# Patient Record
Sex: FEMALE | Race: OTHER | NOT HISPANIC OR LATINO
[De-identification: names, ages, dates, MRNs, and addresses within clinical notes are randomized per-mention and may not be internally consistent; named-entity substitution may affect disease eponyms.]

---

## 2017-05-09 PROBLEM — Z00.00 ENCOUNTER FOR PREVENTIVE HEALTH EXAMINATION: Status: ACTIVE | Noted: 2017-05-09

## 2017-05-16 ENCOUNTER — APPOINTMENT (OUTPATIENT)
Dept: PULMONOLOGY | Facility: CLINIC | Age: 82
End: 2017-05-16

## 2017-05-23 ENCOUNTER — OUTPATIENT (OUTPATIENT)
Dept: OUTPATIENT SERVICES | Facility: HOSPITAL | Age: 82
LOS: 1 days | End: 2017-05-23
Payer: MEDICARE

## 2017-05-23 PROCEDURE — 78815 PET IMAGE W/CT SKULL-THIGH: CPT | Mod: 26,PI

## 2017-05-23 PROCEDURE — A9552: CPT

## 2017-05-23 PROCEDURE — 78815 PET IMAGE W/CT SKULL-THIGH: CPT

## 2017-05-26 ENCOUNTER — APPOINTMENT (OUTPATIENT)
Dept: PULMONOLOGY | Facility: CLINIC | Age: 82
End: 2017-05-26

## 2017-05-26 VITALS
HEART RATE: 67 BPM | DIASTOLIC BLOOD PRESSURE: 72 MMHG | WEIGHT: 149 LBS | SYSTOLIC BLOOD PRESSURE: 128 MMHG | OXYGEN SATURATION: 98 % | BODY MASS INDEX: 25.13 KG/M2 | TEMPERATURE: 96.9 F | HEIGHT: 64.5 IN

## 2017-05-26 DIAGNOSIS — Z86.79 PERSONAL HISTORY OF OTHER DISEASES OF THE CIRCULATORY SYSTEM: ICD-10-CM

## 2017-05-26 DIAGNOSIS — Z86.39 PERSONAL HISTORY OF OTHER ENDOCRINE, NUTRITIONAL AND METABOLIC DISEASE: ICD-10-CM

## 2017-05-26 DIAGNOSIS — Z80.3 FAMILY HISTORY OF MALIGNANT NEOPLASM OF BREAST: ICD-10-CM

## 2017-05-26 DIAGNOSIS — F17.200 NICOTINE DEPENDENCE, UNSPECIFIED, UNCOMPLICATED: ICD-10-CM

## 2017-05-26 DIAGNOSIS — Z87.442 PERSONAL HISTORY OF URINARY CALCULI: ICD-10-CM

## 2017-05-26 RX ORDER — ASPIRIN 81 MG
81 TABLET, DELAYED RELEASE (ENTERIC COATED) ORAL
Refills: 0 | Status: ACTIVE | COMMUNITY

## 2017-05-26 RX ORDER — FOLIC ACID 20 MG
CAPSULE ORAL
Refills: 0 | Status: ACTIVE | COMMUNITY

## 2017-05-26 RX ORDER — LISINOPRIL 30 MG/1
TABLET ORAL
Refills: 0 | Status: ACTIVE | COMMUNITY

## 2017-05-30 LAB
ALBUMIN SERPL ELPH-MCNC: 4.5 G/DL
ALP BLD-CCNC: 47 U/L
ALT SERPL-CCNC: 9 U/L
ANION GAP SERPL CALC-SCNC: 16 MMOL/L
APTT BLD: 28.7 SEC
AST SERPL-CCNC: 16 U/L
BASOPHILS # BLD AUTO: 0.03 K/UL
BASOPHILS NFR BLD AUTO: 0.3 %
BILIRUB SERPL-MCNC: 0.3 MG/DL
BUN SERPL-MCNC: 30 MG/DL
CALCIUM SERPL-MCNC: 10.8 MG/DL
CHLORIDE SERPL-SCNC: 106 MMOL/L
CO2 SERPL-SCNC: 20 MMOL/L
CREAT SERPL-MCNC: 1.79 MG/DL
EOSINOPHIL # BLD AUTO: 0.24 K/UL
EOSINOPHIL NFR BLD AUTO: 2.1 %
GLUCOSE SERPL-MCNC: 104 MG/DL
HCT VFR BLD CALC: 38.5 %
HGB BLD-MCNC: 12.9 G/DL
IMM GRANULOCYTES NFR BLD AUTO: 0.2 %
INR PPP: 1.04 RATIO
LYMPHOCYTES # BLD AUTO: 3.54 K/UL
LYMPHOCYTES NFR BLD AUTO: 31.4 %
MAN DIFF?: NORMAL
MCHC RBC-ENTMCNC: 27.3 PG
MCHC RBC-ENTMCNC: 33.5 GM/DL
MCV RBC AUTO: 81.6 FL
MONOCYTES # BLD AUTO: 0.7 K/UL
MONOCYTES NFR BLD AUTO: 6.2 %
NEUTROPHILS # BLD AUTO: 6.73 K/UL
NEUTROPHILS NFR BLD AUTO: 59.8 %
PLATELET # BLD AUTO: 333 K/UL
POTASSIUM SERPL-SCNC: 5.3 MMOL/L
PROT SERPL-MCNC: 7.6 G/DL
PT BLD: 11.8 SEC
RBC # BLD: 4.72 M/UL
RBC # FLD: 14.9 %
SODIUM SERPL-SCNC: 142 MMOL/L
WBC # FLD AUTO: 11.26 K/UL

## 2017-06-05 ENCOUNTER — APPOINTMENT (OUTPATIENT)
Dept: PULMONOLOGY | Facility: CLINIC | Age: 82
End: 2017-06-05

## 2017-06-05 VITALS
HEART RATE: 68 BPM | OXYGEN SATURATION: 98 % | HEIGHT: 64.5 IN | WEIGHT: 146 LBS | DIASTOLIC BLOOD PRESSURE: 70 MMHG | SYSTOLIC BLOOD PRESSURE: 130 MMHG | BODY MASS INDEX: 24.62 KG/M2 | TEMPERATURE: 97.9 F

## 2017-06-07 LAB
ANION GAP SERPL CALC-SCNC: 21 MMOL/L
BUN SERPL-MCNC: 29 MG/DL
CALCIUM SERPL-MCNC: 11 MG/DL
CHLORIDE SERPL-SCNC: 107 MMOL/L
CO2 SERPL-SCNC: 19 MMOL/L
CREAT SERPL-MCNC: 1.76 MG/DL
GLUCOSE SERPL-MCNC: 102 MG/DL
POTASSIUM SERPL-SCNC: 5.6 MMOL/L
SODIUM SERPL-SCNC: 147 MMOL/L

## 2017-06-14 ENCOUNTER — RESULT REVIEW (OUTPATIENT)
Age: 82
End: 2017-06-14

## 2017-06-14 ENCOUNTER — APPOINTMENT (OUTPATIENT)
Dept: PULMONOLOGY | Facility: HOSPITAL | Age: 82
End: 2017-06-14

## 2017-06-14 ENCOUNTER — OUTPATIENT (OUTPATIENT)
Dept: OUTPATIENT SERVICES | Facility: HOSPITAL | Age: 82
LOS: 1 days | Discharge: HOME | End: 2017-06-14
Payer: MEDICARE

## 2017-06-14 LAB
BASE EXCESS BLDV CALC-SCNC: -2.1 MMOL/L — SIGNIFICANT CHANGE UP
CA-I SERPL-SCNC: 1.34 MMOL/L — HIGH (ref 1.12–1.3)
GAS PNL BLDV: 144 MMOL/L — SIGNIFICANT CHANGE UP (ref 138–146)
GAS PNL BLDV: SIGNIFICANT CHANGE UP
GAS PNL BLDV: SIGNIFICANT CHANGE UP
HCO3 BLDV-SCNC: 24 MMOL/L — SIGNIFICANT CHANGE UP (ref 20–27)
PCO2 BLDV: 44 MMHG — SIGNIFICANT CHANGE UP (ref 41–51)
PH BLDV: 7.35 — SIGNIFICANT CHANGE UP (ref 7.32–7.43)
PO2 BLDV: 16 MMHG — SIGNIFICANT CHANGE UP
POTASSIUM BLDV-SCNC: 4.7 MMOL/L — SIGNIFICANT CHANGE UP (ref 3.5–4.9)
SAO2 % BLDV: 21 % — SIGNIFICANT CHANGE UP

## 2017-06-14 PROCEDURE — 88173 CYTOPATH EVAL FNA REPORT: CPT

## 2017-06-14 PROCEDURE — 31652 BRONCH EBUS SAMPLNG 1/2 NODE: CPT

## 2017-06-14 PROCEDURE — 31624 DX BRONCHOSCOPE/LAVAGE: CPT

## 2017-06-14 PROCEDURE — 84132 ASSAY OF SERUM POTASSIUM: CPT

## 2017-06-14 PROCEDURE — 31625 BRONCHOSCOPY W/BIOPSY(S): CPT

## 2017-06-14 PROCEDURE — 84295 ASSAY OF SERUM SODIUM: CPT

## 2017-06-14 PROCEDURE — 88112 CYTOPATH CELL ENHANCE TECH: CPT

## 2017-06-14 PROCEDURE — 88305 TISSUE EXAM BY PATHOLOGIST: CPT

## 2017-06-14 PROCEDURE — 82330 ASSAY OF CALCIUM: CPT

## 2017-06-14 PROCEDURE — 87102 FUNGUS ISOLATION CULTURE: CPT

## 2017-06-14 PROCEDURE — 87070 CULTURE OTHR SPECIMN AEROBIC: CPT

## 2017-06-14 PROCEDURE — 88341 IMHCHEM/IMCYTCHM EA ADD ANTB: CPT

## 2017-06-14 PROCEDURE — 87116 MYCOBACTERIA CULTURE: CPT

## 2017-06-14 PROCEDURE — 87206 SMEAR FLUORESCENT/ACID STAI: CPT

## 2017-06-14 PROCEDURE — 87015 SPECIMEN INFECT AGNT CONCNTJ: CPT

## 2017-06-14 PROCEDURE — 82803 BLOOD GASES ANY COMBINATION: CPT

## 2017-06-15 LAB
GRAM STN FLD: SIGNIFICANT CHANGE UP
NIGHT BLUE STAIN TISS: SIGNIFICANT CHANGE UP
NON-GYNECOLOGICAL CYTOLOGY STUDY: SIGNIFICANT CHANGE UP
SPECIMEN SOURCE: SIGNIFICANT CHANGE UP
SPECIMEN SOURCE: SIGNIFICANT CHANGE UP

## 2017-06-16 LAB
CULTURE RESULTS: NO GROWTH — SIGNIFICANT CHANGE UP
SPECIMEN SOURCE: SIGNIFICANT CHANGE UP
SURGICAL PATHOLOGY STUDY: SIGNIFICANT CHANGE UP

## 2017-06-20 DIAGNOSIS — C77.1 SECONDARY AND UNSPECIFIED MALIGNANT NEOPLASM OF INTRATHORACIC LYMPH NODES: ICD-10-CM

## 2017-06-20 DIAGNOSIS — E11.9 TYPE 2 DIABETES MELLITUS WITHOUT COMPLICATIONS: ICD-10-CM

## 2017-06-20 DIAGNOSIS — F17.200 NICOTINE DEPENDENCE, UNSPECIFIED, UNCOMPLICATED: ICD-10-CM

## 2017-06-20 DIAGNOSIS — C34.11 MALIGNANT NEOPLASM OF UPPER LOBE, RIGHT BRONCHUS OR LUNG: ICD-10-CM

## 2017-06-20 DIAGNOSIS — I10 ESSENTIAL (PRIMARY) HYPERTENSION: ICD-10-CM

## 2017-06-22 ENCOUNTER — APPOINTMENT (OUTPATIENT)
Dept: THORACIC SURGERY | Facility: CLINIC | Age: 82
End: 2017-06-22

## 2017-06-22 VITALS
OXYGEN SATURATION: 97 % | TEMPERATURE: 98.2 F | WEIGHT: 150 LBS | RESPIRATION RATE: 17 BRPM | HEART RATE: 58 BPM | HEIGHT: 59 IN | DIASTOLIC BLOOD PRESSURE: 58 MMHG | BODY MASS INDEX: 30.24 KG/M2 | SYSTOLIC BLOOD PRESSURE: 126 MMHG

## 2017-06-23 RX ORDER — AZITHROMYCIN 250 MG/1
250 TABLET, FILM COATED ORAL
Qty: 6 | Refills: 0 | Status: COMPLETED | COMMUNITY
Start: 2017-03-27

## 2017-06-23 RX ORDER — OXYCODONE AND ACETAMINOPHEN 5; 325 MG/1; MG/1
5-325 TABLET ORAL
Qty: 12 | Refills: 0 | Status: COMPLETED | COMMUNITY
Start: 2017-05-02

## 2017-06-23 RX ORDER — OMEPRAZOLE 20 MG/1
20 CAPSULE, DELAYED RELEASE ORAL
Qty: 30 | Refills: 0 | Status: COMPLETED | COMMUNITY
Start: 2017-04-08

## 2017-06-23 RX ORDER — METFORMIN ER 500 MG 500 MG/1
500 TABLET ORAL
Qty: 90 | Refills: 0 | Status: COMPLETED | COMMUNITY
Start: 2016-03-24

## 2017-06-23 RX ORDER — FUROSEMIDE 20 MG/1
20 TABLET ORAL
Qty: 30 | Refills: 0 | Status: ACTIVE | COMMUNITY
Start: 2017-04-08

## 2017-06-23 RX ORDER — QUETIAPINE FUMARATE 25 MG/1
25 TABLET ORAL
Qty: 30 | Refills: 0 | Status: COMPLETED | COMMUNITY
Start: 2017-04-08

## 2017-06-23 RX ORDER — FLUTICASONE PROPIONATE 50 UG/1
50 SPRAY, METERED NASAL
Qty: 16 | Refills: 0 | Status: COMPLETED | COMMUNITY
Start: 2017-03-27

## 2017-06-23 RX ORDER — IBUPROFEN 600 MG/1
600 TABLET, FILM COATED ORAL
Qty: 28 | Refills: 0 | Status: COMPLETED | COMMUNITY
Start: 2017-05-02

## 2017-06-23 RX ORDER — CEPHALEXIN 500 MG/1
500 CAPSULE ORAL
Qty: 28 | Refills: 0 | Status: COMPLETED | COMMUNITY
Start: 2017-05-02

## 2017-07-06 ENCOUNTER — OUTPATIENT (OUTPATIENT)
Dept: OUTPATIENT SERVICES | Facility: HOSPITAL | Age: 82
LOS: 1 days | End: 2017-07-06
Payer: MEDICARE

## 2017-07-06 ENCOUNTER — APPOINTMENT (OUTPATIENT)
Dept: THORACIC SURGERY | Facility: CLINIC | Age: 82
End: 2017-07-06

## 2017-07-06 VITALS
RESPIRATION RATE: 20 BRPM | WEIGHT: 151 LBS | DIASTOLIC BLOOD PRESSURE: 62 MMHG | BODY MASS INDEX: 30.5 KG/M2 | HEART RATE: 98 BPM | TEMPERATURE: 97.9 F | OXYGEN SATURATION: 97 % | SYSTOLIC BLOOD PRESSURE: 143 MMHG

## 2017-07-06 DIAGNOSIS — R91.8 OTHER NONSPECIFIC ABNORMAL FINDING OF LUNG FIELD: ICD-10-CM

## 2017-07-06 DIAGNOSIS — R91.1 SOLITARY PULMONARY NODULE: ICD-10-CM

## 2017-07-06 LAB
ALBUMIN SERPL ELPH-MCNC: 4.2 G/DL — SIGNIFICANT CHANGE UP (ref 3.3–5)
ALP SERPL-CCNC: 59 U/L — SIGNIFICANT CHANGE UP (ref 40–120)
ALT FLD-CCNC: 8 U/L — LOW (ref 10–45)
ANION GAP SERPL CALC-SCNC: 16 MMOL/L — SIGNIFICANT CHANGE UP (ref 5–17)
APTT BLD: 28.1 SEC — SIGNIFICANT CHANGE UP (ref 27.5–37.4)
AST SERPL-CCNC: 12 U/L — SIGNIFICANT CHANGE UP (ref 10–40)
BASOPHILS NFR BLD AUTO: 0.5 % — SIGNIFICANT CHANGE UP (ref 0–2)
BILIRUB SERPL-MCNC: 0.2 MG/DL — SIGNIFICANT CHANGE UP (ref 0.2–1.2)
BUN SERPL-MCNC: 31 MG/DL — HIGH (ref 7–23)
CALCIUM SERPL-MCNC: 9.9 MG/DL — SIGNIFICANT CHANGE UP (ref 8.4–10.5)
CHLORIDE SERPL-SCNC: 101 MMOL/L — SIGNIFICANT CHANGE UP (ref 96–108)
CO2 SERPL-SCNC: 25 MMOL/L — SIGNIFICANT CHANGE UP (ref 22–31)
CREAT SERPL-MCNC: 2.3 MG/DL — HIGH (ref 0.5–1.3)
EOSINOPHIL NFR BLD AUTO: 2.5 % — SIGNIFICANT CHANGE UP (ref 0–6)
GLUCOSE SERPL-MCNC: 130 MG/DL — HIGH (ref 70–99)
HCT VFR BLD CALC: 36.3 % — SIGNIFICANT CHANGE UP (ref 34.5–45)
HGB BLD-MCNC: 12.9 G/DL — SIGNIFICANT CHANGE UP (ref 11.5–15.5)
INR BLD: 1.07 — SIGNIFICANT CHANGE UP (ref 0.88–1.16)
LYMPHOCYTES # BLD AUTO: 30.4 % — SIGNIFICANT CHANGE UP (ref 13–44)
MCHC RBC-ENTMCNC: 28.2 PG — SIGNIFICANT CHANGE UP (ref 27–34)
MCHC RBC-ENTMCNC: 35.5 G/DL — SIGNIFICANT CHANGE UP (ref 32–36)
MCV RBC AUTO: 79.3 FL — LOW (ref 80–100)
MONOCYTES NFR BLD AUTO: 8.4 % — SIGNIFICANT CHANGE UP (ref 2–14)
NEUTROPHILS NFR BLD AUTO: 58.2 % — SIGNIFICANT CHANGE UP (ref 43–77)
PLATELET # BLD AUTO: 346 K/UL — SIGNIFICANT CHANGE UP (ref 150–400)
POTASSIUM SERPL-MCNC: 4.8 MMOL/L — SIGNIFICANT CHANGE UP (ref 3.5–5.3)
POTASSIUM SERPL-SCNC: 4.8 MMOL/L — SIGNIFICANT CHANGE UP (ref 3.5–5.3)
PROT SERPL-MCNC: 7 G/DL — SIGNIFICANT CHANGE UP (ref 6–8.3)
PROTHROM AB SERPL-ACNC: 11.9 SEC — SIGNIFICANT CHANGE UP (ref 9.8–12.7)
RBC # BLD: 4.58 M/UL — SIGNIFICANT CHANGE UP (ref 3.8–5.2)
RBC # FLD: 14.3 % — SIGNIFICANT CHANGE UP (ref 10.3–16.9)
SODIUM SERPL-SCNC: 142 MMOL/L — SIGNIFICANT CHANGE UP (ref 135–145)
WBC # BLD: 10.8 K/UL — HIGH (ref 3.8–10.5)
WBC # FLD AUTO: 10.8 K/UL — HIGH (ref 3.8–10.5)

## 2017-07-06 PROCEDURE — 85025 COMPLETE CBC W/AUTO DIFF WBC: CPT

## 2017-07-06 PROCEDURE — 85610 PROTHROMBIN TIME: CPT

## 2017-07-06 PROCEDURE — 80053 COMPREHEN METABOLIC PANEL: CPT

## 2017-07-06 PROCEDURE — 36415 COLL VENOUS BLD VENIPUNCTURE: CPT

## 2017-07-06 PROCEDURE — 85730 THROMBOPLASTIN TIME PARTIAL: CPT

## 2017-07-19 LAB
CULTURE RESULTS: SIGNIFICANT CHANGE UP
SPECIMEN SOURCE: SIGNIFICANT CHANGE UP

## 2017-07-28 ENCOUNTER — RESULT REVIEW (OUTPATIENT)
Age: 82
End: 2017-07-28

## 2017-07-28 ENCOUNTER — OUTPATIENT (OUTPATIENT)
Dept: OUTPATIENT SERVICES | Facility: HOSPITAL | Age: 82
LOS: 1 days | End: 2017-07-28
Payer: MEDICARE

## 2017-07-28 PROCEDURE — 32405: CPT

## 2017-07-28 PROCEDURE — 88360 TUMOR IMMUNOHISTOCHEM/MANUAL: CPT

## 2017-07-28 PROCEDURE — 88342 IMHCHEM/IMCYTCHM 1ST ANTB: CPT

## 2017-07-28 PROCEDURE — 88173 CYTOPATH EVAL FNA REPORT: CPT

## 2017-07-28 PROCEDURE — 71035: CPT | Mod: 26

## 2017-07-28 PROCEDURE — 71035: CPT

## 2017-07-28 PROCEDURE — 77012 CT SCAN FOR NEEDLE BIOPSY: CPT | Mod: 26

## 2017-07-28 PROCEDURE — 88305 TISSUE EXAM BY PATHOLOGIST: CPT

## 2017-07-28 PROCEDURE — 77012 CT SCAN FOR NEEDLE BIOPSY: CPT

## 2017-07-28 PROCEDURE — 88341 IMHCHEM/IMCYTCHM EA ADD ANTB: CPT

## 2017-07-29 LAB
CULTURE RESULTS: SIGNIFICANT CHANGE UP
SPECIMEN SOURCE: SIGNIFICANT CHANGE UP

## 2017-08-01 LAB — NON-GYNECOLOGICAL CYTOLOGY STUDY: SIGNIFICANT CHANGE UP

## 2017-08-02 LAB — SURGICAL PATHOLOGY STUDY: SIGNIFICANT CHANGE UP

## 2017-08-04 ENCOUNTER — APPOINTMENT (OUTPATIENT)
Dept: THORACIC SURGERY | Facility: CLINIC | Age: 82
End: 2017-08-04

## 2017-08-07 ENCOUNTER — APPOINTMENT (OUTPATIENT)
Dept: THORACIC SURGERY | Facility: CLINIC | Age: 82
End: 2017-08-07

## 2017-08-07 ENCOUNTER — APPOINTMENT (OUTPATIENT)
Dept: THORACIC SURGERY | Facility: CLINIC | Age: 82
End: 2017-08-07
Payer: MEDICARE

## 2017-08-07 VITALS
RESPIRATION RATE: 17 BRPM | HEART RATE: 66 BPM | DIASTOLIC BLOOD PRESSURE: 65 MMHG | TEMPERATURE: 97.9 F | OXYGEN SATURATION: 95 % | BODY MASS INDEX: 31.1 KG/M2 | SYSTOLIC BLOOD PRESSURE: 150 MMHG | WEIGHT: 154 LBS

## 2017-08-07 DIAGNOSIS — R59.0 LOCALIZED ENLARGED LYMPH NODES: ICD-10-CM

## 2017-08-07 PROCEDURE — 99215 OFFICE O/P EST HI 40 MIN: CPT

## 2017-08-12 PROBLEM — R59.0 LYMPHADENOPATHY, MEDIASTINAL: Status: ACTIVE | Noted: 2017-05-26

## 2017-08-15 RX ORDER — DEXAMETHASONE 0.5 MG/5ML
20 ELIXIR ORAL ONCE
Qty: 0 | Refills: 0 | Status: DISCONTINUED | OUTPATIENT
Start: 2017-09-12 | End: 2017-09-27

## 2017-08-15 RX ORDER — CARBOPLATIN 50 MG
106 VIAL (EA) INTRAVENOUS ONCE
Qty: 0 | Refills: 0 | Status: DISCONTINUED | OUTPATIENT
Start: 2017-09-12 | End: 2017-09-27

## 2017-08-15 RX ORDER — ONDANSETRON 8 MG/1
16 TABLET, FILM COATED ORAL ONCE
Qty: 0 | Refills: 0 | Status: DISCONTINUED | OUTPATIENT
Start: 2017-09-12 | End: 2017-09-27

## 2017-08-15 RX ORDER — PACLITAXEL 6 MG/ML
80 INJECTION, SOLUTION, CONCENTRATE INTRAVENOUS ONCE
Qty: 0 | Refills: 0 | Status: DISCONTINUED | OUTPATIENT
Start: 2017-09-12 | End: 2017-09-27

## 2017-09-06 ENCOUNTER — OUTPATIENT (OUTPATIENT)
Dept: OUTPATIENT SERVICES | Facility: HOSPITAL | Age: 82
LOS: 1 days | End: 2017-09-06
Payer: MEDICARE

## 2017-09-06 PROCEDURE — 71250 CT THORAX DX C-: CPT

## 2017-09-06 PROCEDURE — 70551 MRI BRAIN STEM W/O DYE: CPT

## 2017-09-06 PROCEDURE — 70551 MRI BRAIN STEM W/O DYE: CPT | Mod: 26

## 2017-09-06 PROCEDURE — 71250 CT THORAX DX C-: CPT | Mod: 26

## 2017-09-12 ENCOUNTER — OUTPATIENT (OUTPATIENT)
Dept: OUTPATIENT SERVICES | Facility: HOSPITAL | Age: 82
LOS: 1 days | End: 2017-09-12
Payer: MEDICARE

## 2017-09-12 DIAGNOSIS — C34.90 MALIGNANT NEOPLASM OF UNSPECIFIED PART OF UNSPECIFIED BRONCHUS OR LUNG: ICD-10-CM

## 2017-09-12 DIAGNOSIS — R11.2 NAUSEA WITH VOMITING, UNSPECIFIED: ICD-10-CM

## 2017-09-12 PROCEDURE — 96417 CHEMO IV INFUS EACH ADDL SEQ: CPT

## 2017-09-12 PROCEDURE — 96367 TX/PROPH/DG ADDL SEQ IV INF: CPT

## 2017-09-12 PROCEDURE — 96413 CHEMO IV INFUSION 1 HR: CPT

## 2017-09-18 RX ORDER — CARBOPLATIN 50 MG
106 VIAL (EA) INTRAVENOUS ONCE
Qty: 0 | Refills: 0 | Status: DISCONTINUED | OUTPATIENT
Start: 2017-09-19 | End: 2017-10-04

## 2017-09-18 RX ORDER — ONDANSETRON 8 MG/1
16 TABLET, FILM COATED ORAL ONCE
Qty: 0 | Refills: 0 | Status: DISCONTINUED | OUTPATIENT
Start: 2017-09-19 | End: 2017-10-04

## 2017-09-18 RX ORDER — DEXAMETHASONE 0.5 MG/5ML
20 ELIXIR ORAL ONCE
Qty: 0 | Refills: 0 | Status: DISCONTINUED | OUTPATIENT
Start: 2017-09-19 | End: 2017-10-04

## 2017-09-18 RX ORDER — PACLITAXEL 6 MG/ML
80 INJECTION, SOLUTION, CONCENTRATE INTRAVENOUS ONCE
Qty: 0 | Refills: 0 | Status: DISCONTINUED | OUTPATIENT
Start: 2017-09-19 | End: 2017-10-04

## 2017-09-19 ENCOUNTER — OUTPATIENT (OUTPATIENT)
Dept: OUTPATIENT SERVICES | Facility: HOSPITAL | Age: 82
LOS: 1 days | End: 2017-09-19
Payer: MEDICARE

## 2017-09-19 DIAGNOSIS — C34.90 MALIGNANT NEOPLASM OF UNSPECIFIED PART OF UNSPECIFIED BRONCHUS OR LUNG: ICD-10-CM

## 2017-09-19 DIAGNOSIS — R11.2 NAUSEA WITH VOMITING, UNSPECIFIED: ICD-10-CM

## 2017-09-19 PROCEDURE — 96375 TX/PRO/DX INJ NEW DRUG ADDON: CPT

## 2017-09-19 PROCEDURE — 96417 CHEMO IV INFUS EACH ADDL SEQ: CPT

## 2017-09-19 PROCEDURE — 96413 CHEMO IV INFUSION 1 HR: CPT

## 2017-09-25 RX ORDER — PACLITAXEL 6 MG/ML
80 INJECTION, SOLUTION, CONCENTRATE INTRAVENOUS ONCE
Qty: 0 | Refills: 0 | Status: DISCONTINUED | OUTPATIENT
Start: 2017-09-26 | End: 2017-10-11

## 2017-09-25 RX ORDER — DEXAMETHASONE 0.5 MG/5ML
20 ELIXIR ORAL ONCE
Qty: 0 | Refills: 0 | Status: DISCONTINUED | OUTPATIENT
Start: 2017-09-26 | End: 2017-10-11

## 2017-09-25 RX ORDER — ONDANSETRON 8 MG/1
16 TABLET, FILM COATED ORAL ONCE
Qty: 0 | Refills: 0 | Status: DISCONTINUED | OUTPATIENT
Start: 2017-09-26 | End: 2017-10-11

## 2017-09-25 RX ORDER — CARBOPLATIN 50 MG
106 VIAL (EA) INTRAVENOUS ONCE
Qty: 0 | Refills: 0 | Status: DISCONTINUED | OUTPATIENT
Start: 2017-09-26 | End: 2017-10-11

## 2017-09-26 ENCOUNTER — OUTPATIENT (OUTPATIENT)
Dept: OUTPATIENT SERVICES | Facility: HOSPITAL | Age: 82
LOS: 1 days | End: 2017-09-26
Payer: COMMERCIAL

## 2017-09-26 DIAGNOSIS — R11.2 NAUSEA WITH VOMITING, UNSPECIFIED: ICD-10-CM

## 2017-09-26 DIAGNOSIS — C34.90 MALIGNANT NEOPLASM OF UNSPECIFIED PART OF UNSPECIFIED BRONCHUS OR LUNG: ICD-10-CM

## 2017-09-26 PROCEDURE — 96417 CHEMO IV INFUS EACH ADDL SEQ: CPT

## 2017-09-26 PROCEDURE — 96367 TX/PROPH/DG ADDL SEQ IV INF: CPT

## 2017-09-26 PROCEDURE — 96375 TX/PRO/DX INJ NEW DRUG ADDON: CPT

## 2017-09-26 PROCEDURE — 96413 CHEMO IV INFUSION 1 HR: CPT

## 2017-10-02 RX ORDER — DEXAMETHASONE 0.5 MG/5ML
20 ELIXIR ORAL ONCE
Qty: 0 | Refills: 0 | Status: DISCONTINUED | OUTPATIENT
Start: 2017-10-03 | End: 2017-10-18

## 2017-10-02 RX ORDER — CARBOPLATIN 50 MG
106 VIAL (EA) INTRAVENOUS ONCE
Qty: 0 | Refills: 0 | Status: DISCONTINUED | OUTPATIENT
Start: 2017-10-03 | End: 2017-10-18

## 2017-10-02 RX ORDER — PACLITAXEL 6 MG/ML
80 INJECTION, SOLUTION, CONCENTRATE INTRAVENOUS ONCE
Qty: 0 | Refills: 0 | Status: DISCONTINUED | OUTPATIENT
Start: 2017-10-03 | End: 2017-10-18

## 2017-10-02 RX ORDER — ONDANSETRON 8 MG/1
16 TABLET, FILM COATED ORAL ONCE
Qty: 0 | Refills: 0 | Status: DISCONTINUED | OUTPATIENT
Start: 2017-10-03 | End: 2017-10-18

## 2017-10-03 ENCOUNTER — OUTPATIENT (OUTPATIENT)
Dept: OUTPATIENT SERVICES | Facility: HOSPITAL | Age: 82
LOS: 1 days | End: 2017-10-03
Payer: MEDICARE

## 2017-10-03 DIAGNOSIS — C34.90 MALIGNANT NEOPLASM OF UNSPECIFIED PART OF UNSPECIFIED BRONCHUS OR LUNG: ICD-10-CM

## 2017-10-03 DIAGNOSIS — R11.2 NAUSEA WITH VOMITING, UNSPECIFIED: ICD-10-CM

## 2017-10-03 PROCEDURE — 96367 TX/PROPH/DG ADDL SEQ IV INF: CPT

## 2017-10-03 PROCEDURE — 96375 TX/PRO/DX INJ NEW DRUG ADDON: CPT

## 2017-10-03 PROCEDURE — 96417 CHEMO IV INFUS EACH ADDL SEQ: CPT

## 2017-10-03 PROCEDURE — 96413 CHEMO IV INFUSION 1 HR: CPT

## 2017-10-09 RX ORDER — CARBOPLATIN 50 MG
106 VIAL (EA) INTRAVENOUS ONCE
Qty: 0 | Refills: 0 | Status: DISCONTINUED | OUTPATIENT
Start: 2017-10-10 | End: 2017-10-25

## 2017-10-09 RX ORDER — DEXAMETHASONE 0.5 MG/5ML
20 ELIXIR ORAL ONCE
Qty: 0 | Refills: 0 | Status: DISCONTINUED | OUTPATIENT
Start: 2017-10-10 | End: 2017-10-25

## 2017-10-09 RX ORDER — PACLITAXEL 6 MG/ML
80 INJECTION, SOLUTION, CONCENTRATE INTRAVENOUS ONCE
Qty: 0 | Refills: 0 | Status: DISCONTINUED | OUTPATIENT
Start: 2017-10-10 | End: 2017-10-25

## 2017-10-09 RX ORDER — ONDANSETRON 8 MG/1
16 TABLET, FILM COATED ORAL ONCE
Qty: 0 | Refills: 0 | Status: DISCONTINUED | OUTPATIENT
Start: 2017-10-10 | End: 2017-10-25

## 2017-10-10 ENCOUNTER — OUTPATIENT (OUTPATIENT)
Dept: OUTPATIENT SERVICES | Facility: HOSPITAL | Age: 82
LOS: 1 days | End: 2017-10-10
Payer: MEDICARE

## 2017-10-10 DIAGNOSIS — C34.90 MALIGNANT NEOPLASM OF UNSPECIFIED PART OF UNSPECIFIED BRONCHUS OR LUNG: ICD-10-CM

## 2017-10-10 DIAGNOSIS — R11.2 NAUSEA WITH VOMITING, UNSPECIFIED: ICD-10-CM

## 2017-10-10 LAB
ALBUMIN SERPL ELPH-MCNC: 3.4 G/DL — SIGNIFICANT CHANGE UP (ref 3.3–5)
ALP SERPL-CCNC: 57 U/L — SIGNIFICANT CHANGE UP (ref 40–120)
ALT FLD-CCNC: 7 U/L — LOW (ref 10–45)
ANION GAP SERPL CALC-SCNC: 11 MMOL/L — SIGNIFICANT CHANGE UP (ref 5–17)
AST SERPL-CCNC: 9 U/L — LOW (ref 10–40)
BASOPHILS NFR BLD AUTO: 0.6 % — SIGNIFICANT CHANGE UP (ref 0–2)
BILIRUB SERPL-MCNC: 0.3 MG/DL — SIGNIFICANT CHANGE UP (ref 0.2–1.2)
BUN SERPL-MCNC: 41 MG/DL — HIGH (ref 7–23)
CALCIUM SERPL-MCNC: 9.5 MG/DL — SIGNIFICANT CHANGE UP (ref 8.4–10.5)
CHLORIDE SERPL-SCNC: 106 MMOL/L — SIGNIFICANT CHANGE UP (ref 96–108)
CO2 SERPL-SCNC: 23 MMOL/L — SIGNIFICANT CHANGE UP (ref 22–31)
CREAT SERPL-MCNC: 1.92 MG/DL — HIGH (ref 0.5–1.3)
EOSINOPHIL NFR BLD AUTO: 4.8 % — SIGNIFICANT CHANGE UP (ref 0–6)
GLUCOSE SERPL-MCNC: 137 MG/DL — HIGH (ref 70–99)
HCT VFR BLD CALC: 31.3 % — LOW (ref 34.5–45)
HGB BLD-MCNC: 11.3 G/DL — LOW (ref 11.5–15.5)
LYMPHOCYTES # BLD AUTO: 22.4 % — SIGNIFICANT CHANGE UP (ref 13–44)
MCHC RBC-ENTMCNC: 28 PG — SIGNIFICANT CHANGE UP (ref 27–34)
MCHC RBC-ENTMCNC: 36.1 G/DL — HIGH (ref 32–36)
MCV RBC AUTO: 77.7 FL — LOW (ref 80–100)
MONOCYTES NFR BLD AUTO: 10.6 % — SIGNIFICANT CHANGE UP (ref 2–14)
NEUTROPHILS NFR BLD AUTO: 61.6 % — SIGNIFICANT CHANGE UP (ref 43–77)
PLATELET # BLD AUTO: 136 K/UL — LOW (ref 150–400)
POTASSIUM SERPL-MCNC: 4.9 MMOL/L — SIGNIFICANT CHANGE UP (ref 3.5–5.3)
POTASSIUM SERPL-SCNC: 4.9 MMOL/L — SIGNIFICANT CHANGE UP (ref 3.5–5.3)
PROT SERPL-MCNC: 6.7 G/DL — SIGNIFICANT CHANGE UP (ref 6–8.3)
RBC # BLD: 4.03 M/UL — SIGNIFICANT CHANGE UP (ref 3.8–5.2)
RBC # FLD: 14.7 % — SIGNIFICANT CHANGE UP (ref 10.3–16.9)
SODIUM SERPL-SCNC: 140 MMOL/L — SIGNIFICANT CHANGE UP (ref 135–145)
WBC # BLD: 4.8 K/UL — SIGNIFICANT CHANGE UP (ref 3.8–10.5)
WBC # FLD AUTO: 4.8 K/UL — SIGNIFICANT CHANGE UP (ref 3.8–10.5)

## 2017-10-10 PROCEDURE — 96417 CHEMO IV INFUS EACH ADDL SEQ: CPT

## 2017-10-10 PROCEDURE — 85025 COMPLETE CBC W/AUTO DIFF WBC: CPT

## 2017-10-10 PROCEDURE — 96413 CHEMO IV INFUSION 1 HR: CPT

## 2017-10-10 PROCEDURE — 36415 COLL VENOUS BLD VENIPUNCTURE: CPT

## 2017-10-10 PROCEDURE — 80053 COMPREHEN METABOLIC PANEL: CPT

## 2017-10-10 PROCEDURE — 96375 TX/PRO/DX INJ NEW DRUG ADDON: CPT

## 2017-10-16 RX ORDER — PACLITAXEL 6 MG/ML
80 INJECTION, SOLUTION, CONCENTRATE INTRAVENOUS ONCE
Qty: 0 | Refills: 0 | Status: DISCONTINUED | OUTPATIENT
Start: 2017-10-17 | End: 2017-11-01

## 2017-10-16 RX ORDER — CARBOPLATIN 50 MG
106 VIAL (EA) INTRAVENOUS ONCE
Qty: 0 | Refills: 0 | Status: DISCONTINUED | OUTPATIENT
Start: 2017-10-17 | End: 2017-11-01

## 2017-10-16 RX ORDER — ONDANSETRON 8 MG/1
16 TABLET, FILM COATED ORAL ONCE
Qty: 0 | Refills: 0 | Status: DISCONTINUED | OUTPATIENT
Start: 2017-10-17 | End: 2017-11-01

## 2017-10-16 RX ORDER — DEXAMETHASONE 0.5 MG/5ML
20 ELIXIR ORAL ONCE
Qty: 0 | Refills: 0 | Status: DISCONTINUED | OUTPATIENT
Start: 2017-10-17 | End: 2017-11-01

## 2017-10-17 ENCOUNTER — APPOINTMENT (OUTPATIENT)
Dept: INFUSION THERAPY | Facility: HOSPITAL | Age: 82
End: 2017-10-17

## 2017-10-17 ENCOUNTER — OUTPATIENT (OUTPATIENT)
Dept: OUTPATIENT SERVICES | Facility: HOSPITAL | Age: 82
LOS: 1 days | End: 2017-10-17
Payer: MEDICARE

## 2017-10-17 DIAGNOSIS — C34.90 MALIGNANT NEOPLASM OF UNSPECIFIED PART OF UNSPECIFIED BRONCHUS OR LUNG: ICD-10-CM

## 2017-10-17 PROCEDURE — 96413 CHEMO IV INFUSION 1 HR: CPT

## 2017-10-17 PROCEDURE — 96375 TX/PRO/DX INJ NEW DRUG ADDON: CPT

## 2017-10-17 PROCEDURE — 96417 CHEMO IV INFUS EACH ADDL SEQ: CPT

## 2017-11-01 DIAGNOSIS — R11.2 NAUSEA WITH VOMITING, UNSPECIFIED: ICD-10-CM

## 2017-11-24 ENCOUNTER — OUTPATIENT (OUTPATIENT)
Dept: OUTPATIENT SERVICES | Facility: HOSPITAL | Age: 82
LOS: 1 days | End: 2017-11-24
Payer: MEDICARE

## 2017-11-24 PROCEDURE — 71250 CT THORAX DX C-: CPT

## 2017-11-24 PROCEDURE — 71250 CT THORAX DX C-: CPT | Mod: 26

## 2018-01-25 ENCOUNTER — APPOINTMENT (OUTPATIENT)
Dept: THORACIC SURGERY | Facility: CLINIC | Age: 83
End: 2018-01-25
Payer: COMMERCIAL

## 2018-01-25 VITALS
TEMPERATURE: 97.6 F | RESPIRATION RATE: 19 BRPM | WEIGHT: 151 LBS | DIASTOLIC BLOOD PRESSURE: 63 MMHG | HEART RATE: 84 BPM | BODY MASS INDEX: 30.5 KG/M2 | SYSTOLIC BLOOD PRESSURE: 130 MMHG | OXYGEN SATURATION: 98 %

## 2018-01-25 DIAGNOSIS — R91.8 OTHER NONSPECIFIC ABNORMAL FINDING OF LUNG FIELD: ICD-10-CM

## 2018-01-25 DIAGNOSIS — C34.90 MALIGNANT NEOPLASM OF UNSPECIFIED PART OF UNSPECIFIED BRONCHUS OR LUNG: ICD-10-CM

## 2018-01-25 DIAGNOSIS — C7A.1 MALIGNANT POORLY DIFFERENTIATED NEUROENDOCRINE TUMORS: ICD-10-CM

## 2018-01-25 PROCEDURE — 99214 OFFICE O/P EST MOD 30 MIN: CPT

## 2018-01-25 RX ORDER — OMEPRAZOLE 40 MG/1
40 CAPSULE, DELAYED RELEASE ORAL
Qty: 30 | Refills: 6 | Status: ACTIVE | COMMUNITY

## 2018-02-02 ENCOUNTER — OUTPATIENT (OUTPATIENT)
Dept: OUTPATIENT SERVICES | Facility: HOSPITAL | Age: 83
LOS: 1 days | End: 2018-02-02
Payer: MEDICARE

## 2018-02-02 LAB — GLUCOSE BLDC GLUCOMTR-MCNC: 122 MG/DL — HIGH (ref 70–99)

## 2018-02-02 PROCEDURE — A9552: CPT

## 2018-02-02 PROCEDURE — 78815 PET IMAGE W/CT SKULL-THIGH: CPT | Mod: 26

## 2018-02-02 PROCEDURE — 82962 GLUCOSE BLOOD TEST: CPT

## 2018-02-02 PROCEDURE — 78815 PET IMAGE W/CT SKULL-THIGH: CPT

## 2018-05-02 ENCOUNTER — APPOINTMENT (OUTPATIENT)
Dept: INFUSION THERAPY | Facility: HOSPITAL | Age: 83
End: 2018-05-02

## 2018-06-27 ENCOUNTER — APPOINTMENT (OUTPATIENT)
Dept: INFUSION THERAPY | Facility: HOSPITAL | Age: 83
End: 2018-06-27

## 2018-10-04 ENCOUNTER — APPOINTMENT (OUTPATIENT)
Dept: CT IMAGING | Facility: HOSPITAL | Age: 83
End: 2018-10-04
Payer: MEDICARE

## 2018-10-04 ENCOUNTER — OUTPATIENT (OUTPATIENT)
Dept: OUTPATIENT SERVICES | Facility: HOSPITAL | Age: 83
LOS: 1 days | End: 2018-10-04
Payer: MEDICARE

## 2018-10-04 PROCEDURE — 71250 CT THORAX DX C-: CPT | Mod: 26

## 2018-10-04 PROCEDURE — 71250 CT THORAX DX C-: CPT

## 2019-02-04 PROBLEM — R91.1 PULMONARY LESION: Status: ACTIVE | Noted: 2017-06-05

## 2019-03-28 ENCOUNTER — APPOINTMENT (OUTPATIENT)
Age: 84
End: 2019-03-28
Payer: COMMERCIAL

## 2019-03-28 ENCOUNTER — OUTPATIENT (OUTPATIENT)
Dept: OUTPATIENT SERVICES | Facility: HOSPITAL | Age: 84
LOS: 1 days | End: 2019-03-28
Payer: MEDICARE

## 2019-03-28 PROCEDURE — 71250 CT THORAX DX C-: CPT

## 2019-03-28 PROCEDURE — 71250 CT THORAX DX C-: CPT | Mod: 26

## 2019-05-08 ENCOUNTER — APPOINTMENT (OUTPATIENT)
Dept: OTOLARYNGOLOGY | Facility: CLINIC | Age: 84
End: 2019-05-08
Payer: MEDICARE

## 2019-05-08 VITALS
BODY MASS INDEX: 24.99 KG/M2 | HEART RATE: 75 BPM | WEIGHT: 150 LBS | OXYGEN SATURATION: 97 % | SYSTOLIC BLOOD PRESSURE: 133 MMHG | HEIGHT: 65 IN | DIASTOLIC BLOOD PRESSURE: 70 MMHG

## 2019-05-08 DIAGNOSIS — Z78.9 OTHER SPECIFIED HEALTH STATUS: ICD-10-CM

## 2019-05-08 DIAGNOSIS — Z82.49 FAMILY HISTORY OF ISCHEMIC HEART DISEASE AND OTHER DISEASES OF THE CIRCULATORY SYSTEM: ICD-10-CM

## 2019-05-08 DIAGNOSIS — Z83.3 FAMILY HISTORY OF DIABETES MELLITUS: ICD-10-CM

## 2019-05-08 DIAGNOSIS — Z80.9 FAMILY HISTORY OF MALIGNANT NEOPLASM, UNSPECIFIED: ICD-10-CM

## 2019-05-08 PROCEDURE — 99203 OFFICE O/P NEW LOW 30 MIN: CPT | Mod: 25

## 2019-05-08 PROCEDURE — 31575 DIAGNOSTIC LARYNGOSCOPY: CPT

## 2019-05-09 PROBLEM — Z80.9 FAMILY HISTORY OF MALIGNANT NEOPLASM: Status: ACTIVE | Noted: 2019-05-08

## 2019-05-09 PROBLEM — Z83.3 FAMILY HISTORY OF DIABETES MELLITUS: Status: ACTIVE | Noted: 2019-05-08

## 2019-05-09 PROBLEM — Z78.9 CAFFEINE USE: Status: ACTIVE | Noted: 2019-05-09

## 2019-05-09 PROBLEM — Z82.49 FAMILY HISTORY OF CARDIAC DISORDER: Status: ACTIVE | Noted: 2019-05-08

## 2019-05-09 PROBLEM — Z78.9 RARELY CONSUMES ALCOHOL: Status: ACTIVE | Noted: 2019-05-08

## 2019-05-09 NOTE — REVIEW OF SYSTEMS
[Patient Intake Form Reviewed] : Patient intake form was reviewed [As Noted in HPI] : as noted in HPI

## 2019-05-10 NOTE — REASON FOR VISIT
[Initial Evaluation] : an initial evaluation for [Other: _____] : [unfilled] [FreeTextEntry2] : R SMG adenocarcinoma

## 2019-05-10 NOTE — ASSESSMENT
[FreeTextEntry1] : 84F with hx DM, HTN, and lung ca treated with chemoRT in 2017, now with suspected new primary in the right SMG, poorly differentiated adenocarcinoma.\par \par Plan:\par - Last PET in 2017.  Obtain PET/CT to evaluate for metastasis of R SMG adenoca.\par - Obtain cytology slides.\par - Obtain imaging CDs for internal review.\par - R SMG excision and R MRND planned for 5/28/19.  May need post-op RT or CRT.\par - Risks of surgery explained during office visit including but not limited to risk of bleeding, risk of infection, risk of injury to the vessels of the neck, risk of injury to the nerves of the neck, risk of injury to the nerves that move the vocal cords, post op complications such as hematoma, surgical site infection, prolonged hospital stay, shoulder weakness. pt verbalized understanding.\par - Will discuss case with Dr. Wellington.\par - PST given.\par - Pt and granddaughter verbalized understanding of above.

## 2019-05-10 NOTE — DATA REVIEWED
[de-identified] : Old medical records [de-identified] : 4/2019 [facility]NA report [de-identified] : 4/2019 CT and MRI neck

## 2019-05-10 NOTE — PROCEDURE
[Image(s) Captured] : image(s) captured and filed [Lesion] : lesion identified by mirror examination needing further evaluation [None] : none [Flexible Endoscope] : examined with the flexible endoscope [de-identified] : Flexible fiberoptic laryngoscope advanced orally, no oropharyngeal lesions or masses identified, larynx visualized, adequate and symmetric cord adduction and abduction noted.

## 2019-05-10 NOTE — PHYSICAL EXAM
[Laryngoscopy Performed] : laryngoscopy was performed, see procedure section for findings [Normal] : orientation to person, place, and time: normal [de-identified] : 3 cm right submandibular mass, fixed, mild tenderness to palpation, does not appear to extend to floor of mouth mucosa, L SMG, b/l parotid, thyroid WNL

## 2019-05-10 NOTE — HISTORY OF PRESENT ILLNESS
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  Pt reports intermittent 6/10 pain, worse at night when laying on the affected side.  She reports burning sensation on the back of the tongue.  She denies dysphagia, dyspnea, fever, chills, weight loss, cervical LAD, other head and neck masses or lesions.\par \par 4/2/19 CT neck without contrast:R SMG evaluation limited by artifact, multiple thyroid nodules.\par \par 4/10/19 MRI neck without contrast: 1.9 x 1.9 x 2.9 cm mass within the right submandibular gland, no evidence of enlarged lymph nodes.\par \par 4/17/19 FNA R SMG: malignant cells present consistent with carcinoma, favor poorly differentiated adenocarcinoma.

## 2019-05-17 ENCOUNTER — RESULT REVIEW (OUTPATIENT)
Age: 84
End: 2019-05-17

## 2019-05-17 ENCOUNTER — OUTPATIENT (OUTPATIENT)
Dept: OUTPATIENT SERVICES | Facility: HOSPITAL | Age: 84
LOS: 1 days | End: 2019-05-17
Payer: MEDICARE

## 2019-05-17 DIAGNOSIS — C80.1 MALIGNANT (PRIMARY) NEOPLASM, UNSPECIFIED: ICD-10-CM

## 2019-05-17 PROCEDURE — 88321 CONSLTJ&REPRT SLD PREP ELSWR: CPT

## 2019-05-22 LAB — NON-GYNECOLOGICAL CYTOLOGY STUDY: SIGNIFICANT CHANGE UP

## 2019-05-24 VITALS
DIASTOLIC BLOOD PRESSURE: 60 MMHG | HEART RATE: 74 BPM | WEIGHT: 149.69 LBS | TEMPERATURE: 98 F | SYSTOLIC BLOOD PRESSURE: 129 MMHG | HEIGHT: 65 IN | RESPIRATION RATE: 18 BRPM | OXYGEN SATURATION: 99 %

## 2019-05-24 NOTE — ASU PREOP CHECKLIST - SELECT TESTS ORDERED
CMP/INR/PT/PTT/CT pet scan, ECHO, CT Chest/EKG/CBC CMP/INR/CBC/PT/PTT/CT pet scan, ECHO, CT Chest, BS - 122/EKG

## 2019-05-24 NOTE — ASU PATIENT PROFILE, ADULT - PMH
Adenocarcinoma of head and neck    Depression    DM (diabetes mellitus)    Heart murmur    Squamous cell carcinoma of left lung    Submandibular gland mass    Tobacco use Adenocarcinoma of head and neck    DM (diabetes mellitus)    Heart murmur    Squamous cell carcinoma of left lung    Submandibular gland mass    Tobacco use
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
## 2019-05-28 ENCOUNTER — RESULT REVIEW (OUTPATIENT)
Age: 84
End: 2019-05-28

## 2019-05-28 ENCOUNTER — OUTPATIENT (OUTPATIENT)
Dept: INPATIENT UNIT | Facility: HOSPITAL | Age: 84
LOS: 1 days | Discharge: HOME | End: 2019-05-28
Payer: COMMERCIAL

## 2019-05-28 ENCOUNTER — APPOINTMENT (OUTPATIENT)
Dept: OTOLARYNGOLOGY | Facility: HOSPITAL | Age: 84
End: 2019-05-28

## 2019-05-28 DIAGNOSIS — Z90.710 ACQUIRED ABSENCE OF BOTH CERVIX AND UTERUS: Chronic | ICD-10-CM

## 2019-05-28 PROBLEM — K11.8 OTHER DISEASES OF SALIVARY GLANDS: Chronic | Status: ACTIVE | Noted: 2019-05-24

## 2019-05-28 PROBLEM — C76.0 MALIGNANT NEOPLASM OF HEAD, FACE AND NECK: Chronic | Status: ACTIVE | Noted: 2019-05-24

## 2019-05-28 PROBLEM — Z72.0 TOBACCO USE: Chronic | Status: ACTIVE | Noted: 2019-05-24

## 2019-05-28 PROBLEM — R01.1 CARDIAC MURMUR, UNSPECIFIED: Chronic | Status: ACTIVE | Noted: 2019-05-24

## 2019-05-28 PROBLEM — C34.92 MALIGNANT NEOPLASM OF UNSPECIFIED PART OF LEFT BRONCHUS OR LUNG: Chronic | Status: ACTIVE | Noted: 2019-05-24

## 2019-05-28 PROBLEM — E11.9 TYPE 2 DIABETES MELLITUS WITHOUT COMPLICATIONS: Chronic | Status: ACTIVE | Noted: 2019-05-24

## 2019-05-28 LAB
GLUCOSE BLDC GLUCOMTR-MCNC: 122 MG/DL — HIGH (ref 70–99)
GLUCOSE BLDC GLUCOMTR-MCNC: 159 MG/DL — HIGH (ref 70–99)
GLUCOSE BLDC GLUCOMTR-MCNC: 183 MG/DL — HIGH (ref 70–99)
GLUCOSE BLDC GLUCOMTR-MCNC: 196 MG/DL — HIGH (ref 70–99)

## 2019-05-28 PROCEDURE — 38700 REMOVAL OF LYMPH NODES NECK: CPT | Mod: RT

## 2019-05-28 RX ORDER — GLUCAGON INJECTION, SOLUTION 0.5 MG/.1ML
1 INJECTION, SOLUTION SUBCUTANEOUS ONCE
Refills: 0 | Status: DISCONTINUED | OUTPATIENT
Start: 2019-05-28 | End: 2019-05-30

## 2019-05-28 RX ORDER — SODIUM CHLORIDE 9 MG/ML
1000 INJECTION INTRAMUSCULAR; INTRAVENOUS; SUBCUTANEOUS
Refills: 0 | Status: DISCONTINUED | OUTPATIENT
Start: 2019-05-28 | End: 2019-05-29

## 2019-05-28 RX ORDER — DEXTROSE 50 % IN WATER 50 %
12.5 SYRINGE (ML) INTRAVENOUS ONCE
Refills: 0 | Status: DISCONTINUED | OUTPATIENT
Start: 2019-05-28 | End: 2019-05-30

## 2019-05-28 RX ORDER — DEXTROSE 50 % IN WATER 50 %
15 SYRINGE (ML) INTRAVENOUS ONCE
Refills: 0 | Status: DISCONTINUED | OUTPATIENT
Start: 2019-05-28 | End: 2019-05-30

## 2019-05-28 RX ORDER — OXYCODONE HYDROCHLORIDE 5 MG/1
5 TABLET ORAL EVERY 4 HOURS
Refills: 0 | Status: DISCONTINUED | OUTPATIENT
Start: 2019-05-28 | End: 2019-05-29

## 2019-05-28 RX ORDER — ONDANSETRON 8 MG/1
4 TABLET, FILM COATED ORAL EVERY 4 HOURS
Refills: 0 | Status: DISCONTINUED | OUTPATIENT
Start: 2019-05-28 | End: 2019-05-30

## 2019-05-28 RX ORDER — MORPHINE SULFATE 50 MG/1
1 CAPSULE, EXTENDED RELEASE ORAL
Refills: 0 | Status: DISCONTINUED | OUTPATIENT
Start: 2019-05-28 | End: 2019-05-28

## 2019-05-28 RX ORDER — ACETAMINOPHEN 500 MG
1000 TABLET ORAL ONCE
Refills: 0 | Status: COMPLETED | OUTPATIENT
Start: 2019-05-28 | End: 2019-05-29

## 2019-05-28 RX ORDER — FUROSEMIDE 40 MG
20 TABLET ORAL DAILY
Refills: 0 | Status: DISCONTINUED | OUTPATIENT
Start: 2019-05-28 | End: 2019-05-30

## 2019-05-28 RX ORDER — MORPHINE SULFATE 50 MG/1
2 CAPSULE, EXTENDED RELEASE ORAL
Refills: 0 | Status: DISCONTINUED | OUTPATIENT
Start: 2019-05-28 | End: 2019-05-29

## 2019-05-28 RX ORDER — CEFAZOLIN SODIUM 1 G
1000 VIAL (EA) INJECTION EVERY 8 HOURS
Refills: 0 | Status: DISCONTINUED | OUTPATIENT
Start: 2019-05-28 | End: 2019-05-28

## 2019-05-28 RX ORDER — DEXTROSE 50 % IN WATER 50 %
25 SYRINGE (ML) INTRAVENOUS ONCE
Refills: 0 | Status: DISCONTINUED | OUTPATIENT
Start: 2019-05-28 | End: 2019-05-30

## 2019-05-28 RX ORDER — SODIUM CHLORIDE 9 MG/ML
1000 INJECTION, SOLUTION INTRAVENOUS
Refills: 0 | Status: DISCONTINUED | OUTPATIENT
Start: 2019-05-28 | End: 2019-05-30

## 2019-05-28 RX ORDER — PANTOPRAZOLE SODIUM 20 MG/1
40 TABLET, DELAYED RELEASE ORAL DAILY
Refills: 0 | Status: DISCONTINUED | OUTPATIENT
Start: 2019-05-28 | End: 2019-05-30

## 2019-05-28 RX ORDER — INSULIN LISPRO 100/ML
VIAL (ML) SUBCUTANEOUS
Refills: 0 | Status: DISCONTINUED | OUTPATIENT
Start: 2019-05-28 | End: 2019-05-30

## 2019-05-28 RX ORDER — CEFAZOLIN SODIUM 1 G
1000 VIAL (EA) INJECTION EVERY 8 HOURS
Refills: 0 | Status: COMPLETED | OUTPATIENT
Start: 2019-05-28 | End: 2019-05-29

## 2019-05-28 RX ADMIN — MORPHINE SULFATE 2 MILLIGRAM(S): 50 CAPSULE, EXTENDED RELEASE ORAL at 14:00

## 2019-05-28 RX ADMIN — MORPHINE SULFATE 1 MILLIGRAM(S): 50 CAPSULE, EXTENDED RELEASE ORAL at 13:07

## 2019-05-28 RX ADMIN — MORPHINE SULFATE 2 MILLIGRAM(S): 50 CAPSULE, EXTENDED RELEASE ORAL at 20:05

## 2019-05-28 RX ADMIN — Medication 30 MILLILITER(S): at 18:37

## 2019-05-28 RX ADMIN — Medication 100 MILLIGRAM(S): at 17:38

## 2019-05-28 RX ADMIN — MORPHINE SULFATE 2 MILLIGRAM(S): 50 CAPSULE, EXTENDED RELEASE ORAL at 15:00

## 2019-05-28 RX ADMIN — MORPHINE SULFATE 2 MILLIGRAM(S): 50 CAPSULE, EXTENDED RELEASE ORAL at 19:53

## 2019-05-28 RX ADMIN — Medication 20 MILLIGRAM(S): at 15:53

## 2019-05-28 RX ADMIN — MORPHINE SULFATE 1 MILLIGRAM(S): 50 CAPSULE, EXTENDED RELEASE ORAL at 12:52

## 2019-05-28 RX ADMIN — SODIUM CHLORIDE 30 MILLILITER(S): 9 INJECTION INTRAMUSCULAR; INTRAVENOUS; SUBCUTANEOUS at 13:59

## 2019-05-28 RX ADMIN — Medication 1: at 17:10

## 2019-05-28 NOTE — BRIEF OPERATIVE NOTE - NSICDXBRIEFPROCEDURE_GEN_ALL_CORE_FT
PROCEDURES:  Selective neck dissection 28-May-2019 12:09:24  Daniel Lazaro  Resection of submandibular gland 28-May-2019 12:09:04  Daniel Lazaro

## 2019-05-29 LAB
GLUCOSE BLDC GLUCOMTR-MCNC: 119 MG/DL — HIGH (ref 70–99)
GLUCOSE BLDC GLUCOMTR-MCNC: 129 MG/DL — HIGH (ref 70–99)
GLUCOSE BLDC GLUCOMTR-MCNC: 132 MG/DL — HIGH (ref 70–99)
GLUCOSE BLDC GLUCOMTR-MCNC: 142 MG/DL — HIGH (ref 70–99)
HBA1C BLD-MCNC: 5.6 % — SIGNIFICANT CHANGE UP (ref 4–5.6)

## 2019-05-29 RX ORDER — ACETAMINOPHEN 500 MG
325 TABLET ORAL EVERY 4 HOURS
Refills: 0 | Status: DISCONTINUED | OUTPATIENT
Start: 2019-05-29 | End: 2019-05-30

## 2019-05-29 RX ORDER — LANOLIN ALCOHOL/MO/W.PET/CERES
3 CREAM (GRAM) TOPICAL AT BEDTIME
Refills: 0 | Status: DISCONTINUED | OUTPATIENT
Start: 2019-05-29 | End: 2019-05-30

## 2019-05-29 RX ORDER — OXYCODONE AND ACETAMINOPHEN 5; 325 MG/1; MG/1
1 TABLET ORAL EVERY 4 HOURS
Refills: 0 | Status: DISCONTINUED | OUTPATIENT
Start: 2019-05-29 | End: 2019-05-30

## 2019-05-29 RX ORDER — ACETAMINOPHEN 500 MG
650 TABLET ORAL EVERY 6 HOURS
Refills: 0 | Status: DISCONTINUED | OUTPATIENT
Start: 2019-05-29 | End: 2019-05-30

## 2019-05-29 RX ADMIN — Medication 100 MILLIGRAM(S): at 00:12

## 2019-05-29 RX ADMIN — OXYCODONE AND ACETAMINOPHEN 1 TABLET(S): 5; 325 TABLET ORAL at 12:20

## 2019-05-29 RX ADMIN — Medication 100 MILLIGRAM(S): at 10:15

## 2019-05-29 RX ADMIN — Medication 650 MILLIGRAM(S): at 22:00

## 2019-05-29 RX ADMIN — Medication 3 MILLIGRAM(S): at 22:40

## 2019-05-29 RX ADMIN — Medication 400 MILLIGRAM(S): at 00:11

## 2019-05-29 RX ADMIN — Medication 650 MILLIGRAM(S): at 21:00

## 2019-05-29 RX ADMIN — Medication 30 MILLILITER(S): at 00:11

## 2019-05-29 RX ADMIN — OXYCODONE AND ACETAMINOPHEN 1 TABLET(S): 5; 325 TABLET ORAL at 11:37

## 2019-05-29 RX ADMIN — Medication 1000 MILLIGRAM(S): at 00:45

## 2019-05-29 RX ADMIN — PANTOPRAZOLE SODIUM 40 MILLIGRAM(S): 20 TABLET, DELAYED RELEASE ORAL at 12:28

## 2019-05-29 NOTE — H&P ADULT - NSHPPHYSICALEXAM_GEN_ALL_CORE
Physical Exam    Constitutional: normal appearance, well groomed, well nourished, and in no acute distress.   Glands: 3 cm right submandibular mass, fixed, mild tenderness to palpation, does not appear to extend to floor of mouth mucosa, L SMG, b/l parotid, thyroid WNL.     Ear: external ears are normal bilaterally.     Nasal:  external appearance is normal.     Oral Cavity: tongue is normal.   Larynx: laryngoscopy was performed, see procedure section for findings.     Head/Face: no masses and lesions seen, face is symmetric.     Eyes: ocular motility and gaze are normal and extraocular movements are normal.     Respiratory: assessment of respiratory effort is normal.       Lymphatic: no neck adenopathy.     Neurological: cranial nerves 2-12 intact and orientation to person, place, and time: normal.

## 2019-05-29 NOTE — H&P ADULT - HISTORY OF PRESENT ILLNESS
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 Pt reports intermittent 6/10 pain, worse at night when laying on the affected side. She reports burning sensation on the back of the tongue. She denies dysphagia, dyspnea, fever, chills, weight loss, cervical LAD, other head and neck masses or lesions.    4/2/19 CT neck without contrast:R SMG evaluation limited by artifact, multiple thyroid nodules.    4/10/19 MRI neck without contrast: 1.9 x 1.9 x 2.9 cm mass within the right submandibular gland, no evidence of enlarged lymph nodes.    4/17/19 FNA R SMG: malignant cells present consistent with carcinoma, favor poorly differentiated adenocarcinoma.      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)  Lung mass (786.6) (R91.8)  Lymphadenopathy, mediastinal (785.6) (R59.0)  Primary squamous cell carcinoma of lung, unspecified laterality (162.9) (C34.90)  Pulmonary lesion (518.89) (J98.4)  Submandibular gland mass (527.8) (K11.8)    Past Medical History  History of diabetes mellitus (V12.29) (Z86.39)  History of hypertension (V12.59) (Z86.79)  History of renal calculi (V13.01) (Z87.442)    Surgical History  History of Hysterectomy    Family History  Family history of cardiac disorder (V17.49) (Z82.49)  Family history of diabetes mellitus (V18.0) (Z83.3)  Family history of malignant neoplasm (V16.9) (Z80.9)  Family history of malignant neoplasm of breast (V16.3) (Z80.3) : Daughter    Social History  Caffeine use (V49.89) (Z78.9)  Current every day smoker (305.1) (F17.200)  Occupation  Rarely consumes alcohol (V49.89) (Z78.9)    Current Meds  Aspirin 81 MG TABS  Folic Acid CAPS  Furosemide 20 MG Oral Tablet  Lisinopril TABS  Omeprazole 40 MG Oral Capsule Delayed Release; TAKE 1 CAPSULE Daily

## 2019-05-29 NOTE — H&P ADULT - ASSESSMENT
Assessment  Adenocarcinoma (199.1) (C80.1)  Submandibular gland mass (527.8) (K11.8)    84F with hx DM, HTN, and lung ca treated with chemoRT in 2017, now with suspected new primary in the right SMG, poorly differentiated adenocarcinoma.    Plan:  - Last PET in 2017. Obtain PET/CT to evaluate for metastasis of R SMG adenoca.  - Obtain cytology slides.  - Obtain imaging CDs for internal review.  - R SMG excision and R MRND planned for 5/28/19. May need post-op RT or CRT.  - Risks of surgery explained during office visit including but not limited to risk of bleeding, risk of infection, risk of injury to the vessels of the neck, risk of injury to the nerves of the neck, risk of injury to the nerves that move the vocal cords, post op complications such as hematoma, surgical site infection, prolonged hospital stay, shoulder weakness. pt verbalized understanding.  - Will discuss case with Dr. Wellington.  - PST given.  - Pt and granddaughter verbalized understanding of above.     Patient now s/p SMG and SND resection, POD1. Originally not scheduled to be admitted; however has decreased PO intake post op, will keep for one more night.

## 2019-05-29 NOTE — H&P ADULT - ATTENDING COMMENTS
Alejandra began to take in more PO throughout the day today and her drain output was minimal so it was removed.  I saw her this afternoon, pain was controlled and PO intake was good, neck was soft and flat with concavity in the area of the gland excision, ok for discharge to home with 7-10 day follow up

## 2019-05-29 NOTE — H&P ADULT - NSICDXPASTMEDICALHX_GEN_ALL_CORE_FT
PAST MEDICAL HISTORY:  Adenocarcinoma of head and neck     DM (diabetes mellitus)     Heart murmur     Squamous cell carcinoma of left lung     Submandibular gland mass     Tobacco use

## 2019-05-30 ENCOUNTER — TRANSCRIPTION ENCOUNTER (OUTPATIENT)
Age: 84
End: 2019-05-30

## 2019-05-30 VITALS
SYSTOLIC BLOOD PRESSURE: 157 MMHG | HEART RATE: 79 BPM | RESPIRATION RATE: 17 BRPM | DIASTOLIC BLOOD PRESSURE: 77 MMHG | TEMPERATURE: 98 F | OXYGEN SATURATION: 99 %

## 2019-05-30 LAB
GLUCOSE BLDC GLUCOMTR-MCNC: 134 MG/DL — HIGH (ref 70–99)
GLUCOSE BLDC GLUCOMTR-MCNC: 159 MG/DL — HIGH (ref 70–99)

## 2019-05-30 PROCEDURE — 92610 EVALUATE SWALLOWING FUNCTION: CPT

## 2019-05-30 PROCEDURE — 42440 EXCISE SUBMAXILLARY GLAND: CPT | Mod: RT

## 2019-05-30 PROCEDURE — 88313 SPECIAL STAINS GROUP 2: CPT

## 2019-05-30 PROCEDURE — 88307 TISSUE EXAM BY PATHOLOGIST: CPT

## 2019-05-30 PROCEDURE — 88312 SPECIAL STAINS GROUP 1: CPT

## 2019-05-30 PROCEDURE — 36415 COLL VENOUS BLD VENIPUNCTURE: CPT

## 2019-05-30 PROCEDURE — 88341 IMHCHEM/IMCYTCHM EA ADD ANTB: CPT

## 2019-05-30 PROCEDURE — 82962 GLUCOSE BLOOD TEST: CPT

## 2019-05-30 PROCEDURE — 38724 REMOVAL OF LYMPH NODES NECK: CPT

## 2019-05-30 PROCEDURE — 88360 TUMOR IMMUNOHISTOCHEM/MANUAL: CPT

## 2019-05-30 PROCEDURE — 83036 HEMOGLOBIN GLYCOSYLATED A1C: CPT

## 2019-05-30 RX ORDER — CEFAZOLIN SODIUM 1 G
1000 VIAL (EA) INJECTION ONCE
Refills: 0 | Status: COMPLETED | OUTPATIENT
Start: 2019-05-30 | End: 2019-05-30

## 2019-05-30 RX ORDER — CEFAZOLIN SODIUM 1 G
VIAL (EA) INJECTION
Refills: 0 | Status: DISCONTINUED | OUTPATIENT
Start: 2019-05-30 | End: 2019-05-30

## 2019-05-30 RX ORDER — FUROSEMIDE 40 MG
1 TABLET ORAL
Qty: 0 | Refills: 0 | DISCHARGE
Start: 2019-05-30

## 2019-05-30 RX ORDER — CEFAZOLIN SODIUM 1 G
1000 VIAL (EA) INJECTION EVERY 8 HOURS
Refills: 0 | Status: DISCONTINUED | OUTPATIENT
Start: 2019-05-30 | End: 2019-05-30

## 2019-05-30 RX ADMIN — PANTOPRAZOLE SODIUM 40 MILLIGRAM(S): 20 TABLET, DELAYED RELEASE ORAL at 12:15

## 2019-05-30 RX ADMIN — Medication 650 MILLIGRAM(S): at 14:55

## 2019-05-30 RX ADMIN — Medication 100 MILLIGRAM(S): at 09:36

## 2019-05-30 NOTE — DISCHARGE NOTE PROVIDER - CARE PROVIDER_API CALL
Adi Fairbanks)  Otolaryngology  [address] [place] [address]  Phone: [phone]  Fax: [phone]  Follow Up Time:

## 2019-05-30 NOTE — DISCHARGE NOTE PROVIDER - NSDCCPCAREPLAN_GEN_ALL_CORE_FT
PRINCIPAL DISCHARGE DIAGNOSIS  Diagnosis: H/O submandibular gland removal  Assessment and Plan of Treatment:

## 2019-05-30 NOTE — DISCHARGE NOTE PROVIDER - HOSPITAL COURSE
Patient POD1 from SMG resection and SND of levels 1 & 2, decreased PO intake. On POD2 had increased po intake. Drain pulled and sent home in sttable condition.

## 2019-05-30 NOTE — SWALLOW BEDSIDE ASSESSMENT ADULT - COMMENTS
Received awake & alert, sitting up on edge of bed, language skills are intact at the conversational level. Pt c/o pain with swallowing on right side of neck, denies coughing or choking.

## 2019-05-30 NOTE — SWALLOW BEDSIDE ASSESSMENT ADULT - PHARYNGEAL PHASE
Within functional limits/Hyolaryngeal excursion palpated during swallow trigger, all aspects appear WNL & Pt has no c/o globus, only discomfort at drain site.

## 2019-05-30 NOTE — SWALLOW BEDSIDE ASSESSMENT ADULT - SWALLOW EVAL: DIAGNOSIS
Functional oropharyngeal swallow with no overt signs of aspiration. Pt would benefit from modified texture solids 2/2 edentulous status & not a mechanical dysphagia.

## 2019-05-30 NOTE — DISCHARGE NOTE PROVIDER - NSDCACTIVITY_GEN_ALL_CORE
Showering allowed/Do not make important decisions/Stairs allowed/Return to Work/School allowed/Sex allowed/No heavy lifting/straining/Do not drive or operate machinery/Driving allowed/Bathing allowed/Walking - Indoors allowed/Walking - Outdoors allowed

## 2019-05-30 NOTE — DISCHARGE NOTE NURSING/CASE MANAGEMENT/SOCIAL WORK - NSDCDPATPORTLINK_GEN_ALL_CORE
You can access the [facility], offered by [facility], by registering with the following website: [email or web address]

## 2019-05-30 NOTE — DISCHARGE NOTE PROVIDER - NSDCFUADDINST_GEN_ALL_CORE_FT
•	Report any fever, chills, difficulty breathing, difficulty swallowing, and change in your voice, bleeding or purulent drainage from your incision sites, or any significant swelling to your neck to the doctor immediately.  •	Diet: Soft/mechanical soft diet. No sharp foods. No extensive chewing of steaks, hard meats, etc. You can resume a normal diet once your throat feels back to normal.  •	Activity: No heavy lifting. Do not lift anything heavier than a gallon of milk until after your follow up appointment with your doctor. No strenuous activity such as running or biking.  •	Shower/Bathing: You can shower starting 48 hours after you procedure. After 48 hours, you may wash your hair and shower, but do not scrub at your neck incision.  •	Wound care: Keep your incision site clean and pat dry.  •	Take all medications as prescribed.  •	Continue all of your normal home medications unless told otherwise.  •	You may take Tylenol for mild pain.

## 2019-06-03 ENCOUNTER — APPOINTMENT (OUTPATIENT)
Dept: OTOLARYNGOLOGY | Facility: CLINIC | Age: 84
End: 2019-06-03
Payer: MEDICARE

## 2019-06-03 VITALS
OXYGEN SATURATION: 98 % | WEIGHT: 150 LBS | SYSTOLIC BLOOD PRESSURE: 147 MMHG | BODY MASS INDEX: 24.99 KG/M2 | HEART RATE: 82 BPM | HEIGHT: 65 IN | DIASTOLIC BLOOD PRESSURE: 75 MMHG

## 2019-06-03 DIAGNOSIS — K11.8 OTHER DISEASES OF SALIVARY GLANDS: ICD-10-CM

## 2019-06-03 PROCEDURE — 99024 POSTOP FOLLOW-UP VISIT: CPT

## 2019-06-04 PROBLEM — K11.8 SUBMANDIBULAR GLAND MASS: Status: ACTIVE | Noted: 2019-05-08

## 2019-06-04 NOTE — ASSESSMENT
[FreeTextEntry1] : 84F s/p 5/28/19 R SMG excision and R MRND, final path pending.\par \par - Pt has f/u appt with Dr. Wellington on Thursday.\par - Will f/u with pt with path results and treatment plan when available.\par - Advised that the lip weakness is transient and will improve with time.\par - Referred for physical therapy to maintain R shoulder/neck ROM and strength s/p neck dissection.\par - Advised to apply Vit E bid to scar and avoid direct sun exposure to minimize appearance of the scar.  After several weeks, apply sunscreen when outdoors.\par - Pt verbalized understanding of above.

## 2019-06-04 NOTE — PHYSICAL EXAM
[Normal] : orientation to person, place, and time: normal [de-identified] : well-healed, well-approximated right neck incision, no erythema, edema or discharge [de-identified] : HBIII, mild right lip weakness.  R shoulder full ROM and 5/5 motor strength [FreeTextEntry2] : mild right lip weakness

## 2019-06-04 NOTE — HISTORY OF PRESENT ILLNESS
[de-identified] : 84F is here POD6 s/p 5/28/19 R SMG excision and R MRND for adenoca, final path pending.  Pt is doing well, no complaints.

## 2019-06-04 NOTE — REASON FOR VISIT
[Post-Operative Visit] : a post-operative visit [FreeTextEntry2] : s/p 5/28/19 R SMG  excision and R MRND [Other: _____] : [unfilled]

## 2019-06-06 LAB — SURGICAL PATHOLOGY STUDY: SIGNIFICANT CHANGE UP

## 2019-06-21 ENCOUNTER — APPOINTMENT (OUTPATIENT)
Age: 84
End: 2019-06-21

## 2019-07-01 ENCOUNTER — OUTPATIENT (OUTPATIENT)
Dept: OUTPATIENT SERVICES | Facility: HOSPITAL | Age: 84
LOS: 1 days | End: 2019-07-01
Payer: MEDICARE

## 2019-07-01 ENCOUNTER — APPOINTMENT (OUTPATIENT)
Age: 84
End: 2019-07-01

## 2019-07-01 VITALS
SYSTOLIC BLOOD PRESSURE: 119 MMHG | TEMPERATURE: 99 F | RESPIRATION RATE: 18 BRPM | OXYGEN SATURATION: 99 % | HEART RATE: 71 BPM | DIASTOLIC BLOOD PRESSURE: 67 MMHG

## 2019-07-01 VITALS
RESPIRATION RATE: 18 BRPM | DIASTOLIC BLOOD PRESSURE: 69 MMHG | SYSTOLIC BLOOD PRESSURE: 124 MMHG | HEIGHT: 63 IN | HEART RATE: 59 BPM | TEMPERATURE: 98 F | OXYGEN SATURATION: 99 % | WEIGHT: 150.8 LBS

## 2019-07-01 DIAGNOSIS — C7A.1 MALIGNANT POORLY DIFFERENTIATED NEUROENDOCRINE TUMORS: ICD-10-CM

## 2019-07-01 DIAGNOSIS — Z90.710 ACQUIRED ABSENCE OF BOTH CERVIX AND UTERUS: Chronic | ICD-10-CM

## 2019-07-01 DIAGNOSIS — J43.2 CENTRILOBULAR EMPHYSEMA: ICD-10-CM

## 2019-07-01 DIAGNOSIS — C7B.8 OTHER SECONDARY NEUROENDOCRINE TUMORS: ICD-10-CM

## 2019-07-01 DIAGNOSIS — C34.90 MALIGNANT NEOPLASM OF UNSPECIFIED PART OF UNSPECIFIED BRONCHUS OR LUNG: ICD-10-CM

## 2019-07-01 DIAGNOSIS — J84.10 PULMONARY FIBROSIS, UNSPECIFIED: ICD-10-CM

## 2019-07-01 PROCEDURE — 96417 CHEMO IV INFUS EACH ADDL SEQ: CPT

## 2019-07-01 PROCEDURE — 96375 TX/PRO/DX INJ NEW DRUG ADDON: CPT

## 2019-07-01 PROCEDURE — 96367 TX/PROPH/DG ADDL SEQ IV INF: CPT

## 2019-07-01 PROCEDURE — 96413 CHEMO IV INFUSION 1 HR: CPT

## 2019-07-01 RX ORDER — CARBOPLATIN 50 MG
256 VIAL (EA) INTRAVENOUS ONCE
Refills: 0 | Status: COMPLETED | OUTPATIENT
Start: 2019-07-01 | End: 2019-07-01

## 2019-07-01 RX ORDER — DEXAMETHASONE 0.5 MG/5ML
20 ELIXIR ORAL ONCE
Refills: 0 | Status: COMPLETED | OUTPATIENT
Start: 2019-07-01 | End: 2019-07-01

## 2019-07-01 RX ORDER — FOSAPREPITANT DIMEGLUMINE 150 MG/5ML
150 INJECTION, POWDER, LYOPHILIZED, FOR SOLUTION INTRAVENOUS ONCE
Refills: 0 | Status: COMPLETED | OUTPATIENT
Start: 2019-07-01 | End: 2019-07-01

## 2019-07-01 RX ORDER — ONDANSETRON 8 MG/1
16 TABLET, FILM COATED ORAL ONCE
Refills: 0 | Status: COMPLETED | OUTPATIENT
Start: 2019-07-01 | End: 2019-07-01

## 2019-07-01 RX ORDER — ETOPOSIDE 20 MG/ML
105 VIAL (ML) INTRAVENOUS ONCE
Refills: 0 | Status: COMPLETED | OUTPATIENT
Start: 2019-07-01 | End: 2019-07-01

## 2019-07-01 RX ADMIN — Medication 336.83 MILLIGRAM(S): at 12:35

## 2019-07-01 RX ADMIN — Medication 256 MILLIGRAM(S): at 12:32

## 2019-07-01 RX ADMIN — ONDANSETRON 232 MILLIGRAM(S): 8 TABLET, FILM COATED ORAL at 10:35

## 2019-07-01 RX ADMIN — FOSAPREPITANT DIMEGLUMINE 150 MILLIGRAM(S): 150 INJECTION, POWDER, LYOPHILIZED, FOR SOLUTION INTRAVENOUS at 11:20

## 2019-07-01 RX ADMIN — Medication 20 MILLIGRAM(S): at 10:35

## 2019-07-01 RX ADMIN — FOSAPREPITANT DIMEGLUMINE 310 MILLIGRAM(S): 150 INJECTION, POWDER, LYOPHILIZED, FOR SOLUTION INTRAVENOUS at 10:50

## 2019-07-01 RX ADMIN — Medication 275.6 MILLIGRAM(S): at 11:32

## 2019-07-01 RX ADMIN — Medication 220 MILLIGRAM(S): at 10:20

## 2019-07-01 RX ADMIN — Medication 105 MILLIGRAM(S): at 14:05

## 2019-07-01 RX ADMIN — ONDANSETRON 16 MILLIGRAM(S): 8 TABLET, FILM COATED ORAL at 10:50

## 2019-07-02 ENCOUNTER — APPOINTMENT (OUTPATIENT)
Age: 84
End: 2019-07-02

## 2019-07-02 ENCOUNTER — OUTPATIENT (OUTPATIENT)
Dept: OUTPATIENT SERVICES | Facility: HOSPITAL | Age: 84
LOS: 1 days | End: 2019-07-02
Payer: MEDICARE

## 2019-07-02 VITALS
HEART RATE: 77 BPM | TEMPERATURE: 98 F | RESPIRATION RATE: 18 BRPM | OXYGEN SATURATION: 99 % | DIASTOLIC BLOOD PRESSURE: 71 MMHG | SYSTOLIC BLOOD PRESSURE: 142 MMHG

## 2019-07-02 DIAGNOSIS — Z90.710 ACQUIRED ABSENCE OF BOTH CERVIX AND UTERUS: Chronic | ICD-10-CM

## 2019-07-02 DIAGNOSIS — C34.90 MALIGNANT NEOPLASM OF UNSPECIFIED PART OF UNSPECIFIED BRONCHUS OR LUNG: ICD-10-CM

## 2019-07-02 DIAGNOSIS — C7A.1 MALIGNANT POORLY DIFFERENTIATED NEUROENDOCRINE TUMORS: ICD-10-CM

## 2019-07-02 DIAGNOSIS — C7B.8 OTHER SECONDARY NEUROENDOCRINE TUMORS: ICD-10-CM

## 2019-07-02 DIAGNOSIS — J43.2 CENTRILOBULAR EMPHYSEMA: ICD-10-CM

## 2019-07-02 DIAGNOSIS — R11.2 NAUSEA WITH VOMITING, UNSPECIFIED: ICD-10-CM

## 2019-07-02 DIAGNOSIS — J84.10 PULMONARY FIBROSIS, UNSPECIFIED: ICD-10-CM

## 2019-07-02 PROCEDURE — 96413 CHEMO IV INFUSION 1 HR: CPT

## 2019-07-02 RX ORDER — ETOPOSIDE 20 MG/ML
105 VIAL (ML) INTRAVENOUS ONCE
Refills: 0 | Status: COMPLETED | OUTPATIENT
Start: 2019-07-02 | End: 2019-07-02

## 2019-07-02 RX ADMIN — Medication 5.61 MILLIGRAM(S): at 13:54

## 2019-07-02 RX ADMIN — Medication 105 MILLIGRAM(S): at 15:24

## 2019-07-03 ENCOUNTER — APPOINTMENT (OUTPATIENT)
Age: 84
End: 2019-07-03

## 2019-07-03 ENCOUNTER — OUTPATIENT (OUTPATIENT)
Dept: OUTPATIENT SERVICES | Facility: HOSPITAL | Age: 84
LOS: 1 days | End: 2019-07-03
Payer: MEDICARE

## 2019-07-03 VITALS
TEMPERATURE: 97 F | HEART RATE: 63 BPM | HEIGHT: 63 IN | WEIGHT: 151.02 LBS | OXYGEN SATURATION: 98 % | DIASTOLIC BLOOD PRESSURE: 65 MMHG | RESPIRATION RATE: 16 BRPM | SYSTOLIC BLOOD PRESSURE: 143 MMHG

## 2019-07-03 DIAGNOSIS — C7B.8 OTHER SECONDARY NEUROENDOCRINE TUMORS: ICD-10-CM

## 2019-07-03 DIAGNOSIS — C7B.1 SECONDARY MERKEL CELL CARCINOMA: ICD-10-CM

## 2019-07-03 DIAGNOSIS — J43.2 CENTRILOBULAR EMPHYSEMA: ICD-10-CM

## 2019-07-03 DIAGNOSIS — C34.90 MALIGNANT NEOPLASM OF UNSPECIFIED PART OF UNSPECIFIED BRONCHUS OR LUNG: ICD-10-CM

## 2019-07-03 DIAGNOSIS — C7A.1 MALIGNANT POORLY DIFFERENTIATED NEUROENDOCRINE TUMORS: ICD-10-CM

## 2019-07-03 DIAGNOSIS — Z90.710 ACQUIRED ABSENCE OF BOTH CERVIX AND UTERUS: Chronic | ICD-10-CM

## 2019-07-03 DIAGNOSIS — J84.10 PULMONARY FIBROSIS, UNSPECIFIED: ICD-10-CM

## 2019-07-03 PROCEDURE — 96375 TX/PRO/DX INJ NEW DRUG ADDON: CPT

## 2019-07-03 PROCEDURE — 96372 THER/PROPH/DIAG INJ SC/IM: CPT

## 2019-07-03 PROCEDURE — 96377 APPLICATON ON-BODY INJECTOR: CPT

## 2019-07-03 PROCEDURE — 96413 CHEMO IV INFUSION 1 HR: CPT

## 2019-07-03 RX ORDER — ETOPOSIDE 20 MG/ML
105 VIAL (ML) INTRAVENOUS ONCE
Refills: 0 | Status: COMPLETED | OUTPATIENT
Start: 2019-07-03 | End: 2019-07-03

## 2019-07-03 RX ORDER — ONDANSETRON 8 MG/1
16 TABLET, FILM COATED ORAL ONCE
Refills: 0 | Status: COMPLETED | OUTPATIENT
Start: 2019-07-03 | End: 2019-07-03

## 2019-07-03 RX ORDER — DENOSUMAB 60 MG/ML
120 INJECTION SUBCUTANEOUS ONCE
Refills: 0 | Status: COMPLETED | OUTPATIENT
Start: 2019-07-03 | End: 2019-07-03

## 2019-07-03 RX ORDER — PEGFILGRASTIM-CBQV 6 MG/.6ML
6 INJECTION, SOLUTION SUBCUTANEOUS ONCE
Refills: 0 | Status: COMPLETED | OUTPATIENT
Start: 2019-07-03 | End: 2019-07-03

## 2019-07-03 RX ADMIN — ONDANSETRON 232 MILLIGRAM(S): 8 TABLET, FILM COATED ORAL at 10:00

## 2019-07-03 RX ADMIN — ONDANSETRON 16 MILLIGRAM(S): 8 TABLET, FILM COATED ORAL at 10:15

## 2019-07-03 RX ADMIN — DENOSUMAB 120 MILLIGRAM(S): 60 INJECTION SUBCUTANEOUS at 12:01

## 2019-07-03 RX ADMIN — Medication 105 MILLIGRAM(S): at 12:00

## 2019-07-03 RX ADMIN — Medication 336.83 MILLIGRAM(S): at 10:30

## 2019-07-03 RX ADMIN — PEGFILGRASTIM-CBQV 6 MILLIGRAM(S): 6 INJECTION, SOLUTION SUBCUTANEOUS at 12:05

## 2019-07-05 DIAGNOSIS — Z51.11 ENCOUNTER FOR ANTINEOPLASTIC CHEMOTHERAPY: ICD-10-CM

## 2019-07-05 DIAGNOSIS — R11.2 NAUSEA WITH VOMITING, UNSPECIFIED: ICD-10-CM

## 2019-07-24 ENCOUNTER — APPOINTMENT (OUTPATIENT)
Age: 84
End: 2019-07-24

## 2019-07-24 ENCOUNTER — OUTPATIENT (OUTPATIENT)
Dept: OUTPATIENT SERVICES | Facility: HOSPITAL | Age: 84
LOS: 1 days | End: 2019-07-24
Payer: MEDICARE

## 2019-07-24 VITALS
OXYGEN SATURATION: 99 % | WEIGHT: 151.02 LBS | SYSTOLIC BLOOD PRESSURE: 143 MMHG | HEART RATE: 67 BPM | HEIGHT: 63 IN | DIASTOLIC BLOOD PRESSURE: 70 MMHG | TEMPERATURE: 99 F | RESPIRATION RATE: 18 BRPM

## 2019-07-24 VITALS
HEART RATE: 80 BPM | OXYGEN SATURATION: 99 % | SYSTOLIC BLOOD PRESSURE: 110 MMHG | RESPIRATION RATE: 18 BRPM | DIASTOLIC BLOOD PRESSURE: 64 MMHG | TEMPERATURE: 98 F

## 2019-07-24 DIAGNOSIS — C34.90 MALIGNANT NEOPLASM OF UNSPECIFIED PART OF UNSPECIFIED BRONCHUS OR LUNG: ICD-10-CM

## 2019-07-24 DIAGNOSIS — Z90.710 ACQUIRED ABSENCE OF BOTH CERVIX AND UTERUS: Chronic | ICD-10-CM

## 2019-07-24 PROCEDURE — 96367 TX/PROPH/DG ADDL SEQ IV INF: CPT

## 2019-07-24 PROCEDURE — 96375 TX/PRO/DX INJ NEW DRUG ADDON: CPT

## 2019-07-24 PROCEDURE — 96417 CHEMO IV INFUS EACH ADDL SEQ: CPT

## 2019-07-24 PROCEDURE — 96413 CHEMO IV INFUSION 1 HR: CPT

## 2019-07-24 RX ORDER — ONDANSETRON 8 MG/1
16 TABLET, FILM COATED ORAL ONCE
Refills: 0 | Status: COMPLETED | OUTPATIENT
Start: 2019-07-24 | End: 2019-07-24

## 2019-07-24 RX ORDER — FOSAPREPITANT DIMEGLUMINE 150 MG/5ML
150 INJECTION, POWDER, LYOPHILIZED, FOR SOLUTION INTRAVENOUS ONCE
Refills: 0 | Status: COMPLETED | OUTPATIENT
Start: 2019-07-24 | End: 2019-07-24

## 2019-07-24 RX ORDER — DEXAMETHASONE 0.5 MG/5ML
20 ELIXIR ORAL ONCE
Refills: 0 | Status: COMPLETED | OUTPATIENT
Start: 2019-07-24 | End: 2019-07-24

## 2019-07-24 RX ORDER — CARBOPLATIN 50 MG
242 VIAL (EA) INTRAVENOUS ONCE
Refills: 0 | Status: COMPLETED | OUTPATIENT
Start: 2019-07-24 | End: 2019-07-24

## 2019-07-24 RX ORDER — ETOPOSIDE 20 MG/ML
105 VIAL (ML) INTRAVENOUS ONCE
Refills: 0 | Status: COMPLETED | OUTPATIENT
Start: 2019-07-24 | End: 2019-07-24

## 2019-07-24 RX ADMIN — FOSAPREPITANT DIMEGLUMINE 310 MILLIGRAM(S): 150 INJECTION, POWDER, LYOPHILIZED, FOR SOLUTION INTRAVENOUS at 10:03

## 2019-07-24 RX ADMIN — Medication 274.2 MILLIGRAM(S): at 12:15

## 2019-07-24 RX ADMIN — ONDANSETRON 16 MILLIGRAM(S): 8 TABLET, FILM COATED ORAL at 09:45

## 2019-07-24 RX ADMIN — Medication 242 MILLIGRAM(S): at 13:15

## 2019-07-24 RX ADMIN — FOSAPREPITANT DIMEGLUMINE 150 MILLIGRAM(S): 150 INJECTION, POWDER, LYOPHILIZED, FOR SOLUTION INTRAVENOUS at 10:33

## 2019-07-24 RX ADMIN — Medication 220 MILLIGRAM(S): at 09:45

## 2019-07-24 RX ADMIN — Medication 105 MILLIGRAM(S): at 12:10

## 2019-07-24 RX ADMIN — ONDANSETRON 232 MILLIGRAM(S): 8 TABLET, FILM COATED ORAL at 09:30

## 2019-07-24 RX ADMIN — Medication 336.83 MILLIGRAM(S): at 10:40

## 2019-07-24 RX ADMIN — Medication 20 MILLIGRAM(S): at 10:00

## 2019-07-25 ENCOUNTER — OUTPATIENT (OUTPATIENT)
Dept: OUTPATIENT SERVICES | Facility: HOSPITAL | Age: 84
LOS: 1 days | End: 2019-07-25
Payer: MEDICARE

## 2019-07-25 ENCOUNTER — APPOINTMENT (OUTPATIENT)
Age: 84
End: 2019-07-25

## 2019-07-25 VITALS
RESPIRATION RATE: 18 BRPM | TEMPERATURE: 99 F | DIASTOLIC BLOOD PRESSURE: 72 MMHG | OXYGEN SATURATION: 99 % | SYSTOLIC BLOOD PRESSURE: 134 MMHG | HEART RATE: 81 BPM

## 2019-07-25 VITALS
OXYGEN SATURATION: 99 % | SYSTOLIC BLOOD PRESSURE: 146 MMHG | RESPIRATION RATE: 18 BRPM | HEART RATE: 68 BPM | DIASTOLIC BLOOD PRESSURE: 67 MMHG | TEMPERATURE: 98 F

## 2019-07-25 DIAGNOSIS — C7A.1 MALIGNANT POORLY DIFFERENTIATED NEUROENDOCRINE TUMORS: ICD-10-CM

## 2019-07-25 DIAGNOSIS — C34.90 MALIGNANT NEOPLASM OF UNSPECIFIED PART OF UNSPECIFIED BRONCHUS OR LUNG: ICD-10-CM

## 2019-07-25 DIAGNOSIS — R11.2 NAUSEA WITH VOMITING, UNSPECIFIED: ICD-10-CM

## 2019-07-25 DIAGNOSIS — Z90.710 ACQUIRED ABSENCE OF BOTH CERVIX AND UTERUS: Chronic | ICD-10-CM

## 2019-07-25 DIAGNOSIS — J43.2 CENTRILOBULAR EMPHYSEMA: ICD-10-CM

## 2019-07-25 DIAGNOSIS — J84.10 PULMONARY FIBROSIS, UNSPECIFIED: ICD-10-CM

## 2019-07-25 DIAGNOSIS — C7B.8 OTHER SECONDARY NEUROENDOCRINE TUMORS: ICD-10-CM

## 2019-07-25 PROCEDURE — 96413 CHEMO IV INFUSION 1 HR: CPT

## 2019-07-25 RX ORDER — ETOPOSIDE 20 MG/ML
105 VIAL (ML) INTRAVENOUS ONCE
Refills: 0 | Status: COMPLETED | OUTPATIENT
Start: 2019-07-25 | End: 2019-07-25

## 2019-07-25 RX ADMIN — Medication 105 MILLIGRAM(S): at 12:06

## 2019-07-25 RX ADMIN — Medication 336.83 MILLIGRAM(S): at 10:36

## 2019-07-26 ENCOUNTER — OUTPATIENT (OUTPATIENT)
Dept: OUTPATIENT SERVICES | Facility: HOSPITAL | Age: 84
LOS: 1 days | End: 2019-07-26
Payer: MEDICARE

## 2019-07-26 ENCOUNTER — APPOINTMENT (OUTPATIENT)
Age: 84
End: 2019-07-26

## 2019-07-26 VITALS
DIASTOLIC BLOOD PRESSURE: 69 MMHG | SYSTOLIC BLOOD PRESSURE: 115 MMHG | RESPIRATION RATE: 16 BRPM | TEMPERATURE: 98 F | HEART RATE: 79 BPM | OXYGEN SATURATION: 99 %

## 2019-07-26 VITALS
HEIGHT: 63 IN | TEMPERATURE: 98 F | RESPIRATION RATE: 16 BRPM | SYSTOLIC BLOOD PRESSURE: 109 MMHG | OXYGEN SATURATION: 100 % | DIASTOLIC BLOOD PRESSURE: 66 MMHG | HEART RATE: 74 BPM | WEIGHT: 149.03 LBS

## 2019-07-26 DIAGNOSIS — Z90.710 ACQUIRED ABSENCE OF BOTH CERVIX AND UTERUS: Chronic | ICD-10-CM

## 2019-07-26 DIAGNOSIS — C34.90 MALIGNANT NEOPLASM OF UNSPECIFIED PART OF UNSPECIFIED BRONCHUS OR LUNG: ICD-10-CM

## 2019-07-26 PROCEDURE — 96377 APPLICATON ON-BODY INJECTOR: CPT

## 2019-07-26 PROCEDURE — 96375 TX/PRO/DX INJ NEW DRUG ADDON: CPT

## 2019-07-26 PROCEDURE — 96413 CHEMO IV INFUSION 1 HR: CPT

## 2019-07-26 RX ORDER — DEXAMETHASONE 0.5 MG/5ML
20 ELIXIR ORAL ONCE
Refills: 0 | Status: COMPLETED | OUTPATIENT
Start: 2019-07-26 | End: 2019-07-26

## 2019-07-26 RX ORDER — ONDANSETRON 8 MG/1
16 TABLET, FILM COATED ORAL ONCE
Refills: 0 | Status: COMPLETED | OUTPATIENT
Start: 2019-07-26 | End: 2019-07-26

## 2019-07-26 RX ORDER — ETOPOSIDE 20 MG/ML
105 VIAL (ML) INTRAVENOUS ONCE
Refills: 0 | Status: COMPLETED | OUTPATIENT
Start: 2019-07-26 | End: 2019-07-26

## 2019-07-26 RX ORDER — PEGFILGRASTIM-CBQV 6 MG/.6ML
6 INJECTION, SOLUTION SUBCUTANEOUS ONCE
Refills: 0 | Status: COMPLETED | OUTPATIENT
Start: 2019-07-26 | End: 2019-07-26

## 2019-07-26 RX ADMIN — ONDANSETRON 16 MILLIGRAM(S): 8 TABLET, FILM COATED ORAL at 13:45

## 2019-07-26 RX ADMIN — Medication 336.83 MILLIGRAM(S): at 13:45

## 2019-07-26 RX ADMIN — Medication 220 MILLIGRAM(S): at 13:15

## 2019-07-26 RX ADMIN — Medication 105 MILLIGRAM(S): at 15:15

## 2019-07-26 RX ADMIN — Medication 20 MILLIGRAM(S): at 13:30

## 2019-07-26 RX ADMIN — PEGFILGRASTIM-CBQV 6 MILLIGRAM(S): 6 INJECTION, SOLUTION SUBCUTANEOUS at 15:20

## 2019-07-26 RX ADMIN — ONDANSETRON 232 MILLIGRAM(S): 8 TABLET, FILM COATED ORAL at 13:30

## 2019-07-29 DIAGNOSIS — R11.2 NAUSEA WITH VOMITING, UNSPECIFIED: ICD-10-CM

## 2019-07-29 DIAGNOSIS — Z51.11 ENCOUNTER FOR ANTINEOPLASTIC CHEMOTHERAPY: ICD-10-CM

## 2019-08-06 ENCOUNTER — MOBILE ON CALL (OUTPATIENT)
Age: 84
End: 2019-08-06

## 2019-08-13 RX ORDER — ONDANSETRON 8 MG/1
16 TABLET, FILM COATED ORAL ONCE
Refills: 0 | Status: COMPLETED | OUTPATIENT
Start: 2019-08-14 | End: 2019-08-14

## 2019-08-13 RX ORDER — FOSAPREPITANT DIMEGLUMINE 150 MG/5ML
150 INJECTION, POWDER, LYOPHILIZED, FOR SOLUTION INTRAVENOUS ONCE
Refills: 0 | Status: COMPLETED | OUTPATIENT
Start: 2019-08-14 | End: 2019-08-14

## 2019-08-13 RX ORDER — DEXAMETHASONE 0.5 MG/5ML
20 ELIXIR ORAL ONCE
Refills: 0 | Status: COMPLETED | OUTPATIENT
Start: 2019-08-14 | End: 2019-08-14

## 2019-08-14 ENCOUNTER — APPOINTMENT (OUTPATIENT)
Age: 84
End: 2019-08-14

## 2019-08-14 ENCOUNTER — OUTPATIENT (OUTPATIENT)
Dept: OUTPATIENT SERVICES | Facility: HOSPITAL | Age: 84
LOS: 1 days | End: 2019-08-14
Payer: MEDICARE

## 2019-08-14 VITALS
RESPIRATION RATE: 16 BRPM | SYSTOLIC BLOOD PRESSURE: 141 MMHG | HEART RATE: 71 BPM | DIASTOLIC BLOOD PRESSURE: 58 MMHG | OXYGEN SATURATION: 98 % | TEMPERATURE: 98 F

## 2019-08-14 VITALS
HEART RATE: 96 BPM | WEIGHT: 149.03 LBS | OXYGEN SATURATION: 98 % | HEIGHT: 63 IN | SYSTOLIC BLOOD PRESSURE: 113 MMHG | DIASTOLIC BLOOD PRESSURE: 56 MMHG | RESPIRATION RATE: 16 BRPM | TEMPERATURE: 98 F

## 2019-08-14 DIAGNOSIS — Z90.710 ACQUIRED ABSENCE OF BOTH CERVIX AND UTERUS: Chronic | ICD-10-CM

## 2019-08-14 DIAGNOSIS — C34.90 MALIGNANT NEOPLASM OF UNSPECIFIED PART OF UNSPECIFIED BRONCHUS OR LUNG: ICD-10-CM

## 2019-08-14 PROCEDURE — 96417 CHEMO IV INFUS EACH ADDL SEQ: CPT

## 2019-08-14 PROCEDURE — 96413 CHEMO IV INFUSION 1 HR: CPT

## 2019-08-14 PROCEDURE — 96375 TX/PRO/DX INJ NEW DRUG ADDON: CPT

## 2019-08-14 PROCEDURE — 96367 TX/PROPH/DG ADDL SEQ IV INF: CPT

## 2019-08-14 RX ORDER — ETOPOSIDE 20 MG/ML
105 VIAL (ML) INTRAVENOUS ONCE
Refills: 0 | Status: COMPLETED | OUTPATIENT
Start: 2019-08-14 | End: 2019-08-14

## 2019-08-14 RX ORDER — CARBOPLATIN 50 MG
259 VIAL (EA) INTRAVENOUS ONCE
Refills: 0 | Status: COMPLETED | OUTPATIENT
Start: 2019-08-14 | End: 2019-08-14

## 2019-08-14 RX ADMIN — Medication 220 MILLIGRAM(S): at 08:55

## 2019-08-14 RX ADMIN — Medication 336.83 MILLIGRAM(S): at 11:35

## 2019-08-14 RX ADMIN — FOSAPREPITANT DIMEGLUMINE 310 MILLIGRAM(S): 150 INJECTION, POWDER, LYOPHILIZED, FOR SOLUTION INTRAVENOUS at 09:30

## 2019-08-14 RX ADMIN — ONDANSETRON 16 MILLIGRAM(S): 8 TABLET, FILM COATED ORAL at 09:26

## 2019-08-14 RX ADMIN — FOSAPREPITANT DIMEGLUMINE 150 MILLIGRAM(S): 150 INJECTION, POWDER, LYOPHILIZED, FOR SOLUTION INTRAVENOUS at 10:00

## 2019-08-14 RX ADMIN — Medication 259 MILLIGRAM(S): at 11:30

## 2019-08-14 RX ADMIN — Medication 20 MILLIGRAM(S): at 09:10

## 2019-08-14 RX ADMIN — Medication 105 MILLIGRAM(S): at 13:05

## 2019-08-14 RX ADMIN — Medication 275.9 MILLIGRAM(S): at 10:30

## 2019-08-14 RX ADMIN — ONDANSETRON 232 MILLIGRAM(S): 8 TABLET, FILM COATED ORAL at 09:11

## 2019-08-15 ENCOUNTER — APPOINTMENT (OUTPATIENT)
Age: 84
End: 2019-08-15

## 2019-08-15 ENCOUNTER — OUTPATIENT (OUTPATIENT)
Dept: OUTPATIENT SERVICES | Facility: HOSPITAL | Age: 84
LOS: 1 days | End: 2019-08-15
Payer: MEDICARE

## 2019-08-15 VITALS
DIASTOLIC BLOOD PRESSURE: 50 MMHG | SYSTOLIC BLOOD PRESSURE: 115 MMHG | TEMPERATURE: 98 F | RESPIRATION RATE: 18 BRPM | HEART RATE: 70 BPM | OXYGEN SATURATION: 98 %

## 2019-08-15 VITALS
HEART RATE: 57 BPM | DIASTOLIC BLOOD PRESSURE: 63 MMHG | OXYGEN SATURATION: 99 % | RESPIRATION RATE: 18 BRPM | TEMPERATURE: 98 F | SYSTOLIC BLOOD PRESSURE: 135 MMHG

## 2019-08-15 DIAGNOSIS — C34.90 MALIGNANT NEOPLASM OF UNSPECIFIED PART OF UNSPECIFIED BRONCHUS OR LUNG: ICD-10-CM

## 2019-08-15 DIAGNOSIS — R11.2 NAUSEA WITH VOMITING, UNSPECIFIED: ICD-10-CM

## 2019-08-15 DIAGNOSIS — C7B.8 OTHER SECONDARY NEUROENDOCRINE TUMORS: ICD-10-CM

## 2019-08-15 DIAGNOSIS — C7A.1 MALIGNANT POORLY DIFFERENTIATED NEUROENDOCRINE TUMORS: ICD-10-CM

## 2019-08-15 DIAGNOSIS — J84.10 PULMONARY FIBROSIS, UNSPECIFIED: ICD-10-CM

## 2019-08-15 DIAGNOSIS — J43.2 CENTRILOBULAR EMPHYSEMA: ICD-10-CM

## 2019-08-15 DIAGNOSIS — Z90.710 ACQUIRED ABSENCE OF BOTH CERVIX AND UTERUS: Chronic | ICD-10-CM

## 2019-08-15 PROCEDURE — 96413 CHEMO IV INFUSION 1 HR: CPT

## 2019-08-15 RX ORDER — ETOPOSIDE 20 MG/ML
105 VIAL (ML) INTRAVENOUS ONCE
Refills: 0 | Status: COMPLETED | OUTPATIENT
Start: 2019-08-15 | End: 2019-08-15

## 2019-08-15 RX ORDER — ONDANSETRON 8 MG/1
8 TABLET, FILM COATED ORAL ONCE
Refills: 0 | Status: COMPLETED | OUTPATIENT
Start: 2019-08-15 | End: 2019-08-15

## 2019-08-15 RX ADMIN — Medication 336.83 MILLIGRAM(S): at 12:44

## 2019-08-15 RX ADMIN — Medication 105 MILLIGRAM(S): at 14:14

## 2019-08-15 RX ADMIN — ONDANSETRON 8 MILLIGRAM(S): 8 TABLET, FILM COATED ORAL at 13:24

## 2019-08-16 ENCOUNTER — APPOINTMENT (OUTPATIENT)
Age: 84
End: 2019-08-16

## 2019-08-16 ENCOUNTER — OUTPATIENT (OUTPATIENT)
Dept: OUTPATIENT SERVICES | Facility: HOSPITAL | Age: 84
LOS: 1 days | End: 2019-08-16
Payer: MEDICARE

## 2019-08-16 VITALS
OXYGEN SATURATION: 97 % | DIASTOLIC BLOOD PRESSURE: 76 MMHG | HEART RATE: 73 BPM | SYSTOLIC BLOOD PRESSURE: 157 MMHG | TEMPERATURE: 98 F | RESPIRATION RATE: 18 BRPM

## 2019-08-16 VITALS
RESPIRATION RATE: 18 BRPM | HEART RATE: 75 BPM | TEMPERATURE: 98 F | SYSTOLIC BLOOD PRESSURE: 122 MMHG | DIASTOLIC BLOOD PRESSURE: 68 MMHG | OXYGEN SATURATION: 98 %

## 2019-08-16 DIAGNOSIS — C7B.8 OTHER SECONDARY NEUROENDOCRINE TUMORS: ICD-10-CM

## 2019-08-16 DIAGNOSIS — J43.2 CENTRILOBULAR EMPHYSEMA: ICD-10-CM

## 2019-08-16 DIAGNOSIS — Z90.710 ACQUIRED ABSENCE OF BOTH CERVIX AND UTERUS: Chronic | ICD-10-CM

## 2019-08-16 DIAGNOSIS — J84.10 PULMONARY FIBROSIS, UNSPECIFIED: ICD-10-CM

## 2019-08-16 DIAGNOSIS — C7A.1 MALIGNANT POORLY DIFFERENTIATED NEUROENDOCRINE TUMORS: ICD-10-CM

## 2019-08-16 DIAGNOSIS — C34.90 MALIGNANT NEOPLASM OF UNSPECIFIED PART OF UNSPECIFIED BRONCHUS OR LUNG: ICD-10-CM

## 2019-08-16 PROCEDURE — 96413 CHEMO IV INFUSION 1 HR: CPT

## 2019-08-16 PROCEDURE — 96377 APPLICATON ON-BODY INJECTOR: CPT

## 2019-08-16 PROCEDURE — 96375 TX/PRO/DX INJ NEW DRUG ADDON: CPT

## 2019-08-16 RX ORDER — ONDANSETRON 8 MG/1
16 TABLET, FILM COATED ORAL ONCE
Refills: 0 | Status: COMPLETED | OUTPATIENT
Start: 2019-08-16 | End: 2019-08-16

## 2019-08-16 RX ORDER — ETOPOSIDE 20 MG/ML
105 VIAL (ML) INTRAVENOUS ONCE
Refills: 0 | Status: COMPLETED | OUTPATIENT
Start: 2019-08-16 | End: 2019-08-16

## 2019-08-16 RX ORDER — PEGFILGRASTIM-CBQV 6 MG/.6ML
6 INJECTION, SOLUTION SUBCUTANEOUS ONCE
Refills: 0 | Status: COMPLETED | OUTPATIENT
Start: 2019-08-16 | End: 2019-08-16

## 2019-08-16 RX ADMIN — ONDANSETRON 232 MILLIGRAM(S): 8 TABLET, FILM COATED ORAL at 11:15

## 2019-08-16 RX ADMIN — Medication 336.83 MILLIGRAM(S): at 11:36

## 2019-08-16 RX ADMIN — Medication 105 MILLIGRAM(S): at 13:06

## 2019-08-16 RX ADMIN — PEGFILGRASTIM-CBQV 6 MILLIGRAM(S): 6 INJECTION, SOLUTION SUBCUTANEOUS at 13:10

## 2019-08-16 RX ADMIN — ONDANSETRON 16 MILLIGRAM(S): 8 TABLET, FILM COATED ORAL at 11:30

## 2019-08-20 ENCOUNTER — INPATIENT (INPATIENT)
Facility: HOSPITAL | Age: 84
LOS: 2 days | Discharge: HOME | DRG: 872 | End: 2019-08-23
Payer: MEDICARE

## 2019-08-20 VITALS
SYSTOLIC BLOOD PRESSURE: 116 MMHG | RESPIRATION RATE: 18 BRPM | WEIGHT: 154.32 LBS | HEART RATE: 115 BPM | OXYGEN SATURATION: 97 % | HEIGHT: 64 IN | DIASTOLIC BLOOD PRESSURE: 56 MMHG | TEMPERATURE: 101 F

## 2019-08-20 DIAGNOSIS — Z90.710 ACQUIRED ABSENCE OF BOTH CERVIX AND UTERUS: Chronic | ICD-10-CM

## 2019-08-20 LAB
ALBUMIN SERPL ELPH-MCNC: 3.5 G/DL — SIGNIFICANT CHANGE UP (ref 3.3–5)
ALP SERPL-CCNC: 151 U/L — HIGH (ref 40–120)
ALT FLD-CCNC: 6 U/L — LOW (ref 10–45)
ANION GAP SERPL CALC-SCNC: 12 MMOL/L — SIGNIFICANT CHANGE UP (ref 5–17)
ANISOCYTOSIS BLD QL: SLIGHT — SIGNIFICANT CHANGE UP
AST SERPL-CCNC: 10 U/L — SIGNIFICANT CHANGE UP (ref 10–40)
BASE EXCESS BLDV CALC-SCNC: -8 MMOL/L — SIGNIFICANT CHANGE UP
BASOPHILS # BLD AUTO: 0 K/UL — SIGNIFICANT CHANGE UP (ref 0–0.2)
BASOPHILS NFR BLD AUTO: 0 % — SIGNIFICANT CHANGE UP (ref 0–2)
BILIRUB SERPL-MCNC: 0.4 MG/DL — SIGNIFICANT CHANGE UP (ref 0.2–1.2)
BUN SERPL-MCNC: 32 MG/DL — HIGH (ref 7–23)
BURR CELLS BLD QL SMEAR: PRESENT — SIGNIFICANT CHANGE UP
CA-I SERPL-SCNC: 1.11 MMOL/L — LOW (ref 1.12–1.3)
CALCIUM SERPL-MCNC: 8.2 MG/DL — LOW (ref 8.4–10.5)
CHLORIDE SERPL-SCNC: 111 MMOL/L — HIGH (ref 96–108)
CO2 SERPL-SCNC: 16 MMOL/L — LOW (ref 22–31)
CREAT SERPL-MCNC: 1.64 MG/DL — HIGH (ref 0.5–1.3)
EOSINOPHIL # BLD AUTO: 0 K/UL — SIGNIFICANT CHANGE UP (ref 0–0.5)
EOSINOPHIL NFR BLD AUTO: 0 % — SIGNIFICANT CHANGE UP (ref 0–6)
GAS PNL BLDV: 135 MMOL/L — LOW (ref 138–146)
GAS PNL BLDV: SIGNIFICANT CHANGE UP
GIANT PLATELETS BLD QL SMEAR: PRESENT — SIGNIFICANT CHANGE UP
GLUCOSE SERPL-MCNC: 146 MG/DL — HIGH (ref 70–99)
HCO3 BLDV-SCNC: 16 MMOL/L — LOW (ref 20–27)
HCT VFR BLD CALC: 23 % — LOW (ref 34.5–45)
HGB BLD-MCNC: 7.8 G/DL — LOW (ref 11.5–15.5)
HYPOCHROMIA BLD QL: SIGNIFICANT CHANGE UP
LACTATE SERPL-SCNC: 1.2 MMOL/L — SIGNIFICANT CHANGE UP (ref 0.5–2)
LIDOCAIN IGE QN: 25 U/L — SIGNIFICANT CHANGE UP (ref 7–60)
LYMPHOCYTES # BLD AUTO: 0.22 K/UL — LOW (ref 1–3.3)
LYMPHOCYTES # BLD AUTO: 0.9 % — LOW (ref 13–44)
MANUAL SMEAR VERIFICATION: SIGNIFICANT CHANGE UP
MCHC RBC-ENTMCNC: 28.2 PG — SIGNIFICANT CHANGE UP (ref 27–34)
MCHC RBC-ENTMCNC: 33.9 GM/DL — SIGNIFICANT CHANGE UP (ref 32–36)
MCV RBC AUTO: 83 FL — SIGNIFICANT CHANGE UP (ref 80–100)
METAMYELOCYTES # FLD: 0.9 % — HIGH (ref 0–0)
MICROCYTES BLD QL: SLIGHT — SIGNIFICANT CHANGE UP
MONOCYTES # BLD AUTO: 0 K/UL — SIGNIFICANT CHANGE UP (ref 0–0.9)
MONOCYTES NFR BLD AUTO: 0 % — LOW (ref 2–14)
NEUTROPHILS # BLD AUTO: 24.4 K/UL — HIGH (ref 1.8–7.4)
NEUTROPHILS NFR BLD AUTO: 93.8 % — HIGH (ref 43–77)
NEUTS BAND # BLD: 4.4 % — SIGNIFICANT CHANGE UP (ref 0–8)
OVALOCYTES BLD QL SMEAR: SLIGHT — SIGNIFICANT CHANGE UP
PCO2 BLDV: 26 MMHG — LOW (ref 41–51)
PH BLDV: 7.4 — SIGNIFICANT CHANGE UP (ref 7.32–7.43)
PLAT MORPH BLD: ABNORMAL
PLATELET # BLD AUTO: 208 K/UL — SIGNIFICANT CHANGE UP (ref 150–400)
PO2 BLDV: 44 MMHG — SIGNIFICANT CHANGE UP
POLYCHROMASIA BLD QL SMEAR: SLIGHT — SIGNIFICANT CHANGE UP
POTASSIUM BLDV-SCNC: 5 MMOL/L — HIGH (ref 3.5–4.9)
POTASSIUM SERPL-MCNC: 5.3 MMOL/L — SIGNIFICANT CHANGE UP (ref 3.5–5.3)
POTASSIUM SERPL-SCNC: 5.3 MMOL/L — SIGNIFICANT CHANGE UP (ref 3.5–5.3)
PROT SERPL-MCNC: 6.6 G/DL — SIGNIFICANT CHANGE UP (ref 6–8.3)
RBC # BLD: 2.77 M/UL — LOW (ref 3.8–5.2)
RBC # FLD: 17.9 % — HIGH (ref 10.3–14.5)
RBC BLD AUTO: ABNORMAL
SAO2 % BLDV: 77 % — SIGNIFICANT CHANGE UP
SCHISTOCYTES BLD QL AUTO: SLIGHT — SIGNIFICANT CHANGE UP
SODIUM SERPL-SCNC: 139 MMOL/L — SIGNIFICANT CHANGE UP (ref 135–145)
WBC # BLD: 24.85 K/UL — HIGH (ref 3.8–10.5)
WBC # FLD AUTO: 24.85 K/UL — HIGH (ref 3.8–10.5)

## 2019-08-20 PROCEDURE — 99285 EMERGENCY DEPT VISIT HI MDM: CPT

## 2019-08-20 PROCEDURE — 71045 X-RAY EXAM CHEST 1 VIEW: CPT | Mod: 26

## 2019-08-20 RX ORDER — VANCOMYCIN HCL 1 G
1000 VIAL (EA) INTRAVENOUS ONCE
Refills: 0 | Status: COMPLETED | OUTPATIENT
Start: 2019-08-20 | End: 2019-08-20

## 2019-08-20 RX ORDER — ONDANSETRON 8 MG/1
4 TABLET, FILM COATED ORAL ONCE
Refills: 0 | Status: COMPLETED | OUTPATIENT
Start: 2019-08-20 | End: 2019-08-20

## 2019-08-20 RX ORDER — ACETAMINOPHEN 500 MG
650 TABLET ORAL ONCE
Refills: 0 | Status: COMPLETED | OUTPATIENT
Start: 2019-08-20 | End: 2019-08-20

## 2019-08-20 RX ORDER — CEFAZOLIN SODIUM 1 G
1000 VIAL (EA) INJECTION ONCE
Refills: 0 | Status: COMPLETED | OUTPATIENT
Start: 2019-08-20 | End: 2019-08-20

## 2019-08-20 RX ORDER — SODIUM CHLORIDE 9 MG/ML
1000 INJECTION INTRAMUSCULAR; INTRAVENOUS; SUBCUTANEOUS ONCE
Refills: 0 | Status: COMPLETED | OUTPATIENT
Start: 2019-08-20 | End: 2019-08-20

## 2019-08-20 RX ADMIN — SODIUM CHLORIDE 1000 MILLILITER(S): 9 INJECTION INTRAMUSCULAR; INTRAVENOUS; SUBCUTANEOUS at 22:00

## 2019-08-20 RX ADMIN — Medication 100 MILLIGRAM(S): at 23:26

## 2019-08-20 RX ADMIN — SODIUM CHLORIDE 1000 MILLILITER(S): 9 INJECTION INTRAMUSCULAR; INTRAVENOUS; SUBCUTANEOUS at 23:00

## 2019-08-20 RX ADMIN — Medication 250 MILLIGRAM(S): at 23:49

## 2019-08-20 RX ADMIN — Medication 650 MILLIGRAM(S): at 23:27

## 2019-08-20 RX ADMIN — ONDANSETRON 4 MILLIGRAM(S): 8 TABLET, FILM COATED ORAL at 23:27

## 2019-08-20 NOTE — ED PROVIDER NOTE - OBJECTIVE STATEMENT
84F recurrent metastatic squamous cell lung carcinoma s/p remote chemotx/xrt now on chemotx (last rcvd 8/16/19), submandibular LN adenocarcinoma, c/o <24h subjective fever/chills. +nausea 1-2d ago, no recurrence. no ha/dizziness, no uri/cough, no cp/sob, no abd pain, no diarrhea, no vomiting, no dysuria/frequency.    ent: jozef

## 2019-08-20 NOTE — ED ADULT NURSE REASSESSMENT NOTE - NS ED NURSE REASSESS COMMENT FT1
initial admit orders and bed assignment were received, awaiting sign out, pt. and family utd on poc, with understanding verbalized

## 2019-08-20 NOTE — ED PROVIDER NOTE - CLINICAL SUMMARY MEDICAL DECISION MAKING FREE TEXT BOX
avss. nontoxic. NAD.  recently s/p chemotx 4d ago. no focal sx. no acute focal neuro deficits. found to have leukocytosis w/ bands 4. lactate wnl. cxr w/o acute abnl. rvp pending. s/p empiric abx. bcx pending. ua/ucx pending. unable to get a hold of dr hodgson but heme/onc aware of admission. will admit for sepsis of unknown source in setting of chemotx.

## 2019-08-20 NOTE — ED ADULT NURSE NOTE - OBJECTIVE STATEMENT
pt. presents with reported acute onset chills/fever, earlier today, CA pt., currently undergoing Chemo, denies other complaint at present

## 2019-08-20 NOTE — ED PROVIDER NOTE - CARE PLAN
Principal Discharge DX:	Sepsis, due to unspecified organism Principal Discharge DX:	Sepsis, due to unspecified organism  Secondary Diagnosis:	Squamous cell carcinoma of left lung

## 2019-08-20 NOTE — ED PROVIDER NOTE - PHYSICAL EXAMINATION
CONST: chronically ill appearing thin nontoxic NAD speaking in full sentences  HEAD: atraumatic  EYES: conjunctivae clear  ENT: mmm  NECK: supple  CARD: rrr no murmurs  CHEST: ctab no r/r/w, no stridor/retractions/tripoding  ABD: soft, nd, nttp, no rebound/gaurding  EXT: 3x3cm focal area of induration along L medial arm w/o overlying skin changes and no palpable cord, compartment soft, no crepitus, joints passive/active FROM, symmetric distal pulses intact  SKIN: warm, dry, no rash, no pedal edema, cap refill <2sec  NEURO: a+ox3, 5/5 strength x4, gross sensation intact x4, normal gait

## 2019-08-20 NOTE — ED ADULT NURSE REASSESSMENT NOTE - NS ED NURSE REASSESS COMMENT FT1
interventions as noted, machelle. well, awaiting further orders, pt. and family utd on poc, with understanding verbalized

## 2019-08-20 NOTE — ED ADULT NURSE NOTE - PMH
Adenocarcinoma of head and neck    DM (diabetes mellitus)    Heart murmur    Squamous cell carcinoma of left lung    Submandibular gland mass    Tobacco use

## 2019-08-21 DIAGNOSIS — A41.9 SEPSIS, UNSPECIFIED ORGANISM: ICD-10-CM

## 2019-08-21 DIAGNOSIS — C76.0 MALIGNANT NEOPLASM OF HEAD, FACE AND NECK: ICD-10-CM

## 2019-08-21 DIAGNOSIS — Z91.89 OTHER SPECIFIED PERSONAL RISK FACTORS, NOT ELSEWHERE CLASSIFIED: ICD-10-CM

## 2019-08-21 DIAGNOSIS — Z29.9 ENCOUNTER FOR PROPHYLACTIC MEASURES, UNSPECIFIED: ICD-10-CM

## 2019-08-21 DIAGNOSIS — C7B.8 OTHER SECONDARY NEUROENDOCRINE TUMORS: ICD-10-CM

## 2019-08-21 DIAGNOSIS — D64.9 ANEMIA, UNSPECIFIED: ICD-10-CM

## 2019-08-21 DIAGNOSIS — J84.10 PULMONARY FIBROSIS, UNSPECIFIED: ICD-10-CM

## 2019-08-21 DIAGNOSIS — R11.2 NAUSEA WITH VOMITING, UNSPECIFIED: ICD-10-CM

## 2019-08-21 DIAGNOSIS — Z51.11 ENCOUNTER FOR ANTINEOPLASTIC CHEMOTHERAPY: ICD-10-CM

## 2019-08-21 DIAGNOSIS — N18.9 CHRONIC KIDNEY DISEASE, UNSPECIFIED: ICD-10-CM

## 2019-08-21 DIAGNOSIS — I10 ESSENTIAL (PRIMARY) HYPERTENSION: ICD-10-CM

## 2019-08-21 DIAGNOSIS — C34.92 MALIGNANT NEOPLASM OF UNSPECIFIED PART OF LEFT BRONCHUS OR LUNG: ICD-10-CM

## 2019-08-21 DIAGNOSIS — J43.2 CENTRILOBULAR EMPHYSEMA: ICD-10-CM

## 2019-08-21 DIAGNOSIS — R09.89 OTHER SPECIFIED SYMPTOMS AND SIGNS INVOLVING THE CIRCULATORY AND RESPIRATORY SYSTEMS: ICD-10-CM

## 2019-08-21 DIAGNOSIS — C7A.1 MALIGNANT POORLY DIFFERENTIATED NEUROENDOCRINE TUMORS: ICD-10-CM

## 2019-08-21 LAB
-  KPC RESISTANCE GENE: SIGNIFICANT CHANGE UP
ANION GAP SERPL CALC-SCNC: 11 MMOL/L — SIGNIFICANT CHANGE UP (ref 5–17)
BLD GP AB SCN SERPL QL: NEGATIVE — SIGNIFICANT CHANGE UP
BUN SERPL-MCNC: 29 MG/DL — HIGH (ref 7–23)
CALCIUM SERPL-MCNC: 7.7 MG/DL — LOW (ref 8.4–10.5)
CHLORIDE SERPL-SCNC: 115 MMOL/L — HIGH (ref 96–108)
CO2 SERPL-SCNC: 17 MMOL/L — LOW (ref 22–31)
CREAT SERPL-MCNC: 1.65 MG/DL — HIGH (ref 0.5–1.3)
E COLI DNA BLD POS QL NAA+NON-PROBE: SIGNIFICANT CHANGE UP
GLUCOSE SERPL-MCNC: 129 MG/DL — HIGH (ref 70–99)
GRAM STN FLD: SIGNIFICANT CHANGE UP
HCT VFR BLD CALC: 20.4 % — CRITICAL LOW (ref 34.5–45)
HCT VFR BLD CALC: 23.6 % — LOW (ref 34.5–45)
HGB BLD-MCNC: 6.7 G/DL — CRITICAL LOW (ref 11.5–15.5)
HGB BLD-MCNC: 7.9 G/DL — LOW (ref 11.5–15.5)
MAGNESIUM SERPL-MCNC: 1.8 MG/DL — SIGNIFICANT CHANGE UP (ref 1.6–2.6)
MCHC RBC-ENTMCNC: 28.2 PG — SIGNIFICANT CHANGE UP (ref 27–34)
MCHC RBC-ENTMCNC: 28.2 PG — SIGNIFICANT CHANGE UP (ref 27–34)
MCHC RBC-ENTMCNC: 32.8 GM/DL — SIGNIFICANT CHANGE UP (ref 32–36)
MCHC RBC-ENTMCNC: 33.5 GM/DL — SIGNIFICANT CHANGE UP (ref 32–36)
MCV RBC AUTO: 84.3 FL — SIGNIFICANT CHANGE UP (ref 80–100)
MCV RBC AUTO: 85.7 FL — SIGNIFICANT CHANGE UP (ref 80–100)
METHOD TYPE: SIGNIFICANT CHANGE UP
NRBC # BLD: 0 /100 WBCS — SIGNIFICANT CHANGE UP (ref 0–0)
NRBC # BLD: 0 /100 WBCS — SIGNIFICANT CHANGE UP (ref 0–0)
PLATELET # BLD AUTO: 143 K/UL — LOW (ref 150–400)
PLATELET # BLD AUTO: 176 K/UL — SIGNIFICANT CHANGE UP (ref 150–400)
POTASSIUM SERPL-MCNC: 5 MMOL/L — SIGNIFICANT CHANGE UP (ref 3.5–5.3)
POTASSIUM SERPL-SCNC: 5 MMOL/L — SIGNIFICANT CHANGE UP (ref 3.5–5.3)
RAPID RVP RESULT: SIGNIFICANT CHANGE UP
RBC # BLD: 2.38 M/UL — LOW (ref 3.8–5.2)
RBC # BLD: 2.8 M/UL — LOW (ref 3.8–5.2)
RBC # FLD: 17.4 % — HIGH (ref 10.3–14.5)
RBC # FLD: 18.2 % — HIGH (ref 10.3–14.5)
RH IG SCN BLD-IMP: POSITIVE — SIGNIFICANT CHANGE UP
SODIUM SERPL-SCNC: 143 MMOL/L — SIGNIFICANT CHANGE UP (ref 135–145)
WBC # BLD: 12.25 K/UL — HIGH (ref 3.8–10.5)
WBC # BLD: 21.18 K/UL — HIGH (ref 3.8–10.5)
WBC # FLD AUTO: 12.25 K/UL — HIGH (ref 3.8–10.5)
WBC # FLD AUTO: 21.18 K/UL — HIGH (ref 3.8–10.5)

## 2019-08-21 PROCEDURE — 93306 TTE W/DOPPLER COMPLETE: CPT | Mod: 26

## 2019-08-21 PROCEDURE — 99223 1ST HOSP IP/OBS HIGH 75: CPT | Mod: GC

## 2019-08-21 PROCEDURE — 86078 PHYS BLOOD BANK SERV REACTJ: CPT

## 2019-08-21 RX ORDER — SODIUM CHLORIDE 9 MG/ML
1000 INJECTION INTRAMUSCULAR; INTRAVENOUS; SUBCUTANEOUS ONCE
Refills: 0 | Status: DISCONTINUED | OUTPATIENT
Start: 2019-08-21 | End: 2019-08-21

## 2019-08-21 RX ORDER — PIPERACILLIN AND TAZOBACTAM 4; .5 G/20ML; G/20ML
3.38 INJECTION, POWDER, LYOPHILIZED, FOR SOLUTION INTRAVENOUS ONCE
Refills: 0 | Status: COMPLETED | OUTPATIENT
Start: 2019-08-21 | End: 2019-08-21

## 2019-08-21 RX ORDER — ACETAMINOPHEN 500 MG
1000 TABLET ORAL ONCE
Refills: 0 | Status: COMPLETED | OUTPATIENT
Start: 2019-08-21 | End: 2019-08-21

## 2019-08-21 RX ORDER — ENOXAPARIN SODIUM 100 MG/ML
30 INJECTION SUBCUTANEOUS EVERY 24 HOURS
Refills: 0 | Status: DISCONTINUED | OUTPATIENT
Start: 2019-08-21 | End: 2019-08-23

## 2019-08-21 RX ORDER — ACETAMINOPHEN 500 MG
650 TABLET ORAL EVERY 6 HOURS
Refills: 0 | Status: DISCONTINUED | OUTPATIENT
Start: 2019-08-21 | End: 2019-08-23

## 2019-08-21 RX ORDER — PIPERACILLIN AND TAZOBACTAM 4; .5 G/20ML; G/20ML
3.38 INJECTION, POWDER, LYOPHILIZED, FOR SOLUTION INTRAVENOUS EVERY 6 HOURS
Refills: 0 | Status: DISCONTINUED | OUTPATIENT
Start: 2019-08-21 | End: 2019-08-21

## 2019-08-21 RX ORDER — QUETIAPINE FUMARATE 200 MG/1
25 TABLET, FILM COATED ORAL AT BEDTIME
Refills: 0 | Status: DISCONTINUED | OUTPATIENT
Start: 2019-08-21 | End: 2019-08-23

## 2019-08-21 RX ORDER — VANCOMYCIN HCL 1 G
1000 VIAL (EA) INTRAVENOUS EVERY 24 HOURS
Refills: 0 | Status: DISCONTINUED | OUTPATIENT
Start: 2019-08-21 | End: 2019-08-21

## 2019-08-21 RX ORDER — PANTOPRAZOLE SODIUM 20 MG/1
40 TABLET, DELAYED RELEASE ORAL
Refills: 0 | Status: DISCONTINUED | OUTPATIENT
Start: 2019-08-21 | End: 2019-08-23

## 2019-08-21 RX ORDER — CEFTRIAXONE 500 MG/1
2 INJECTION, POWDER, FOR SOLUTION INTRAMUSCULAR; INTRAVENOUS EVERY 24 HOURS
Refills: 0 | Status: DISCONTINUED | OUTPATIENT
Start: 2019-08-21 | End: 2019-08-23

## 2019-08-21 RX ADMIN — PIPERACILLIN AND TAZOBACTAM 200 GRAM(S): 4; .5 INJECTION, POWDER, LYOPHILIZED, FOR SOLUTION INTRAVENOUS at 13:14

## 2019-08-21 RX ADMIN — ENOXAPARIN SODIUM 30 MILLIGRAM(S): 100 INJECTION SUBCUTANEOUS at 13:13

## 2019-08-21 RX ADMIN — CEFTRIAXONE 100 MILLIGRAM(S): 500 INJECTION, POWDER, FOR SOLUTION INTRAMUSCULAR; INTRAVENOUS at 22:26

## 2019-08-21 RX ADMIN — QUETIAPINE FUMARATE 25 MILLIGRAM(S): 200 TABLET, FILM COATED ORAL at 22:27

## 2019-08-21 RX ADMIN — PANTOPRAZOLE SODIUM 40 MILLIGRAM(S): 20 TABLET, DELAYED RELEASE ORAL at 06:13

## 2019-08-21 RX ADMIN — Medication 1000 MILLIGRAM(S): at 00:00

## 2019-08-21 RX ADMIN — Medication 1000 MILLIGRAM(S): at 23:15

## 2019-08-21 RX ADMIN — Medication 400 MILLIGRAM(S): at 22:26

## 2019-08-21 RX ADMIN — Medication 650 MILLIGRAM(S): at 01:00

## 2019-08-21 RX ADMIN — PIPERACILLIN AND TAZOBACTAM 200 GRAM(S): 4; .5 INJECTION, POWDER, LYOPHILIZED, FOR SOLUTION INTRAVENOUS at 06:13

## 2019-08-21 NOTE — H&P ADULT - PROBLEM SELECTOR PLAN 9
1) PCP Contacted on Admission: (Y/N) --> Name & Phone #: Kwesi Stark (PCP), [address] [facility]  2) Date of Contact with PCP: Will contact in AM  3) PCP Contacted at Discharge: (Y/N)  4) Summary of Handoff Given to PCP:   5) Post-Discharge Appointment Date and Location:

## 2019-08-21 NOTE — H&P ADULT - PROBLEM SELECTOR PLAN 3
Patient complaining of RUE palpable nodule, was supposed to have an outpatient RUE duplex which the patient missed.   -RUE duplex to r/o DVT

## 2019-08-21 NOTE — H&P ADULT - ATTENDING COMMENTS
Patient was seen and examined with the resident team today.  I agree with Dr. 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  History c/f frequency at home and positive U/A.  Does have a cough on exam but CXR more c/w known malignancy.  AM labs w/anemia w/o signs/symptoms of blood loss; thus, likely dilutional.      -- can stop Vanc but would continue w/Zosyn  -- f/u urine and blood cultures  -- needs RVP   -- 1U of PRBC this AM   -- Onc following, appreciate assistance   -- clarify if murmur new or old   -- pending above, may or may not need additional imaging  -- DVT PPx - Lovenox  -- Dispo - TBD; lives at home w/family    Whitley Del Toro  [phone] Patient was seen and examined with the resident team today.  I agree with Dr. 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  History c/f frequency at home and positive U/A.  Does have a cough on exam but CXR more c/w known malignancy.  AM labs w/anemia w/o signs/symptoms of blood loss; thus, likely dilutional.      -- can stop Vanc but would continue w/Zosyn  -- f/u urine and blood cultures  -- needs RVP   -- 1U of PRBC this AM   -- Onc following, appreciate assistance   -- clarify if murmur new or old   -- pending above, may or may not need additional imaging  -- restart home ASA/statin/Seroquel/Folic Acid/PPI/antidepressant  -- DVT PPx - Lovenox  -- Dispo - TBD; lives at home w/family    Whitley Del Toro  [phone] Patient was seen and examined with the resident team today.  I agree with Dr. 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  History c/f frequency at home and positive U/A.  Does have a cough on exam but CXR more c/w known malignancy.  AM labs w/anemia w/o signs/symptoms of blood loss; thus, likely dilutional.      -- can stop Vanc but would continue w/Zosyn  -- f/u urine and blood cultures  -- needs RVP   -- 1U of PRBC this AM   -- Onc following, appreciate assistance   -- clarify if murmur new or old   -- pending above, may or may not need additional imaging  -- restart home ASA/statin/Seroquel/Folic Acid/PPI/antidepressant  -- DVT PPx - Lovenox  -- Dispo - TBD; lives at home w/family    Whitley Del Toro  [phone] Patient was seen and examined with the resident team today.  I agree with Dr. 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  History c/f frequency at home and positive U/A.  Does have a cough on exam but CXR more c/w known malignancy.  AM labs w/anemia w/o signs/symptoms of blood loss; thus, likely dilutional.      -- can stop Vanc but would continue w/Zosyn  -- f/u urine and blood cultures  -- needs RVP   -- 1U of PRBC this AM   -- Onc following, appreciate assistance   -- clarify if murmur new or old   -- pending above, may or may not need additional imaging  -- restart home ASA/statin/Seroquel/Folic Acid/PPI/antidepressant  -- DVT PPx - Lovenox  -- Dispo - TBD; lives at home w/family    Whitley Del Toro  [phone]

## 2019-08-21 NOTE — H&P ADULT - PROBLEM SELECTOR PLAN 1
Patient presenting with fevers, meeting sepsis criteria (T 100.8F, tachycardia, leukocytosis), no associated lactate. Source likely urinary vs lung. CXR with no obvious infiltrate however with increased haziness in LLL region, UA collection still pending  -f/u Bcx  -f/u UA/Ucx  -f/u RVP  -f/u official read of CXR  -c/w abxs, Vanc and Zosyn (renally dosed) to cover for both lung and urinary source for now, de-escalate as needed

## 2019-08-21 NOTE — ED ADULT NURSE REASSESSMENT NOTE - NS ED NURSE REASSESS COMMENT FT1
pt. asking for something to eat, states she is hungry, offered cheese sandwich, refuses, son to pick something up for pt.

## 2019-08-21 NOTE — H&P ADULT - ASSESSMENT
83 y/o F PMHx DM, HTN, Stage IIIb LLL NSCLC, c/l R endobronchial SCC, TOMMY presenting to [facility] ED with complaints of fevers, chills, nausea for the last 24 hrs, meeting sepsis criteria likely urinary vs lung source, admitted to [facility] for further medical management.

## 2019-08-21 NOTE — H&P ADULT - PROBLEM SELECTOR PLAN 7
Patient with history of HTN, BP stable on admission, reports currently not being on any anti-hypertensives at this time.   -obtain collateral from patient's pharmacy/PCP re: home medications  -continue to monitor

## 2019-08-21 NOTE — H&P ADULT - NSHPLABSRESULTS_GEN_ALL_CORE
.  LABS:                         6.7    21.18 )-----------( 176      ( 21 Aug 2019 05:49 )             20.4     08-21    143  |  115<H>  |  29<H>  ----------------------------<  129<H>  5.0   |  17<L>  |  1.65<H>    Ca    7.7<L>      21 Aug 2019 05:49  Mg     1.8     08-21    TPro  6.6  /  Alb  3.5  /  TBili  0.4  /  DBili  x   /  AST  10  /  ALT  6<L>  /  AlkPhos  151<H>  08-20              Lactate, Blood: 1.2 mmoL/L (08-20 @ 22:09)      RADIOLOGY, EKG & ADDITIONAL TESTS: Reviewed.

## 2019-08-21 NOTE — CONSULT NOTE ADULT - ASSESSMENT
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 She is s/p 3 cycles of Carboplatin/Etoposide, with last cycle finishing last Friday per patient.     #Sepsis  Met criteria with fever, tachycardia and leukocytosis. Possible source is urinary tract  -c/w Vanco/zosyn per primary team, will consider de-escalating  -f/u urine culture and blood cultures    #Stage IV Large Cell neuroendocrine cancer   s/p 3 cycles of Carboplatin/Etoposide, follows with Dr. Wellington. CXR shows LLL haziness likely due to underlying malignancy  -Will repeat CT chest/abdomen/pelvis after 4th cycle as outpatient  -DVT ppx with Lovenox  -?nodule on RUE, will f/u ultrasound to evaluate for thrombosis  -Patient has follow up appointment with Dr. Wellington on 9/3 at 1:45 PM     #Anemia  Normocytic anemia, likely due to Etoposide as well as underlying infection/malignancy  -Recommend transfusing 1 unit pRBC after T&S has been performed  -Check post-transfusion H&H  -f/u iron studies, sent out earlier while in ED     Case d/w Dr. Wellington

## 2019-08-21 NOTE — H&P ADULT - PROBLEM SELECTOR PLAN 6
With underlying CKD per record review, prior record demonstrating sCr ~2. sCr on admission today 1.64.   -continue to monitor with daily BMPs   -f/u Urine lytes, calculate FeNa, likely instrinsic

## 2019-08-21 NOTE — H&P ADULT - HISTORY OF PRESENT ILLNESS
85 y/o F PMHx DM, HTN, Stage IIIb LLL NSCLC, c/l R endobronchial SCC, TOMMY presenting to [facility] ED with complaints of fevers, chills, nausea for the last 24 hrs. Patients notes that starting earlier today, patient began feeling feverish (did not measure temperature at home) with associated rigors and nausea around 2-2:30 pm in the afternoon. Patient notes she is currently undergoing chemotherapy, following with Dr Wellington, last round of chemo noted Wednesday-Friday of last week. Patient denies any recent sick contacts or recent travel. ROS also positive for increased urinary frequency. Otherwise, patient denies any shortness of breath, chest pain, cough, vomiting, dysuria, urgency, diarrhea, constipation. Remainder of ROS negative.     ED VS: T 100.7-100.8F; HR ; -144/56-99; RR 16-18; Sp02 95-98 RA  ED Course: Labs notable for WBC 24.85 with 93.8% neutrophils, negative lactate 1.2, sCr 1.64, Alk Phos 151, AST 10, ALT 6. s/p Cefazolin 1g x1, Vanc 1g x1. Zofran 4 mg IVPx1, Tylenol 650 mg POx1, Blood cultures sent/pending, UA/Ucx pending collection, RVP pending collection. CXR, pending official read, increased haziness LLL infiltrate.

## 2019-08-21 NOTE — H&P ADULT - PROBLEM SELECTOR PLAN 8
F: s/p 1 L NS in the ED, euvolemic on exam, not fully resuscitated however with unknown baseline EF  E: Replete electrolytes PRN, Goal: K>4, Mg>2  N: DASH/TLC     DVT ppx: Lovenox 30 mg daily (renally adjusted)  CODE: FULL  DIspo: Admit to F

## 2019-08-21 NOTE — H&P ADULT - PROBLEM SELECTOR PLAN 2
Patient anemic to 7.8 on admission, decreased to 6.7 on AM labs, no complaints or signs suggestive of active bleeding. Per record review, Hgb 11-12 range in 2017 however with no recent blood work for comparison. Underlying CKD likely contributing to anemia as well however unclear etiology for acute drop on AM labs.  -f/u FOBT  -Maintain Active T&S  -Will transfuse this AM and check post-transfusion CBC  -Maintain goal Hgb >7  -f/u add on Fe studies

## 2019-08-21 NOTE — CONSULT NOTE ADULT - SUBJECTIVE AND OBJECTIVE BOX
Hematology Oncology Consult Note (Dr. Wellington)  Discussed with Dr. Wellington and recommendations reviewed with the primary team.    The patient was seen and examined    84 year old F with PMH of DM and HTN admitted for fever/chills with Tmax of 100.8 and meeting criteria for sepsis. She follows with Dr. Wellington for lung cancer. She was initially diagnosed in 2017 with stage IIIB squamous cell carcinoma s/p concurrent chemoradiation and subsequently refused Durvalumab per PACIFIC trial. 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 Imaging also revealed bony mets consistent with stage IV disease. She was started on Carboplatin/Etoposide (cisplatin not given due to CKD) in . She is s/p 3 cycles with last cycle finishing last Friday. She notes feeling more fatigued with this cycle than previous cycles. She denies dysuria or productive cough. She has a chronic cough. She denies nausea/vomiting and diarrhea. No other complaints today.     Patient met criteria for sepsis with temperate, leukocytosis and mild tachycardia. She is currently on broad spectrum antibiotics w/ Vanco/Zosyn.     ROS is otherwise negative.      PAST MEDICAL & SURGICAL HISTORY:  Heart murmur  Tobacco use  Squamous cell carcinoma of left lung  Adenocarcinoma of head and neck  Submandibular gland mass  DM (diabetes mellitus)  H/O: hysterectomy      Allergies:      Medications:  MEDICATIONS  (STANDING):  enoxaparin Injectable 30 milliGRAM(s) SubCutaneous every 24 hours  pantoprazole    Tablet 40 milliGRAM(s) Oral before breakfast  piperacillin/tazobactam IVPB.. 3.375 Gram(s) IV Intermittent every 6 hours    MEDICATIONS  (PRN):  acetaminophen   Tablet .. 650 milliGRAM(s) Oral every 6 hours PRN Temp greater or equal to 38C (100.4F), Moderate Pain (4 - 6)        PHYSICAL EXAM:    T(F): 98.5 (19 @ 09:10), Max: 100.8 (19 @ 21:34)  HR: 77 (19 @ 09:10) (75 - 115)  BP: 105/63 (19 @ 09:10) (94/54 - 144/99)  RR: 18 (19 @ 09:10) (16 - 18)  SpO2: 100% (08-21-19 @ 09:10) (95% - 100%)  Wt(kg): --    Daily Height in cm: 162.56 (20 Aug 2019 21:34)    Daily     Gen: well developed, well nourished  HEENT: normocephalic/atraumatic, no conjunctival pallor, no scleral icterus; dry mucous membranes   Neck: supple, no JVD   Cardiovascular: Systolic murmur appreciated   Respiratory: clear air entry b/l  Gastrointestinal: BS+, soft, NT/ND  Extremities: no clubbing/cyanosis, no edema, no calf tenderness; ?nodule in RUE   Vascular:  DP/PT 2+ b/l  Neurological: CN 2-12 grossly intact, no focal deficits  Skin: no rash on visible skin              Labs:                          6.7    21.18 )-----------( 176      ( 21 Aug 2019 05:49 )             20.4     CBC Full  -  ( 21 Aug 2019 05:49 )  WBC Count : 21.18 K/uL  RBC Count : 2.38 M/uL  Hemoglobin : 6.7 g/dL  Hematocrit : 20.4 %  Platelet Count - Automated : 176 K/uL  Mean Cell Volume : 85.7 fl  Mean Cell Hemoglobin : 28.2 pg  Mean Cell Hemoglobin Concentration : 32.8 gm/dL  Auto Neutrophil # : x  Auto Lymphocyte # : x  Auto Monocyte # : x  Auto Eosinophil # : x  Auto Basophil # : x  Auto Neutrophil % : x  Auto Lymphocyte % : x  Auto Monocyte % : x  Auto Eosinophil % : x  Auto Basophil % : x        -    143  |  115<H>  |  29<H>  ----------------------------<  129<H>  5.0   |  17<L>  |  1.65<H>    Ca    7.7<L>      21 Aug 2019 05:49  Mg     1.8         TPro  6.6  /  Alb  3.5  /  TBili  0.4  /  DBili  x   /  AST  10  /  ALT  6<L>  /  AlkPhos  151<H>  08      Urinalysis Basic - ( 21 Aug 2019 08:35 )    Color: Yellow / Appearance: Clear / S.010 / pH: x  Gluc: x / Ketone: NEGATIVE  / Bili: Negative / Urobili: 0.2 E.U./dL   Blood: x / Protein: Trace mg/dL / Nitrite: POSITIVE   Leuk Esterase: Large / RBC: < 5 /HPF / WBC > 10 /HPF   Sq Epi: x / Non Sq Epi: 0-5 /HPF / Bacteria: Present /HPF        Other Labs:    Cultures:    Pathology:  Surgical Pathology Report (19 @ 11:55)    Surgical Pathology Report:   ACCESSION No:  [id number]    CRISTI PEREA        Addendum Report          Addendum    [facility] [employer] NGS Solid Tumor Sequencing Report    RESULT SUMMARY:  ABNORMAL    DETECTED GENOMIC ALTERATIONS:    TP53 [id number]    One unclear variant in PTEN    TUMOR TYPE: Carcinoma    CLINICAL INFORMATION:    Right submandibular gland and level I, II, and III tissue  excision showed high grade carcinoma with neuroendocrine features  and metastatic carcinoma involving four of eight lymph nodes.  Immunostains were strong and diffusely positive for CK7 and CD56;  focally positive for chromogranin; and were negative for androgen  receptor, synaptophysin, CK20, S100, SMA, p40, p63, TTF1, napsin,  HER2, and GATA3. The tumor was morphologically similar and has a  similar immunoprofile to the patient's left lower lung lobe  tumor. While this tumor is favored to be a metastasis form the  left lower lung lobe primary, clinical correlation is needed to  rule out otherpossible primary sites (#[id number]). The  original collection date of this specimen is 2019.    PERTINENT NEGATIVE RESULTS:    The following genes are NEGATIVE for clinically relevant  mutations. Mutational hotspots and surrounding exonic regions  were interrogated for DNA level point mutations and indels  (fusions not assayed).      AKT1, ALK, BRAF, CTNNB1, DDR2, EGFR, EPHA2, ERBB2, ESR1, FGFR1,  FGFR2, FGFR3, GNA11, GNAQ, HRAS, IDH1, IDH2, KIT, KRAS, MAP2K1,  MET, MTOR, NOTCH1, NRAS, PDGFRA, PIK3CA, RAC1, RET, ROS1.    INTERPRETATION SUMMARY  It should be noted that this patient has had one or more  additional NGS studies. The most recent study showed the  following mutation(s): TP53                CRISTI PEREA         6        Addendum Report          [id number] (54.11%). Please see the previous  [facility] NGS Lung Tumor Sequencing Report, Collection Date:  2017, Specimen ID: [id number].    A mutation in TP53 (p.[id number]) was detected in  this patient's sample.    TP53 is frequently mutated in non-small cell lung cancer (NSCLC).  The impact of TP53 mutational status on the outcomes in NSCLC is  the subject of ongoing investigations (97128080; 03140455).  Evidence for the prognostic value of TP53 mutations in NSCLC is  insufficient, and, at present time, TP53 mutational status is not  included in diagnostic and therapeutic algorithms (62946079; NCCN  Guidelines, Non-Small Cell Lung Cancer, Version 5.2019).    An unclear variant in PTEN (p.Ola39Fre) was detected in this  patient's sample.    This variant has been reported in a limited number of tumor  samples (COSMIC) and has not been reported as a population  variant in publicly available databases (EVS; ExAC). Therefore,  although likely disease-associated, due to the paucity of  functional and clinical evidence, its significance is currently  unclear.    Clinical and pathologic correlation is required to interpret  these findings.      Electronically signed by Damion Crowe MD PhD    This addendum reports the results of [facility]  [facility]encing, reported on 2019 by [employer], a specialized  [facility]. Testing has been performed at  [facility] [address] [place] [address], phone: [phone], on [facility] specimen [id number] with [facility] ID: [id number].  Please refer to the official [facility] report, which is on file in  the              CRISTI PEREA                        6        Addendum Report          Pathology Department.    Verified by: Volodymyr Crawford MD  (Electronic Signature)  Reported on: 19 08:29 EDT, [address], [place]  [id number]  _________________________________________________________________          Addendum    PD-L1 Immunohistochemical Analysis (KEYTRUDA)    RESULTS  PD-L1 IMMUNOHISTOCHEMICAL ANALYSIS:    Tumor Proportion Score:  <1% / Negative    COMMENT  This biopsy contains sufficient (>100) viable tumorcells.  A  positive control for each antibody has been reviewed and  accepted.    INTERPRETIVE INFORMATION  This test was performed on formalin-fixed paraffin-embedded tumor  tissue using the FDA-cleared PD-L1 IHC 22C3 pharmDx kit and [employer] Link 48. PD-L1 protein expression is determined by  using the Tumor Proportion Score, which is the percentage of  viable tumor cells showing partial or complete membranous  staining of any intensity (1+,2+,3+). PD-L1 IHC 22C3 pharmDx is  indicated as an aid in identifying NSCLC patients for treatment  with KEYTRUDA® (pembrolizumab). PD-L1 IHC 22C3 may also serve as  a complementary diagnostic in many other tumor types.    Adapted KEYTRUDA® (pembrolizumab) Prescribing Information  (revised 2017 - [id number]). Please view full  prescribing information at www.fda.gov.    ·    as a single agent for first-line treatment of patients with  metastatic NSCLC whose tumors have high PD-L1 expression (Tumor  Proportion Score (TPS) >=50%) as determined by an FDA-approved  test, with no EGFR or ALK genomic tumor aberrations.    ·    as a single agent for patients with metastatic NSCLC whose  tumors express PD-L1 (TPS >=1%) as              BRITCRISTI HERRERA                        6        Addendum Report          determined by an FDA-approved test, with disease progression on  or after platinum-containing chemotherapy and/or EGFR or ALK  targeted therapy for patients with these mutations.  ·    in combination with pemetrexed and carboplatin, as first-  line treatment of patients with metastatic nonsquamous NSCLC.*  ·    for patients with unresectable or metastatic melanoma.  ·    for patients with recurrent or metastatic HNSCC with disease  progression on or after chemotherapy.*    ·    for patients with locally advanced or metastatic urothelial  carcinoma who are not eligible for chemotherapy* or who have  disease progression during / following chemotherapy or within 12  months of neoadjuvant or adjuvant treatment with chemotherapy.    ·    for patients with refractory Classical Hodgkin Lymphoma, or  who have relapsed after 3+ lines of therapy.*    ·    for patients with unresectable or metastatic, microsatellite  instability-high (MSI-H) or mismatch repair deficient (dMMR;  absence of IHC staining for one or more of: MLH1, MSH2, MSH6, or  PMS2 protein):*    -    solid tumors that have progressed after prior treatment and  with no satisfactory alternative treatment options, or*  -    colorectal cancer that has progressed after treatment with  fluoropyrimidine, oxaliplatin, and irinotecan.*    *This indication is approved under accelerated approval.  Continued approval for this indication may be contingent upon  verification and description of clinical benefit in the  confirmatory trials.        Mary Jones M.D.    Pathologist, ex. [id number]    This report was electronically signed.    This addendum reports the results of PD-L1 Immunohistochemical  Analysis (Keytruda), reported on 2019 by [facility], a  specialized [employer] [facility]. Testing has been performed  at [facility] [employer] [address] [place] [address], phone: [phone], on [facility] specimen [id number] (1D) with [facility] ID: [id number].  Please refer to the official [facility] report, which is on file in  the            CRISTI PEREA                        6        Addendum Report          Pathology Department.    Verified by: Volodymyr Crawford MD  (Electronic Signature)  Reported on: 07/10/19 16:47 EDT, [address] [place] [address]  [id number]  _________________________________________________________________      Surgical Final Report          Final Diagnosis    1.  Right submandibular gland and level I, II and III tissue,  excision:  - High-grade carcinoma with neuroendocrine features.  See  note.  - Metastatic carcinoma involving four of eight lymph nodes  (4/8). See note  - Tumor is present at inked deep margin.    Note: Immunostains performed to further classify this tumor show  tumor cells are strong and diffusely positive for CK 7 and CD56,  focally positive for chromogranin and are negative for androgen  receptor, synaptophysin, CK20, S100, SMA, p40, p63, TTF1, napsin,  HER2 and GATA3.  PAS and PASD stains are negative.  The tumor in  this material is morphologically similar and has a similar in the  profile to the patient's left lower lobe lung tumor ([id number]).  While this tumor is favored to be a metastasis from the left  lower lobe lung primary, clinical and radiologic correlation are  needed to rule out other possible primary sites.  This case was  reviewed with 2 senior pathologists (Dr. Phi Trivedi and Dr. Jaquelin Gilbert).  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            CRISTI PEREA                        6        Surgical Final Report            Specimen(s) Submitted  1     Right submandibular gland, levelI, II, III mass    Gross Description  1. The specimen is received in formalin, labeled with the  patient's identification and "right submandibular gland and level  I, II, III mass." It consists of neck dissection specimen  oriented by the surgeon, measuring 6.2 x 5.8 x 2.1 cm.  Per the  surgeon the specimen is oriented into three levels, and with a  deep aspect of the gland.  Level I measures 3.2 x 3.5 x 2.1 cm.  Within level 1 there is a gland measuring 3.5 x 3.1 x 2.4 cm.  The deep aspect of the gland is inked black and the remaining  external surface the gland is inked blue.  The cut surface of the  gland reveals a 2.3 x 2.1 x 1.7 cm white-tan firm mass abutting  the inked external surface and inked deep aspect.  The remaining  cut surface is yellow-tan lobulated.  Also within level 1 is a  lymph node measuring 1.2 x 1.1 x 0.3 cm.  Level II measures 5.5 x  2.1 x 0.9 cm.  Within level II there are four potential lymph  nodes ranging from 0.6 up to 1.5 cm in greatest dimension.  Level  III measures 2.5 x 2.5 x 0.9 cm.  Within level III is seen 1.8 x  1.2 x 0.3 cm potential lymph node.  Also received is a separate  unoriented fragment of pink-tan tissue measuring 0.8 x 0.5 x 0.3  cm. Representaive sections, including all of the lymph nodes, are  submitted in nine cassettes as:  Level I  1A-1C-mass in gland (with deep aspect, perpindular sections in  1A), 1D-uninvolved gland, 1E-one lymph node  Level II  1F-three lymph nodes, 1G-one lymph node sectioned  Level III  1H-one lymph node sectioned    1I-separate fragment of tissue.      MARTHA Garrison [facility] 2019 18:14      Imaging Studies:

## 2019-08-22 ENCOUNTER — TRANSCRIPTION ENCOUNTER (OUTPATIENT)
Age: 84
End: 2019-08-22

## 2019-08-22 LAB
ALBUMIN SERPL ELPH-MCNC: 3.3 G/DL — SIGNIFICANT CHANGE UP (ref 3.3–5)
ALP SERPL-CCNC: 116 U/L — SIGNIFICANT CHANGE UP (ref 40–120)
ALT FLD-CCNC: 5 U/L — LOW (ref 10–45)
ANION GAP SERPL CALC-SCNC: 11 MMOL/L — SIGNIFICANT CHANGE UP (ref 5–17)
AST SERPL-CCNC: 7 U/L — LOW (ref 10–40)
BILIRUB SERPL-MCNC: 0.3 MG/DL — SIGNIFICANT CHANGE UP (ref 0.2–1.2)
BUN SERPL-MCNC: 20 MG/DL — SIGNIFICANT CHANGE UP (ref 7–23)
CALCIUM SERPL-MCNC: 7.8 MG/DL — LOW (ref 8.4–10.5)
CHLORIDE SERPL-SCNC: 116 MMOL/L — HIGH (ref 96–108)
CO2 SERPL-SCNC: 17 MMOL/L — LOW (ref 22–31)
CREAT SERPL-MCNC: 1.41 MG/DL — HIGH (ref 0.5–1.3)
DATE OF TRANSF RX: NEGATIVE — SIGNIFICANT CHANGE UP
GLUCOSE SERPL-MCNC: 99 MG/DL — SIGNIFICANT CHANGE UP (ref 70–99)
HCT VFR BLD CALC: 26.7 % — LOW (ref 34.5–45)
HGB BLD-MCNC: 8.8 G/DL — LOW (ref 11.5–15.5)
MAGNESIUM SERPL-MCNC: 1.9 MG/DL — SIGNIFICANT CHANGE UP (ref 1.6–2.6)
MCHC RBC-ENTMCNC: 28 PG — SIGNIFICANT CHANGE UP (ref 27–34)
MCHC RBC-ENTMCNC: 33 GM/DL — SIGNIFICANT CHANGE UP (ref 32–36)
MCV RBC AUTO: 85 FL — SIGNIFICANT CHANGE UP (ref 80–100)
NRBC # BLD: 0 /100 WBCS — SIGNIFICANT CHANGE UP (ref 0–0)
PHOSPHATE SERPL-MCNC: 1.9 MG/DL — LOW (ref 2.5–4.5)
PLATELET # BLD AUTO: 143 K/UL — LOW (ref 150–400)
POTASSIUM SERPL-MCNC: 4.6 MMOL/L — SIGNIFICANT CHANGE UP (ref 3.5–5.3)
POTASSIUM SERPL-SCNC: 4.6 MMOL/L — SIGNIFICANT CHANGE UP (ref 3.5–5.3)
PROT SERPL-MCNC: 6.1 G/DL — SIGNIFICANT CHANGE UP (ref 6–8.3)
RBC # BLD: 3.14 M/UL — LOW (ref 3.8–5.2)
RBC # FLD: 17.4 % — HIGH (ref 10.3–14.5)
SODIUM SERPL-SCNC: 144 MMOL/L — SIGNIFICANT CHANGE UP (ref 135–145)
WBC # BLD: 8.81 K/UL — SIGNIFICANT CHANGE UP (ref 3.8–10.5)
WBC # FLD AUTO: 8.81 K/UL — SIGNIFICANT CHANGE UP (ref 3.8–10.5)

## 2019-08-22 PROCEDURE — 93971 EXTREMITY STUDY: CPT | Mod: 26,RT

## 2019-08-22 PROCEDURE — 71045 X-RAY EXAM CHEST 1 VIEW: CPT | Mod: 26

## 2019-08-22 PROCEDURE — 99233 SBSQ HOSP IP/OBS HIGH 50: CPT | Mod: GC

## 2019-08-22 RX ADMIN — Medication 62.5 MILLIMOLE(S): at 10:09

## 2019-08-22 RX ADMIN — CEFTRIAXONE 100 MILLIGRAM(S): 500 INJECTION, POWDER, FOR SOLUTION INTRAMUSCULAR; INTRAVENOUS at 21:49

## 2019-08-22 RX ADMIN — QUETIAPINE FUMARATE 25 MILLIGRAM(S): 200 TABLET, FILM COATED ORAL at 21:49

## 2019-08-22 RX ADMIN — PANTOPRAZOLE SODIUM 40 MILLIGRAM(S): 20 TABLET, DELAYED RELEASE ORAL at 06:19

## 2019-08-22 RX ADMIN — ENOXAPARIN SODIUM 30 MILLIGRAM(S): 100 INJECTION SUBCUTANEOUS at 12:40

## 2019-08-22 NOTE — DISCHARGE NOTE PROVIDER - CARE PROVIDER_API CALL
Mine Wellington)  Medical Oncology  [address] [place] [address]  Phone: [phone]  Fax: [phone]  Follow Up Time: 1 week

## 2019-08-22 NOTE — PROGRESS NOTE ADULT - SUBJECTIVE AND OBJECTIVE BOX
Hospital Course:  83 y/o F PMHx DM, HTN, Stage IIIb LLL NSCLC, c/l R endobronchial SCC, TOMMY presenting to [facility] ED with complaints of fevers, chills, nausea for the last 24 hrs. Patients notes that starting earlier today, patient began feeling feverish (did not measure temperature at home) with associated rigors and nausea around 2-2:30 pm in the afternoon. Patient notes she is currently undergoing chemotherapy, following with Dr Wellington, last round of chemo noted Wednesday-Friday of last week. Patient denies any recent sick contacts or recent travel. ROS also positive for increased urinary frequency. Otherwise, patient denies any shortness of breath, chest pain, cough, vomiting, dysuria, urgency, diarrhea, constipation. Remainder of ROS negative. In the ED: T 100.7-100.8F; HR ; -144/56-99; RR 16-18; Sp02 95-98 RA. Labs notable for WBC 24.85 with 93.8% neutrophils, negative lactate 1.2, sCr 1.64, Alk Phos 151, AST 10, ALT 6. s/p Cefazolin 1g x1, Vanc 1g x1. Zofran 4 mg IVPx1, Tylenol 650 mg POx1, Blood cultures sent/pending, UA/Ucx pending collection, RVP pending collection. CXR, pending official read, increased haziness LLL infiltrate.     Subjective: Pt received 1 U pRBCs after which she was febrile 102.3 rectal, which resolved with tylenol. Transfusion reaction work-up negative. Blood cultures with gram negative rods without MDR so transitioned to ceftriaxone with improvement in WBC.     REVIEW OF SYSTEMS:    CONSTITUTIONAL: No weakness, fevers or chills.   EYES/ENT: No visual changes;  No vertigo or throat pain   NECK: No pain or stiffness  RESPIRATORY: No cough, wheezing, hemoptysis; No shortness of breath  CARDIOVASCULAR: No chest pain or palpitations  GASTROINTESTINAL: No abdominal or epigastric pain. No nausea, vomiting, or hematemesis; No diarrhea or constipation. No melena or hematochezia.  GENITOURINARY: No dysuria, frequency or hematuria  NEUROLOGICAL: No numbness or weakness  SKIN: No itching, burning, rashes, or lesions   All other review of systems is negative unless indicated above.    VITAL SIGNS:  T(C): 36.8 (08-22-19 @ 05:25), Max: 39.1 (19 @ 21:55)  T(F): 98.3 (19 @ 05:25), Max: 102.3 (19 @ 21:55)  HR: 70 (19 @ 05:25) (70 - 92)  BP: 101/57 (19 @ 05:25) (101/57 - 121/66)  BP(mean): --  RR: 17 (19 @ 05:25) (17 - 18)  SpO2: 98% (19 @ 05:25) (96% - 100%)  Wt(kg): --    PHYSICAL EXAM:  Constitutional: WDWN resting comfortably in bed; NAD  Head: NC/AT  Eyes: PERRL, EOMI, anicteric sclera  ENT: no nasal discharge; uvula midline, no oropharyngeal erythema or exudates; MMM  Neck: supple; no JVD or thyromegaly  Respiratory: CTA B/L; no W/R/R, no retractions  Cardiac: +S1/S2; RRR; no M/R/G; PMI non-displaced  Gastrointestinal: soft, NT/ND; no rebound or guarding; +BSx4  Genitourinary: normal external genitalia  Back: spine midline, no bony tenderness or step-offs; no CVAT B/L  Extremities: WWP, no clubbing or cyanosis; no peripheral edema  Musculoskeletal: NROM x4; no joint swelling, tenderness or erythema  Vascular: 2+ radial, femoral, DP/PT pulses B/L  Dermatologic: skin warm, dry and intact; no rashes, wounds, or scars  Lymphatic: no submandibular or cervical LAD  Neurologic: AAOx3; CNII-XII grossly intact; no focal deficits  Psychiatric: affect and characteristics of appearance, verbalizations, behaviors are appropriate    MEDICATIONS  (STANDING):  cefTRIAXone   IVPB 2 milliGRAM(s) IV Intermittent every 24 hours  enoxaparin Injectable 30 milliGRAM(s) SubCutaneous every 24 hours  pantoprazole    Tablet 40 milliGRAM(s) Oral before breakfast  QUEtiapine 25 milliGRAM(s) Oral at bedtime    MEDICATIONS  (PRN):  acetaminophen   Tablet .. 650 milliGRAM(s) Oral every 6 hours PRN Temp greater or equal to 38C (100.4F), Moderate Pain (4 - 6)    Allergies    No Known Allergies    Intolerances    LABS:                        8.8    8.81  )-----------( 143      ( 22 Aug 2019 05:54 )             26.7         144  |  116<H>  |  20  ----------------------------<  99  4.6   |  17<L>  |  1.41<H>    Ca    7.8<L>      22 Aug 2019 05:54  Phos  1.9       Mg     1.9         TPro  6.1  /  Alb  3.3  /  TBili  0.3  /  DBili  x   /  AST  7<L>  /  ALT  5<L>  /  AlkPhos  116        Urinalysis Basic - ( 21 Aug 2019 08:35 )    Color: Yellow / Appearance: Clear / S.010 / pH: x  Gluc: x / Ketone: NEGATIVE  / Bili: Negative / Urobili: 0.2 E.U./dL   Blood: x / Protein: Trace mg/dL / Nitrite: POSITIVE   Leuk Esterase: Large / RBC: < 5 /HPF / WBC > 10 /HPF   Sq Epi: x / Non Sq Epi: 0-5 /HPF / Bacteria: Present /HPF      CAPILLARY BLOOD GLUCOSE          RADIOLOGY & ADDITIONAL TESTS: Reviewed. Hospital Course:  83 y/o F PMHx DM, HTN, Stage IIIb LLL NSCLC, c/l R endobronchial SCC, TOMMY presenting to [facility] ED with complaints of fevers, chills, nausea for the last 24 hrs. Patients notes that starting earlier today, patient began feeling feverish (did not measure temperature at home) with associated rigors and nausea around 2-2:30 pm in the afternoon. Patient notes she is currently undergoing chemotherapy, following with Dr Wellington, last round of chemo noted Wednesday-Friday of last week. Patient denies any recent sick contacts or recent travel. ROS also positive for increased urinary frequency. Otherwise, patient denies any shortness of breath, chest pain, cough, vomiting, dysuria, urgency, diarrhea, constipation. Remainder of ROS negative. In the ED: T 100.7-100.8F; HR ; -144/56-99; RR 16-18; Sp02 95-98 RA. Labs notable for WBC 24.85 with 93.8% neutrophils, negative lactate 1.2, sCr 1.64, Alk Phos 151, AST 10, ALT 6. s/p Cefazolin 1g x1, Vanc 1g x1. Zofran 4 mg IVPx1, Tylenol 650 mg POx1, Blood cultures with G neg rods (e coli), UA+, UCx with G neg rods, RVP neg. CXR with increased haziness LLL infiltrate.  given 1 U pRBC to keep Hb above 7 after which pt became febrile to 102.3 which resolved with IV tylenol, transfusion reaction w/u neg. WBC downtrended on CTX. With concern for RUE nodule, scheduled for RUE duplex to r/o DVT.    Subjective: Pt received 1 U pRBCs after which she was febrile 102.3 rectal, which resolved with tylenol. Transfusion reaction work-up negative. Blood cultures with gram negative rods without MDR so transitioned to ceftriaxone with improvement in WBC. Pt seen and examined at bedside resting comfortably in bed. No complaints. Breathing is the same as yesterday.    REVIEW OF SYSTEMS:    CONSTITUTIONAL: No weakness, fevers or chills.   EYES/ENT: No visual changes;  No vertigo or throat pain   NECK: No pain or stiffness  RESPIRATORY: No cough, wheezing, hemoptysis; +SOB unchanged from yesterday  CARDIOVASCULAR: No chest pain or palpitations  GASTROINTESTINAL: No abdominal or epigastric pain. No nausea, vomiting, or hematemesis; No diarrhea or constipation. No melena or hematochezia.  GENITOURINARY: No dysuria, frequency or hematuria  NEUROLOGICAL: No numbness or weakness  SKIN: No itching, burning, rashes, or lesions   All other review of systems is negative unless indicated above.    VITAL SIGNS:  T(C): 36.8 (19 @ 05:25), Max: 39.1 (19 @ 21:55)  T(F): 98.3 (19 @ 05:25), Max: 102.3 (19 @ 21:55)  HR: 70 (19 @ 05:25) (70 - 92)  BP: 101/57 (19 @ 05:25) (101/57 - 121/66)  BP(mean): --  RR: 17 (19 @ 05:25) (17 - 18)  SpO2: 98% (19 @ 05:25) (96% - 100%)  Wt(kg): --    PHYSICAL EXAM:  Constitutional: WDWN resting comfortably in bed; NAD, speaking in full sentences  Head: NC/AT  Eyes: PERRL, EOMI, anicteric sclera  ENT: no nasal discharge; MMM  Neck: supple; no JVD   Respiratory: CTA B/L; no W/R/R, no retractions  Cardiac: +S1/S2; RRR; holosystolic murmur without radiation to carotids  Gastrointestinal: soft, NT/ND; no rebound or guarding; +BSx4  Extremities: WWP, no clubbing or cyanosis; no peripheral edema  Vascular: 2+ radial, DP/PT pulses B/L  Dermatologic: skin warm, dry and intact; no rashes, wounds, or scars  Neurologic: AAOx3; no focal deficits  Psychiatric: affect and characteristics of appearance, verbalizations, behaviors are appropriate    MEDICATIONS  (STANDING):  cefTRIAXone   IVPB 2 milliGRAM(s) IV Intermittent every 24 hours  enoxaparin Injectable 30 milliGRAM(s) SubCutaneous every 24 hours  pantoprazole    Tablet 40 milliGRAM(s) Oral before breakfast  QUEtiapine 25 milliGRAM(s) Oral at bedtime    MEDICATIONS  (PRN):  acetaminophen   Tablet .. 650 milliGRAM(s) Oral every 6 hours PRN Temp greater or equal to 38C (100.4F), Moderate Pain (4 - 6)    Allergies    No Known Allergies    Intolerances      LABS:                        8.8    8.81  )-----------( 143      ( 22 Aug 2019 05:54 )             26.7     08-22    144  |  116<H>  |  20  ----------------------------<  99  4.6   |  17<L>  |  1.41<H>    Ca    7.8<L>      22 Aug 2019 05:54  Phos  1.9       Mg     1.9         TPro  6.1  /  Alb  3.3  /  TBili  0.3  /  DBili  x   /  AST  7<L>  /  ALT  5<L>  /  AlkPhos  116        Urinalysis Basic - ( 21 Aug 2019 08:35 )    Color: Yellow / Appearance: Clear / S.010 / pH: x  Gluc: x / Ketone: NEGATIVE  / Bili: Negative / Urobili: 0.2 E.U./dL   Blood: x / Protein: Trace mg/dL / Nitrite: POSITIVE   Leuk Esterase: Large / RBC: < 5 /HPF / WBC > 10 /HPF   Sq Epi: x / Non Sq Epi: 0-5 /HPF / Bacteria: Present /HPF      CAPILLARY BLOOD GLUCOSE          RADIOLOGY & ADDITIONAL TESTS: Reviewed.

## 2019-08-22 NOTE — PROGRESS NOTE ADULT - PROBLEM SELECTOR PLAN 7
Patient with history of HTN, BP stable on admission, reports currently not being on any anti-hypertensives at this time.   -obtain collateral from patient's pharmacy/PCP re: home medications  -continue to monitor Patient with history of HTN, BP stable on admission, reports currently not being on any anti-hypertensives at this time.   -continue to monitor

## 2019-08-22 NOTE — DISCHARGE NOTE PROVIDER - NSDCCPCAREPLAN_GEN_ALL_CORE_FT
PRINCIPAL DISCHARGE DIAGNOSIS  Diagnosis: Sepsis, due to unspecified organism  Assessment and Plan of Treatment: You came in with fevers, chills, and nausea. In the hospital you were diagnosed with a UTI that had spread from your urine to your blood. You were treated wth IV antibiotics and your symptoms improved and you remained without a fever after starting your antibiotics. You received one unit of blood transfusion because your hemoglobin was low. You will continue to take antibiotics when you leave the hospital and your prescription has been sent to your pharmacy.      SECONDARY DISCHARGE DIAGNOSES  Diagnosis: Squamous cell carcinoma of left lung  Assessment and Plan of Treatment: You follow with Dr. Wellington and last completed chemotherapy on Friday 8/16. Please follow-up with Dr. Wellington. PRINCIPAL DISCHARGE DIAGNOSIS  Diagnosis: Sepsis, due to unspecified organism  Assessment and Plan of Treatment: You came in with fevers, chills, and nausea. In the hospital you were diagnosed with a UTI that had spread from your urine to your blood. You were treated wth IV antibiotics and your symptoms improved and you remained without a fever after starting your antibiotics. You received one unit of blood transfusion because your hemoglobin was low. You will continue to take antibiotics when you leave the hospital and your prescription has been sent to your pharmacy. Please continue to take ciprofloxacin 500 mg once a day for the next 6 days to treat your urinary tract infection and the bacteria that was in your blood.      SECONDARY DISCHARGE DIAGNOSES  Diagnosis: Squamous cell carcinoma of left lung  Assessment and Plan of Treatment: You follow with Dr. Wellington and last completed chemotherapy on Friday 8/16. Please follow-up with Dr. Wellington.    Diagnosis: Thrombophlebitis  Assessment and Plan of Treatment: You have a condition in your arm known as thrombophlebitis which is inflammation of one of the veins in your arm. The thrombophlebitis does not require medications but will get better with warm compresses and elevation of the arm.

## 2019-08-22 NOTE — PROGRESS NOTE ADULT - PROBLEM SELECTOR PLAN 6
With underlying CKD per record review, prior record demonstrating sCr ~2. sCr on admission today 1.64.   -continue to monitor with daily BMPs   -f/u Urine lytes, calculate FeNa, likely instrinsic With underlying CKD per record review, prior record demonstrating sCr ~2. Cr on admission 1.64  -Cr today 1.4, downtrending  -continue to monitor with daily BMPs

## 2019-08-22 NOTE — PROGRESS NOTE ADULT - PROBLEM SELECTOR PLAN 4
Patient reports history of Stage IIIb LLL NSCLC and contralateral R endobronchial SCC s/p chemo/RT, currently undergoing chemotherapy with unknown agents. Scheduled for next chemo session SEpt 4th  -Heme/Onc consult (Dr Wellington) in AM   -collateral from Dr Wellington re: prior/ongoing oncologic management Patient reports history of Stage IIIb LLL NSCLC and contralateral R endobronchial SCC s/p chemo/RT, currently undergoing chemotherapy with unknown agents. Scheduled for next chemo session Sept 4th  -Heme/Onc following - LLL haziness on CXR likely d/t underlying malignancy  -collateral from Dr Wellington re: prior/ongoing oncologic management

## 2019-08-22 NOTE — DISCHARGE NOTE PROVIDER - HOSPITAL COURSE
Pt is an 83 yo F with PMH DM, HTN, Stage IIIb LLL NSCLC, c/l R endobronchial SCC, TOMMY presenting to [facility] ED with complaints of fevers, chills, nausea x1d. Pt is currently undergoing chemotherapy, following with Dr Wellington, last round of chemo noted Friday 8/16. Pt also with increased urinary frequency. In the ED pt afebrile to 100.8F with HR . Labs notable for WBC 24.85 with 93.8% neutrophils, negative lactate 1.2, Cr 1.64, Alk Phos 151, AST 10, ALT 6. Received Cefazolin 1g x1, Vanc 1g x1 in the ED. Blood cultures with G neg rods (e coli), UA+, UCx with G neg rods, RVP neg. CXR with increased haziness LLL infiltrate likely 2/2 malignancy. 8/21 given 1 U pRBC to keep Hb above 7 after which pt became febrile to 102.3 which resolved with IV tylenol, transfusion reaction w/u neg. WBC downtrended on CTX. With concern for RUE nodule, scheduled for RUE duplex to r/o DVT.        Problem List/Main Diagnoses (system-based):     Sepsis: E coli bacteremia 2/2 UTI with G neg rods (E coli)         Anemia: Hb 6.7 on arrival given 1 U pRBC with repeat Hb 7.9, likely 2/2 CKD        RUE nodule: pending RUE U/S to r/o DVT        S3B LLL NSCLC: follows with Dr. Wellington, on chemotherapy last session 8/16        CKD: Cr baseline 2, Cr today 1.4            Inpatient treatment course: 1x vanc, 1x cefazolin transitioned to ceftriaxone 2mg q24h    New medications: Bactrim    Labs to be followed outpatient: Hb, Cr    Exam to be followed outpatient: none Pt is an 83 yo F with PMH DM, HTN, Stage IIIb LLL NSCLC, c/l R endobronchial SCC, TOMMY presenting to [facility] ED with complaints of fevers, chills, nausea x1d. Pt is currently undergoing chemotherapy, following with Dr Wellington, last round of chemo noted Friday 8/16. Pt also with increased urinary frequency. In the ED pt afebrile to 100.8F with HR . Labs notable for WBC 24.85 with 93.8% neutrophils, negative lactate 1.2, Cr 1.64, Alk Phos 151, AST 10, ALT 6. Received Cefazolin 1g x1, Vanc 1g x1 in the ED. Blood cultures with G neg rods (e coli), UA+, UCx with G neg rods, RVP neg. CXR with increased haziness LLL infiltrate likely 2/2 malignancy. 8/21 given 1 U pRBC to keep Hb above 7 after which pt became febrile to 102.3 which resolved with IV tylenol, transfusion reaction w/u neg. WBC downtrended on CTX. With concern for RUE nodule, scheduled for RUE duplex to r/o DVT.        Problem List/Main Diagnoses (system-based):     Sepsis: E coli bacteremia 2/2 Complicated UTI with G neg rods (E coli)         Anemia: Hb 6.7 on arrival given 1 U pRBC with repeat Hb 7.9, likely 2/2 CKD + myelosuppression 2/2 chemotherapeutic agents         RUE nodule: superficial thrombophlebitis         S3B LLL NSCLC: follows with Dr. Wellington, on chemotherapy last session 8/16        CKD: Cr baseline 2, Cr today 1.4            Inpatient treatment course: 1x vanc, 1x cefazolin transitioned to ceftriaxone 2mg q24h    New medications: Ciprofloxacin 500 mg daily for 6 days     Labs to be followed outpatient: Hb, Cr    Exam to be followed outpatient: none

## 2019-08-22 NOTE — PROGRESS NOTE ADULT - PROBLEM SELECTOR PLAN 8
F: s/p 1 L NS in the ED, euvolemic on exam, not fully resuscitated however with unknown baseline EF  E: Replete electrolytes PRN, Goal: K>4, Mg>2  N: DASH/TLC     DVT ppx: Lovenox 30 mg daily (renally adjusted)  CODE: FULL  DIspo: Admit to F F: none, euvolemic  E: Replete electrolytes PRN, Goal: K>4, Mg>2  N: DASH/TLC   DVT ppx: Lovenox 30 mg daily (renally adjusted)  CODE: FULL  DIspo: Admit to F

## 2019-08-22 NOTE — PROGRESS NOTE ADULT - PROBLEM SELECTOR PLAN 1
Patient presenting with fevers, meeting sepsis criteria (T 100.8F, tachycardia, leukocytosis), no associated lactate. Source likely urinary vs lung. CXR with no obvious infiltrate however with increased haziness in LLL region, UA collection still pending  -f/u Bcx  -f/u UA/Ucx  -f/u RVP  -f/u official read of CXR  -c/w abxs, Vanc and Zosyn (renally dosed) to cover for both lung and urinary source for now, de-escalate as needed Patient presenting with fevers, meeting sepsis criteria (T 100.8F, tachycardia, leukocytosis), no associated lactate. Source likely urinary vs lung. CXR with no obvious infiltrate however with increased haziness in LLL region, UA+ with UCx sent  - BCx with G- rods, E coli that is not MDR   - UCx with G- rods, E coli  - RVP neg  - CXR with inc left basilar infiltrate  - c/w CTX to cover for both lung and urinary source for now

## 2019-08-22 NOTE — PROGRESS NOTE ADULT - PROBLEM SELECTOR PLAN 2
Patient anemic to 7.8 on admission, decreased to 6.7 on AM labs, no complaints or signs suggestive of active bleeding. Per record review, Hgb 11-12 range in 2017 however with no recent blood work for comparison. Underlying CKD likely contributing to anemia as well however unclear etiology for acute drop on AM labs.  -f/u FOBT  -Maintain Active T&S  -Will transfuse this AM and check post-transfusion CBC  -Maintain goal Hgb >7  -f/u add on Fe studies Patient anemic to 7.8 on admission, decreased to 6.7 on AM labs, no complaints or signs suggestive of active bleeding. Per record review, Hgb 11-12 range in 2017 however with no recent blood work for comparison. Underlying CKD likely contributing to anemia as well however unclear etiology for acute drop on AM labs.  -f/u FOBT  -Maintain Active T&S  -s/p 1 U pRBC with appropriate response in Hb post-transfusion 7.9  -Maintain goal Hgb >7  -Iron studies with total iron 39, unsat iron binding capacity 143, TIBC 182, %sat iron 21, ferritin 739

## 2019-08-22 NOTE — PROGRESS NOTE ADULT - PROBLEM SELECTOR PLAN 3
Patient complaining of RUE palpable nodule, was supposed to have an outpatient RUE duplex which the patient missed.   -RUE duplex to r/o DVT Patient complaining of RUE palpable nodule, was supposed to have an outpatient RUE duplex which the patient missed.   -RUE duplex to r/o DVT scheduled for today

## 2019-08-22 NOTE — DISCHARGE NOTE PROVIDER - NSDCFUSCHEDAPPT_GEN_ALL_CORE_FT
CRISTI PEREA ; 09/04/2019 ; [facility] [address]   CRISTI PEREA ; 09/04/2019 ; [facility]  CRISTI PEREA ; 09/05/2019 ; [facility] [address]   CRISTI PEREA ; 09/06/2019 ; [facility] [address] [place] [address] CRISTI PEREA ; 09/04/2019 ; [facility] [address]   CRISTI PEREA ; 09/04/2019 ; [facility] Pre[facility]mits  CRISTI PEREA ; 09/05/2019 ; [facility] [address]   CRISTI PEREA ; 09/06/2019 ; [facility] [address] [place] [address]

## 2019-08-23 ENCOUNTER — TRANSCRIPTION ENCOUNTER (OUTPATIENT)
Age: 84
End: 2019-08-23

## 2019-08-23 VITALS
TEMPERATURE: 98 F | OXYGEN SATURATION: 100 % | DIASTOLIC BLOOD PRESSURE: 64 MMHG | SYSTOLIC BLOOD PRESSURE: 105 MMHG | RESPIRATION RATE: 17 BRPM | HEART RATE: 77 BPM

## 2019-08-23 DIAGNOSIS — I82.621 ACUTE EMBOLISM AND THROMBOSIS OF DEEP VEINS OF RIGHT UPPER EXTREMITY: ICD-10-CM

## 2019-08-23 LAB
-  AMPICILLIN/SULBACTAM: SIGNIFICANT CHANGE UP
-  AMPICILLIN/SULBACTAM: SIGNIFICANT CHANGE UP
-  AMPICILLIN: SIGNIFICANT CHANGE UP
-  AMPICILLIN: SIGNIFICANT CHANGE UP
-  CEFAZOLIN: SIGNIFICANT CHANGE UP
-  CEFAZOLIN: SIGNIFICANT CHANGE UP
-  CEFTRIAXONE: SIGNIFICANT CHANGE UP
-  CEFTRIAXONE: SIGNIFICANT CHANGE UP
-  CIPROFLOXACIN: SIGNIFICANT CHANGE UP
-  CIPROFLOXACIN: SIGNIFICANT CHANGE UP
-  GENTAMICIN: SIGNIFICANT CHANGE UP
-  GENTAMICIN: SIGNIFICANT CHANGE UP
-  PIPERACILLIN/TAZOBACTAM: SIGNIFICANT CHANGE UP
-  PIPERACILLIN/TAZOBACTAM: SIGNIFICANT CHANGE UP
-  TOBRAMYCIN: SIGNIFICANT CHANGE UP
-  TOBRAMYCIN: SIGNIFICANT CHANGE UP
-  TRIMETHOPRIM/SULFAMETHOXAZOLE: SIGNIFICANT CHANGE UP
-  TRIMETHOPRIM/SULFAMETHOXAZOLE: SIGNIFICANT CHANGE UP
ANION GAP SERPL CALC-SCNC: 11 MMOL/L — SIGNIFICANT CHANGE UP (ref 5–17)
BASOPHILS # BLD AUTO: 0.06 K/UL — SIGNIFICANT CHANGE UP (ref 0–0.2)
BASOPHILS NFR BLD AUTO: 0.9 % — SIGNIFICANT CHANGE UP (ref 0–2)
BUN SERPL-MCNC: 20 MG/DL — SIGNIFICANT CHANGE UP (ref 7–23)
CALCIUM SERPL-MCNC: 7.4 MG/DL — LOW (ref 8.4–10.5)
CHLORIDE SERPL-SCNC: 114 MMOL/L — HIGH (ref 96–108)
CO2 SERPL-SCNC: 17 MMOL/L — LOW (ref 22–31)
CREAT SERPL-MCNC: 1.38 MG/DL — HIGH (ref 0.5–1.3)
CULTURE RESULTS: SIGNIFICANT CHANGE UP
CULTURE RESULTS: SIGNIFICANT CHANGE UP
EOSINOPHIL # BLD AUTO: 0 K/UL — SIGNIFICANT CHANGE UP (ref 0–0.5)
EOSINOPHIL NFR BLD AUTO: 0 % — SIGNIFICANT CHANGE UP (ref 0–6)
GLUCOSE SERPL-MCNC: 93 MG/DL — SIGNIFICANT CHANGE UP (ref 70–99)
HCT VFR BLD CALC: 24.3 % — LOW (ref 34.5–45)
HGB BLD-MCNC: 8.1 G/DL — LOW (ref 11.5–15.5)
LYMPHOCYTES # BLD AUTO: 1.05 K/UL — SIGNIFICANT CHANGE UP (ref 1–3.3)
LYMPHOCYTES # BLD AUTO: 15.2 % — SIGNIFICANT CHANGE UP (ref 13–44)
MAGNESIUM SERPL-MCNC: 1.8 MG/DL — SIGNIFICANT CHANGE UP (ref 1.6–2.6)
MCHC RBC-ENTMCNC: 28.1 PG — SIGNIFICANT CHANGE UP (ref 27–34)
MCHC RBC-ENTMCNC: 33.3 GM/DL — SIGNIFICANT CHANGE UP (ref 32–36)
MCV RBC AUTO: 84.4 FL — SIGNIFICANT CHANGE UP (ref 80–100)
METHOD TYPE: SIGNIFICANT CHANGE UP
MONOCYTES # BLD AUTO: 0.37 K/UL — SIGNIFICANT CHANGE UP (ref 0–0.9)
MONOCYTES NFR BLD AUTO: 5.3 % — SIGNIFICANT CHANGE UP (ref 2–14)
NEUTROPHILS # BLD AUTO: 5.43 K/UL — SIGNIFICANT CHANGE UP (ref 1.8–7.4)
NEUTROPHILS NFR BLD AUTO: 78.6 % — HIGH (ref 43–77)
ORGANISM # SPEC MICROSCOPIC CNT: SIGNIFICANT CHANGE UP
PHOSPHATE SERPL-MCNC: 1.7 MG/DL — LOW (ref 2.5–4.5)
PLATELET # BLD AUTO: 114 K/UL — LOW (ref 150–400)
POTASSIUM SERPL-MCNC: 4.2 MMOL/L — SIGNIFICANT CHANGE UP (ref 3.5–5.3)
POTASSIUM SERPL-SCNC: 4.2 MMOL/L — SIGNIFICANT CHANGE UP (ref 3.5–5.3)
RBC # BLD: 2.88 M/UL — LOW (ref 3.8–5.2)
RBC # FLD: 17.4 % — HIGH (ref 10.3–14.5)
SODIUM SERPL-SCNC: 142 MMOL/L — SIGNIFICANT CHANGE UP (ref 135–145)
SPECIMEN SOURCE: SIGNIFICANT CHANGE UP
SPECIMEN SOURCE: SIGNIFICANT CHANGE UP
WBC # BLD: 6.91 K/UL — SIGNIFICANT CHANGE UP (ref 3.8–10.5)
WBC # FLD AUTO: 6.91 K/UL — SIGNIFICANT CHANGE UP (ref 3.8–10.5)

## 2019-08-23 PROCEDURE — 87186 SC STD MICRODIL/AGAR DIL: CPT

## 2019-08-23 PROCEDURE — 93306 TTE W/DOPPLER COMPLETE: CPT

## 2019-08-23 PROCEDURE — 87150 DNA/RNA AMPLIFIED PROBE: CPT

## 2019-08-23 PROCEDURE — 36430 TRANSFUSION BLD/BLD COMPNT: CPT

## 2019-08-23 PROCEDURE — 85027 COMPLETE CBC AUTOMATED: CPT

## 2019-08-23 PROCEDURE — 99285 EMERGENCY DEPT VISIT HI MDM: CPT | Mod: 25

## 2019-08-23 PROCEDURE — 96361 HYDRATE IV INFUSION ADD-ON: CPT

## 2019-08-23 PROCEDURE — 84132 ASSAY OF SERUM POTASSIUM: CPT

## 2019-08-23 PROCEDURE — 87633 RESP VIRUS 12-25 TARGETS: CPT

## 2019-08-23 PROCEDURE — P9016: CPT

## 2019-08-23 PROCEDURE — 86923 COMPATIBILITY TEST ELECTRIC: CPT

## 2019-08-23 PROCEDURE — 96374 THER/PROPH/DIAG INJ IV PUSH: CPT

## 2019-08-23 PROCEDURE — 84466 ASSAY OF TRANSFERRIN: CPT

## 2019-08-23 PROCEDURE — 87581 M.PNEUMON DNA AMP PROBE: CPT

## 2019-08-23 PROCEDURE — 84100 ASSAY OF PHOSPHORUS: CPT

## 2019-08-23 PROCEDURE — 80053 COMPREHEN METABOLIC PANEL: CPT

## 2019-08-23 PROCEDURE — 82803 BLOOD GASES ANY COMBINATION: CPT

## 2019-08-23 PROCEDURE — 83690 ASSAY OF LIPASE: CPT

## 2019-08-23 PROCEDURE — 83735 ASSAY OF MAGNESIUM: CPT

## 2019-08-23 PROCEDURE — 83935 ASSAY OF URINE OSMOLALITY: CPT

## 2019-08-23 PROCEDURE — 80048 BASIC METABOLIC PNL TOTAL CA: CPT

## 2019-08-23 PROCEDURE — 36415 COLL VENOUS BLD VENIPUNCTURE: CPT

## 2019-08-23 PROCEDURE — 93005 ELECTROCARDIOGRAM TRACING: CPT

## 2019-08-23 PROCEDURE — 83605 ASSAY OF LACTIC ACID: CPT

## 2019-08-23 PROCEDURE — 82330 ASSAY OF CALCIUM: CPT

## 2019-08-23 PROCEDURE — 71045 X-RAY EXAM CHEST 1 VIEW: CPT

## 2019-08-23 PROCEDURE — 84295 ASSAY OF SERUM SODIUM: CPT

## 2019-08-23 PROCEDURE — 87086 URINE CULTURE/COLONY COUNT: CPT

## 2019-08-23 PROCEDURE — 82728 ASSAY OF FERRITIN: CPT

## 2019-08-23 PROCEDURE — 86901 BLOOD TYPING SEROLOGIC RH(D): CPT

## 2019-08-23 PROCEDURE — 86900 BLOOD TYPING SEROLOGIC ABO: CPT

## 2019-08-23 PROCEDURE — 86850 RBC ANTIBODY SCREEN: CPT

## 2019-08-23 PROCEDURE — 85025 COMPLETE CBC W/AUTO DIFF WBC: CPT

## 2019-08-23 PROCEDURE — 93971 EXTREMITY STUDY: CPT

## 2019-08-23 PROCEDURE — 87040 BLOOD CULTURE FOR BACTERIA: CPT

## 2019-08-23 PROCEDURE — 87798 DETECT AGENT NOS DNA AMP: CPT

## 2019-08-23 PROCEDURE — 99239 HOSP IP/OBS DSCHRG MGMT >30: CPT | Mod: GC

## 2019-08-23 PROCEDURE — 83550 IRON BINDING TEST: CPT

## 2019-08-23 PROCEDURE — 83540 ASSAY OF IRON: CPT

## 2019-08-23 PROCEDURE — 81001 URINALYSIS AUTO W/SCOPE: CPT

## 2019-08-23 PROCEDURE — 87486 CHLMYD PNEUM DNA AMP PROBE: CPT

## 2019-08-23 PROCEDURE — 96375 TX/PRO/DX INJ NEW DRUG ADDON: CPT

## 2019-08-23 RX ORDER — MAGNESIUM SULFATE 500 MG/ML
1 VIAL (ML) INJECTION ONCE
Refills: 0 | Status: DISCONTINUED | OUTPATIENT
Start: 2019-08-23 | End: 2019-08-23

## 2019-08-23 RX ORDER — MOXIFLOXACIN HYDROCHLORIDE TABLETS, 400 MG 400 MG/1
1 TABLET, FILM COATED ORAL
Qty: 6 | Refills: 0
Start: 2019-08-23 | End: 2019-08-27

## 2019-08-23 RX ADMIN — PANTOPRAZOLE SODIUM 40 MILLIGRAM(S): 20 TABLET, DELAYED RELEASE ORAL at 06:41

## 2019-08-23 NOTE — PROGRESS NOTE ADULT - PROBLEM SELECTOR PLAN 8
F: none, euvolemic  E: Replete electrolytes PRN, Goal: K>4, Mg>2  N: DASH/TLC   DVT ppx: Lovenox 30 mg daily (renally adjusted)  CODE: FULL  DIspo: Admit to F

## 2019-08-23 NOTE — DISCHARGE NOTE NURSING/CASE MANAGEMENT/SOCIAL WORK - NSDCDPATPORTLINK_GEN_ALL_CORE
You can access the [facility] Portal, offered by [facility], by registering with the following website: [email or web address]

## 2019-08-23 NOTE — PROGRESS NOTE ADULT - SUBJECTIVE AND OBJECTIVE BOX
Hematology Oncology Progress Note (Dr. Wellington)  Discussed with Dr. Wellington and recommendations reviewed with the primary team.    Interval Hx:  E. Coli growing from blood cultures from 8/21, sensitive to CTX. Superficial thrombophlebitis noted in RUE on ultrasound, she notes significant pain however slightly improved w/ elevation     Initial HPI:  84 year old F with PMH of DM and HTN admitted for fever/chills with Tmax of 100.8 and meeting criteria for sepsis. She follows with Dr. Wellington for lung cancer. She was initially diagnosed in 2017 with stage IIIB squamous cell carcinoma s/p concurrent chemoradiation and subsequently refused Durvalumab per PACIFIC trial. 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 Imaging also revealed bony mets consistent with stage IV disease. She was started on Carboplatin/Etoposide (cisplatin not given due to CKD) in June. She is s/p 3 cycles with last cycle finishing last Friday. She notes feeling more fatigued with this cycle than previous cycles. She denies dysuria or productive cough. She has a chronic cough. She denies nausea/vomiting and diarrhea. No other complaints today.     Patient met criteria for sepsis with temperate, leukocytosis and mild tachycardia. She is currently on broad spectrum antibiotics w/ Vanco/Zosyn.     ROS is otherwise negative.      MEDICATIONS  (STANDING):  cefTRIAXone   IVPB 2 milliGRAM(s) IV Intermittent every 24 hours  enoxaparin Injectable 30 milliGRAM(s) SubCutaneous every 24 hours  magnesium sulfate  IVPB 1 Gram(s) IV Intermittent once  pantoprazole    Tablet 40 milliGRAM(s) Oral before breakfast  QUEtiapine 25 milliGRAM(s) Oral at bedtime  sodium phosphate IVPB 30 milliMole(s) IV Intermittent once    MEDICATIONS  (PRN):  acetaminophen   Tablet .. 650 milliGRAM(s) Oral every 6 hours PRN Temp greater or equal to 38C (100.4F), Moderate Pain (4 - 6)          PHYSICAL EXAM:    T(C): 36.9 (08-23-19 @ 10:30), Max: 37.3 (08-23-19 @ 06:34)  T(F): 98.4 (08-23-19 @ 10:30), Max: 99.1 (08-23-19 @ 06:34)  HR: 77 (08-23-19 @ 10:30) (77 - 98)  BP: 105/64 (08-23-19 @ 10:30) (105/64 - 135/74)  ABP: --  ABP(mean): --  RR: 17 (08-23-19 @ 10:30) (17 - 18)  SpO2: 100% (08-23-19 @ 10:30) (100% - 100%)    Gen: well developed, well nourished  HEENT: normocephalic/atraumatic, no conjunctival pallor, no scleral icterus; dry mucous membranes   Neck: supple, no JVD   Cardiovascular: Systolic murmur appreciated   Respiratory: clear air entry b/l  Gastrointestinal: BS+, soft, NT/ND  Extremities: no clubbing/cyanosis, no edema, no calf tenderness. Swelling and redness of RUE  Vascular:  DP/PT 2+ b/l  Neurological: CN 2-12 grossly intact, no focal deficits  Skin: no rash on visible skin            Pathology:  Surgical Pathology Report (05.28.19 @ 11:55)    Surgical Pathology Report:   ACCESSION No:  [id number]    CRISTI PEREA                        6                            8.1    6.91  )-----------( 114      ( 23 Aug 2019 06:24 )             24.3         CBC Full  -  ( 23 Aug 2019 06:24 )  WBC Count : 6.91 K/uL  RBC Count : 2.88 M/uL  Hemoglobin : 8.1 g/dL  Hematocrit : 24.3 %  Platelet Count - Automated : 114 K/uL  Mean Cell Volume : 84.4 fl  Mean Cell Hemoglobin : 28.1 pg  Mean Cell Hemoglobin Concentration : 33.3 gm/dL  Auto Neutrophil # : 5.43 K/uL  Auto Lymphocyte # : 1.05 K/uL  Auto Monocyte # : 0.37 K/uL  Auto Eosinophil # : 0.00 K/uL  Auto Basophil # : 0.06 K/uL  Auto Neutrophil % : 78.6 %  Auto Lymphocyte % : 15.2 %  Auto Monocyte % : 5.3 %  Auto Eosinophil % : 0.0 %  Auto Basophil % : 0.9 %        08-23    142  |  114<H>  |  20  ----------------------------<  93  4.2   |  17<L>  |  1.38<H>    Ca    7.4<L>      23 Aug 2019 06:24  Phos  1.7     08-23  Mg     1.8     08-23    TPro  6.1  /  Alb  3.3  /  TBili  0.3  /  DBili  x   /  AST  7<L>  /  ALT  5<L>  /  AlkPhos  116  08-22          Addendum Report          Addendum    [employer] NGS Solid Tumor Sequencing Report    RESULT SUMMARY:  ABNORMAL    DETECTED GENOMIC ALTERATIONS:    TP53 [id number]    One unclear variant in PTEN    TUMOR TYPE: Carcinoma    CLINICAL INFORMATION:    Right submandibular gland and level I, II, and III tissue  excision showed high grade carcinoma with neuroendocrine features  and metastatic carcinoma involving four of eight lymph nodes.  Immunostains were strong and diffusely positive for CK7 and CD56;  focally positive for chromogranin; and were negative for androgen  receptor, synaptophysin, CK20, S100, SMA, p40, p63, TTF1, napsin,  HER2, and GATA3. The tumor was morphologically similar and has a  similar immunoprofile to the patient's left lower lung lobe  tumor. While this tumor is favored to be a metastasis form the  left lower lung lobe primary, clinical correlation is needed to  rule out otherpossible primary sites [id number]). The  original collection date of this specimen is 05/28/2019.    PERTINENT NEGATIVE RESULTS:    The following genes are NEGATIVE for clinically relevant  mutations. Mutational hotspots and surrounding exonic regions  were interrogated for DNA level point mutations and indels  (fusions not assayed).      AKT1, ALK, BRAF, CTNNB1, DDR2, EGFR, EPHA2, ERBB2, ESR1, FGFR1,  FGFR2, FGFR3, GNA11, GNAQ, HRAS, IDH1, IDH2, KIT, KRAS, MAP2K1,  MET, MTOR, NOTCH1, NRAS, PDGFRA, PIK3CA, RAC1, RET, ROS1.    INTERPRETATION SUMMARY  It should be noted that this patient has had one or more  additional NGS studies. The most recent study showed the  following mutation(s): TP53                CRISTI PEREA         6        Addendum Report          [id number] (54.11%). Please see the previous  [employer] NGS Lung Tumor Sequencing Report, Collection Date:  July 28 2017, Specimen ID: [id number].    A mutation in TP53 (p.[id number]) was detected in  this patient's sample.    TP53 is frequently mutated in non-small cell lung cancer (NSCLC).  The impact of TP53 mutational status on the outcomes in NSCLC is  the subject of ongoing investigations (83681176; 37208665).  Evidence for the prognostic value of TP53 mutations in NSCLC is  insufficient, and, at present time, TP53 mutational status is not  included in diagnostic and therapeutic algorithms (06169297; NCCN  Guidelines, Non-Small Cell Lung Cancer, Version 5.2019).    An unclear variant in PTEN (p.[id number]) was detected in this  patient's sample.    This variant has been reported in a limited number of tumor  samples (COSMIC) and has not been reported as a population  variant in publicly available databases (EVS; ExAC). Therefore,  although likely disease-associated, due to the paucity of  functional and clinical evidence, its significance is currently  unclear.    Clinical and pathologic correlation is required to interpret  these findings.      Electronically signed by Damion Crowe MD PhD    This addendum reports the results of [facility] NGS Solid Tumor  Sequencing, reported on 07- by [employer], a specialized  [facility]. Testing has been performed at  [facility], [employer] [address] [place] [address], phone: [phone], on [facility] specimen [id number] with [facility] ID: [id number].  Please refer to the official [facility] report, which is on file in  the              CRISTI PEREA                        6        Addendum Report          Pathology Department.    Verified by: Volodymyr Crawford MD  (Electronic Signature)  Reported on: 07/18/19 08:29 EDT, [address] [place] [address]  [id number]  _________________________________________________________________          Addendum    PD-L1 Immunohistochemical Analysis (KEYTRUDA)    RESULTS  PD-L1 IMMUNOHISTOCHEMICAL ANALYSIS:    Tumor Proportion Score:  <1% / Negative    COMMENT  This biopsy contains sufficient (>100) viable tumorcells.  A  positive control for each antibody has been reviewed and  accepted.    INTERPRETIVE INFORMATION  This test was performed on formalin-fixed paraffin-embedded tumor  tissue using the FDA-cleared PD-L1 IHC 22C3 pharmDx kit and [employer] Link 48. PD-L1 protein expression is determined by  using the Tumor Proportion Score, which is the percentage of  viable tumor cells showing partial or complete membranous  staining of any intensity (1+,2+,3+). PD-L1 IHC 22C3 pharmDx is  indicated as an aid in identifying NSCLC patients for treatment  with KEYTRUDA® (pembrolizumab). PD-L1 IHC 22C3 may also serve as  a complementary diagnostic in many other tumor types.    Adapted KEYTRUDA® (pembrolizumab) Prescribing Information  (revised 5/2017 - [id number]). Please view full  prescribing information at www.fda.gov.    ·    as a single agent for first-line treatment of patients with  metastatic NSCLC whose tumors have high PD-L1 expression (Tumor  Proportion Score (TPS) >=50%) as determined by an FDA-approved  test, with no EGFR or ALK genomic tumor aberrations.    ·    as a single agent for patients with metastatic NSCLC whose  tumors express PD-L1 (TPS >=1%) as              CRISTI PEREA                        6        Addendum Report          determined by an FDA-approved test, with disease progression on  or after platinum-containing chemotherapy and/or EGFR or ALK  targeted therapy for patients with these mutations.  ·    in combination with pemetrexed and carboplatin, as first-  line treatment of patients with metastatic nonsquamous NSCLC.*  ·    for patients with unresectable or metastatic melanoma.  ·    for patients with recurrent or metastatic HNSCC with disease  progression on or after chemotherapy.*    ·    for patients with locally advanced or metastatic urothelial  carcinoma who are not eligible for chemotherapy* or who have  disease progression during / following chemotherapy or within 12  months of neoadjuvant or adjuvant treatment with chemotherapy.    ·    for patients with refractory Classical Hodgkin Lymphoma, or  who have relapsed after 3+ lines of therapy.*    ·    for patients with unresectable or metastatic, microsatellite  instability-high (MSI-H) or mismatch repair deficient (dMMR;  absence of IHC staining for one or more of: MLH1, MSH2, MSH6, or  PMS2 protein):*    -    solid tumors that have progressed after prior treatment and  with no satisfactory alternative treatment options, or*  -    colorectal cancer that has progressed after treatment with  fluoropyrimidine, oxaliplatin, and irinotecan.*    *This indication is approved under accelerated approval.  Continued approval for this indication may be contingent upon  verification and description of clinical benefit in the  confirmatory trials.        Mary Jones M.D.    Pathologist, ex. [id number]    This report was electronically signed.    This addendum reports the results of PD-L1 Immunohistochemical  Analysis (Keytruda), reported on 07- by [facility], a  specialized [facility]. Testing has been performed  at [facility], [employer] [address] [place] [address], phone: [phone], on [facility] specimen [id number] (1D) with [facility] ID: [id number].  Please refer to the official [facility] report, which is on file in  the            CRISTI PEREA                        6        Addendum Report          Pathology Department.    Verified by: Volodymyr Crawford MD  (Electronic Signature)  Reported on: 07/10/19 16:47 EDT, [address] [place] [address]  [id number]  _________________________________________________________________      Surgical Final Report          Final Diagnosis    1.  Right submandibular gland and level I, II and III tissue,  excision:  - High-grade carcinoma with neuroendocrine features.  See  note.  - Metastatic carcinoma involving four of eight lymph nodes  (4/8). See note  - Tumor is present at inked deep margin.    Note: Immunostains performed to further classify this tumor show  tumor cells are strong and diffusely positive for CK 7 and CD56,  focally positive for chromogranin and are negative for androgen  receptor, synaptophysin, CK20, S100, SMA, p40, p63, TTF1, napsin,  HER2 and GATA3.  PAS and PASD stains are negative.  The tumor in  this material is morphologically similar and has a similar in the  profile to the patient's left lower lobe lung tumor ([id number]).  While this tumor is favored to be a metastasis from the left  lower lobe lung primary, clinical and radiologic correlation are  needed to rule out other possible primary sites.  This case was  reviewed with 2 senior pathologists (Dr. Phi Trivedi and Dr. Jaquelin Gilbert).  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            CRISTI PEREA                        6        Surgical Final Report            Specimen(s) Submitted  1     Right submandibular gland, levelI, II, III mass    Gross Description  1. The specimen is received in formalin, labeled with the  patient's identification and "right submandibular gland and level  I, II, III mass." It consists of neck dissection specimen  oriented by the surgeon, measuring 6.2 x 5.8 x 2.1 cm.  Per the  surgeon the specimen is oriented into three levels, and with a  deep aspect of the gland.  Level I measures 3.2 x 3.5 x 2.1 cm.  Within level 1 there is a gland measuring 3.5 x 3.1 x 2.4 cm.  The deep aspect of the gland is inked black and the remaining  external surface the gland is inked blue.  The cut surface of the  gland reveals a 2.3 x 2.1 x 1.7 cm white-tan firm mass abutting  the inked external surface and inked deep aspect.  The remaining  cut surface is yellow-tan lobulated.  Also within level 1 is a  lymph node measuring 1.2 x 1.1 x 0.3 cm.  Level II measures 5.5 x  2.1 x 0.9 cm.  Within level II there are four potential lymph  nodes ranging from 0.6 up to 1.5 cm in greatest dimension.  Level  III measures 2.5 x 2.5 x 0.9 cm.  Within level III is seen 1.8 x  1.2 x 0.3 cm potential lymph node.  Also received is a separate  unoriented fragment of pink-tan tissue measuring 0.8 x 0.5 x 0.3  cm. Representaive sections, including all of the lymph nodes, are  submitted in nine cassettes as:  Level I  1A-1C-mass in gland (with deep aspect, perpindular sections in  1A), 1D-uninvolved gland, 1E-one lymph node  Level II  1F-three lymph nodes, 1G-one lymph node sectioned  Level III  1H-one lymph node sectioned    1I-separate fragment of tissue.      MARTHA Garrison [facility] 05/28/2019 18:14      Imaging Studies:

## 2019-08-23 NOTE — PROGRESS NOTE ADULT - PROBLEM SELECTOR PLAN 3
Patient complaining of RUE palpable nodule, was supposed to have an outpatient RUE duplex which the patient missed.   -RUE duplex with thrombus in R cephalic vein at AC fossa, placed pink band on wrist and instructed no lab draws from RUE Patient complaining of RUE palpable nodule, was supposed to have an outpatient RUE duplex which the patient missed.   -RUE duplex with thrombus in R cephalic vein at AC fossa, placed pink band on wrist and instructed no lab draws from RUE  - Supportive treatment - hot packs, elevation

## 2019-08-23 NOTE — PROGRESS NOTE ADULT - SUBJECTIVE AND OBJECTIVE BOX
Hospital Course:  83 y/o F PMHx DM, HTN, Stage IIIb LLL NSCLC, c/l R endobronchial SCC, TOMMY presenting to [facility] ED with complaints of fevers, chills, nausea for the last 24 hrs. Patients notes that starting earlier today, patient began feeling feverish (did not measure temperature at home) with associated rigors and nausea around 2-2:30 pm in the afternoon. Patient notes she is currently undergoing chemotherapy, following with Dr Wellington, last round of chemo noted Wednesday-Friday of last week. Patient denies any recent sick contacts or recent travel. ROS also positive for increased urinary frequency. Otherwise, patient denies any shortness of breath, chest pain, cough, vomiting, dysuria, urgency, diarrhea, constipation. Remainder of ROS negative. In the ED: T 100.7-100.8F; HR ; -144/56-99; RR 16-18; Sp02 95-98 RA. Labs notable for WBC 24.85 with 93.8% neutrophils, negative lactate 1.2, sCr 1.64, Alk Phos 151, AST 10, ALT 6. s/p Cefazolin 1g x1, Vanc 1g x1. Zofran 4 mg IVPx1, Tylenol 650 mg POx1, Blood cultures with G neg rods (e coli), UA+, UCx with G neg rods, RVP neg. CXR with increased haziness LLL infiltrate. 8/21 given 1 U pRBC to keep Hb above 7 after which pt became febrile to 102.3 which resolved with IV tylenol, transfusion reaction w/u neg. WBC downtrended on CTX. RUE duplex with thrombus in R cephalic vein at AC fossa, placed pink band and told nursing no lab draws from RUE.     Subjective: No acute events overnight. Afebrile throughout the night. Pt seen and examined at bedside resting comfortably in bed. No complaints, sleeping and eating well.     REVIEW OF SYSTEMS:    CONSTITUTIONAL: No weakness, fevers or chills.   EYES/ENT: No visual changes;  No vertigo or throat pain   NECK: No pain or stiffness  RESPIRATORY: No cough, wheezing, hemoptysis; +SOB unchanged from yesterday  CARDIOVASCULAR: No chest pain or palpitations  GASTROINTESTINAL: No abdominal or epigastric pain. No nausea, vomiting, or hematemesis; No diarrhea or constipation. No melena or hematochezia.  GENITOURINARY: No dysuria, frequency or hematuria  NEUROLOGICAL: No numbness or weakness  SKIN: No itching, burning, rashes, or lesions   All other review of systems is negative unless indicated above.    Vital Signs Last 24 Hrs  T(C): 37.3 (23 Aug 2019 06:34), Max: 37.3 (22 Aug 2019 08:46)  T(F): 99.1 (23 Aug 2019 06:34), Max: 99.1 (22 Aug 2019 08:46)  HR: 81 (23 Aug 2019 06:34) (81 - 98)  BP: 135/74 (23 Aug 2019 06:34) (92/57 - 135/74)  BP(mean): --  RR: 18 (23 Aug 2019 06:34) (18 - 18)  SpO2: 100% (23 Aug 2019 06:34) (99% - 100%)    PHYSICAL EXAM:  Constitutional: WDWN resting comfortably in bed; NAD, speaking in full sentences  Head: NC/AT  Eyes: PERRL, EOMI, anicteric sclera  ENT: no nasal discharge; MMM  Neck: supple; no JVD   Respiratory: CTA B/L; no W/R/R, no retractions  Cardiac: +S1/S2; RRR; holosystolic murmur without radiation to carotids  Gastrointestinal: soft, NT/ND; no rebound or guarding; +BSx4  Extremities: WWP, no clubbing or cyanosis; no peripheral edema  Vascular: 2+ radial, DP/PT pulses B/L  Dermatologic: skin warm, dry and intact; no rashes, wounds, or scars  Neurologic: AAOx3; no focal deficits  Psychiatric: affect and characteristics of appearance, verbalizations, behaviors are appropriate    MEDICATIONS  (STANDING):  cefTRIAXone   IVPB 2 milliGRAM(s) IV Intermittent every 24 hours  enoxaparin Injectable 30 milliGRAM(s) SubCutaneous every 24 hours  magnesium sulfate  IVPB 1 Gram(s) IV Intermittent once  pantoprazole    Tablet 40 milliGRAM(s) Oral before breakfast  QUEtiapine 25 milliGRAM(s) Oral at bedtime  sodium phosphate IVPB 30 milliMole(s) IV Intermittent once    MEDICATIONS  (PRN):  acetaminophen   Tablet .. 650 milliGRAM(s) Oral every 6 hours PRN Temp greater or equal to 38C (100.4F), Moderate Pain (4 - 6)    Allergies    No Known Allergies    Intolerances      LABS:                        8.1    6.91  )-----------( 114      ( 23 Aug 2019 06:24 )             24.3     08-23    142  |  114<H>  |  20  ----------------------------<  93  4.2   |  17<L>  |  1.38<H>    Ca    7.4<L>      23 Aug 2019 06:24  Phos  1.7     08-23  Mg     1.8     08-23    TPro  6.1  /  Alb  3.3  /  TBili  0.3  /  DBili  x   /  AST  7<L>  /  ALT  5<L>  /  AlkPhos  116  08-22        CAPILLARY BLOOD GLUCOSE          RADIOLOGY & ADDITIONAL TESTS: Reviewed.

## 2019-08-23 NOTE — PROGRESS NOTE ADULT - PROBLEM SELECTOR PLAN 2
Patient anemic to 7.8 on admission, decreased to 6.7 on AM labs, no complaints or signs suggestive of active bleeding. Per record review, Hgb 11-12 range in 2017 however with no recent blood work for comparison. Underlying CKD likely contributing to anemia as well however unclear etiology for acute drop on AM labs.  -Maintain Active T&S  -s/p 1 U pRBC with appropriate response in Hb post-transfusion 7.9 8/21  -Maintain goal Hgb >7  -Iron studies with total iron 39, unsat iron binding capacity 143, TIBC 182, %sat iron 21, ferritin 739  - Hb today 8.1

## 2019-08-23 NOTE — PROGRESS NOTE ADULT - ASSESSMENT
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 She is s/p 3 cycles of Carboplatin/Etoposide, with last cycle finishing last Friday per patient.     #Sepsis  Met criteria with fever, tachycardia and leukocytosis. E. Coli bacteremia noted secondary to UTI  -Cultures sensitive to CTX; repeat blood cultures negative  -D/C home on antibiotics for 2 week course     #Stage IV Large Cell neuroendocrine cancer   s/p 3 cycles of Carboplatin/Etoposide, follows with Dr. Wellington. CXR shows LLL haziness likely due to underlying malignancy  -Will repeat CT chest/abdomen/pelvis after 4th cycle as outpatient  -DVT ppx with Lovenox  -Patient has follow up appointment with Dr. Wellington on 9/3 at 1:45 PM; 4th cycle of chemotherapy will be initiated once patient has completed antibiotic course and remains afebrile     #Anemia  Normocytic anemia, likely due to Etoposide as well as underlying infection/malignancy. s/p 1 unit pRBC transfusion.  -Due to myelosuppression from Etoposide  -CTM CBC  -transfuse to keep Hb > 7 g/dl    Case d/w Dr. Wellington
85 y/o F PMHx DM, HTN, Stage IIIb LLL NSCLC, c/l R endobronchial SCC, TOMMY presenting to [facility] ED with complaints of fevers, chills, nausea for the last 24 hrs, meeting sepsis criteria likely urinary source, admitted to [facility] for further medical management.
83 y/o F PMHx DM, HTN, Stage IIIb LLL NSCLC, c/l R endobronchial SCC, TOMMY presenting to [facility] ED with complaints of fevers, chills, nausea for the last 24 hrs, meeting sepsis criteria likely urinary vs lung source, admitted to [facility] for further medical management.

## 2019-08-23 NOTE — PROGRESS NOTE ADULT - PROBLEM SELECTOR PLAN 7
Patient with history of HTN, BP stable on admission, reports currently not being on any anti-hypertensives at this time.   -continue to monitor

## 2019-08-23 NOTE — PROGRESS NOTE ADULT - PROBLEM SELECTOR PLAN 1
Patient presenting with fevers, meeting sepsis criteria (T 100.8F, tachycardia, leukocytosis), no associated lactate. Source likely urinary vs lung. CXR with no obvious infiltrate however with increased haziness in LLL region, UA+ with UCx sent  - BCx with G- rods, E coli that is not MDR   - UCx with G- rods, E coli  - RVP neg  - CXR with inc left basilar infiltrate  - c/w CTX to cover for both lung and urinary source for now Patient presenting with fevers, meeting sepsis criteria (T 100.8F, tachycardia, leukocytosis), no associated lactate. Source likely urinary vs lung. CXR with no obvious infiltrate however with increased haziness in LLL region, UA+ with UCx sent  - BCx with G- rods, E coli that is not MDR, sensitive to cipro  - UCx with G- rods, E coli, sensitive to cipro  - RVP neg  - CXR with inc left basilar infiltrate  - c/w CTX to cover for UTI, likely can transition to PO cipro on dc

## 2019-08-23 NOTE — PROGRESS NOTE ADULT - PROBLEM SELECTOR PLAN 4
Patient reports history of Stage IIIb LLL NSCLC and contralateral R endobronchial SCC s/p chemo/RT, currently undergoing chemotherapy with unknown agents. Scheduled for next chemo session Sept 4th  -Heme/Onc following - LLL haziness on CXR likely d/t underlying malignancy  -collateral from Dr Wellington re: prior/ongoing oncologic management

## 2019-08-23 NOTE — PROGRESS NOTE ADULT - PROBLEM SELECTOR PLAN 6
With underlying CKD per record review, prior record demonstrating sCr ~2. Cr on admission 1.64  -Cr today 1.38, downtrending  -continue to monitor with daily BMPs

## 2019-08-23 NOTE — PROGRESS NOTE ADULT - PROBLEM SELECTOR PROBLEM 3
R/O Deep vein thrombosis (DVT) of right upper extremity Deep vein thrombosis (DVT) of right upper extremity

## 2019-08-23 NOTE — PROGRESS NOTE ADULT - PROBLEM SELECTOR PLAN 9
1) PCP Contacted on Admission: (Y/N) --> Name & Phone #: Kwesi Stark (PCP), [address] [facility]  2) Date of Contact with PCP: Will contact in AM  3) PCP Contacted at Discharge: (Y/N)  4) Summary of Handoff Given to PCP:   5) Post-Discharge Appointment Date and Location:
1) PCP Contacted on Admission: (Y/N) --> Name & Phone #: Kwesi Stark (PCP), located [address] [facility]  2) Date of Contact with PCP: Will contact in AM  3) PCP Contacted at Discharge: (Y/N)  4) Summary of Handoff Given to PCP:   5) Post-Discharge Appointment Date and Location:

## 2019-08-27 DIAGNOSIS — I80.9 PHLEBITIS AND THROMBOPHLEBITIS OF UNSPECIFIED SITE: ICD-10-CM

## 2019-08-27 DIAGNOSIS — C34.32 MALIGNANT NEOPLASM OF LOWER LOBE, LEFT BRONCHUS OR LUNG: ICD-10-CM

## 2019-08-27 DIAGNOSIS — Z90.710 ACQUIRED ABSENCE OF BOTH CERVIX AND UTERUS: ICD-10-CM

## 2019-08-27 DIAGNOSIS — R50.9 FEVER, UNSPECIFIED: ICD-10-CM

## 2019-08-27 DIAGNOSIS — D63.0 ANEMIA IN NEOPLASTIC DISEASE: ICD-10-CM

## 2019-08-27 DIAGNOSIS — Z87.891 PERSONAL HISTORY OF NICOTINE DEPENDENCE: ICD-10-CM

## 2019-08-27 DIAGNOSIS — I12.9 HYPERTENSIVE CHRONIC KIDNEY DISEASE WITH STAGE 1 THROUGH STAGE 4 CHRONIC KIDNEY DISEASE, OR UNSPECIFIED CHRONIC KIDNEY DISEASE: ICD-10-CM

## 2019-08-27 DIAGNOSIS — N18.3 CHRONIC KIDNEY DISEASE, STAGE 3 (MODERATE): ICD-10-CM

## 2019-08-27 DIAGNOSIS — G47.33 OBSTRUCTIVE SLEEP APNEA (ADULT) (PEDIATRIC): ICD-10-CM

## 2019-08-27 DIAGNOSIS — F17.200 NICOTINE DEPENDENCE, UNSPECIFIED, UNCOMPLICATED: ICD-10-CM

## 2019-08-27 DIAGNOSIS — C76.0 MALIGNANT NEOPLASM OF HEAD, FACE AND NECK: ICD-10-CM

## 2019-08-27 DIAGNOSIS — A41.51 SEPSIS DUE TO ESCHERICHIA COLI [E. COLI]: ICD-10-CM

## 2019-08-27 DIAGNOSIS — E11.22 TYPE 2 DIABETES MELLITUS WITH DIABETIC CHRONIC KIDNEY DISEASE: ICD-10-CM

## 2019-08-27 DIAGNOSIS — K21.9 GASTRO-ESOPHAGEAL REFLUX DISEASE WITHOUT ESOPHAGITIS: ICD-10-CM

## 2019-08-27 DIAGNOSIS — N39.0 URINARY TRACT INFECTION, SITE NOT SPECIFIED: ICD-10-CM

## 2019-08-27 DIAGNOSIS — C77.9 SECONDARY AND UNSPECIFIED MALIGNANT NEOPLASM OF LYMPH NODE, UNSPECIFIED: ICD-10-CM

## 2019-08-27 DIAGNOSIS — C79.51 SECONDARY MALIGNANT NEOPLASM OF BONE: ICD-10-CM

## 2019-08-27 LAB
CULTURE RESULTS: SIGNIFICANT CHANGE UP
SPECIMEN SOURCE: SIGNIFICANT CHANGE UP

## 2019-09-06 RX ORDER — PEGFILGRASTIM-CBQV 6 MG/.6ML
6 INJECTION, SOLUTION SUBCUTANEOUS ONCE
Refills: 0 | Status: COMPLETED | OUTPATIENT
Start: 2019-09-11 | End: 2019-09-11

## 2019-09-09 ENCOUNTER — OUTPATIENT (OUTPATIENT)
Dept: OUTPATIENT SERVICES | Facility: HOSPITAL | Age: 84
LOS: 1 days | End: 2019-09-09
Payer: MEDICARE

## 2019-09-09 ENCOUNTER — APPOINTMENT (OUTPATIENT)
Age: 84
End: 2019-09-09

## 2019-09-09 VITALS
HEART RATE: 53 BPM | SYSTOLIC BLOOD PRESSURE: 137 MMHG | TEMPERATURE: 97 F | DIASTOLIC BLOOD PRESSURE: 73 MMHG | RESPIRATION RATE: 18 BRPM | OXYGEN SATURATION: 99 %

## 2019-09-09 DIAGNOSIS — Z90.710 ACQUIRED ABSENCE OF BOTH CERVIX AND UTERUS: Chronic | ICD-10-CM

## 2019-09-09 DIAGNOSIS — C34.90 MALIGNANT NEOPLASM OF UNSPECIFIED PART OF UNSPECIFIED BRONCHUS OR LUNG: ICD-10-CM

## 2019-09-09 PROCEDURE — 96375 TX/PRO/DX INJ NEW DRUG ADDON: CPT

## 2019-09-09 PROCEDURE — 96415 CHEMO IV INFUSION ADDL HR: CPT

## 2019-09-09 PROCEDURE — 96417 CHEMO IV INFUS EACH ADDL SEQ: CPT

## 2019-09-09 PROCEDURE — 96367 TX/PROPH/DG ADDL SEQ IV INF: CPT

## 2019-09-09 PROCEDURE — 96413 CHEMO IV INFUSION 1 HR: CPT

## 2019-09-09 RX ORDER — FOSAPREPITANT DIMEGLUMINE 150 MG/5ML
150 INJECTION, POWDER, LYOPHILIZED, FOR SOLUTION INTRAVENOUS ONCE
Refills: 0 | Status: COMPLETED | OUTPATIENT
Start: 2019-09-09 | End: 2019-09-09

## 2019-09-09 RX ORDER — DEXAMETHASONE 0.5 MG/5ML
20 ELIXIR ORAL ONCE
Refills: 0 | Status: COMPLETED | OUTPATIENT
Start: 2019-09-09 | End: 2019-09-09

## 2019-09-09 RX ORDER — ETOPOSIDE 20 MG/ML
105 VIAL (ML) INTRAVENOUS ONCE
Refills: 0 | Status: COMPLETED | OUTPATIENT
Start: 2019-09-09 | End: 2019-09-09

## 2019-09-09 RX ORDER — ONDANSETRON 8 MG/1
16 TABLET, FILM COATED ORAL ONCE
Refills: 0 | Status: COMPLETED | OUTPATIENT
Start: 2019-09-09 | End: 2019-09-09

## 2019-09-09 RX ORDER — CARBOPLATIN 50 MG
257 VIAL (EA) INTRAVENOUS ONCE
Refills: 0 | Status: COMPLETED | OUTPATIENT
Start: 2019-09-09 | End: 2019-09-09

## 2019-09-09 RX ADMIN — FOSAPREPITANT DIMEGLUMINE 310 MILLIGRAM(S): 150 INJECTION, POWDER, LYOPHILIZED, FOR SOLUTION INTRAVENOUS at 10:40

## 2019-09-09 RX ADMIN — Medication 220 MILLIGRAM(S): at 10:10

## 2019-09-09 RX ADMIN — Medication 336.83 MILLIGRAM(S): at 11:20

## 2019-09-09 RX ADMIN — Medication 257 MILLIGRAM(S): at 13:50

## 2019-09-09 RX ADMIN — Medication 20 MILLIGRAM(S): at 10:25

## 2019-09-09 RX ADMIN — ONDANSETRON 232 MILLIGRAM(S): 8 TABLET, FILM COATED ORAL at 10:25

## 2019-09-09 RX ADMIN — FOSAPREPITANT DIMEGLUMINE 150 MILLIGRAM(S): 150 INJECTION, POWDER, LYOPHILIZED, FOR SOLUTION INTRAVENOUS at 11:10

## 2019-09-09 RX ADMIN — Medication 105 MILLIGRAM(S): at 12:50

## 2019-09-09 RX ADMIN — ONDANSETRON 16 MILLIGRAM(S): 8 TABLET, FILM COATED ORAL at 10:40

## 2019-09-09 RX ADMIN — Medication 275.7 MILLIGRAM(S): at 12:50

## 2019-09-10 ENCOUNTER — APPOINTMENT (OUTPATIENT)
Age: 84
End: 2019-09-10

## 2019-09-10 ENCOUNTER — OUTPATIENT (OUTPATIENT)
Dept: OUTPATIENT SERVICES | Facility: HOSPITAL | Age: 84
LOS: 1 days | End: 2019-09-10
Payer: MEDICARE

## 2019-09-10 VITALS
OXYGEN SATURATION: 99 % | HEIGHT: 63 IN | TEMPERATURE: 98 F | SYSTOLIC BLOOD PRESSURE: 140 MMHG | WEIGHT: 151.02 LBS | RESPIRATION RATE: 16 BRPM | DIASTOLIC BLOOD PRESSURE: 52 MMHG | HEART RATE: 61 BPM

## 2019-09-10 DIAGNOSIS — J84.10 PULMONARY FIBROSIS, UNSPECIFIED: ICD-10-CM

## 2019-09-10 DIAGNOSIS — C7A.1 MALIGNANT POORLY DIFFERENTIATED NEUROENDOCRINE TUMORS: ICD-10-CM

## 2019-09-10 DIAGNOSIS — C7B.8 OTHER SECONDARY NEUROENDOCRINE TUMORS: ICD-10-CM

## 2019-09-10 DIAGNOSIS — R11.2 NAUSEA WITH VOMITING, UNSPECIFIED: ICD-10-CM

## 2019-09-10 DIAGNOSIS — Z90.710 ACQUIRED ABSENCE OF BOTH CERVIX AND UTERUS: Chronic | ICD-10-CM

## 2019-09-10 DIAGNOSIS — C34.90 MALIGNANT NEOPLASM OF UNSPECIFIED PART OF UNSPECIFIED BRONCHUS OR LUNG: ICD-10-CM

## 2019-09-10 DIAGNOSIS — J43.2 CENTRILOBULAR EMPHYSEMA: ICD-10-CM

## 2019-09-10 PROCEDURE — 96413 CHEMO IV INFUSION 1 HR: CPT

## 2019-09-10 PROCEDURE — 96375 TX/PRO/DX INJ NEW DRUG ADDON: CPT

## 2019-09-10 RX ORDER — ETOPOSIDE 20 MG/ML
105 VIAL (ML) INTRAVENOUS ONCE
Refills: 0 | Status: COMPLETED | OUTPATIENT
Start: 2019-09-10 | End: 2019-09-10

## 2019-09-10 RX ORDER — DEXAMETHASONE 0.5 MG/5ML
20 ELIXIR ORAL ONCE
Refills: 0 | Status: COMPLETED | OUTPATIENT
Start: 2019-09-10 | End: 2019-09-10

## 2019-09-10 RX ORDER — ONDANSETRON 8 MG/1
16 TABLET, FILM COATED ORAL ONCE
Refills: 0 | Status: COMPLETED | OUTPATIENT
Start: 2019-09-10 | End: 2019-09-10

## 2019-09-10 RX ADMIN — ONDANSETRON 232 MILLIGRAM(S): 8 TABLET, FILM COATED ORAL at 09:45

## 2019-09-10 RX ADMIN — Medication 220 MILLIGRAM(S): at 10:00

## 2019-09-10 RX ADMIN — Medication 105 MILLIGRAM(S): at 11:45

## 2019-09-10 RX ADMIN — Medication 20 MILLIGRAM(S): at 10:15

## 2019-09-10 RX ADMIN — ONDANSETRON 16 MILLIGRAM(S): 8 TABLET, FILM COATED ORAL at 10:00

## 2019-09-10 RX ADMIN — Medication 336.83 MILLIGRAM(S): at 10:15

## 2019-09-11 ENCOUNTER — APPOINTMENT (OUTPATIENT)
Age: 84
End: 2019-09-11

## 2019-09-11 ENCOUNTER — OUTPATIENT (OUTPATIENT)
Dept: OUTPATIENT SERVICES | Facility: HOSPITAL | Age: 84
LOS: 1 days | End: 2019-09-11
Payer: MEDICARE

## 2019-09-11 VITALS
DIASTOLIC BLOOD PRESSURE: 69 MMHG | TEMPERATURE: 98 F | HEART RATE: 50 BPM | OXYGEN SATURATION: 100 % | RESPIRATION RATE: 18 BRPM | SYSTOLIC BLOOD PRESSURE: 147 MMHG

## 2019-09-11 DIAGNOSIS — C7A.1 MALIGNANT POORLY DIFFERENTIATED NEUROENDOCRINE TUMORS: ICD-10-CM

## 2019-09-11 DIAGNOSIS — C34.90 MALIGNANT NEOPLASM OF UNSPECIFIED PART OF UNSPECIFIED BRONCHUS OR LUNG: ICD-10-CM

## 2019-09-11 DIAGNOSIS — R11.2 NAUSEA WITH VOMITING, UNSPECIFIED: ICD-10-CM

## 2019-09-11 DIAGNOSIS — J43.2 CENTRILOBULAR EMPHYSEMA: ICD-10-CM

## 2019-09-11 DIAGNOSIS — C7B.8 OTHER SECONDARY NEUROENDOCRINE TUMORS: ICD-10-CM

## 2019-09-11 DIAGNOSIS — Z90.710 ACQUIRED ABSENCE OF BOTH CERVIX AND UTERUS: Chronic | ICD-10-CM

## 2019-09-11 DIAGNOSIS — J84.10 PULMONARY FIBROSIS, UNSPECIFIED: ICD-10-CM

## 2019-09-11 PROCEDURE — 96413 CHEMO IV INFUSION 1 HR: CPT

## 2019-09-11 PROCEDURE — 96375 TX/PRO/DX INJ NEW DRUG ADDON: CPT

## 2019-09-11 PROCEDURE — 96377 APPLICATON ON-BODY INJECTOR: CPT

## 2019-09-11 RX ORDER — DEXAMETHASONE 0.5 MG/5ML
20 ELIXIR ORAL ONCE
Refills: 0 | Status: COMPLETED | OUTPATIENT
Start: 2019-09-11 | End: 2019-09-11

## 2019-09-11 RX ORDER — ONDANSETRON 8 MG/1
16 TABLET, FILM COATED ORAL ONCE
Refills: 0 | Status: COMPLETED | OUTPATIENT
Start: 2019-09-11 | End: 2019-09-11

## 2019-09-11 RX ORDER — ETOPOSIDE 20 MG/ML
105 VIAL (ML) INTRAVENOUS ONCE
Refills: 0 | Status: COMPLETED | OUTPATIENT
Start: 2019-09-11 | End: 2019-09-11

## 2019-09-11 RX ADMIN — Medication 220 MILLIGRAM(S): at 11:45

## 2019-09-11 RX ADMIN — ONDANSETRON 232 MILLIGRAM(S): 8 TABLET, FILM COATED ORAL at 12:00

## 2019-09-11 RX ADMIN — Medication 336.83 MILLIGRAM(S): at 12:20

## 2019-09-11 RX ADMIN — Medication 20 MILLIGRAM(S): at 12:00

## 2019-09-11 RX ADMIN — ONDANSETRON 16 MILLIGRAM(S): 8 TABLET, FILM COATED ORAL at 12:15

## 2019-09-11 RX ADMIN — PEGFILGRASTIM-CBQV 6 MILLIGRAM(S): 6 INJECTION, SOLUTION SUBCUTANEOUS at 14:00

## 2019-09-11 RX ADMIN — Medication 105 MILLIGRAM(S): at 13:50

## 2019-09-11 NOTE — H&P ADULT - PROBLEM SELECTOR PLAN 4
Patient reports history of Stage IIIb LLL NSCLC and contralateral R endobronchial SCC s/p chemo/RT, currently undergoing chemotherapy with unknown agents. Scheduled for next chemo session SEpt 4th  -Heme/Onc consult (Dr Wellington) in AM   -collateral from Dr Wellington re: prior/ongoing oncologic management
Statement Selected

## 2019-09-12 DIAGNOSIS — J43.2 CENTRILOBULAR EMPHYSEMA: ICD-10-CM

## 2019-09-12 DIAGNOSIS — C7B.8 OTHER SECONDARY NEUROENDOCRINE TUMORS: ICD-10-CM

## 2019-09-12 DIAGNOSIS — C7A.1 MALIGNANT POORLY DIFFERENTIATED NEUROENDOCRINE TUMORS: ICD-10-CM

## 2019-09-12 DIAGNOSIS — R11.2 NAUSEA WITH VOMITING, UNSPECIFIED: ICD-10-CM

## 2019-09-12 DIAGNOSIS — Z51.11 ENCOUNTER FOR ANTINEOPLASTIC CHEMOTHERAPY: ICD-10-CM

## 2019-09-12 DIAGNOSIS — J84.10 PULMONARY FIBROSIS, UNSPECIFIED: ICD-10-CM

## 2019-09-13 NOTE — END OF VISIT
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 Monroy on 6/24/19.  Later treated for cholecystitis and PNA. Since then nonverbal with PEG tube for feeding.  Pt. with minimal responsiveness.  \par Pt. was seen in hospital for BOT mass suspected be traumatic at the time \par Now in Rehab. Daughter/ notes much less responsive since she was recently readmitted for pneumonia.

## 2019-10-30 ENCOUNTER — APPOINTMENT (OUTPATIENT)
Dept: PULMONOLOGY | Facility: CLINIC | Age: 84
End: 2019-10-30
Payer: MEDICARE

## 2019-10-30 VITALS
HEART RATE: 76 BPM | TEMPERATURE: 95.7 F | HEIGHT: 65 IN | BODY MASS INDEX: 25.33 KG/M2 | WEIGHT: 152 LBS | DIASTOLIC BLOOD PRESSURE: 80 MMHG | SYSTOLIC BLOOD PRESSURE: 120 MMHG | OXYGEN SATURATION: 97 %

## 2019-10-30 DIAGNOSIS — C80.1 MALIGNANT (PRIMARY) NEOPLASM, UNSPECIFIED: ICD-10-CM

## 2019-10-30 PROCEDURE — G0008: CPT

## 2019-10-30 PROCEDURE — 99214 OFFICE O/P EST MOD 30 MIN: CPT | Mod: 25

## 2019-10-30 PROCEDURE — 90662 IIV NO PRSV INCREASED AG IM: CPT

## 2019-10-30 PROCEDURE — 94010 BREATHING CAPACITY TEST: CPT

## 2019-10-30 RX ORDER — ONDANSETRON 4 MG/1
4 TABLET ORAL
Qty: 20 | Refills: 0 | Status: ACTIVE | COMMUNITY
Start: 2019-09-06

## 2019-10-30 RX ORDER — CIPROFLOXACIN HYDROCHLORIDE 500 MG/1
500 TABLET, FILM COATED ORAL
Qty: 6 | Refills: 0 | Status: ACTIVE | COMMUNITY
Start: 2019-08-23

## 2019-10-30 RX ORDER — NAPROXEN 500 MG/1
500 TABLET ORAL
Qty: 60 | Refills: 0 | Status: ACTIVE | COMMUNITY
Start: 2019-09-06

## 2019-10-30 RX ORDER — BUPROPION HYDROCHLORIDE 300 MG/1
300 TABLET, EXTENDED RELEASE ORAL
Qty: 90 | Refills: 0 | Status: ACTIVE | COMMUNITY
Start: 2019-07-13

## 2019-10-30 RX ORDER — ATORVASTATIN CALCIUM 10 MG/1
10 TABLET, FILM COATED ORAL
Qty: 90 | Refills: 0 | Status: ACTIVE | COMMUNITY
Start: 2019-09-06

## 2019-10-30 NOTE — PHYSICAL EXAM
[General Appearance - Well Developed] : well developed [General Appearance - Well Nourished] : well nourished [General Appearance - In No Acute Distress] : no acute distress [Heart Sounds] : normal S1 and S2 [Murmurs] : no murmurs present [Edema] : no peripheral edema present [Auscultation Breath Sounds / Voice Sounds] : lungs were clear to auscultation bilaterally [Abdomen Tenderness] : non-tender [Affect] : the affect was normal

## 2019-10-30 NOTE — ASSESSMENT
[FreeTextEntry1] : Data reviewed:\par \par CT chest [facility] 3/2019 personally reviewed: Moderate centrilobular emphysema. There is again residual amorphous groundglass and pleural parenchymal scarring in the left lower lobe at the site of prior tumor. No evidence of residual or recurrent disease in this region. Scattered solid nodules in both lungs are redemonstrated, not significantly changed since 2017. The largest is difficult to measure as it runs adjacent to a vessel and bronchus (approximately 9 mm) in the apicoposterior segment of the left upper lobe \par \par CT 9/2019 and PET 10/2019 [facility] both personally reviewed and in part: Interval increase in the size and intensity of the hypermetabolic parenchymal opacities in the posterior left upper lobe of the lung. The imaging appearance of these opacities is nonspecific, and may be infectious/inflammatory in etiology. New hypermetabolic parenchymal infiltrates in the medial right middle lobe of the lung. These findings are nonspecific, and may be infectious/inflammatory etiology. No significant change in the intensity or appearance of the mildly hypermetabolic infiltrate in the left lower lobe of the lung.\par \par Ethel 10/30/19: normal\par \par Impression:\par New pulmonary nodular densities\par Metastatic lung cancer\par \par Recommendations:\par Discussed in real time w Dr Wellington. These opacities are nonspecific and may be infectious, inflammatory or malignant. She was not on therapy likely to cause a pneumonitis. She has no symptoms at all - nothing to go with infection. We agreed to repeat a scan in 3 mos and if worsening, follow up at that time. If any new chest symptoms develop she knows to return.\par Flu vaccine given.

## 2019-10-30 NOTE — HISTORY OF PRESENT ILLNESS
[FreeTextEntry1] : 5/25/17: Asked to evaluate patient by Dr Kwesi Stark for lung mass. Presented to ED in [place] for chest pain, which was attributed to gallstones, but a lung mass was seen and now she's had CT and PET here in [facility]. 1 ppd x 65 yrs and still smokes. No weight loss, some cough. No dyspnea. [facility], not seen again.\par \par From Dr Youssef: "It was ultimately determined she has a Stage IIIb LEFT lower lobe carcinoma and a separate, contralateral RIGHT endobronchial carcinoma. She underwent chemotherapy with 6 cycles of carboplatin/Taxol completed on 10/17/17, followed by SBRT completed November 2017." Last seen by Dr Youssef in 2018. More recently seen by Dr Fairbanks in 6/2019 who notes that her case was presented at [facility] and she has metastatic lung cancer extending to the neck with positive lymph nodes and pelvic mets. Returning to Dr Wellington for systemic therapy.\par \par 10/30/19: Comes in today referred by Dr Wellington for an abnormal PET. She reports that she just finished chemo (confirmed w Dr Wellington, chemo, not immuno). No radiation subsequent to summer 2017. She still smokes 1/2 ppd. Her breathing is pretty good. No weight loss, no fever, cough, sputum, hemoptysis, no chest pain. Neck hurts - feels like a toothache.

## 2019-12-16 ENCOUNTER — APPOINTMENT (OUTPATIENT)
Dept: OTOLARYNGOLOGY | Facility: CLINIC | Age: 84
End: 2019-12-16
Payer: MEDICARE

## 2019-12-16 VITALS
DIASTOLIC BLOOD PRESSURE: 80 MMHG | SYSTOLIC BLOOD PRESSURE: 160 MMHG | WEIGHT: 152 LBS | OXYGEN SATURATION: 97 % | HEART RATE: 75 BPM | BODY MASS INDEX: 25.33 KG/M2 | HEIGHT: 65 IN

## 2019-12-16 PROCEDURE — 99213 OFFICE O/P EST LOW 20 MIN: CPT

## 2019-12-28 NOTE — HISTORY OF PRESENT ILLNESS
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 Pt reports intermittent 6/10 pain, worse at night when laying on the affected side. She reports burning sensation on the back of the tongue. She denies dysphagia, dyspnea, fever, chills, weight loss, cervical LAD, other head and neck masses or lesions.\par \par 4/2/19 CT neck without contrast:R SMG evaluation limited by artifact, multiple thyroid nodules.\par \par 4/10/19 MRI neck without contrast: 1.9 x 1.9 x 2.9 cm mass within the right submandibular gland, no evidence of enlarged lymph nodes.\par \par 4/17/19 FNA R SMG: malignant cells present consistent with carcinoma, favor poorly differentiated adenocarcinoma.  [FreeTextEntry1] : Patient is presenting today due to increasing neck pain and new neck lesion seen on the recent imaging.

## 2019-12-28 NOTE — ASSESSMENT
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 Plan was discussed with Dr. Wellington who will arrange for stat MRI and arrange for an appointment with Dr. Chowdhury

## 2019-12-28 NOTE — PHYSICAL EXAM
[Normal] : lingual tonsils are normal [de-identified] : right neck mass at level II tender and firm, normal overlying skin

## 2019-12-28 NOTE — REASON FOR VISIT
[Subsequent Evaluation] : a subsequent evaluation for [FreeTextEntry2] : Neck pain/adenocarcinoma recurrence

## 2020-01-01 ENCOUNTER — EMERGENCY (EMERGENCY)
Facility: HOSPITAL | Age: 85
LOS: 1 days | Discharge: HOME | End: 2020-01-01
Attending: EMERGENCY MEDICINE | Admitting: EMERGENCY MEDICINE
Payer: MEDICARE

## 2020-01-01 VITALS
HEART RATE: 73 BPM | OXYGEN SATURATION: 95 % | DIASTOLIC BLOOD PRESSURE: 73 MMHG | TEMPERATURE: 98 F | SYSTOLIC BLOOD PRESSURE: 174 MMHG | RESPIRATION RATE: 16 BRPM

## 2020-01-01 VITALS
DIASTOLIC BLOOD PRESSURE: 82 MMHG | HEIGHT: 63 IN | OXYGEN SATURATION: 100 % | HEART RATE: 80 BPM | RESPIRATION RATE: 18 BRPM | TEMPERATURE: 97 F | SYSTOLIC BLOOD PRESSURE: 151 MMHG

## 2020-01-01 DIAGNOSIS — Z90.710 ACQUIRED ABSENCE OF BOTH CERVIX AND UTERUS: Chronic | ICD-10-CM

## 2020-01-01 LAB
ALBUMIN SERPL ELPH-MCNC: 3.8 G/DL — SIGNIFICANT CHANGE UP (ref 3.3–5)
ALP SERPL-CCNC: 47 U/L — SIGNIFICANT CHANGE UP (ref 40–120)
ALT FLD-CCNC: < 5 U/L — SIGNIFICANT CHANGE UP (ref 4–33)
ANION GAP SERPL CALC-SCNC: 11 MMO/L — SIGNIFICANT CHANGE UP (ref 7–14)
ANISOCYTOSIS BLD QL: SLIGHT — SIGNIFICANT CHANGE UP
APTT BLD: 20.9 SEC — LOW (ref 27–36.3)
AST SERPL-CCNC: 17 U/L — SIGNIFICANT CHANGE UP (ref 4–32)
BASOPHILS # BLD AUTO: 0.02 K/UL — SIGNIFICANT CHANGE UP (ref 0–0.2)
BASOPHILS NFR BLD AUTO: 0.4 % — SIGNIFICANT CHANGE UP (ref 0–2)
BASOPHILS NFR SPEC: 1.7 % — SIGNIFICANT CHANGE UP (ref 0–2)
BILIRUB SERPL-MCNC: 0.3 MG/DL — SIGNIFICANT CHANGE UP (ref 0.2–1.2)
BLD GP AB SCN SERPL QL: NEGATIVE — SIGNIFICANT CHANGE UP
BUN SERPL-MCNC: 22 MG/DL — SIGNIFICANT CHANGE UP (ref 7–23)
CALCIUM SERPL-MCNC: 9.6 MG/DL — SIGNIFICANT CHANGE UP (ref 8.4–10.5)
CHLORIDE SERPL-SCNC: 109 MMOL/L — HIGH (ref 98–107)
CO2 SERPL-SCNC: 21 MMOL/L — LOW (ref 22–31)
CREAT SERPL-MCNC: 1.66 MG/DL — HIGH (ref 0.5–1.3)
EOSINOPHIL # BLD AUTO: 0.06 K/UL — SIGNIFICANT CHANGE UP (ref 0–0.5)
EOSINOPHIL NFR BLD AUTO: 1.2 % — SIGNIFICANT CHANGE UP (ref 0–6)
EOSINOPHIL NFR FLD: 0 % — SIGNIFICANT CHANGE UP (ref 0–6)
GIANT PLATELETS BLD QL SMEAR: PRESENT — SIGNIFICANT CHANGE UP
GLUCOSE SERPL-MCNC: 92 MG/DL — SIGNIFICANT CHANGE UP (ref 70–99)
HCT VFR BLD CALC: 24.8 % — LOW (ref 34.5–45)
HGB BLD-MCNC: 8.1 G/DL — LOW (ref 11.5–15.5)
IMM GRANULOCYTES NFR BLD AUTO: 0.2 % — SIGNIFICANT CHANGE UP (ref 0–1.5)
INR BLD: 1.23 — HIGH (ref 0.88–1.16)
LYMPHOCYTES # BLD AUTO: 1.57 K/UL — SIGNIFICANT CHANGE UP (ref 1–3.3)
LYMPHOCYTES # BLD AUTO: 31.9 % — SIGNIFICANT CHANGE UP (ref 13–44)
LYMPHOCYTES NFR SPEC AUTO: 27.8 % — SIGNIFICANT CHANGE UP (ref 13–44)
MACROCYTES BLD QL: SLIGHT — SIGNIFICANT CHANGE UP
MCHC RBC-ENTMCNC: 30.6 PG — SIGNIFICANT CHANGE UP (ref 27–34)
MCHC RBC-ENTMCNC: 32.7 % — SIGNIFICANT CHANGE UP (ref 32–36)
MCV RBC AUTO: 93.6 FL — SIGNIFICANT CHANGE UP (ref 80–100)
MONOCYTES # BLD AUTO: 0.73 K/UL — SIGNIFICANT CHANGE UP (ref 0–0.9)
MONOCYTES NFR BLD AUTO: 14.8 % — HIGH (ref 2–14)
MONOCYTES NFR BLD: 14.8 % — HIGH (ref 2–9)
NEUTROPHIL AB SER-ACNC: 53.1 % — SIGNIFICANT CHANGE UP (ref 43–77)
NEUTROPHILS # BLD AUTO: 2.53 K/UL — SIGNIFICANT CHANGE UP (ref 1.8–7.4)
NEUTROPHILS NFR BLD AUTO: 51.5 % — SIGNIFICANT CHANGE UP (ref 43–77)
NRBC # FLD: 0 K/UL — SIGNIFICANT CHANGE UP (ref 0–0)
PLATELET # BLD AUTO: 240 K/UL — SIGNIFICANT CHANGE UP (ref 150–400)
PLATELET COUNT - ESTIMATE: NORMAL — SIGNIFICANT CHANGE UP
PMV BLD: 10 FL — SIGNIFICANT CHANGE UP (ref 7–13)
POTASSIUM SERPL-MCNC: 4.8 MMOL/L — SIGNIFICANT CHANGE UP (ref 3.5–5.3)
POTASSIUM SERPL-SCNC: 4.8 MMOL/L — SIGNIFICANT CHANGE UP (ref 3.5–5.3)
PROT SERPL-MCNC: 6.7 G/DL — SIGNIFICANT CHANGE UP (ref 6–8.3)
PROTHROM AB SERPL-ACNC: 13.9 SEC — HIGH (ref 10.6–13.6)
RBC # BLD: 2.65 M/UL — LOW (ref 3.8–5.2)
RBC # FLD: 21.8 % — HIGH (ref 10.3–14.5)
RH IG SCN BLD-IMP: POSITIVE — SIGNIFICANT CHANGE UP
SODIUM SERPL-SCNC: 141 MMOL/L — SIGNIFICANT CHANGE UP (ref 135–145)
VARIANT LYMPHS # BLD: 2.6 % — SIGNIFICANT CHANGE UP
WBC # BLD: 4.92 K/UL — SIGNIFICANT CHANGE UP (ref 3.8–10.5)
WBC # FLD AUTO: 4.92 K/UL — SIGNIFICANT CHANGE UP (ref 3.8–10.5)

## 2020-01-01 PROCEDURE — 71046 X-RAY EXAM CHEST 2 VIEWS: CPT | Mod: 26

## 2020-01-01 PROCEDURE — 99284 EMERGENCY DEPT VISIT MOD MDM: CPT | Mod: GC

## 2020-01-01 RX ORDER — IRON SUCROSE 20 MG/ML
200 INJECTION, SOLUTION INTRAVENOUS ONCE
Refills: 0 | Status: COMPLETED | OUTPATIENT
Start: 2020-01-01 | End: 2020-01-01

## 2020-01-01 RX ADMIN — IRON SUCROSE 220 MILLIGRAM(S): 20 INJECTION, SOLUTION INTRAVENOUS at 19:53

## 2020-01-09 ENCOUNTER — APPOINTMENT (OUTPATIENT)
Dept: RADIATION ONCOLOGY | Facility: CLINIC | Age: 85
End: 2020-01-09

## 2020-02-03 NOTE — PROGRESS NOTE ADULT - ATTENDING COMMENTS
I spoke with the patient   
Pt. seen and examined by me earlier today; I have read Dr. Hoyt's note, I agree w/ her findings and plan of care as documented; cultures reviewed; Pt. medically-clear for d/c home on PO Cipro, has outpatient Heme-Onc f/u
Pt. seen and examined by me earlier today; I have read Dr. Hoyt's note, I agree w/ her findings and plan of care as documented; cont. abx, f/u cultures, RUE Doppler U/S

## 2020-07-23 NOTE — CONSULT NOTE ADULT - CONSULT REQUESTED BY NAME
Problem: RESPIRATORY  Goal: Clear lung sounds  Description: Clear lung sounds  Outcome: Ongoing  Note: Mdi to maintain open airways Dr. Del Toro

## 2020-10-24 NOTE — ED PROVIDER NOTE - PROGRESS NOTE DETAILS
Judit RICE (PGY-2): Iron infusion given as patient's Hgb 8.1, no indication for blood transfusion. Discussed strict return precautions and follow-up instructions with patient, who verbalized understanding

## 2020-10-24 NOTE — ED ADULT NURSE NOTE - OBJECTIVE STATEMENT
Received pt in room 27, pt A&Ox4, respirations even and unlabored b/l. Pt sent by oncologist MD Luna for low H&H, reports blood was drawn on Wed. Pt c/o dizziness/lightheadedness, weakness, intermittent SOB for 1 week. Denies any bleeding, fever, chills, pain/discomfort. Reports hx of squamous cell carcinoma of lung, stage 4 large cell neuroendocrine ca, last IV chemo 3-4 weeks ago. Sinus rhythm on cardiac monitor. IVL 20g Angiocath placed on right hand. Labs sent. MD Griffith at bedside for eval. Will continue to monitor.

## 2020-10-24 NOTE — ED PROVIDER NOTE - NSFOLLOWUPINSTRUCTIONS_ED_ALL_ED_FT
- Lab and imaging results, if performed, were discussed with you along with your discharge diagnosis    - Follow up with your Hematologist within one week. Bring a copy of your results with you to your next visit.    - Return to the ED for any new, worsening, or concerning symptoms to you including worsening dizziness, weakness, shortness of breath or chest pain     - Continue all prescribed medications    - Rest and keep yourself hydrated with fluids

## 2020-10-24 NOTE — ED ADULT TRIAGE NOTE - CHIEF COMPLAINT QUOTE
Pt states she is being sent in by her PMD for a blood transfusion. Pt states she was told that her blood count is low. Denies episodes of bleeding. Pt denies sob/weakness. c/o dizziness.

## 2020-10-24 NOTE — ED PROVIDER NOTE - OBJECTIVE STATEMENT
85F PMH Stage IIIb lung SCC and Stage IV large cell neuroendocrine CA, DM, HTN presenting for abnormal blood count noted last week. Patient is currently receiving chemo with oncologist Mine Wellington in [place], last was 4w ago. Pt is scheduled for next chemo next week and last week, she had labs showing a low blood count. Her oncologist advised her to present to ED prior to the next treatment. She has had lightheadedness for the last 4w and S[place]IN for 1w. No fever, chills, cough, HA, nausea/vomiting, abdominal pain, diarrhea.

## 2020-10-24 NOTE — ED PROVIDER NOTE - CLINICAL SUMMARY MEDICAL DECISION MAKING FREE TEXT BOX
Extensive cancer history on chemo, last 4w ago, with likely symptomatic anemia (last admission hgb ~8). Plan labs, CXR, transfuse as needed, reassess.

## 2020-10-24 NOTE — ED PROVIDER NOTE - PATIENT PORTAL LINK FT
You can access the FollowMyHealth Patient Portal offered by [facility] by registering at the following website: [email or web address]. By joining [employer]’s FollowMyHealth portal, you will also be able to view your health information using other applications (apps) compatible with our system.

## 2020-10-24 NOTE — ED PROVIDER NOTE - ATTENDING CONTRIBUTION TO CARE
Dr. Griffith:  I have personally performed a face to face bedside history and physical examination of this patient. I have discussed the history, examination, review of systems, assessment and plan of management with the resident. I have reviewed the electronic medical record and amended it to reflect my history, review of systems, physical exam, assessment and plan.    85F h/o lung squamous cell carcinoma, large cell neuroendocrine carcinoma, on chemo (last 4wks prior), sent in by her oncologist for low H&H on outpatient labs.  Endorses fatigue and lightheadedness.  Denies active bleeding, easy bruising.      Exam:  - thin, nad, pale  - rrr  - ctab  - abd soft ntnd    A/P  - evaluate anemia  - cbc, cmp, coags, type and screen, ekg

## 2021-01-01 ENCOUNTER — RESULT REVIEW (OUTPATIENT)
Age: 86
End: 2021-01-01

## 2021-01-01 ENCOUNTER — TRANSCRIPTION ENCOUNTER (OUTPATIENT)
Age: 86
End: 2021-01-01

## 2021-01-01 ENCOUNTER — INPATIENT (INPATIENT)
Facility: HOSPITAL | Age: 86
LOS: 5 days | DRG: 166 | End: 2021-02-28
Attending: INTERNAL MEDICINE | Admitting: INTERNAL MEDICINE
Payer: MEDICARE

## 2021-01-01 ENCOUNTER — INPATIENT (INPATIENT)
Facility: HOSPITAL | Age: 86
LOS: 5 days | Discharge: HOME | DRG: 180 | End: 2021-02-13
Attending: INTERNAL MEDICINE | Admitting: INTERNAL MEDICINE
Payer: MEDICARE

## 2021-01-01 VITALS
OXYGEN SATURATION: 100 % | SYSTOLIC BLOOD PRESSURE: 156 MMHG | DIASTOLIC BLOOD PRESSURE: 70 MMHG | HEART RATE: 98 BPM | RESPIRATION RATE: 20 BRPM | HEIGHT: 63 IN | TEMPERATURE: 98 F | WEIGHT: 117.95 LBS

## 2021-01-01 VITALS
TEMPERATURE: 99 F | OXYGEN SATURATION: 93 % | WEIGHT: 117.07 LBS | HEIGHT: 63 IN | HEART RATE: 154 BPM | RESPIRATION RATE: 20 BRPM | DIASTOLIC BLOOD PRESSURE: 69 MMHG | SYSTOLIC BLOOD PRESSURE: 91 MMHG

## 2021-01-01 VITALS — RESPIRATION RATE: 21 BRPM | OXYGEN SATURATION: 100 % | HEART RATE: 73 BPM

## 2021-01-01 VITALS — TEMPERATURE: 98 F

## 2021-01-01 DIAGNOSIS — J90 PLEURAL EFFUSION, NOT ELSEWHERE CLASSIFIED: ICD-10-CM

## 2021-01-01 DIAGNOSIS — R06.02 SHORTNESS OF BREATH: ICD-10-CM

## 2021-01-01 DIAGNOSIS — Z51.5 ENCOUNTER FOR PALLIATIVE CARE: ICD-10-CM

## 2021-01-01 DIAGNOSIS — Z71.89 OTHER SPECIFIED COUNSELING: ICD-10-CM

## 2021-01-01 DIAGNOSIS — G89.3 NEOPLASM RELATED PAIN (ACUTE) (CHRONIC): ICD-10-CM

## 2021-01-01 DIAGNOSIS — G47.33 OBSTRUCTIVE SLEEP APNEA (ADULT) (PEDIATRIC): ICD-10-CM

## 2021-01-01 DIAGNOSIS — C34.91 MALIGNANT NEOPLASM OF UNSPECIFIED PART OF RIGHT BRONCHUS OR LUNG: ICD-10-CM

## 2021-01-01 DIAGNOSIS — J98.11 ATELECTASIS: ICD-10-CM

## 2021-01-01 DIAGNOSIS — I49.8 OTHER SPECIFIED CARDIAC ARRHYTHMIAS: ICD-10-CM

## 2021-01-01 DIAGNOSIS — J91.0 MALIGNANT PLEURAL EFFUSION: ICD-10-CM

## 2021-01-01 DIAGNOSIS — C34.92 MALIGNANT NEOPLASM OF UNSPECIFIED PART OF LEFT BRONCHUS OR LUNG: ICD-10-CM

## 2021-01-01 DIAGNOSIS — N18.9 CHRONIC KIDNEY DISEASE, UNSPECIFIED: ICD-10-CM

## 2021-01-01 DIAGNOSIS — N18.4 CHRONIC KIDNEY DISEASE, STAGE 4 (SEVERE): ICD-10-CM

## 2021-01-01 DIAGNOSIS — R45.1 RESTLESSNESS AND AGITATION: ICD-10-CM

## 2021-01-01 DIAGNOSIS — C7A.8 OTHER MALIGNANT NEUROENDOCRINE TUMORS: ICD-10-CM

## 2021-01-01 DIAGNOSIS — Z90.710 ACQUIRED ABSENCE OF BOTH CERVIX AND UTERUS: Chronic | ICD-10-CM

## 2021-01-01 DIAGNOSIS — N17.9 ACUTE KIDNEY FAILURE, UNSPECIFIED: ICD-10-CM

## 2021-01-01 DIAGNOSIS — E87.8 OTHER DISORDERS OF ELECTROLYTE AND FLUID BALANCE, NOT ELSEWHERE CLASSIFIED: ICD-10-CM

## 2021-01-01 DIAGNOSIS — R63.8 OTHER SYMPTOMS AND SIGNS CONCERNING FOOD AND FLUID INTAKE: ICD-10-CM

## 2021-01-01 DIAGNOSIS — R00.1 BRADYCARDIA, UNSPECIFIED: ICD-10-CM

## 2021-01-01 DIAGNOSIS — Z79.899 OTHER LONG TERM (CURRENT) DRUG THERAPY: ICD-10-CM

## 2021-01-01 DIAGNOSIS — C08.0 MALIGNANT NEOPLASM OF SUBMANDIBULAR GLAND: ICD-10-CM
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
## 2021-01-01 DIAGNOSIS — J96.01 ACUTE RESPIRATORY FAILURE WITH HYPOXIA: ICD-10-CM

## 2021-01-01 DIAGNOSIS — I47.1 SUPRAVENTRICULAR TACHYCARDIA: ICD-10-CM

## 2021-01-01 DIAGNOSIS — Z79.82 LONG TERM (CURRENT) USE OF ASPIRIN: ICD-10-CM

## 2021-01-01 DIAGNOSIS — R00.2 PALPITATIONS: ICD-10-CM

## 2021-01-01 DIAGNOSIS — I48.91 UNSPECIFIED ATRIAL FIBRILLATION: ICD-10-CM

## 2021-01-01 DIAGNOSIS — Z87.891 PERSONAL HISTORY OF NICOTINE DEPENDENCE: ICD-10-CM

## 2021-01-01 DIAGNOSIS — E43 UNSPECIFIED SEVERE PROTEIN-CALORIE MALNUTRITION: ICD-10-CM

## 2021-01-01 DIAGNOSIS — E11.22 TYPE 2 DIABETES MELLITUS WITH DIABETIC CHRONIC KIDNEY DISEASE: ICD-10-CM

## 2021-01-01 DIAGNOSIS — I48.0 PAROXYSMAL ATRIAL FIBRILLATION: ICD-10-CM

## 2021-01-01 DIAGNOSIS — T46.1X5A ADVERSE EFFECT OF CALCIUM-CHANNEL BLOCKERS, INITIAL ENCOUNTER: ICD-10-CM

## 2021-01-01 DIAGNOSIS — E46 UNSPECIFIED PROTEIN-CALORIE MALNUTRITION: ICD-10-CM

## 2021-01-01 DIAGNOSIS — Z92.3 PERSONAL HISTORY OF IRRADIATION: ICD-10-CM

## 2021-01-01 DIAGNOSIS — Z92.21 PERSONAL HISTORY OF ANTINEOPLASTIC CHEMOTHERAPY: ICD-10-CM

## 2021-01-01 DIAGNOSIS — Z79.1 LONG TERM (CURRENT) USE OF NON-STEROIDAL ANTI-INFLAMMATORIES (NSAID): ICD-10-CM

## 2021-01-01 DIAGNOSIS — E11.9 TYPE 2 DIABETES MELLITUS WITHOUT COMPLICATIONS: ICD-10-CM

## 2021-01-01 DIAGNOSIS — E78.5 HYPERLIPIDEMIA, UNSPECIFIED: ICD-10-CM

## 2021-01-01 DIAGNOSIS — J98.19 OTHER PULMONARY COLLAPSE: ICD-10-CM

## 2021-01-01 DIAGNOSIS — R53.81 OTHER MALAISE: ICD-10-CM

## 2021-01-01 DIAGNOSIS — E87.5 HYPERKALEMIA: ICD-10-CM

## 2021-01-01 DIAGNOSIS — J96.90 RESPIRATORY FAILURE, UNSPECIFIED, UNSPECIFIED WHETHER WITH HYPOXIA OR HYPERCAPNIA: ICD-10-CM

## 2021-01-01 LAB
-  CEFAZOLIN: SIGNIFICANT CHANGE UP
-  CLINDAMYCIN: SIGNIFICANT CHANGE UP
-  ERYTHROMYCIN: SIGNIFICANT CHANGE UP
-  LINEZOLID: SIGNIFICANT CHANGE UP
-  OXACILLIN: SIGNIFICANT CHANGE UP
-  RIFAMPIN: SIGNIFICANT CHANGE UP
-  TRIMETHOPRIM/SULFAMETHOXAZOLE: SIGNIFICANT CHANGE UP
-  VANCOMYCIN: SIGNIFICANT CHANGE UP
A1C WITH ESTIMATED AVERAGE GLUCOSE RESULT: 5.4 % — SIGNIFICANT CHANGE UP (ref 4–5.6)
ALBUMIN FLD-MCNC: 2.2 G/DL — SIGNIFICANT CHANGE UP
ALBUMIN SERPL ELPH-MCNC: 1.6 G/DL — LOW (ref 3.3–5)
ALBUMIN SERPL ELPH-MCNC: 2 G/DL — LOW (ref 3.3–5)
ALBUMIN SERPL ELPH-MCNC: 2.1 G/DL — LOW (ref 3.3–5)
ALBUMIN SERPL ELPH-MCNC: 2.8 G/DL — LOW (ref 3.3–5)
ALBUMIN SERPL ELPH-MCNC: 2.8 G/DL — LOW (ref 3.3–5)
ALBUMIN SERPL ELPH-MCNC: 3 G/DL — LOW (ref 3.3–5)
ALBUMIN SERPL ELPH-MCNC: 3.6 G/DL — SIGNIFICANT CHANGE UP (ref 3.3–5)
ALP SERPL-CCNC: 30 U/L — LOW (ref 40–120)
ALP SERPL-CCNC: 41 U/L — SIGNIFICANT CHANGE UP (ref 40–120)
ALP SERPL-CCNC: 43 U/L — SIGNIFICANT CHANGE UP (ref 40–120)
ALP SERPL-CCNC: 57 U/L — SIGNIFICANT CHANGE UP (ref 40–120)
ALP SERPL-CCNC: 64 U/L — SIGNIFICANT CHANGE UP (ref 40–120)
ALP SERPL-CCNC: 71 U/L — SIGNIFICANT CHANGE UP (ref 40–120)
ALT FLD-CCNC: 359 U/L — HIGH (ref 10–45)
ALT FLD-CCNC: 50 U/L — HIGH (ref 10–45)
ALT FLD-CCNC: 6 U/L — LOW (ref 10–45)
ALT FLD-CCNC: 6 U/L — LOW (ref 10–45)
ALT FLD-CCNC: 8 U/L — LOW (ref 10–45)
ALT FLD-CCNC: 81 U/L — HIGH (ref 10–45)
ANION GAP SERPL CALC-SCNC: 10 MMOL/L — SIGNIFICANT CHANGE UP (ref 5–17)
ANION GAP SERPL CALC-SCNC: 11 MMOL/L — SIGNIFICANT CHANGE UP (ref 5–17)
ANION GAP SERPL CALC-SCNC: 12 MMOL/L — SIGNIFICANT CHANGE UP (ref 5–17)
ANION GAP SERPL CALC-SCNC: 12 MMOL/L — SIGNIFICANT CHANGE UP (ref 5–17)
ANION GAP SERPL CALC-SCNC: 13 MMOL/L — SIGNIFICANT CHANGE UP (ref 5–17)
ANION GAP SERPL CALC-SCNC: 14 MMOL/L — SIGNIFICANT CHANGE UP (ref 5–17)
ANION GAP SERPL CALC-SCNC: 14 MMOL/L — SIGNIFICANT CHANGE UP (ref 5–17)
ANION GAP SERPL CALC-SCNC: 15 MMOL/L — SIGNIFICANT CHANGE UP (ref 5–17)
ANION GAP SERPL CALC-SCNC: 15 MMOL/L — SIGNIFICANT CHANGE UP (ref 5–17)
ANION GAP SERPL CALC-SCNC: 17 MMOL/L — SIGNIFICANT CHANGE UP (ref 5–17)
ANION GAP SERPL CALC-SCNC: 18 MMOL/L — HIGH (ref 5–17)
ANION GAP SERPL CALC-SCNC: 21 MMOL/L — HIGH (ref 5–17)
ANION GAP SERPL CALC-SCNC: 8 MMOL/L — SIGNIFICANT CHANGE UP (ref 5–17)
ANION GAP SERPL CALC-SCNC: 9 MMOL/L — SIGNIFICANT CHANGE UP (ref 5–17)
ANISOCYTOSIS BLD QL: SLIGHT — SIGNIFICANT CHANGE UP
APTT BLD: 24.1 SEC — LOW (ref 27.5–35.5)
APTT BLD: 24.2 SEC — LOW (ref 27.5–35.5)
APTT BLD: 25.7 SEC — LOW (ref 27.5–35.5)
APTT BLD: 26.6 SEC — LOW (ref 27.5–35.5)
APTT BLD: 27.4 SEC — LOW (ref 27.5–35.5)
APTT BLD: 28.3 SEC — SIGNIFICANT CHANGE UP (ref 27.5–35.5)
APTT BLD: 28.6 SEC — SIGNIFICANT CHANGE UP (ref 27.5–35.5)
APTT BLD: 28.7 SEC — SIGNIFICANT CHANGE UP (ref 27.5–35.5)
APTT BLD: 31.9 SEC — SIGNIFICANT CHANGE UP (ref 27.5–35.5)
APTT BLD: 32 SEC — SIGNIFICANT CHANGE UP (ref 27.5–35.5)
APTT BLD: 36.7 SEC — HIGH (ref 27.5–35.5)
APTT BLD: 38.2 SEC — HIGH (ref 27.5–35.5)
APTT BLD: 47.7 SEC — HIGH (ref 27.5–35.5)
APTT BLD: 58 SEC — HIGH (ref 27.5–35.5)
APTT BLD: 63.6 SEC — HIGH (ref 27.5–35.5)
APTT BLD: 90.8 SEC — HIGH (ref 27.5–35.5)
AST SERPL-CCNC: 10 U/L — SIGNIFICANT CHANGE UP (ref 10–40)
AST SERPL-CCNC: 11 U/L — SIGNIFICANT CHANGE UP (ref 10–40)
AST SERPL-CCNC: 120 U/L — HIGH (ref 10–40)
AST SERPL-CCNC: 14 U/L — SIGNIFICANT CHANGE UP (ref 10–40)
AST SERPL-CCNC: 229 U/L — HIGH (ref 10–40)
AST SERPL-CCNC: 928 U/L — HIGH (ref 10–40)
B PERT IGG+IGM PNL SER: CLEAR — SIGNIFICANT CHANGE UP
BASE EXCESS BLDA CALC-SCNC: -17.9 MMOL/L — LOW (ref -2–3)
BASE EXCESS BLDA CALC-SCNC: -9.4 MMOL/L — LOW (ref -2–3)
BASE EXCESS BLDA CALC-SCNC: -9.7 MMOL/L — LOW (ref -2–3)
BASE EXCESS BLDV CALC-SCNC: -18.7 MMOL/L — SIGNIFICANT CHANGE UP
BASOPHILS # BLD AUTO: 0 K/UL — SIGNIFICANT CHANGE UP (ref 0–0.2)
BASOPHILS # BLD AUTO: 0 K/UL — SIGNIFICANT CHANGE UP (ref 0–0.2)
BASOPHILS # BLD AUTO: 0.02 K/UL — SIGNIFICANT CHANGE UP (ref 0–0.2)
BASOPHILS # BLD AUTO: 0.02 K/UL — SIGNIFICANT CHANGE UP (ref 0–0.2)
BASOPHILS # BLD AUTO: 0.03 K/UL — SIGNIFICANT CHANGE UP (ref 0–0.2)
BASOPHILS # BLD AUTO: 0.04 K/UL — SIGNIFICANT CHANGE UP (ref 0–0.2)
BASOPHILS NFR BLD AUTO: 0 % — SIGNIFICANT CHANGE UP (ref 0–2)
BASOPHILS NFR BLD AUTO: 0 % — SIGNIFICANT CHANGE UP (ref 0–2)
BASOPHILS NFR BLD AUTO: 0.2 % — SIGNIFICANT CHANGE UP (ref 0–2)
BASOPHILS NFR BLD AUTO: 0.2 % — SIGNIFICANT CHANGE UP (ref 0–2)
BASOPHILS NFR BLD AUTO: 0.3 % — SIGNIFICANT CHANGE UP (ref 0–2)
BASOPHILS NFR BLD AUTO: 0.3 % — SIGNIFICANT CHANGE UP (ref 0–2)
BILIRUB SERPL-MCNC: 0.2 MG/DL — SIGNIFICANT CHANGE UP (ref 0.2–1.2)
BILIRUB SERPL-MCNC: 0.3 MG/DL — SIGNIFICANT CHANGE UP (ref 0.2–1.2)
BILIRUB SERPL-MCNC: 0.4 MG/DL — SIGNIFICANT CHANGE UP (ref 0.2–1.2)
BILIRUB SERPL-MCNC: 0.5 MG/DL — SIGNIFICANT CHANGE UP (ref 0.2–1.2)
BILIRUB SERPL-MCNC: 0.6 MG/DL — SIGNIFICANT CHANGE UP (ref 0.2–1.2)
BILIRUB SERPL-MCNC: <0.2 MG/DL — SIGNIFICANT CHANGE UP (ref 0.2–1.2)
BLD GP AB SCN SERPL QL: NEGATIVE — SIGNIFICANT CHANGE UP
BUN SERPL-MCNC: 20 MG/DL — SIGNIFICANT CHANGE UP (ref 7–23)
BUN SERPL-MCNC: 23 MG/DL — SIGNIFICANT CHANGE UP (ref 7–23)
BUN SERPL-MCNC: 24 MG/DL — HIGH (ref 7–23)
BUN SERPL-MCNC: 25 MG/DL — HIGH (ref 7–23)
BUN SERPL-MCNC: 26 MG/DL — HIGH (ref 7–23)
BUN SERPL-MCNC: 27 MG/DL — HIGH (ref 7–23)
BUN SERPL-MCNC: 28 MG/DL — HIGH (ref 7–23)
BUN SERPL-MCNC: 31 MG/DL — HIGH (ref 7–23)
BUN SERPL-MCNC: 31 MG/DL — HIGH (ref 7–23)
BUN SERPL-MCNC: 34 MG/DL — HIGH (ref 7–23)
BUN SERPL-MCNC: 34 MG/DL — HIGH (ref 7–23)
BUN SERPL-MCNC: 36 MG/DL — HIGH (ref 7–23)
BURR CELLS BLD QL SMEAR: PRESENT — SIGNIFICANT CHANGE UP
CA-I SERPL-SCNC: 1.15 MMOL/L — SIGNIFICANT CHANGE UP (ref 1.12–1.3)
CALCIUM SERPL-MCNC: 11 MG/DL — HIGH (ref 8.4–10.5)
CALCIUM SERPL-MCNC: 6.3 MG/DL — CRITICAL LOW (ref 8.4–10.5)
CALCIUM SERPL-MCNC: 7.7 MG/DL — LOW (ref 8.4–10.5)
CALCIUM SERPL-MCNC: 8 MG/DL — LOW (ref 8.4–10.5)
CALCIUM SERPL-MCNC: 8.2 MG/DL — LOW (ref 8.4–10.5)
CALCIUM SERPL-MCNC: 8.4 MG/DL — SIGNIFICANT CHANGE UP (ref 8.4–10.5)
CALCIUM SERPL-MCNC: 8.4 MG/DL — SIGNIFICANT CHANGE UP (ref 8.4–10.5)
CALCIUM SERPL-MCNC: 8.6 MG/DL — SIGNIFICANT CHANGE UP (ref 8.4–10.5)
CALCIUM SERPL-MCNC: 8.7 MG/DL — SIGNIFICANT CHANGE UP (ref 8.4–10.5)
CALCIUM SERPL-MCNC: 9 MG/DL — SIGNIFICANT CHANGE UP (ref 8.4–10.5)
CALCIUM SERPL-MCNC: 9 MG/DL — SIGNIFICANT CHANGE UP (ref 8.4–10.5)
CALCIUM SERPL-MCNC: 9.1 MG/DL — SIGNIFICANT CHANGE UP (ref 8.4–10.5)
CALCIUM SERPL-MCNC: 9.6 MG/DL — SIGNIFICANT CHANGE UP (ref 8.4–10.5)
CALCIUM SERPL-MCNC: 9.7 MG/DL — SIGNIFICANT CHANGE UP (ref 8.4–10.5)
CALCIUM SERPL-MCNC: 9.8 MG/DL — SIGNIFICANT CHANGE UP (ref 8.4–10.5)
CHLORIDE SERPL-SCNC: 104 MMOL/L — SIGNIFICANT CHANGE UP (ref 96–108)
CHLORIDE SERPL-SCNC: 104 MMOL/L — SIGNIFICANT CHANGE UP (ref 96–108)
CHLORIDE SERPL-SCNC: 106 MMOL/L — SIGNIFICANT CHANGE UP (ref 96–108)
CHLORIDE SERPL-SCNC: 109 MMOL/L — HIGH (ref 96–108)
CHLORIDE SERPL-SCNC: 110 MMOL/L — HIGH (ref 96–108)
CHLORIDE SERPL-SCNC: 111 MMOL/L — HIGH (ref 96–108)
CHLORIDE SERPL-SCNC: 112 MMOL/L — HIGH (ref 96–108)
CHLORIDE SERPL-SCNC: 113 MMOL/L — HIGH (ref 96–108)
CHLORIDE SERPL-SCNC: 118 MMOL/L — HIGH (ref 96–108)
CHOLEST FLD-MCNC: 54 MG/DL — SIGNIFICANT CHANGE UP
CK MB CFR SERPL CALC: 1.3 NG/ML — SIGNIFICANT CHANGE UP (ref 0–6.7)
CK MB CFR SERPL CALC: 3.1 NG/ML — SIGNIFICANT CHANGE UP (ref 0–6.7)
CK MB CFR SERPL CALC: <1 NG/ML — SIGNIFICANT CHANGE UP (ref 0–6.7)
CK SERPL-CCNC: 117 U/L — SIGNIFICANT CHANGE UP (ref 25–170)
CK SERPL-CCNC: 60 U/L — SIGNIFICANT CHANGE UP (ref 25–170)
CK SERPL-CCNC: 92 U/L — SIGNIFICANT CHANGE UP (ref 25–170)
CO2 SERPL-SCNC: 14 MMOL/L — LOW (ref 22–31)
CO2 SERPL-SCNC: 15 MMOL/L — LOW (ref 22–31)
CO2 SERPL-SCNC: 16 MMOL/L — LOW (ref 22–31)
CO2 SERPL-SCNC: 16 MMOL/L — LOW (ref 22–31)
CO2 SERPL-SCNC: 17 MMOL/L — LOW (ref 22–31)
CO2 SERPL-SCNC: 19 MMOL/L — LOW (ref 22–31)
CO2 SERPL-SCNC: 20 MMOL/L — LOW (ref 22–31)
CO2 SERPL-SCNC: 20 MMOL/L — LOW (ref 22–31)
CO2 SERPL-SCNC: 21 MMOL/L — LOW (ref 22–31)
CO2 SERPL-SCNC: 21 MMOL/L — LOW (ref 22–31)
CO2 SERPL-SCNC: 22 MMOL/L — SIGNIFICANT CHANGE UP (ref 22–31)
COLOR FLD: YELLOW — SIGNIFICANT CHANGE UP
COMMENT - FLUIDS: SIGNIFICANT CHANGE UP
CREAT FLD-MCNC: 1.65 MG/DL — SIGNIFICANT CHANGE UP
CREAT SERPL-MCNC: 1.08 MG/DL — SIGNIFICANT CHANGE UP (ref 0.5–1.3)
CREAT SERPL-MCNC: 1.36 MG/DL — HIGH (ref 0.5–1.3)
CREAT SERPL-MCNC: 1.38 MG/DL — HIGH (ref 0.5–1.3)
CREAT SERPL-MCNC: 1.43 MG/DL — HIGH (ref 0.5–1.3)
CREAT SERPL-MCNC: 1.44 MG/DL — HIGH (ref 0.5–1.3)
CREAT SERPL-MCNC: 1.44 MG/DL — HIGH (ref 0.5–1.3)
CREAT SERPL-MCNC: 1.46 MG/DL — HIGH (ref 0.5–1.3)
CREAT SERPL-MCNC: 1.48 MG/DL — HIGH (ref 0.5–1.3)
CREAT SERPL-MCNC: 1.52 MG/DL — HIGH (ref 0.5–1.3)
CREAT SERPL-MCNC: 1.53 MG/DL — HIGH (ref 0.5–1.3)
CREAT SERPL-MCNC: 1.55 MG/DL — HIGH (ref 0.5–1.3)
CREAT SERPL-MCNC: 1.55 MG/DL — HIGH (ref 0.5–1.3)
CREAT SERPL-MCNC: 1.65 MG/DL — HIGH (ref 0.5–1.3)
CREAT SERPL-MCNC: 1.82 MG/DL — HIGH (ref 0.5–1.3)
CREAT SERPL-MCNC: 1.89 MG/DL — HIGH (ref 0.5–1.3)
CREAT SERPL-MCNC: 1.89 MG/DL — HIGH (ref 0.5–1.3)
CULTURE RESULTS: NO GROWTH — SIGNIFICANT CHANGE UP
CULTURE RESULTS: SIGNIFICANT CHANGE UP
D DIMER BLD IA.RAPID-MCNC: 418 NG/ML DDU — HIGH
EOSINOPHIL # BLD AUTO: 0.02 K/UL — SIGNIFICANT CHANGE UP (ref 0–0.5)
EOSINOPHIL # BLD AUTO: 0.03 K/UL — SIGNIFICANT CHANGE UP (ref 0–0.5)
EOSINOPHIL # BLD AUTO: 0.15 K/UL — SIGNIFICANT CHANGE UP (ref 0–0.5)
EOSINOPHIL # BLD AUTO: 0.19 K/UL — SIGNIFICANT CHANGE UP (ref 0–0.5)
EOSINOPHIL # BLD AUTO: 0.28 K/UL — SIGNIFICANT CHANGE UP (ref 0–0.5)
EOSINOPHIL # BLD AUTO: 0.35 K/UL — SIGNIFICANT CHANGE UP (ref 0–0.5)
EOSINOPHIL NFR BLD AUTO: 0.2 % — SIGNIFICANT CHANGE UP (ref 0–6)
EOSINOPHIL NFR BLD AUTO: 0.3 % — SIGNIFICANT CHANGE UP (ref 0–6)
EOSINOPHIL NFR BLD AUTO: 1.7 % — SIGNIFICANT CHANGE UP (ref 0–6)
EOSINOPHIL NFR BLD AUTO: 2 % — SIGNIFICANT CHANGE UP (ref 0–6)
EOSINOPHIL NFR BLD AUTO: 2.6 % — SIGNIFICANT CHANGE UP (ref 0–6)
EOSINOPHIL NFR BLD AUTO: 2.6 % — SIGNIFICANT CHANGE UP (ref 0–6)
ESTIMATED AVERAGE GLUCOSE: 108 MG/DL — SIGNIFICANT CHANGE UP (ref 68–114)
FLUID INTAKE SUBSTANCE CLASS: SIGNIFICANT CHANGE UP
FLUID SEGMENTED GRANULOCYTES: 26 % — SIGNIFICANT CHANGE UP
GAS PNL BLDA: SIGNIFICANT CHANGE UP
GAS PNL BLDA: SIGNIFICANT CHANGE UP
GAS PNL BLDV: 137 MMOL/L — LOW (ref 138–146)
GAS PNL BLDV: SIGNIFICANT CHANGE UP
GIANT PLATELETS BLD QL SMEAR: PRESENT — SIGNIFICANT CHANGE UP
GLUCOSE BLDC GLUCOMTR-MCNC: 101 MG/DL — HIGH (ref 70–99)
GLUCOSE BLDC GLUCOMTR-MCNC: 104 MG/DL — HIGH (ref 70–99)
GLUCOSE BLDC GLUCOMTR-MCNC: 107 MG/DL — HIGH (ref 70–99)
GLUCOSE BLDC GLUCOMTR-MCNC: 108 MG/DL — HIGH (ref 70–99)
GLUCOSE BLDC GLUCOMTR-MCNC: 108 MG/DL — HIGH (ref 70–99)
GLUCOSE BLDC GLUCOMTR-MCNC: 109 MG/DL — HIGH (ref 70–99)
GLUCOSE BLDC GLUCOMTR-MCNC: 113 MG/DL — HIGH (ref 70–99)
GLUCOSE BLDC GLUCOMTR-MCNC: 114 MG/DL — HIGH (ref 70–99)
GLUCOSE BLDC GLUCOMTR-MCNC: 115 MG/DL — HIGH (ref 70–99)
GLUCOSE BLDC GLUCOMTR-MCNC: 115 MG/DL — HIGH (ref 70–99)
GLUCOSE BLDC GLUCOMTR-MCNC: 116 MG/DL — HIGH (ref 70–99)
GLUCOSE BLDC GLUCOMTR-MCNC: 117 MG/DL — HIGH (ref 70–99)
GLUCOSE BLDC GLUCOMTR-MCNC: 118 MG/DL — HIGH (ref 70–99)
GLUCOSE BLDC GLUCOMTR-MCNC: 129 MG/DL — HIGH (ref 70–99)
GLUCOSE BLDC GLUCOMTR-MCNC: 132 MG/DL — HIGH (ref 70–99)
GLUCOSE BLDC GLUCOMTR-MCNC: 142 MG/DL — HIGH (ref 70–99)
GLUCOSE BLDC GLUCOMTR-MCNC: 82 MG/DL — SIGNIFICANT CHANGE UP (ref 70–99)
GLUCOSE BLDC GLUCOMTR-MCNC: 86 MG/DL — SIGNIFICANT CHANGE UP (ref 70–99)
GLUCOSE BLDC GLUCOMTR-MCNC: 89 MG/DL — SIGNIFICANT CHANGE UP (ref 70–99)
GLUCOSE BLDC GLUCOMTR-MCNC: 90 MG/DL — SIGNIFICANT CHANGE UP (ref 70–99)
GLUCOSE BLDC GLUCOMTR-MCNC: 96 MG/DL — SIGNIFICANT CHANGE UP (ref 70–99)
GLUCOSE FLD-MCNC: 95 MG/DL — SIGNIFICANT CHANGE UP
GLUCOSE SERPL-MCNC: 101 MG/DL — HIGH (ref 70–99)
GLUCOSE SERPL-MCNC: 101 MG/DL — HIGH (ref 70–99)
GLUCOSE SERPL-MCNC: 102 MG/DL — HIGH (ref 70–99)
GLUCOSE SERPL-MCNC: 108 MG/DL — HIGH (ref 70–99)
GLUCOSE SERPL-MCNC: 114 MG/DL — HIGH (ref 70–99)
GLUCOSE SERPL-MCNC: 115 MG/DL — HIGH (ref 70–99)
GLUCOSE SERPL-MCNC: 119 MG/DL — HIGH (ref 70–99)
GLUCOSE SERPL-MCNC: 150 MG/DL — HIGH (ref 70–99)
GLUCOSE SERPL-MCNC: 217 MG/DL — HIGH (ref 70–99)
GLUCOSE SERPL-MCNC: 217 MG/DL — HIGH (ref 70–99)
GLUCOSE SERPL-MCNC: 80 MG/DL — SIGNIFICANT CHANGE UP (ref 70–99)
GLUCOSE SERPL-MCNC: 87 MG/DL — SIGNIFICANT CHANGE UP (ref 70–99)
GLUCOSE SERPL-MCNC: 88 MG/DL — SIGNIFICANT CHANGE UP (ref 70–99)
GLUCOSE SERPL-MCNC: 94 MG/DL — SIGNIFICANT CHANGE UP (ref 70–99)
GRAM STN FLD: SIGNIFICANT CHANGE UP
HCO3 BLDA-SCNC: 10 MMOL/L — LOW (ref 21–28)
HCO3 BLDA-SCNC: 16 MMOL/L — LOW (ref 21–28)
HCO3 BLDA-SCNC: 17 MMOL/L — LOW (ref 21–28)
HCO3 BLDV-SCNC: 11 MMOL/L — LOW (ref 20–27)
HCT VFR BLD CALC: 19.1 % — CRITICAL LOW (ref 34.5–45)
HCT VFR BLD CALC: 20.8 % — CRITICAL LOW (ref 34.5–45)
HCT VFR BLD CALC: 25.2 % — LOW (ref 34.5–45)
HCT VFR BLD CALC: 25.9 % — LOW (ref 34.5–45)
HCT VFR BLD CALC: 26.9 % — LOW (ref 34.5–45)
HCT VFR BLD CALC: 28.7 % — LOW (ref 34.5–45)
HCT VFR BLD CALC: 28.8 % — LOW (ref 34.5–45)
HCT VFR BLD CALC: 29.1 % — LOW (ref 34.5–45)
HCT VFR BLD CALC: 29.2 % — LOW (ref 34.5–45)
HCT VFR BLD CALC: 29.3 % — LOW (ref 34.5–45)
HCT VFR BLD CALC: 29.4 % — LOW (ref 34.5–45)
HCT VFR BLD CALC: 29.6 % — LOW (ref 34.5–45)
HCT VFR BLD CALC: 34 % — LOW (ref 34.5–45)
HCT VFR BLD CALC: 38.6 % — SIGNIFICANT CHANGE UP (ref 34.5–45)
HGB BLD-MCNC: 11.2 G/DL — LOW (ref 11.5–15.5)
HGB BLD-MCNC: 12.7 G/DL — SIGNIFICANT CHANGE UP (ref 11.5–15.5)
HGB BLD-MCNC: 6.2 G/DL — CRITICAL LOW (ref 11.5–15.5)
HGB BLD-MCNC: 6.7 G/DL — CRITICAL LOW (ref 11.5–15.5)
HGB BLD-MCNC: 8.1 G/DL — LOW (ref 11.5–15.5)
HGB BLD-MCNC: 8.7 G/DL — LOW (ref 11.5–15.5)
HGB BLD-MCNC: 8.8 G/DL — LOW (ref 11.5–15.5)
HGB BLD-MCNC: 9.2 G/DL — LOW (ref 11.5–15.5)
HGB BLD-MCNC: 9.4 G/DL — LOW (ref 11.5–15.5)
HGB BLD-MCNC: 9.4 G/DL — LOW (ref 11.5–15.5)
HGB BLD-MCNC: 9.5 G/DL — LOW (ref 11.5–15.5)
HGB BLD-MCNC: 9.6 G/DL — LOW (ref 11.5–15.5)
HGB BLD-MCNC: 9.7 G/DL — LOW (ref 11.5–15.5)
HGB BLD-MCNC: 9.8 G/DL — LOW (ref 11.5–15.5)
IMM GRANULOCYTES NFR BLD AUTO: 0.4 % — SIGNIFICANT CHANGE UP (ref 0–1.5)
IMM GRANULOCYTES NFR BLD AUTO: 0.6 % — SIGNIFICANT CHANGE UP (ref 0–1.5)
IMM GRANULOCYTES NFR BLD AUTO: 0.6 % — SIGNIFICANT CHANGE UP (ref 0–1.5)
IMM GRANULOCYTES NFR BLD AUTO: 0.7 % — SIGNIFICANT CHANGE UP (ref 0–1.5)
INR BLD: 1.26 — HIGH (ref 0.88–1.16)
INR BLD: 1.35 — HIGH (ref 0.88–1.16)
INR BLD: 1.4 — HIGH (ref 0.88–1.16)
INR BLD: 1.67 — HIGH (ref 0.88–1.16)
INR BLD: 2.61 — HIGH (ref 0.88–1.16)
INR BLD: 3.91 — HIGH (ref 0.88–1.16)
LACTATE SERPL-SCNC: 1.9 MMOL/L — SIGNIFICANT CHANGE UP (ref 0.5–2)
LACTATE SERPL-SCNC: 11.9 MMOL/L — CRITICAL HIGH (ref 0.5–2)
LACTATE SERPL-SCNC: 4.6 MMOL/L — CRITICAL HIGH (ref 0.5–2)
LACTATE SERPL-SCNC: 9.2 MMOL/L — CRITICAL HIGH (ref 0.5–2)
LACTATE SERPL-SCNC: 9.3 MMOL/L — CRITICAL HIGH (ref 0.5–2)
LDH SERPL L TO P-CCNC: 275 U/L — SIGNIFICANT CHANGE UP
LDH SERPL L TO P-CCNC: 430 U/L — HIGH (ref 50–242)
LYMPHOCYTES # BLD AUTO: 0.69 K/UL — LOW (ref 1–3.3)
LYMPHOCYTES # BLD AUTO: 0.81 K/UL — LOW (ref 1–3.3)
LYMPHOCYTES # BLD AUTO: 0.97 K/UL — LOW (ref 1–3.3)
LYMPHOCYTES # BLD AUTO: 1.26 K/UL — SIGNIFICANT CHANGE UP (ref 1–3.3)
LYMPHOCYTES # BLD AUTO: 1.96 K/UL — SIGNIFICANT CHANGE UP (ref 1–3.3)
LYMPHOCYTES # BLD AUTO: 10.2 % — LOW (ref 13–44)
LYMPHOCYTES # BLD AUTO: 14.7 % — SIGNIFICANT CHANGE UP (ref 13–44)
LYMPHOCYTES # BLD AUTO: 3.42 K/UL — HIGH (ref 1–3.3)
LYMPHOCYTES # BLD AUTO: 31.9 % — SIGNIFICANT CHANGE UP (ref 13–44)
LYMPHOCYTES # BLD AUTO: 7.8 % — LOW (ref 13–44)
LYMPHOCYTES # BLD AUTO: 8 % — LOW (ref 13–44)
LYMPHOCYTES # BLD AUTO: 9.5 % — LOW (ref 13–44)
LYMPHOCYTES # FLD: 14 % — SIGNIFICANT CHANGE UP
MACROCYTES BLD QL: SLIGHT — SIGNIFICANT CHANGE UP
MAGNESIUM SERPL-MCNC: 1.9 MG/DL — SIGNIFICANT CHANGE UP (ref 1.6–2.6)
MAGNESIUM SERPL-MCNC: 1.9 MG/DL — SIGNIFICANT CHANGE UP (ref 1.6–2.6)
MAGNESIUM SERPL-MCNC: 2 MG/DL — SIGNIFICANT CHANGE UP (ref 1.6–2.6)
MAGNESIUM SERPL-MCNC: 2.1 MG/DL — SIGNIFICANT CHANGE UP (ref 1.6–2.6)
MAGNESIUM SERPL-MCNC: 2.3 MG/DL — SIGNIFICANT CHANGE UP (ref 1.6–2.6)
MAGNESIUM SERPL-MCNC: 2.3 MG/DL — SIGNIFICANT CHANGE UP (ref 1.6–2.6)
MAGNESIUM SERPL-MCNC: 2.4 MG/DL — SIGNIFICANT CHANGE UP (ref 1.6–2.6)
MANUAL SMEAR VERIFICATION: SIGNIFICANT CHANGE UP
MCHC RBC-ENTMCNC: 29.2 PG — SIGNIFICANT CHANGE UP (ref 27–34)
MCHC RBC-ENTMCNC: 29.2 PG — SIGNIFICANT CHANGE UP (ref 27–34)
MCHC RBC-ENTMCNC: 29.3 PG — SIGNIFICANT CHANGE UP (ref 27–34)
MCHC RBC-ENTMCNC: 29.4 PG — SIGNIFICANT CHANGE UP (ref 27–34)
MCHC RBC-ENTMCNC: 29.5 PG — SIGNIFICANT CHANGE UP (ref 27–34)
MCHC RBC-ENTMCNC: 29.5 PG — SIGNIFICANT CHANGE UP (ref 27–34)
MCHC RBC-ENTMCNC: 29.6 PG — SIGNIFICANT CHANGE UP (ref 27–34)
MCHC RBC-ENTMCNC: 29.6 PG — SIGNIFICANT CHANGE UP (ref 27–34)
MCHC RBC-ENTMCNC: 29.8 PG — SIGNIFICANT CHANGE UP (ref 27–34)
MCHC RBC-ENTMCNC: 30 PG — SIGNIFICANT CHANGE UP (ref 27–34)
MCHC RBC-ENTMCNC: 30 PG — SIGNIFICANT CHANGE UP (ref 27–34)
MCHC RBC-ENTMCNC: 30.2 PG — SIGNIFICANT CHANGE UP (ref 27–34)
MCHC RBC-ENTMCNC: 31.9 GM/DL — LOW (ref 32–36)
MCHC RBC-ENTMCNC: 32.1 GM/DL — SIGNIFICANT CHANGE UP (ref 32–36)
MCHC RBC-ENTMCNC: 32.2 GM/DL — SIGNIFICANT CHANGE UP (ref 32–36)
MCHC RBC-ENTMCNC: 32.5 GM/DL — SIGNIFICANT CHANGE UP (ref 32–36)
MCHC RBC-ENTMCNC: 32.7 GM/DL — SIGNIFICANT CHANGE UP (ref 32–36)
MCHC RBC-ENTMCNC: 32.8 GM/DL — SIGNIFICANT CHANGE UP (ref 32–36)
MCHC RBC-ENTMCNC: 32.9 GM/DL — SIGNIFICANT CHANGE UP (ref 32–36)
MCHC RBC-ENTMCNC: 32.9 GM/DL — SIGNIFICANT CHANGE UP (ref 32–36)
MCHC RBC-ENTMCNC: 33 GM/DL — SIGNIFICANT CHANGE UP (ref 32–36)
MCHC RBC-ENTMCNC: 33.2 GM/DL — SIGNIFICANT CHANGE UP (ref 32–36)
MCHC RBC-ENTMCNC: 33.3 GM/DL — SIGNIFICANT CHANGE UP (ref 32–36)
MCHC RBC-ENTMCNC: 33.6 GM/DL — SIGNIFICANT CHANGE UP (ref 32–36)
MCV RBC AUTO: 88.7 FL — SIGNIFICANT CHANGE UP (ref 80–100)
MCV RBC AUTO: 89.4 FL — SIGNIFICANT CHANGE UP (ref 80–100)
MCV RBC AUTO: 89.5 FL — SIGNIFICANT CHANGE UP (ref 80–100)
MCV RBC AUTO: 89.6 FL — SIGNIFICANT CHANGE UP (ref 80–100)
MCV RBC AUTO: 89.8 FL — SIGNIFICANT CHANGE UP (ref 80–100)
MCV RBC AUTO: 90 FL — SIGNIFICANT CHANGE UP (ref 80–100)
MCV RBC AUTO: 91.1 FL — SIGNIFICANT CHANGE UP (ref 80–100)
MCV RBC AUTO: 91.3 FL — SIGNIFICANT CHANGE UP (ref 80–100)
MCV RBC AUTO: 91.3 FL — SIGNIFICANT CHANGE UP (ref 80–100)
MCV RBC AUTO: 91.4 FL — SIGNIFICANT CHANGE UP (ref 80–100)
MCV RBC AUTO: 91.6 FL — SIGNIFICANT CHANGE UP (ref 80–100)
MCV RBC AUTO: 91.7 FL — SIGNIFICANT CHANGE UP (ref 80–100)
MCV RBC AUTO: 92 FL — SIGNIFICANT CHANGE UP (ref 80–100)
MCV RBC AUTO: 93.2 FL — SIGNIFICANT CHANGE UP (ref 80–100)
MESOTHL CELL # FLD: 5 % — SIGNIFICANT CHANGE UP
METHOD TYPE: SIGNIFICANT CHANGE UP
MONOCYTES # BLD AUTO: 0.28 K/UL — SIGNIFICANT CHANGE UP (ref 0–0.9)
MONOCYTES # BLD AUTO: 0.65 K/UL — SIGNIFICANT CHANGE UP (ref 0–0.9)
MONOCYTES # BLD AUTO: 0.81 K/UL — SIGNIFICANT CHANGE UP (ref 0–0.9)
MONOCYTES # BLD AUTO: 0.9 K/UL — SIGNIFICANT CHANGE UP (ref 0–0.9)
MONOCYTES # BLD AUTO: 0.95 K/UL — HIGH (ref 0–0.9)
MONOCYTES # BLD AUTO: 1.15 K/UL — HIGH (ref 0–0.9)
MONOCYTES NFR BLD AUTO: 10 % — SIGNIFICANT CHANGE UP (ref 2–14)
MONOCYTES NFR BLD AUTO: 10.1 % — SIGNIFICANT CHANGE UP (ref 2–14)
MONOCYTES NFR BLD AUTO: 2.6 % — SIGNIFICANT CHANGE UP (ref 2–14)
MONOCYTES NFR BLD AUTO: 6.1 % — SIGNIFICANT CHANGE UP (ref 2–14)
MONOCYTES NFR BLD AUTO: 6.4 % — SIGNIFICANT CHANGE UP (ref 2–14)
MONOCYTES NFR BLD AUTO: 8.6 % — SIGNIFICANT CHANGE UP (ref 2–14)
MONOS+MACROS # FLD: 44 % — SIGNIFICANT CHANGE UP
NEUTROPHILS # BLD AUTO: 11.06 K/UL — HIGH (ref 1.8–7.4)
NEUTROPHILS # BLD AUTO: 6.74 K/UL — SIGNIFICANT CHANGE UP (ref 1.8–7.4)
NEUTROPHILS # BLD AUTO: 7.06 K/UL — SIGNIFICANT CHANGE UP (ref 1.8–7.4)
NEUTROPHILS # BLD AUTO: 7.29 K/UL — SIGNIFICANT CHANGE UP (ref 1.8–7.4)
NEUTROPHILS # BLD AUTO: 8.6 K/UL — HIGH (ref 1.8–7.4)
NEUTROPHILS # BLD AUTO: 9.88 K/UL — HIGH (ref 1.8–7.4)
NEUTROPHILS NFR BLD AUTO: 61.1 % — SIGNIFICANT CHANGE UP (ref 43–77)
NEUTROPHILS NFR BLD AUTO: 74.1 % — SIGNIFICANT CHANGE UP (ref 43–77)
NEUTROPHILS NFR BLD AUTO: 77 % — SIGNIFICANT CHANGE UP (ref 43–77)
NEUTROPHILS NFR BLD AUTO: 79.4 % — HIGH (ref 43–77)
NEUTROPHILS NFR BLD AUTO: 83.5 % — HIGH (ref 43–77)
NEUTROPHILS NFR BLD AUTO: 84.5 % — HIGH (ref 43–77)
NEUTS BAND # BLD: 1.8 % — SIGNIFICANT CHANGE UP (ref 0–8)
NIGHT BLUE STAIN TISS: SIGNIFICANT CHANGE UP
NON-GYNECOLOGICAL CYTOLOGY STUDY: SIGNIFICANT CHANGE UP
NRBC # BLD: 0 /100 WBCS — SIGNIFICANT CHANGE UP (ref 0–0)
NT-PROBNP SERPL-SCNC: 5352 PG/ML — HIGH (ref 0–300)
OVALOCYTES BLD QL SMEAR: SLIGHT — SIGNIFICANT CHANGE UP
PCO2 BLDA: 30 MMHG — LOW (ref 32–45)
PCO2 BLDA: 31 MMHG — LOW (ref 32–45)
PCO2 BLDA: 43 MMHG — SIGNIFICANT CHANGE UP (ref 32–45)
PCO2 BLDV: 40 MMHG — LOW (ref 41–51)
PH BLDA: 7.13 — CRITICAL LOW (ref 7.35–7.45)
PH BLDA: 7.22 — CRITICAL LOW (ref 7.35–7.45)
PH BLDA: 7.32 — LOW (ref 7.35–7.45)
PH BLDV: 7.04 — CRITICAL LOW (ref 7.32–7.43)
PH, PLEURAL FLUID: 7.44 — SIGNIFICANT CHANGE UP
PHOSPHATE SERPL-MCNC: 2.1 MG/DL — LOW (ref 2.5–4.5)
PHOSPHATE SERPL-MCNC: 2.2 MG/DL — LOW (ref 2.5–4.5)
PHOSPHATE SERPL-MCNC: 2.5 MG/DL — SIGNIFICANT CHANGE UP (ref 2.5–4.5)
PHOSPHATE SERPL-MCNC: 2.6 MG/DL — SIGNIFICANT CHANGE UP (ref 2.5–4.5)
PHOSPHATE SERPL-MCNC: 3 MG/DL — SIGNIFICANT CHANGE UP (ref 2.5–4.5)
PHOSPHATE SERPL-MCNC: 3.3 MG/DL — SIGNIFICANT CHANGE UP (ref 2.5–4.5)
PHOSPHATE SERPL-MCNC: 3.8 MG/DL — SIGNIFICANT CHANGE UP (ref 2.5–4.5)
PHOSPHATE SERPL-MCNC: 3.9 MG/DL — SIGNIFICANT CHANGE UP (ref 2.5–4.5)
PHOSPHATE SERPL-MCNC: 5.9 MG/DL — HIGH (ref 2.5–4.5)
PHOSPHATE SERPL-MCNC: 7 MG/DL — HIGH (ref 2.5–4.5)
PLAT MORPH BLD: ABNORMAL
PLATELET # BLD AUTO: 133 K/UL — LOW (ref 150–400)
PLATELET # BLD AUTO: 137 K/UL — LOW (ref 150–400)
PLATELET # BLD AUTO: 196 K/UL — SIGNIFICANT CHANGE UP (ref 150–400)
PLATELET # BLD AUTO: 198 K/UL — SIGNIFICANT CHANGE UP (ref 150–400)
PLATELET # BLD AUTO: 198 K/UL — SIGNIFICANT CHANGE UP (ref 150–400)
PLATELET # BLD AUTO: 199 K/UL — SIGNIFICANT CHANGE UP (ref 150–400)
PLATELET # BLD AUTO: 216 K/UL — SIGNIFICANT CHANGE UP (ref 150–400)
PLATELET # BLD AUTO: 241 K/UL — SIGNIFICANT CHANGE UP (ref 150–400)
PLATELET # BLD AUTO: 242 K/UL — SIGNIFICANT CHANGE UP (ref 150–400)
PLATELET # BLD AUTO: 245 K/UL — SIGNIFICANT CHANGE UP (ref 150–400)
PLATELET # BLD AUTO: 252 K/UL — SIGNIFICANT CHANGE UP (ref 150–400)
PLATELET # BLD AUTO: 260 K/UL — SIGNIFICANT CHANGE UP (ref 150–400)
PLATELET # BLD AUTO: 281 K/UL — SIGNIFICANT CHANGE UP (ref 150–400)
PLATELET # BLD AUTO: 379 K/UL — SIGNIFICANT CHANGE UP (ref 150–400)
PO2 BLDA: 273 MMHG — HIGH (ref 83–108)
PO2 BLDA: 312 MMHG — HIGH (ref 83–108)
PO2 BLDA: 314 MMHG — HIGH (ref 83–108)
PO2 BLDV: 55 MMHG — SIGNIFICANT CHANGE UP
POIKILOCYTOSIS BLD QL AUTO: SLIGHT — SIGNIFICANT CHANGE UP
POLYCHROMASIA BLD QL SMEAR: SIGNIFICANT CHANGE UP
POTASSIUM BLDV-SCNC: 6.1 MMOL/L — HIGH (ref 3.5–4.9)
POTASSIUM SERPL-MCNC: 4.1 MMOL/L — SIGNIFICANT CHANGE UP (ref 3.5–5.3)
POTASSIUM SERPL-MCNC: 4.1 MMOL/L — SIGNIFICANT CHANGE UP (ref 3.5–5.3)
POTASSIUM SERPL-MCNC: 4.2 MMOL/L — SIGNIFICANT CHANGE UP (ref 3.5–5.3)
POTASSIUM SERPL-MCNC: 4.3 MMOL/L — SIGNIFICANT CHANGE UP (ref 3.5–5.3)
POTASSIUM SERPL-MCNC: 4.6 MMOL/L — SIGNIFICANT CHANGE UP (ref 3.5–5.3)
POTASSIUM SERPL-MCNC: 4.6 MMOL/L — SIGNIFICANT CHANGE UP (ref 3.5–5.3)
POTASSIUM SERPL-MCNC: 4.9 MMOL/L — SIGNIFICANT CHANGE UP (ref 3.5–5.3)
POTASSIUM SERPL-MCNC: 4.9 MMOL/L — SIGNIFICANT CHANGE UP (ref 3.5–5.3)
POTASSIUM SERPL-MCNC: 5 MMOL/L — SIGNIFICANT CHANGE UP (ref 3.5–5.3)
POTASSIUM SERPL-MCNC: 5 MMOL/L — SIGNIFICANT CHANGE UP (ref 3.5–5.3)
POTASSIUM SERPL-MCNC: 5.2 MMOL/L — SIGNIFICANT CHANGE UP (ref 3.5–5.3)
POTASSIUM SERPL-MCNC: 5.2 MMOL/L — SIGNIFICANT CHANGE UP (ref 3.5–5.3)
POTASSIUM SERPL-MCNC: 5.3 MMOL/L — SIGNIFICANT CHANGE UP (ref 3.5–5.3)
POTASSIUM SERPL-MCNC: 5.5 MMOL/L — HIGH (ref 3.5–5.3)
POTASSIUM SERPL-MCNC: 5.7 MMOL/L — HIGH (ref 3.5–5.3)
POTASSIUM SERPL-MCNC: SIGNIFICANT CHANGE UP MMOL/L (ref 3.5–5.3)
POTASSIUM SERPL-SCNC: 4.1 MMOL/L — SIGNIFICANT CHANGE UP (ref 3.5–5.3)
POTASSIUM SERPL-SCNC: 4.1 MMOL/L — SIGNIFICANT CHANGE UP (ref 3.5–5.3)
POTASSIUM SERPL-SCNC: 4.2 MMOL/L — SIGNIFICANT CHANGE UP (ref 3.5–5.3)
POTASSIUM SERPL-SCNC: 4.3 MMOL/L — SIGNIFICANT CHANGE UP (ref 3.5–5.3)
POTASSIUM SERPL-SCNC: 4.6 MMOL/L — SIGNIFICANT CHANGE UP (ref 3.5–5.3)
POTASSIUM SERPL-SCNC: 4.6 MMOL/L — SIGNIFICANT CHANGE UP (ref 3.5–5.3)
POTASSIUM SERPL-SCNC: 4.9 MMOL/L — SIGNIFICANT CHANGE UP (ref 3.5–5.3)
POTASSIUM SERPL-SCNC: 4.9 MMOL/L — SIGNIFICANT CHANGE UP (ref 3.5–5.3)
POTASSIUM SERPL-SCNC: 5 MMOL/L — SIGNIFICANT CHANGE UP (ref 3.5–5.3)
POTASSIUM SERPL-SCNC: 5 MMOL/L — SIGNIFICANT CHANGE UP (ref 3.5–5.3)
POTASSIUM SERPL-SCNC: 5.2 MMOL/L — SIGNIFICANT CHANGE UP (ref 3.5–5.3)
POTASSIUM SERPL-SCNC: 5.2 MMOL/L — SIGNIFICANT CHANGE UP (ref 3.5–5.3)
POTASSIUM SERPL-SCNC: 5.3 MMOL/L — SIGNIFICANT CHANGE UP (ref 3.5–5.3)
POTASSIUM SERPL-SCNC: 5.5 MMOL/L — HIGH (ref 3.5–5.3)
POTASSIUM SERPL-SCNC: 5.7 MMOL/L — HIGH (ref 3.5–5.3)
POTASSIUM SERPL-SCNC: SIGNIFICANT CHANGE UP MMOL/L (ref 3.5–5.3)
PROT FLD-MCNC: 3.4 G/DL — SIGNIFICANT CHANGE UP
PROT SERPL-MCNC: 3.3 G/DL — LOW (ref 6–8.3)
PROT SERPL-MCNC: 3.8 G/DL — LOW (ref 6–8.3)
PROT SERPL-MCNC: 4.5 G/DL — LOW (ref 6–8.3)
PROT SERPL-MCNC: 6.1 G/DL — SIGNIFICANT CHANGE UP (ref 6–8.3)
PROT SERPL-MCNC: 6.2 G/DL — SIGNIFICANT CHANGE UP (ref 6–8.3)
PROT SERPL-MCNC: 7.7 G/DL — SIGNIFICANT CHANGE UP (ref 6–8.3)
PROTHROM AB SERPL-ACNC: 14.9 SEC — HIGH (ref 10.6–13.6)
PROTHROM AB SERPL-ACNC: 16 SEC — HIGH (ref 10.6–13.6)
PROTHROM AB SERPL-ACNC: 16.5 SEC — HIGH (ref 10.6–13.6)
PROTHROM AB SERPL-ACNC: 19.6 SEC — HIGH (ref 10.6–13.6)
PROTHROM AB SERPL-ACNC: 29.9 SEC — HIGH (ref 10.6–13.6)
PROTHROM AB SERPL-ACNC: 44 SEC — HIGH (ref 10.6–13.6)
PTH-INTACT FLD-MCNC: 86 PG/ML — HIGH (ref 15–65)
RBC # BLD: 2.05 M/UL — LOW (ref 3.8–5.2)
RBC # BLD: 2.26 M/UL — LOW (ref 3.8–5.2)
RBC # BLD: 2.75 M/UL — LOW (ref 3.8–5.2)
RBC # BLD: 2.92 M/UL — LOW (ref 3.8–5.2)
RBC # BLD: 2.99 M/UL — LOW (ref 3.8–5.2)
RBC # BLD: 3.13 M/UL — LOW (ref 3.8–5.2)
RBC # BLD: 3.15 M/UL — LOW (ref 3.8–5.2)
RBC # BLD: 3.21 M/UL — LOW (ref 3.8–5.2)
RBC # BLD: 3.24 M/UL — LOW (ref 3.8–5.2)
RBC # BLD: 3.25 M/UL — LOW (ref 3.8–5.2)
RBC # BLD: 3.26 M/UL — LOW (ref 3.8–5.2)
RBC # BLD: 3.29 M/UL — LOW (ref 3.8–5.2)
RBC # BLD: 3.8 M/UL — SIGNIFICANT CHANGE UP (ref 3.8–5.2)
RBC # BLD: 4.23 M/UL — SIGNIFICANT CHANGE UP (ref 3.8–5.2)
RBC # FLD: 17 % — HIGH (ref 10.3–14.5)
RBC # FLD: 17.1 % — HIGH (ref 10.3–14.5)
RBC # FLD: 17.2 % — HIGH (ref 10.3–14.5)
RBC # FLD: 17.3 % — HIGH (ref 10.3–14.5)
RBC # FLD: 17.3 % — HIGH (ref 10.3–14.5)
RBC BLD AUTO: SIGNIFICANT CHANGE UP
RCV VOL RI: 0 /UL — SIGNIFICANT CHANGE UP (ref 0–0)
RH IG SCN BLD-IMP: POSITIVE — SIGNIFICANT CHANGE UP
SAO2 % BLDA: 100 % — SIGNIFICANT CHANGE UP (ref 95–100)
SAO2 % BLDV: 71 % — SIGNIFICANT CHANGE UP
SARS-COV-2 RNA SPEC QL NAA+PROBE: NEGATIVE — SIGNIFICANT CHANGE UP
SARS-COV-2 RNA SPEC QL NAA+PROBE: SIGNIFICANT CHANGE UP
SARS-COV-2 RNA SPEC QL NAA+PROBE: SIGNIFICANT CHANGE UP
SODIUM SERPL-SCNC: 136 MMOL/L — SIGNIFICANT CHANGE UP (ref 135–145)
SODIUM SERPL-SCNC: 137 MMOL/L — SIGNIFICANT CHANGE UP (ref 135–145)
SODIUM SERPL-SCNC: 137 MMOL/L — SIGNIFICANT CHANGE UP (ref 135–145)
SODIUM SERPL-SCNC: 138 MMOL/L — SIGNIFICANT CHANGE UP (ref 135–145)
SODIUM SERPL-SCNC: 138 MMOL/L — SIGNIFICANT CHANGE UP (ref 135–145)
SODIUM SERPL-SCNC: 139 MMOL/L — SIGNIFICANT CHANGE UP (ref 135–145)
SODIUM SERPL-SCNC: 140 MMOL/L — SIGNIFICANT CHANGE UP (ref 135–145)
SODIUM SERPL-SCNC: 141 MMOL/L — SIGNIFICANT CHANGE UP (ref 135–145)
SODIUM SERPL-SCNC: 141 MMOL/L — SIGNIFICANT CHANGE UP (ref 135–145)
SODIUM SERPL-SCNC: 142 MMOL/L — SIGNIFICANT CHANGE UP (ref 135–145)
SODIUM SERPL-SCNC: 143 MMOL/L — SIGNIFICANT CHANGE UP (ref 135–145)
SODIUM SERPL-SCNC: 144 MMOL/L — SIGNIFICANT CHANGE UP (ref 135–145)
SODIUM SERPL-SCNC: 148 MMOL/L — HIGH (ref 135–145)
SODIUM SERPL-SCNC: 149 MMOL/L — HIGH (ref 135–145)
SPECIMEN SOURCE FLD: SIGNIFICANT CHANGE UP
SPECIMEN SOURCE: SIGNIFICANT CHANGE UP
TOTAL NUCLEATED CELL COUNT, BODY FLUID: 207 /UL — SIGNIFICANT CHANGE UP
TROPONIN T SERPL-MCNC: 0.09 NG/ML — CRITICAL HIGH (ref 0–0.01)
TROPONIN T SERPL-MCNC: <0.01 NG/ML — SIGNIFICANT CHANGE UP (ref 0–0.01)
TSH SERPL-MCNC: 0.64 UIU/ML — SIGNIFICANT CHANGE UP (ref 0.35–4.94)
TUBE TYPE: SIGNIFICANT CHANGE UP
URATE SERPL-MCNC: 8.8 MG/DL — HIGH (ref 2.5–7)
WBC # BLD: 10.14 K/UL — SIGNIFICANT CHANGE UP (ref 3.8–10.5)
WBC # BLD: 10.17 K/UL — SIGNIFICANT CHANGE UP (ref 3.8–10.5)
WBC # BLD: 10.72 K/UL — HIGH (ref 3.8–10.5)
WBC # BLD: 13.24 K/UL — HIGH (ref 3.8–10.5)
WBC # BLD: 13.33 K/UL — HIGH (ref 3.8–10.5)
WBC # BLD: 4.73 K/UL — SIGNIFICANT CHANGE UP (ref 3.8–10.5)
WBC # BLD: 6.61 K/UL — SIGNIFICANT CHANGE UP (ref 3.8–10.5)
WBC # BLD: 8.25 K/UL — SIGNIFICANT CHANGE UP (ref 3.8–10.5)
WBC # BLD: 8.25 K/UL — SIGNIFICANT CHANGE UP (ref 3.8–10.5)
WBC # BLD: 8.87 K/UL — SIGNIFICANT CHANGE UP (ref 3.8–10.5)
WBC # BLD: 8.89 K/UL — SIGNIFICANT CHANGE UP (ref 3.8–10.5)
WBC # BLD: 8.91 K/UL — SIGNIFICANT CHANGE UP (ref 3.8–10.5)
WBC # BLD: 9.48 K/UL — SIGNIFICANT CHANGE UP (ref 3.8–10.5)
WBC # BLD: 9.49 K/UL — SIGNIFICANT CHANGE UP (ref 3.8–10.5)
WBC # FLD AUTO: 10.14 K/UL — SIGNIFICANT CHANGE UP (ref 3.8–10.5)
WBC # FLD AUTO: 10.17 K/UL — SIGNIFICANT CHANGE UP (ref 3.8–10.5)
WBC # FLD AUTO: 10.72 K/UL — HIGH (ref 3.8–10.5)
WBC # FLD AUTO: 13.24 K/UL — HIGH (ref 3.8–10.5)
WBC # FLD AUTO: 13.33 K/UL — HIGH (ref 3.8–10.5)
WBC # FLD AUTO: 4.73 K/UL — SIGNIFICANT CHANGE UP (ref 3.8–10.5)
WBC # FLD AUTO: 6.61 K/UL — SIGNIFICANT CHANGE UP (ref 3.8–10.5)
WBC # FLD AUTO: 8.25 K/UL — SIGNIFICANT CHANGE UP (ref 3.8–10.5)
WBC # FLD AUTO: 8.25 K/UL — SIGNIFICANT CHANGE UP (ref 3.8–10.5)
WBC # FLD AUTO: 8.87 K/UL — SIGNIFICANT CHANGE UP (ref 3.8–10.5)
WBC # FLD AUTO: 8.89 K/UL — SIGNIFICANT CHANGE UP (ref 3.8–10.5)
WBC # FLD AUTO: 8.91 K/UL — SIGNIFICANT CHANGE UP (ref 3.8–10.5)
WBC # FLD AUTO: 9.48 K/UL — SIGNIFICANT CHANGE UP (ref 3.8–10.5)
WBC # FLD AUTO: 9.49 K/UL — SIGNIFICANT CHANGE UP (ref 3.8–10.5)

## 2021-01-01 PROCEDURE — 99233 SBSQ HOSP IP/OBS HIGH 50: CPT

## 2021-01-01 PROCEDURE — 93005 ELECTROCARDIOGRAM TRACING: CPT

## 2021-01-01 PROCEDURE — 89051 BODY FLUID CELL COUNT: CPT

## 2021-01-01 PROCEDURE — 71045 X-RAY EXAM CHEST 1 VIEW: CPT | Mod: 26,77

## 2021-01-01 PROCEDURE — 99233 SBSQ HOSP IP/OBS HIGH 50: CPT | Mod: GC

## 2021-01-01 PROCEDURE — 80048 BASIC METABOLIC PNL TOTAL CA: CPT

## 2021-01-01 PROCEDURE — 99291 CRITICAL CARE FIRST HOUR: CPT

## 2021-01-01 PROCEDURE — 82040 ASSAY OF SERUM ALBUMIN: CPT

## 2021-01-01 PROCEDURE — 99358 PROLONG SERVICE W/O CONTACT: CPT

## 2021-01-01 PROCEDURE — 99223 1ST HOSP IP/OBS HIGH 75: CPT | Mod: GC

## 2021-01-01 PROCEDURE — 99232 SBSQ HOSP IP/OBS MODERATE 35: CPT | Mod: GC,25

## 2021-01-01 PROCEDURE — 88305 TISSUE EXAM BY PATHOLOGIST: CPT | Mod: 26

## 2021-01-01 PROCEDURE — 92950 HEART/LUNG RESUSCITATION CPR: CPT

## 2021-01-01 PROCEDURE — 88112 CYTOPATH CELL ENHANCE TECH: CPT | Mod: 26

## 2021-01-01 PROCEDURE — 84550 ASSAY OF BLOOD/URIC ACID: CPT

## 2021-01-01 PROCEDURE — 84157 ASSAY OF PROTEIN OTHER: CPT

## 2021-01-01 PROCEDURE — 85027 COMPLETE CBC AUTOMATED: CPT

## 2021-01-01 PROCEDURE — 87075 CULTR BACTERIA EXCEPT BLOOD: CPT

## 2021-01-01 PROCEDURE — 84311 SPECTROPHOTOMETRY: CPT

## 2021-01-01 PROCEDURE — 86901 BLOOD TYPING SEROLOGIC RH(D): CPT

## 2021-01-01 PROCEDURE — 83970 ASSAY OF PARATHORMONE: CPT

## 2021-01-01 PROCEDURE — 70460 CT HEAD/BRAIN W/DYE: CPT | Mod: 26,ME

## 2021-01-01 PROCEDURE — 77001 FLUOROGUIDE FOR VEIN DEVICE: CPT | Mod: 26

## 2021-01-01 PROCEDURE — 99232 SBSQ HOSP IP/OBS MODERATE 35: CPT | Mod: GC

## 2021-01-01 PROCEDURE — 88342 IMHCHEM/IMCYTCHM 1ST ANTB: CPT | Mod: 26

## 2021-01-01 PROCEDURE — 83880 ASSAY OF NATRIURETIC PEPTIDE: CPT

## 2021-01-01 PROCEDURE — 82330 ASSAY OF CALCIUM: CPT

## 2021-01-01 PROCEDURE — 83036 HEMOGLOBIN GLYCOSYLATED A1C: CPT

## 2021-01-01 PROCEDURE — G1004: CPT

## 2021-01-01 PROCEDURE — 36561 INSERT TUNNELED CV CATH: CPT | Mod: LT

## 2021-01-01 PROCEDURE — 71045 X-RAY EXAM CHEST 1 VIEW: CPT

## 2021-01-01 PROCEDURE — 93010 ELECTROCARDIOGRAM REPORT: CPT

## 2021-01-01 PROCEDURE — 71045 X-RAY EXAM CHEST 1 VIEW: CPT | Mod: 26

## 2021-01-01 PROCEDURE — 84100 ASSAY OF PHOSPHORUS: CPT

## 2021-01-01 PROCEDURE — 80053 COMPREHEN METABOLIC PANEL: CPT

## 2021-01-01 PROCEDURE — 85730 THROMBOPLASTIN TIME PARTIAL: CPT

## 2021-01-01 PROCEDURE — 71275 CT ANGIOGRAPHY CHEST: CPT | Mod: 26,ME

## 2021-01-01 PROCEDURE — 99497 ADVNCD CARE PLAN 30 MIN: CPT | Mod: 25

## 2021-01-01 PROCEDURE — 76937 US GUIDE VASCULAR ACCESS: CPT | Mod: 26

## 2021-01-01 PROCEDURE — 71275 CT ANGIOGRAPHY CHEST: CPT

## 2021-01-01 PROCEDURE — 87102 FUNGUS ISOLATION CULTURE: CPT

## 2021-01-01 PROCEDURE — 88341 IMHCHEM/IMCYTCHM EA ADD ANTB: CPT

## 2021-01-01 PROCEDURE — 31622 DX BRONCHOSCOPE/WASH: CPT | Mod: GC

## 2021-01-01 PROCEDURE — 36000 PLACE NEEDLE IN VEIN: CPT

## 2021-01-01 PROCEDURE — 88112 CYTOPATH CELL ENHANCE TECH: CPT

## 2021-01-01 PROCEDURE — 87015 SPECIMEN INFECT AGNT CONCNTJ: CPT

## 2021-01-01 PROCEDURE — 86850 RBC ANTIBODY SCREEN: CPT

## 2021-01-01 PROCEDURE — 87205 SMEAR GRAM STAIN: CPT

## 2021-01-01 PROCEDURE — 82570 ASSAY OF URINE CREATININE: CPT

## 2021-01-01 PROCEDURE — 83615 LACTATE (LD) (LDH) ENZYME: CPT

## 2021-01-01 PROCEDURE — 82042 OTHER SOURCE ALBUMIN QUAN EA: CPT

## 2021-01-01 PROCEDURE — 97161 PT EVAL LOW COMPLEX 20 MIN: CPT

## 2021-01-01 PROCEDURE — 88341 IMHCHEM/IMCYTCHM EA ADD ANTB: CPT | Mod: 26

## 2021-01-01 PROCEDURE — 99291 CRITICAL CARE FIRST HOUR: CPT | Mod: 25

## 2021-01-01 PROCEDURE — 99222 1ST HOSP IP/OBS MODERATE 55: CPT

## 2021-01-01 PROCEDURE — 87206 SMEAR FLUORESCENT/ACID STAI: CPT

## 2021-01-01 PROCEDURE — 93306 TTE W/DOPPLER COMPLETE: CPT

## 2021-01-01 PROCEDURE — 84484 ASSAY OF TROPONIN QUANT: CPT

## 2021-01-01 PROCEDURE — 70460 CT HEAD/BRAIN W/DYE: CPT

## 2021-01-01 PROCEDURE — 85610 PROTHROMBIN TIME: CPT

## 2021-01-01 PROCEDURE — 32555 ASPIRATE PLEURA W/ IMAGING: CPT | Mod: GC,RT

## 2021-01-01 PROCEDURE — 99221 1ST HOSP IP/OBS SF/LOW 40: CPT

## 2021-01-01 PROCEDURE — 87186 SC STD MICRODIL/AGAR DIL: CPT

## 2021-01-01 PROCEDURE — 99232 SBSQ HOSP IP/OBS MODERATE 35: CPT

## 2021-01-01 PROCEDURE — 85379 FIBRIN DEGRADATION QUANT: CPT

## 2021-01-01 PROCEDURE — 82310 ASSAY OF CALCIUM: CPT

## 2021-01-01 PROCEDURE — 88305 TISSUE EXAM BY PATHOLOGIST: CPT

## 2021-01-01 PROCEDURE — 87040 BLOOD CULTURE FOR BACTERIA: CPT

## 2021-01-01 PROCEDURE — 99223 1ST HOSP IP/OBS HIGH 75: CPT

## 2021-01-01 PROCEDURE — 76937 US GUIDE VASCULAR ACCESS: CPT | Mod: 26,59

## 2021-01-01 PROCEDURE — 84295 ASSAY OF SERUM SODIUM: CPT

## 2021-01-01 PROCEDURE — 93306 TTE W/DOPPLER COMPLETE: CPT | Mod: 26

## 2021-01-01 PROCEDURE — 82962 GLUCOSE BLOOD TEST: CPT

## 2021-01-01 PROCEDURE — 82945 GLUCOSE OTHER FLUID: CPT

## 2021-01-01 PROCEDURE — U0003: CPT

## 2021-01-01 PROCEDURE — 87116 MYCOBACTERIA CULTURE: CPT

## 2021-01-01 PROCEDURE — 83986 ASSAY PH BODY FLUID NOS: CPT

## 2021-01-01 PROCEDURE — 36415 COLL VENOUS BLD VENIPUNCTURE: CPT

## 2021-01-01 PROCEDURE — 85025 COMPLETE CBC W/AUTO DIFF WBC: CPT

## 2021-01-01 PROCEDURE — U0005: CPT

## 2021-01-01 PROCEDURE — 87070 CULTURE OTHR SPECIMN AEROBIC: CPT

## 2021-01-01 PROCEDURE — 32550 INSERT PLEURAL CATH: CPT | Mod: GC

## 2021-01-01 PROCEDURE — 83735 ASSAY OF MAGNESIUM: CPT

## 2021-01-01 PROCEDURE — 86900 BLOOD TYPING SEROLOGIC ABO: CPT

## 2021-01-01 PROCEDURE — 88342 IMHCHEM/IMCYTCHM 1ST ANTB: CPT

## 2021-01-01 PROCEDURE — 84132 ASSAY OF SERUM POTASSIUM: CPT

## 2021-01-01 PROCEDURE — 84443 ASSAY THYROID STIM HORMONE: CPT

## 2021-01-01 PROCEDURE — 96374 THER/PROPH/DIAG INJ IV PUSH: CPT | Mod: XU

## 2021-01-01 PROCEDURE — 99152 MOD SED SAME PHYS/QHP 5/>YRS: CPT

## 2021-01-01 PROCEDURE — 36620 INSERTION CATHETER ARTERY: CPT

## 2021-01-01 PROCEDURE — 82803 BLOOD GASES ANY COMBINATION: CPT

## 2021-01-01 PROCEDURE — 99222 1ST HOSP IP/OBS MODERATE 55: CPT | Mod: GC

## 2021-01-01 RX ORDER — METOPROLOL TARTRATE 50 MG
5 TABLET ORAL ONCE
Refills: 0 | Status: COMPLETED | OUTPATIENT
Start: 2021-01-01 | End: 2021-01-01

## 2021-01-01 RX ORDER — ALBUMIN HUMAN 25 %
500 VIAL (ML) INTRAVENOUS ONCE
Refills: 0 | Status: DISCONTINUED | OUTPATIENT
Start: 2021-01-01 | End: 2021-01-01

## 2021-01-01 RX ORDER — METOPROLOL TARTRATE 50 MG
25 TABLET ORAL ONCE
Refills: 0 | Status: COMPLETED | OUTPATIENT
Start: 2021-01-01 | End: 2021-01-01

## 2021-01-01 RX ORDER — ALBUMIN HUMAN 25 %
500 VIAL (ML) INTRAVENOUS ONCE
Refills: 0 | Status: COMPLETED | OUTPATIENT
Start: 2021-01-01 | End: 2021-01-01

## 2021-01-01 RX ORDER — MIRTAZAPINE 45 MG/1
7.5 TABLET, ORALLY DISINTEGRATING ORAL AT BEDTIME
Refills: 0 | Status: DISCONTINUED | OUTPATIENT
Start: 2021-01-01 | End: 2021-01-01

## 2021-01-01 RX ORDER — HEPARIN SODIUM 5000 [USP'U]/ML
1200 INJECTION INTRAVENOUS; SUBCUTANEOUS
Qty: 25000 | Refills: 0 | Status: DISCONTINUED | OUTPATIENT
Start: 2021-01-01 | End: 2021-01-01

## 2021-01-01 RX ORDER — ACETAMINOPHEN 500 MG
650 TABLET ORAL EVERY 6 HOURS
Refills: 0 | Status: DISCONTINUED | OUTPATIENT
Start: 2021-01-01 | End: 2021-01-01

## 2021-01-01 RX ORDER — SODIUM BICARBONATE 1 MEQ/ML
650 SYRINGE (ML) INTRAVENOUS EVERY 12 HOURS
Refills: 0 | Status: DISCONTINUED | OUTPATIENT
Start: 2021-01-01 | End: 2021-01-01

## 2021-01-01 RX ORDER — DEXTROSE 50 % IN WATER 50 %
15 SYRINGE (ML) INTRAVENOUS ONCE
Refills: 0 | Status: DISCONTINUED | OUTPATIENT
Start: 2021-01-01 | End: 2021-01-01

## 2021-01-01 RX ORDER — METOPROLOL TARTRATE 50 MG
50 TABLET ORAL EVERY 24 HOURS
Refills: 0 | Status: DISCONTINUED | OUTPATIENT
Start: 2021-01-01 | End: 2021-01-01

## 2021-01-01 RX ORDER — ONDANSETRON 8 MG/1
4 TABLET, FILM COATED ORAL EVERY 8 HOURS
Refills: 0 | Status: DISCONTINUED | OUTPATIENT
Start: 2021-01-01 | End: 2021-01-01

## 2021-01-01 RX ORDER — SODIUM CHLORIDE 9 MG/ML
500 INJECTION INTRAMUSCULAR; INTRAVENOUS; SUBCUTANEOUS ONCE
Refills: 0 | Status: COMPLETED | OUTPATIENT
Start: 2021-01-01 | End: 2021-01-01

## 2021-01-01 RX ORDER — DILTIAZEM HCL 120 MG
5 CAPSULE, EXT RELEASE 24 HR ORAL ONCE
Refills: 0 | Status: COMPLETED | OUTPATIENT
Start: 2021-01-01 | End: 2021-01-01

## 2021-01-01 RX ORDER — AMIODARONE HYDROCHLORIDE 400 MG/1
200 TABLET ORAL ONCE
Refills: 0 | Status: COMPLETED | OUTPATIENT
Start: 2021-01-01 | End: 2021-01-01

## 2021-01-01 RX ORDER — ATORVASTATIN CALCIUM 80 MG/1
10 TABLET, FILM COATED ORAL AT BEDTIME
Refills: 0 | Status: DISCONTINUED | OUTPATIENT
Start: 2021-01-01 | End: 2021-01-01

## 2021-01-01 RX ORDER — APIXABAN 2.5 MG/1
2.5 TABLET, FILM COATED ORAL EVERY 12 HOURS
Refills: 0 | Status: DISCONTINUED | OUTPATIENT
Start: 2021-01-01 | End: 2021-01-01

## 2021-01-01 RX ORDER — FENTANYL CITRATE 50 UG/ML
50 INJECTION INTRAVENOUS ONCE
Refills: 0 | Status: DISCONTINUED | OUTPATIENT
Start: 2021-01-01 | End: 2021-01-01

## 2021-01-01 RX ORDER — INSULIN LISPRO 100/ML
VIAL (ML) SUBCUTANEOUS
Refills: 0 | Status: DISCONTINUED | OUTPATIENT
Start: 2021-01-01 | End: 2021-01-01

## 2021-01-01 RX ORDER — HEPARIN SODIUM 5000 [USP'U]/ML
INJECTION INTRAVENOUS; SUBCUTANEOUS
Qty: 25000 | Refills: 0 | Status: DISCONTINUED | OUTPATIENT
Start: 2021-01-01 | End: 2021-01-01

## 2021-01-01 RX ORDER — ASPIRIN/CALCIUM CARB/MAGNESIUM 324 MG
1 TABLET ORAL
Qty: 0 | Refills: 0 | DISCHARGE

## 2021-01-01 RX ORDER — HEPARIN SODIUM 5000 [USP'U]/ML
1300 INJECTION INTRAVENOUS; SUBCUTANEOUS
Qty: 25000 | Refills: 0 | Status: DISCONTINUED | OUTPATIENT
Start: 2021-01-01 | End: 2021-01-01

## 2021-01-01 RX ORDER — METOPROLOL TARTRATE 50 MG
1 TABLET ORAL
Qty: 60 | Refills: 0
Start: 2021-01-01 | End: 2021-03-14

## 2021-01-01 RX ORDER — MORPHINE SULFATE 50 MG/1
2 CAPSULE, EXTENDED RELEASE ORAL ONCE
Refills: 0 | Status: DISCONTINUED | OUTPATIENT
Start: 2021-01-01 | End: 2021-01-01

## 2021-01-01 RX ORDER — ADENOSINE 3 MG/ML
6 INJECTION INTRAVENOUS ONCE
Refills: 0 | Status: COMPLETED | OUTPATIENT
Start: 2021-01-01 | End: 2021-01-01

## 2021-01-01 RX ORDER — ENOXAPARIN SODIUM 100 MG/ML
30 INJECTION SUBCUTANEOUS EVERY 24 HOURS
Refills: 0 | Status: DISCONTINUED | OUTPATIENT
Start: 2021-01-01 | End: 2021-01-01

## 2021-01-01 RX ORDER — AMIODARONE HYDROCHLORIDE 400 MG/1
1 TABLET ORAL
Qty: 60 | Refills: 0
Start: 2021-01-01 | End: 2021-04-13

## 2021-01-01 RX ORDER — DEXTROSE 50 % IN WATER 50 %
25 SYRINGE (ML) INTRAVENOUS ONCE
Refills: 0 | Status: DISCONTINUED | OUTPATIENT
Start: 2021-01-01 | End: 2021-01-01

## 2021-01-01 RX ORDER — METOPROLOL TARTRATE 50 MG
5 TABLET ORAL ONCE
Refills: 0 | Status: DISCONTINUED | OUTPATIENT
Start: 2021-01-01 | End: 2021-01-01

## 2021-01-01 RX ORDER — PHYTONADIONE (VIT K1) 5 MG
10 TABLET ORAL ONCE
Refills: 0 | Status: COMPLETED | OUTPATIENT
Start: 2021-01-01 | End: 2021-01-01

## 2021-01-01 RX ORDER — AMIODARONE HYDROCHLORIDE 400 MG/1
150 TABLET ORAL ONCE
Refills: 0 | Status: COMPLETED | OUTPATIENT
Start: 2021-01-01 | End: 2021-01-01

## 2021-01-01 RX ORDER — HEPARIN SODIUM 5000 [USP'U]/ML
1500 INJECTION INTRAVENOUS; SUBCUTANEOUS
Qty: 25000 | Refills: 0 | Status: DISCONTINUED | OUTPATIENT
Start: 2021-01-01 | End: 2021-01-01

## 2021-01-01 RX ORDER — MIDAZOLAM HYDROCHLORIDE 1 MG/ML
0.02 INJECTION, SOLUTION INTRAMUSCULAR; INTRAVENOUS
Qty: 100 | Refills: 0 | Status: DISCONTINUED | OUTPATIENT
Start: 2021-01-01 | End: 2021-01-01

## 2021-01-01 RX ORDER — AMIODARONE HYDROCHLORIDE 400 MG/1
400 TABLET ORAL THREE TIMES A DAY
Refills: 0 | Status: DISCONTINUED | OUTPATIENT
Start: 2021-01-01 | End: 2021-01-01

## 2021-01-01 RX ORDER — APIXABAN 2.5 MG/1
1 TABLET, FILM COATED ORAL
Qty: 60 | Refills: 0
Start: 2021-01-01 | End: 2021-03-14

## 2021-01-01 RX ORDER — SODIUM CHLORIDE 9 MG/ML
1000 INJECTION, SOLUTION INTRAVENOUS
Refills: 0 | Status: DISCONTINUED | OUTPATIENT
Start: 2021-01-01 | End: 2021-01-01

## 2021-01-01 RX ORDER — GABAPENTIN 400 MG/1
100 CAPSULE ORAL
Refills: 0 | Status: DISCONTINUED | OUTPATIENT
Start: 2021-01-01 | End: 2021-01-01

## 2021-01-01 RX ORDER — GLUCAGON INJECTION, SOLUTION 0.5 MG/.1ML
1 INJECTION, SOLUTION SUBCUTANEOUS ONCE
Refills: 0 | Status: DISCONTINUED | OUTPATIENT
Start: 2021-01-01 | End: 2021-01-01

## 2021-01-01 RX ORDER — OXYCODONE AND ACETAMINOPHEN 5; 325 MG/1; MG/1
1 TABLET ORAL ONCE
Refills: 0 | Status: DISCONTINUED | OUTPATIENT
Start: 2021-01-01 | End: 2021-01-01

## 2021-01-01 RX ORDER — HYDROMORPHONE HYDROCHLORIDE 2 MG/ML
0.5 INJECTION INTRAMUSCULAR; INTRAVENOUS; SUBCUTANEOUS ONCE
Refills: 0 | Status: DISCONTINUED | OUTPATIENT
Start: 2021-01-01 | End: 2021-01-01

## 2021-01-01 RX ORDER — CALCIUM GLUCONATE 100 MG/ML
2 VIAL (ML) INTRAVENOUS ONCE
Refills: 0 | Status: COMPLETED | OUTPATIENT
Start: 2021-01-01 | End: 2021-01-01

## 2021-01-01 RX ORDER — METOPROLOL TARTRATE 50 MG
50 TABLET ORAL EVERY 12 HOURS
Refills: 0 | Status: DISCONTINUED | OUTPATIENT
Start: 2021-01-01 | End: 2021-01-01

## 2021-01-01 RX ORDER — ZALEPLON 10 MG
5 CAPSULE ORAL AT BEDTIME
Refills: 0 | Status: DISCONTINUED | OUTPATIENT
Start: 2021-01-01 | End: 2021-01-01

## 2021-01-01 RX ORDER — LANOLIN ALCOHOL/MO/W.PET/CERES
5 CREAM (GRAM) TOPICAL AT BEDTIME
Refills: 0 | Status: DISCONTINUED | OUTPATIENT
Start: 2021-01-01 | End: 2021-01-01

## 2021-01-01 RX ORDER — BUPROPION HYDROCHLORIDE 150 MG/1
300 TABLET, EXTENDED RELEASE ORAL DAILY
Refills: 0 | Status: DISCONTINUED | OUTPATIENT
Start: 2021-01-01 | End: 2021-01-01

## 2021-01-01 RX ORDER — DILTIAZEM HCL 120 MG
2.5 CAPSULE, EXT RELEASE 24 HR ORAL ONCE
Refills: 0 | Status: DISCONTINUED | OUTPATIENT
Start: 2021-01-01 | End: 2021-01-01

## 2021-01-01 RX ORDER — METOPROLOL TARTRATE 50 MG
50 TABLET ORAL ONCE
Refills: 0 | Status: DISCONTINUED | OUTPATIENT
Start: 2021-01-01 | End: 2021-01-01

## 2021-01-01 RX ORDER — DILTIAZEM HCL 120 MG
30 CAPSULE, EXT RELEASE 24 HR ORAL EVERY 8 HOURS
Refills: 0 | Status: DISCONTINUED | OUTPATIENT
Start: 2021-01-01 | End: 2021-01-01

## 2021-01-01 RX ORDER — SODIUM CHLORIDE 9 MG/ML
1000 INJECTION INTRAMUSCULAR; INTRAVENOUS; SUBCUTANEOUS ONCE
Refills: 0 | Status: COMPLETED | OUTPATIENT
Start: 2021-01-01 | End: 2021-01-01

## 2021-01-01 RX ORDER — VASOPRESSIN 20 [USP'U]/ML
0.02 INJECTION INTRAVENOUS
Qty: 50 | Refills: 0 | Status: DISCONTINUED | OUTPATIENT
Start: 2021-01-01 | End: 2021-01-01

## 2021-01-01 RX ORDER — ATORVASTATIN CALCIUM 80 MG/1
1 TABLET, FILM COATED ORAL
Qty: 0 | Refills: 0 | DISCHARGE

## 2021-01-01 RX ORDER — NOREPINEPHRINE BITARTRATE/D5W 8 MG/250ML
0.05 PLASTIC BAG, INJECTION (ML) INTRAVENOUS
Qty: 8 | Refills: 0 | Status: DISCONTINUED | OUTPATIENT
Start: 2021-01-01 | End: 2021-01-01

## 2021-01-01 RX ORDER — FOLIC ACID 0.8 MG
1 TABLET ORAL DAILY
Refills: 0 | Status: DISCONTINUED | OUTPATIENT
Start: 2021-01-01 | End: 2021-01-01

## 2021-01-01 RX ORDER — PANTOPRAZOLE SODIUM 20 MG/1
40 TABLET, DELAYED RELEASE ORAL
Refills: 0 | Status: DISCONTINUED | OUTPATIENT
Start: 2021-01-01 | End: 2021-01-01

## 2021-01-01 RX ORDER — AMIODARONE HYDROCHLORIDE 400 MG/1
200 TABLET ORAL EVERY 12 HOURS
Refills: 0 | Status: DISCONTINUED | OUTPATIENT
Start: 2021-01-01 | End: 2021-01-01

## 2021-01-01 RX ORDER — ADENOSINE 3 MG/ML
12 INJECTION INTRAVENOUS ONCE
Refills: 0 | Status: COMPLETED | OUTPATIENT
Start: 2021-01-01 | End: 2021-01-01

## 2021-01-01 RX ORDER — SODIUM CHLORIDE 9 MG/ML
1000 INJECTION INTRAMUSCULAR; INTRAVENOUS; SUBCUTANEOUS
Refills: 0 | Status: DISCONTINUED | OUTPATIENT
Start: 2021-01-01 | End: 2021-01-01

## 2021-01-01 RX ORDER — HYDROMORPHONE HYDROCHLORIDE 2 MG/ML
0.25 INJECTION INTRAMUSCULAR; INTRAVENOUS; SUBCUTANEOUS
Refills: 0 | Status: DISCONTINUED | OUTPATIENT
Start: 2021-01-01 | End: 2021-01-01

## 2021-01-01 RX ORDER — SODIUM BICARBONATE 1 MEQ/ML
0.28 SYRINGE (ML) INTRAVENOUS
Qty: 150 | Refills: 0 | Status: DISCONTINUED | OUTPATIENT
Start: 2021-01-01 | End: 2021-01-01

## 2021-01-01 RX ORDER — SODIUM CHLORIDE 9 MG/ML
250 INJECTION INTRAMUSCULAR; INTRAVENOUS; SUBCUTANEOUS ONCE
Refills: 0 | Status: COMPLETED | OUTPATIENT
Start: 2021-01-01 | End: 2021-01-01

## 2021-01-01 RX ORDER — QUETIAPINE FUMARATE 200 MG/1
25 TABLET, FILM COATED ORAL AT BEDTIME
Refills: 0 | Status: DISCONTINUED | OUTPATIENT
Start: 2021-01-01 | End: 2021-01-01

## 2021-01-01 RX ORDER — FLUOCINONIDE/EMOLLIENT BASE 0.05 %
1 CREAM (GRAM) TOPICAL DAILY
Refills: 0 | Status: DISCONTINUED | OUTPATIENT
Start: 2021-01-01 | End: 2021-01-01

## 2021-01-01 RX ORDER — ASPIRIN/CALCIUM CARB/MAGNESIUM 324 MG
81 TABLET ORAL DAILY
Refills: 0 | Status: DISCONTINUED | OUTPATIENT
Start: 2021-01-01 | End: 2021-01-01

## 2021-01-01 RX ORDER — GABAPENTIN 400 MG/1
300 CAPSULE ORAL THREE TIMES A DAY
Refills: 0 | Status: DISCONTINUED | OUTPATIENT
Start: 2021-01-01 | End: 2021-01-01

## 2021-01-01 RX ORDER — AMIODARONE HYDROCHLORIDE 400 MG/1
200 TABLET ORAL EVERY 24 HOURS
Refills: 0 | Status: DISCONTINUED | OUTPATIENT
Start: 2021-01-01 | End: 2021-01-01

## 2021-01-01 RX ORDER — ACETAMINOPHEN 500 MG
2 TABLET ORAL
Qty: 0 | Refills: 0 | DISCHARGE

## 2021-01-01 RX ORDER — CALAMINE AND ZINC OXIDE AND PHENOL 160; 10 MG/ML; MG/ML
1 LOTION TOPICAL EVERY 8 HOURS
Refills: 0 | Status: DISCONTINUED | OUTPATIENT
Start: 2021-01-01 | End: 2021-01-01

## 2021-01-01 RX ORDER — QUETIAPINE FUMARATE 200 MG/1
1 TABLET, FILM COATED ORAL
Qty: 0 | Refills: 0 | DISCHARGE

## 2021-01-01 RX ORDER — BUPROPION HYDROCHLORIDE 150 MG/1
1 TABLET, EXTENDED RELEASE ORAL
Qty: 0 | Refills: 0 | DISCHARGE

## 2021-01-01 RX ORDER — MIDAZOLAM HYDROCHLORIDE 1 MG/ML
2 INJECTION, SOLUTION INTRAMUSCULAR; INTRAVENOUS ONCE
Refills: 0 | Status: DISCONTINUED | OUTPATIENT
Start: 2021-01-01 | End: 2021-01-01

## 2021-01-01 RX ORDER — ACETAMINOPHEN 500 MG
650 TABLET ORAL EVERY 4 HOURS
Refills: 0 | Status: DISCONTINUED | OUTPATIENT
Start: 2021-01-01 | End: 2021-01-01

## 2021-01-01 RX ORDER — QUETIAPINE FUMARATE 200 MG/1
25 TABLET, FILM COATED ORAL EVERY 24 HOURS
Refills: 0 | Status: DISCONTINUED | OUTPATIENT
Start: 2021-01-01 | End: 2021-01-01

## 2021-01-01 RX ORDER — SODIUM CHLORIDE 9 MG/ML
250 INJECTION INTRAMUSCULAR; INTRAVENOUS; SUBCUTANEOUS ONCE
Refills: 0 | Status: DISCONTINUED | OUTPATIENT
Start: 2021-01-01 | End: 2021-01-01

## 2021-01-01 RX ORDER — SODIUM ZIRCONIUM CYCLOSILICATE 10 G/10G
10 POWDER, FOR SUSPENSION ORAL ONCE
Refills: 0 | Status: COMPLETED | OUTPATIENT
Start: 2021-01-01 | End: 2021-01-01

## 2021-01-01 RX ORDER — DILTIAZEM HCL 120 MG
10 CAPSULE, EXT RELEASE 24 HR ORAL ONCE
Refills: 0 | Status: DISCONTINUED | OUTPATIENT
Start: 2021-01-01 | End: 2021-01-01

## 2021-01-01 RX ORDER — ONDANSETRON 8 MG/1
1 TABLET, FILM COATED ORAL
Qty: 0 | Refills: 0 | DISCHARGE

## 2021-01-01 RX ORDER — METOPROLOL TARTRATE 50 MG
75 TABLET ORAL EVERY 24 HOURS
Refills: 0 | Status: DISCONTINUED | OUTPATIENT
Start: 2021-01-01 | End: 2021-01-01

## 2021-01-01 RX ORDER — METOPROLOL TARTRATE 50 MG
25 TABLET ORAL ONCE
Refills: 0 | Status: DISCONTINUED | OUTPATIENT
Start: 2021-01-01 | End: 2021-01-01

## 2021-01-01 RX ORDER — QUETIAPINE FUMARATE 200 MG/1
25 TABLET, FILM COATED ORAL DAILY
Refills: 0 | Status: DISCONTINUED | OUTPATIENT
Start: 2021-01-01 | End: 2021-01-01

## 2021-01-01 RX ORDER — ALCAFTADINE 0.25 %
1 DROPS OPHTHALMIC (EYE)
Qty: 0 | Refills: 0 | DISCHARGE

## 2021-01-01 RX ORDER — OMEPRAZOLE 10 MG/1
1 CAPSULE, DELAYED RELEASE ORAL
Qty: 0 | Refills: 0 | DISCHARGE

## 2021-01-01 RX ORDER — HALOPERIDOL DECANOATE 100 MG/ML
2 INJECTION INTRAMUSCULAR ONCE
Refills: 0 | Status: COMPLETED | OUTPATIENT
Start: 2021-01-01 | End: 2021-01-01

## 2021-01-01 RX ORDER — CALAMINE AND ZINC OXIDE AND PHENOL 160; 10 MG/ML; MG/ML
1 LOTION TOPICAL THREE TIMES A DAY
Refills: 0 | Status: DISCONTINUED | OUTPATIENT
Start: 2021-01-01 | End: 2021-01-01

## 2021-01-01 RX ORDER — HEPARIN SODIUM 5000 [USP'U]/ML
1400 INJECTION INTRAVENOUS; SUBCUTANEOUS
Qty: 25000 | Refills: 0 | Status: DISCONTINUED | OUTPATIENT
Start: 2021-01-01 | End: 2021-01-01

## 2021-01-01 RX ORDER — HYDROMORPHONE HYDROCHLORIDE 2 MG/ML
0.2 INJECTION INTRAMUSCULAR; INTRAVENOUS; SUBCUTANEOUS
Refills: 0 | Status: DISCONTINUED | OUTPATIENT
Start: 2021-01-01 | End: 2021-01-01

## 2021-01-01 RX ORDER — HEPARIN SODIUM 5000 [USP'U]/ML
1000 INJECTION INTRAVENOUS; SUBCUTANEOUS
Qty: 25000 | Refills: 0 | Status: DISCONTINUED | OUTPATIENT
Start: 2021-01-01 | End: 2021-01-01

## 2021-01-01 RX ORDER — DEXTROSE 50 % IN WATER 50 %
12.5 SYRINGE (ML) INTRAVENOUS ONCE
Refills: 0 | Status: DISCONTINUED | OUTPATIENT
Start: 2021-01-01 | End: 2021-01-01

## 2021-01-01 RX ORDER — SODIUM CHLORIDE 9 MG/ML
500 INJECTION INTRAMUSCULAR; INTRAVENOUS; SUBCUTANEOUS
Refills: 0 | Status: DISCONTINUED | OUTPATIENT
Start: 2021-01-01 | End: 2021-01-01

## 2021-01-01 RX ORDER — SODIUM BICARBONATE 1 MEQ/ML
100 SYRINGE (ML) INTRAVENOUS ONCE
Refills: 0 | Status: COMPLETED | OUTPATIENT
Start: 2021-01-01 | End: 2021-01-01

## 2021-01-01 RX ORDER — ACETAMINOPHEN WITH CODEINE 300MG-30MG
1 TABLET ORAL ONCE
Refills: 0 | Status: DISCONTINUED | OUTPATIENT
Start: 2021-01-01 | End: 2021-01-01

## 2021-01-01 RX ORDER — HEPARIN SODIUM 5000 [USP'U]/ML
5000 INJECTION INTRAVENOUS; SUBCUTANEOUS EVERY 8 HOURS
Refills: 0 | Status: DISCONTINUED | OUTPATIENT
Start: 2021-01-01 | End: 2021-01-01

## 2021-01-01 RX ORDER — ENOXAPARIN SODIUM 100 MG/ML
50 INJECTION SUBCUTANEOUS EVERY 24 HOURS
Refills: 0 | Status: DISCONTINUED | OUTPATIENT
Start: 2021-01-01 | End: 2021-01-01

## 2021-01-01 RX ORDER — METOPROLOL TARTRATE 50 MG
25 TABLET ORAL EVERY 12 HOURS
Refills: 0 | Status: DISCONTINUED | OUTPATIENT
Start: 2021-01-01 | End: 2021-01-01

## 2021-01-01 RX ORDER — FOLIC ACID 0.8 MG
1 TABLET ORAL
Qty: 0 | Refills: 0 | DISCHARGE

## 2021-01-01 RX ADMIN — Medication 81 MILLIGRAM(S): at 11:30

## 2021-01-01 RX ADMIN — SODIUM CHLORIDE 1000 MILLILITER(S): 9 INJECTION INTRAMUSCULAR; INTRAVENOUS; SUBCUTANEOUS at 22:35

## 2021-01-01 RX ADMIN — Medication 50 MILLIGRAM(S): at 09:11

## 2021-01-01 RX ADMIN — APIXABAN 2.5 MILLIGRAM(S): 2.5 TABLET, FILM COATED ORAL at 17:52

## 2021-01-01 RX ADMIN — BUPROPION HYDROCHLORIDE 300 MILLIGRAM(S): 150 TABLET, EXTENDED RELEASE ORAL at 12:18

## 2021-01-01 RX ADMIN — AMIODARONE HYDROCHLORIDE 600 MILLIGRAM(S): 400 TABLET ORAL at 16:40

## 2021-01-01 RX ADMIN — QUETIAPINE FUMARATE 25 MILLIGRAM(S): 200 TABLET, FILM COATED ORAL at 00:18

## 2021-01-01 RX ADMIN — Medication 1 MILLIGRAM(S): at 12:18

## 2021-01-01 RX ADMIN — PANTOPRAZOLE SODIUM 40 MILLIGRAM(S): 20 TABLET, DELAYED RELEASE ORAL at 06:25

## 2021-01-01 RX ADMIN — QUETIAPINE FUMARATE 25 MILLIGRAM(S): 200 TABLET, FILM COATED ORAL at 22:00

## 2021-01-01 RX ADMIN — CALAMINE AND ZINC OXIDE AND PHENOL 1 APPLICATION(S): 160; 10 LOTION TOPICAL at 05:49

## 2021-01-01 RX ADMIN — ATORVASTATIN CALCIUM 10 MILLIGRAM(S): 80 TABLET, FILM COATED ORAL at 23:41

## 2021-01-01 RX ADMIN — Medication 650 MILLIGRAM(S): at 00:24

## 2021-01-01 RX ADMIN — QUETIAPINE FUMARATE 25 MILLIGRAM(S): 200 TABLET, FILM COATED ORAL at 23:08

## 2021-01-01 RX ADMIN — Medication 200 GRAM(S): at 21:32

## 2021-01-01 RX ADMIN — Medication 50 MILLIGRAM(S): at 05:53

## 2021-01-01 RX ADMIN — ATORVASTATIN CALCIUM 10 MILLIGRAM(S): 80 TABLET, FILM COATED ORAL at 22:14

## 2021-01-01 RX ADMIN — GABAPENTIN 100 MILLIGRAM(S): 400 CAPSULE ORAL at 06:34

## 2021-01-01 RX ADMIN — Medication 75 MILLIGRAM(S): at 06:33

## 2021-01-01 RX ADMIN — Medication 650 MILLIGRAM(S): at 00:31

## 2021-01-01 RX ADMIN — Medication 50 MILLIGRAM(S): at 05:07

## 2021-01-01 RX ADMIN — Medication 102 MILLIGRAM(S): at 23:00

## 2021-01-01 RX ADMIN — Medication 1 TABLET(S): at 18:19

## 2021-01-01 RX ADMIN — QUETIAPINE FUMARATE 25 MILLIGRAM(S): 200 TABLET, FILM COATED ORAL at 21:03

## 2021-01-01 RX ADMIN — APIXABAN 2.5 MILLIGRAM(S): 2.5 TABLET, FILM COATED ORAL at 06:34

## 2021-01-01 RX ADMIN — Medication 650 MILLIGRAM(S): at 16:31

## 2021-01-01 RX ADMIN — ATORVASTATIN CALCIUM 10 MILLIGRAM(S): 80 TABLET, FILM COATED ORAL at 22:35

## 2021-01-01 RX ADMIN — PANTOPRAZOLE SODIUM 40 MILLIGRAM(S): 20 TABLET, DELAYED RELEASE ORAL at 06:00

## 2021-01-01 RX ADMIN — Medication 100 MILLIGRAM(S): at 15:35

## 2021-01-01 RX ADMIN — SODIUM CHLORIDE 90 MILLILITER(S): 9 INJECTION INTRAMUSCULAR; INTRAVENOUS; SUBCUTANEOUS at 12:00

## 2021-01-01 RX ADMIN — APIXABAN 2.5 MILLIGRAM(S): 2.5 TABLET, FILM COATED ORAL at 10:32

## 2021-01-01 RX ADMIN — QUETIAPINE FUMARATE 25 MILLIGRAM(S): 200 TABLET, FILM COATED ORAL at 23:10

## 2021-01-01 RX ADMIN — Medication 25 MILLIGRAM(S): at 07:00

## 2021-01-01 RX ADMIN — QUETIAPINE FUMARATE 25 MILLIGRAM(S): 200 TABLET, FILM COATED ORAL at 21:27

## 2021-01-01 RX ADMIN — AMIODARONE HYDROCHLORIDE 200 MILLIGRAM(S): 400 TABLET ORAL at 06:23

## 2021-01-01 RX ADMIN — CALAMINE AND ZINC OXIDE AND PHENOL 1 APPLICATION(S): 160; 10 LOTION TOPICAL at 16:16

## 2021-01-01 RX ADMIN — Medication 81 MILLIGRAM(S): at 11:14

## 2021-01-01 RX ADMIN — Medication 50 MILLIGRAM(S): at 07:00

## 2021-01-01 RX ADMIN — APIXABAN 2.5 MILLIGRAM(S): 2.5 TABLET, FILM COATED ORAL at 06:31

## 2021-01-01 RX ADMIN — QUETIAPINE FUMARATE 25 MILLIGRAM(S): 200 TABLET, FILM COATED ORAL at 23:38

## 2021-01-01 RX ADMIN — Medication 50 MILLIGRAM(S): at 17:46

## 2021-01-01 RX ADMIN — MIDAZOLAM HYDROCHLORIDE 2 MILLIGRAM(S): 1 INJECTION, SOLUTION INTRAMUSCULAR; INTRAVENOUS at 20:00

## 2021-01-01 RX ADMIN — HEPARIN SODIUM 12 UNIT(S)/HR: 5000 INJECTION INTRAVENOUS; SUBCUTANEOUS at 16:19

## 2021-01-01 RX ADMIN — Medication 25 MILLIGRAM(S): at 17:02

## 2021-01-01 RX ADMIN — AMIODARONE HYDROCHLORIDE 400 MILLIGRAM(S): 400 TABLET ORAL at 22:35

## 2021-01-01 RX ADMIN — Medication 650 MILLIGRAM(S): at 23:08

## 2021-01-01 RX ADMIN — Medication 81 MILLIGRAM(S): at 12:18

## 2021-01-01 RX ADMIN — SODIUM CHLORIDE 250 MILLILITER(S): 9 INJECTION INTRAMUSCULAR; INTRAVENOUS; SUBCUTANEOUS at 16:20

## 2021-01-01 RX ADMIN — PANTOPRAZOLE SODIUM 40 MILLIGRAM(S): 20 TABLET, DELAYED RELEASE ORAL at 06:34

## 2021-01-01 RX ADMIN — HEPARIN SODIUM 14 UNIT(S)/HR: 5000 INJECTION INTRAVENOUS; SUBCUTANEOUS at 22:04

## 2021-01-01 RX ADMIN — Medication 250 MILLILITER(S): at 22:57

## 2021-01-01 RX ADMIN — FENTANYL CITRATE 50 MICROGRAM(S): 50 INJECTION INTRAVENOUS at 20:00

## 2021-01-01 RX ADMIN — Medication 50 MILLIGRAM(S): at 06:41

## 2021-01-01 RX ADMIN — GABAPENTIN 100 MILLIGRAM(S): 400 CAPSULE ORAL at 17:01

## 2021-01-01 RX ADMIN — Medication 650 MILLIGRAM(S): at 18:20

## 2021-01-01 RX ADMIN — CALAMINE AND ZINC OXIDE AND PHENOL 1 APPLICATION(S): 160; 10 LOTION TOPICAL at 13:04

## 2021-01-01 RX ADMIN — AMIODARONE HYDROCHLORIDE 200 MILLIGRAM(S): 400 TABLET ORAL at 06:31

## 2021-01-01 RX ADMIN — GABAPENTIN 100 MILLIGRAM(S): 400 CAPSULE ORAL at 06:01

## 2021-01-01 RX ADMIN — QUETIAPINE FUMARATE 25 MILLIGRAM(S): 200 TABLET, FILM COATED ORAL at 00:28

## 2021-01-01 RX ADMIN — Medication 81 MILLIGRAM(S): at 16:30

## 2021-01-01 RX ADMIN — PANTOPRAZOLE SODIUM 40 MILLIGRAM(S): 20 TABLET, DELAYED RELEASE ORAL at 06:41

## 2021-01-01 RX ADMIN — BUPROPION HYDROCHLORIDE 300 MILLIGRAM(S): 150 TABLET, EXTENDED RELEASE ORAL at 11:14

## 2021-01-01 RX ADMIN — Medication 650 MILLIGRAM(S): at 04:17

## 2021-01-01 RX ADMIN — CALAMINE AND ZINC OXIDE AND PHENOL 1 APPLICATION(S): 160; 10 LOTION TOPICAL at 06:15

## 2021-01-01 RX ADMIN — Medication 5 MILLIGRAM(S): at 09:15

## 2021-01-01 RX ADMIN — SODIUM CHLORIDE 1000 MILLILITER(S): 9 INJECTION INTRAMUSCULAR; INTRAVENOUS; SUBCUTANEOUS at 10:00

## 2021-01-01 RX ADMIN — SODIUM CHLORIDE 1000 MILLILITER(S): 9 INJECTION INTRAMUSCULAR; INTRAVENOUS; SUBCUTANEOUS at 04:29

## 2021-01-01 RX ADMIN — HEPARIN SODIUM 14 UNIT(S)/HR: 5000 INJECTION INTRAVENOUS; SUBCUTANEOUS at 00:38

## 2021-01-01 RX ADMIN — PANTOPRAZOLE SODIUM 40 MILLIGRAM(S): 20 TABLET, DELAYED RELEASE ORAL at 05:14

## 2021-01-01 RX ADMIN — Medication 81 MILLIGRAM(S): at 11:15

## 2021-01-01 RX ADMIN — AMIODARONE HYDROCHLORIDE 200 MILLIGRAM(S): 400 TABLET ORAL at 15:34

## 2021-01-01 RX ADMIN — AMIODARONE HYDROCHLORIDE 400 MILLIGRAM(S): 400 TABLET ORAL at 15:02

## 2021-01-01 RX ADMIN — HEPARIN SODIUM 12 UNIT(S)/HR: 5000 INJECTION INTRAVENOUS; SUBCUTANEOUS at 14:18

## 2021-01-01 RX ADMIN — Medication 1 MILLIGRAM(S): at 11:56

## 2021-01-01 RX ADMIN — Medication 50 MILLIGRAM(S): at 06:00

## 2021-01-01 RX ADMIN — QUETIAPINE FUMARATE 25 MILLIGRAM(S): 200 TABLET, FILM COATED ORAL at 22:14

## 2021-01-01 RX ADMIN — GABAPENTIN 100 MILLIGRAM(S): 400 CAPSULE ORAL at 18:05

## 2021-01-01 RX ADMIN — Medication 81 MILLIGRAM(S): at 11:53

## 2021-01-01 RX ADMIN — Medication 75 MILLIGRAM(S): at 06:31

## 2021-01-01 RX ADMIN — PANTOPRAZOLE SODIUM 40 MILLIGRAM(S): 20 TABLET, DELAYED RELEASE ORAL at 06:31

## 2021-01-01 RX ADMIN — MIRTAZAPINE 7.5 MILLIGRAM(S): 45 TABLET, ORALLY DISINTEGRATING ORAL at 21:18

## 2021-01-01 RX ADMIN — APIXABAN 2.5 MILLIGRAM(S): 2.5 TABLET, FILM COATED ORAL at 11:53

## 2021-01-01 RX ADMIN — MIRTAZAPINE 7.5 MILLIGRAM(S): 45 TABLET, ORALLY DISINTEGRATING ORAL at 22:45

## 2021-01-01 RX ADMIN — Medication 50 MILLIGRAM(S): at 06:24

## 2021-01-01 RX ADMIN — Medication 5 MILLIGRAM(S): at 08:40

## 2021-01-01 RX ADMIN — AMIODARONE HYDROCHLORIDE 200 MILLIGRAM(S): 400 TABLET ORAL at 05:54

## 2021-01-01 RX ADMIN — SODIUM CHLORIDE 250 MILLILITER(S): 9 INJECTION INTRAMUSCULAR; INTRAVENOUS; SUBCUTANEOUS at 20:36

## 2021-01-01 RX ADMIN — BUPROPION HYDROCHLORIDE 300 MILLIGRAM(S): 150 TABLET, EXTENDED RELEASE ORAL at 11:56

## 2021-01-01 RX ADMIN — PANTOPRAZOLE SODIUM 40 MILLIGRAM(S): 20 TABLET, DELAYED RELEASE ORAL at 05:07

## 2021-01-01 RX ADMIN — Medication 650 MILLIGRAM(S): at 12:00

## 2021-01-01 RX ADMIN — HEPARIN SODIUM 1000 UNIT(S)/HR: 5000 INJECTION INTRAVENOUS; SUBCUTANEOUS at 00:18

## 2021-01-01 RX ADMIN — Medication 81 MILLIGRAM(S): at 11:32

## 2021-01-01 RX ADMIN — Medication 5 MILLIGRAM(S): at 23:08

## 2021-01-01 RX ADMIN — Medication 50 MILLIGRAM(S): at 17:37

## 2021-01-01 RX ADMIN — Medication 650 MILLIGRAM(S): at 00:46

## 2021-01-01 RX ADMIN — Medication 650 MILLIGRAM(S): at 22:35

## 2021-01-01 RX ADMIN — MIRTAZAPINE 7.5 MILLIGRAM(S): 45 TABLET, ORALLY DISINTEGRATING ORAL at 22:13

## 2021-01-01 RX ADMIN — APIXABAN 2.5 MILLIGRAM(S): 2.5 TABLET, FILM COATED ORAL at 18:09

## 2021-01-01 RX ADMIN — Medication 1 APPLICATION(S): at 17:38

## 2021-01-01 RX ADMIN — Medication 5 MILLIGRAM(S): at 01:20

## 2021-01-01 RX ADMIN — Medication 81 MILLIGRAM(S): at 11:56

## 2021-01-01 RX ADMIN — ATORVASTATIN CALCIUM 10 MILLIGRAM(S): 80 TABLET, FILM COATED ORAL at 17:03

## 2021-01-01 RX ADMIN — SODIUM CHLORIDE 90 MILLILITER(S): 9 INJECTION INTRAMUSCULAR; INTRAVENOUS; SUBCUTANEOUS at 01:42

## 2021-01-01 RX ADMIN — Medication 650 MILLIGRAM(S): at 03:37

## 2021-01-01 RX ADMIN — VASOPRESSIN 1.2 UNIT(S)/MIN: 20 INJECTION INTRAVENOUS at 21:22

## 2021-01-01 RX ADMIN — OXYCODONE AND ACETAMINOPHEN 1 TABLET(S): 5; 325 TABLET ORAL at 23:43

## 2021-01-01 RX ADMIN — AMIODARONE HYDROCHLORIDE 400 MILLIGRAM(S): 400 TABLET ORAL at 15:29

## 2021-01-01 RX ADMIN — Medication 650 MILLIGRAM(S): at 00:22

## 2021-01-01 RX ADMIN — GABAPENTIN 100 MILLIGRAM(S): 400 CAPSULE ORAL at 06:31

## 2021-01-01 RX ADMIN — Medication 25 MILLIGRAM(S): at 19:06

## 2021-01-01 RX ADMIN — QUETIAPINE FUMARATE 25 MILLIGRAM(S): 200 TABLET, FILM COATED ORAL at 22:35

## 2021-01-01 RX ADMIN — APIXABAN 2.5 MILLIGRAM(S): 2.5 TABLET, FILM COATED ORAL at 05:46

## 2021-01-01 RX ADMIN — Medication 650 MILLIGRAM(S): at 07:00

## 2021-01-01 RX ADMIN — AMIODARONE HYDROCHLORIDE 200 MILLIGRAM(S): 400 TABLET ORAL at 07:00

## 2021-01-01 RX ADMIN — Medication 81 MILLIGRAM(S): at 13:02

## 2021-01-01 RX ADMIN — Medication 650 MILLIGRAM(S): at 19:45

## 2021-01-01 RX ADMIN — GABAPENTIN 100 MILLIGRAM(S): 400 CAPSULE ORAL at 17:37

## 2021-01-01 RX ADMIN — AMIODARONE HYDROCHLORIDE 200 MILLIGRAM(S): 400 TABLET ORAL at 06:41

## 2021-01-01 RX ADMIN — HEPARIN SODIUM 5000 UNIT(S): 5000 INJECTION INTRAVENOUS; SUBCUTANEOUS at 04:39

## 2021-01-01 RX ADMIN — PANTOPRAZOLE SODIUM 40 MILLIGRAM(S): 20 TABLET, DELAYED RELEASE ORAL at 07:01

## 2021-01-01 RX ADMIN — QUETIAPINE FUMARATE 25 MILLIGRAM(S): 200 TABLET, FILM COATED ORAL at 18:53

## 2021-01-01 RX ADMIN — CALAMINE AND ZINC OXIDE AND PHENOL 1 APPLICATION(S): 160; 10 LOTION TOPICAL at 15:02

## 2021-01-01 RX ADMIN — Medication 1 MILLIGRAM(S): at 12:00

## 2021-01-01 RX ADMIN — BUPROPION HYDROCHLORIDE 300 MILLIGRAM(S): 150 TABLET, EXTENDED RELEASE ORAL at 12:00

## 2021-01-01 RX ADMIN — ADENOSINE 6 MILLIGRAM(S): 3 INJECTION INTRAVENOUS at 01:34

## 2021-01-01 RX ADMIN — Medication 25 MILLIGRAM(S): at 22:43

## 2021-01-01 RX ADMIN — GABAPENTIN 100 MILLIGRAM(S): 400 CAPSULE ORAL at 05:46

## 2021-01-01 RX ADMIN — Medication 5 MILLIGRAM(S): at 10:05

## 2021-01-01 RX ADMIN — Medication 5 MILLIGRAM(S): at 01:51

## 2021-01-01 RX ADMIN — Medication 5 MILLIGRAM(S): at 21:06

## 2021-01-01 RX ADMIN — Medication 50 MILLIGRAM(S): at 07:39

## 2021-01-01 RX ADMIN — SODIUM CHLORIDE 1000 MILLILITER(S): 9 INJECTION INTRAMUSCULAR; INTRAVENOUS; SUBCUTANEOUS at 21:23

## 2021-01-01 RX ADMIN — Medication 650 MILLIGRAM(S): at 17:46

## 2021-01-01 RX ADMIN — Medication 5 MILLIGRAM(S): at 01:59

## 2021-01-01 RX ADMIN — MORPHINE SULFATE 2 MILLIGRAM(S): 50 CAPSULE, EXTENDED RELEASE ORAL at 13:48

## 2021-01-01 RX ADMIN — AMIODARONE HYDROCHLORIDE 200 MILLIGRAM(S): 400 TABLET ORAL at 11:14

## 2021-01-01 RX ADMIN — HEPARIN SODIUM 13 UNIT(S)/HR: 5000 INJECTION INTRAVENOUS; SUBCUTANEOUS at 19:10

## 2021-01-01 RX ADMIN — Medication 1 APPLICATION(S): at 06:01

## 2021-01-01 RX ADMIN — Medication 50 MILLIGRAM(S): at 17:12

## 2021-01-01 RX ADMIN — GABAPENTIN 300 MILLIGRAM(S): 400 CAPSULE ORAL at 13:48

## 2021-01-01 RX ADMIN — AMIODARONE HYDROCHLORIDE 400 MILLIGRAM(S): 400 TABLET ORAL at 05:46

## 2021-01-01 RX ADMIN — PANTOPRAZOLE SODIUM 40 MILLIGRAM(S): 20 TABLET, DELAYED RELEASE ORAL at 07:39

## 2021-01-01 RX ADMIN — Medication 650 MILLIGRAM(S): at 05:56

## 2021-01-01 RX ADMIN — Medication 81 MILLIGRAM(S): at 11:02

## 2021-01-01 RX ADMIN — BUPROPION HYDROCHLORIDE 300 MILLIGRAM(S): 150 TABLET, EXTENDED RELEASE ORAL at 16:31

## 2021-01-01 RX ADMIN — SODIUM CHLORIDE 1000 MILLILITER(S): 9 INJECTION INTRAMUSCULAR; INTRAVENOUS; SUBCUTANEOUS at 21:54

## 2021-01-01 RX ADMIN — Medication 100 MILLIGRAM(S): at 10:41

## 2021-01-01 RX ADMIN — CALAMINE AND ZINC OXIDE AND PHENOL 1 APPLICATION(S): 160; 10 LOTION TOPICAL at 22:41

## 2021-01-01 RX ADMIN — Medication 1 MILLIGRAM(S): at 11:14

## 2021-01-01 RX ADMIN — Medication 100 MILLIEQUIVALENT(S): at 23:42

## 2021-01-01 RX ADMIN — PANTOPRAZOLE SODIUM 40 MILLIGRAM(S): 20 TABLET, DELAYED RELEASE ORAL at 05:46

## 2021-01-01 RX ADMIN — Medication 5 MILLIGRAM(S): at 22:35

## 2021-01-01 RX ADMIN — CALAMINE AND ZINC OXIDE AND PHENOL 1 APPLICATION(S): 160; 10 LOTION TOPICAL at 22:36

## 2021-01-01 RX ADMIN — SODIUM ZIRCONIUM CYCLOSILICATE 10 GRAM(S): 10 POWDER, FOR SUSPENSION ORAL at 22:43

## 2021-01-01 RX ADMIN — Medication 25 MILLIGRAM(S): at 00:59

## 2021-01-01 RX ADMIN — Medication 30 MILLIGRAM(S): at 10:14

## 2021-01-01 RX ADMIN — Medication 100 MILLIGRAM(S): at 21:32

## 2021-01-01 RX ADMIN — Medication 650 MILLIGRAM(S): at 21:32

## 2021-01-01 RX ADMIN — APIXABAN 2.5 MILLIGRAM(S): 2.5 TABLET, FILM COATED ORAL at 18:06

## 2021-01-01 RX ADMIN — GABAPENTIN 100 MILLIGRAM(S): 400 CAPSULE ORAL at 17:53

## 2021-01-01 RX ADMIN — HEPARIN SODIUM 10 UNIT(S)/HR: 5000 INJECTION INTRAVENOUS; SUBCUTANEOUS at 05:13

## 2021-01-01 RX ADMIN — APIXABAN 2.5 MILLIGRAM(S): 2.5 TABLET, FILM COATED ORAL at 17:01

## 2021-01-01 RX ADMIN — ATORVASTATIN CALCIUM 10 MILLIGRAM(S): 80 TABLET, FILM COATED ORAL at 22:00

## 2021-01-01 RX ADMIN — PANTOPRAZOLE SODIUM 40 MILLIGRAM(S): 20 TABLET, DELAYED RELEASE ORAL at 07:00

## 2021-01-01 RX ADMIN — Medication 1 MILLIGRAM(S): at 16:30

## 2021-01-01 RX ADMIN — Medication 650 MILLIGRAM(S): at 21:30

## 2021-01-01 RX ADMIN — HEPARIN SODIUM 15 UNIT(S)/HR: 5000 INJECTION INTRAVENOUS; SUBCUTANEOUS at 23:56

## 2021-01-01 RX ADMIN — ATORVASTATIN CALCIUM 10 MILLIGRAM(S): 80 TABLET, FILM COATED ORAL at 23:10

## 2021-01-01 RX ADMIN — Medication 81 MILLIGRAM(S): at 12:00

## 2021-01-01 RX ADMIN — PANTOPRAZOLE SODIUM 40 MILLIGRAM(S): 20 TABLET, DELAYED RELEASE ORAL at 05:53

## 2021-01-01 RX ADMIN — MIRTAZAPINE 7.5 MILLIGRAM(S): 45 TABLET, ORALLY DISINTEGRATING ORAL at 21:27

## 2021-01-01 RX ADMIN — Medication 650 MILLIGRAM(S): at 13:02

## 2021-01-01 RX ADMIN — Medication 25 MILLIGRAM(S): at 08:44

## 2021-01-01 RX ADMIN — GABAPENTIN 100 MILLIGRAM(S): 400 CAPSULE ORAL at 18:09

## 2021-01-01 RX ADMIN — Medication 5 MILLIGRAM(S): at 23:18

## 2021-01-01 RX ADMIN — Medication 30 MILLIGRAM(S): at 17:37

## 2021-01-01 RX ADMIN — APIXABAN 2.5 MILLIGRAM(S): 2.5 TABLET, FILM COATED ORAL at 17:37

## 2021-01-01 RX ADMIN — Medication 25 MILLIGRAM(S): at 19:08

## 2021-01-01 RX ADMIN — ATORVASTATIN CALCIUM 10 MILLIGRAM(S): 80 TABLET, FILM COATED ORAL at 21:03

## 2021-01-01 RX ADMIN — Medication 30 MILLIGRAM(S): at 23:05

## 2021-01-01 RX ADMIN — Medication 5 MILLIGRAM(S): at 04:29

## 2021-01-01 RX ADMIN — Medication 5 MILLIGRAM(S): at 05:16

## 2021-01-01 RX ADMIN — Medication 30 MILLIGRAM(S): at 06:01

## 2021-02-06 NOTE — ED ADULT NURSE NOTE - OBJECTIVE STATEMENT
Presents to ED c/o palptiations. Pt in afib 150's. AAOX3, MAEx3, well appearing. denies SOB or CP. Denies fevers. +cough. Pt on chemo, weak appearing. EKG done, pt on monitor. labs drawn, unable to obtain PIV, BP stable. MD Crawford to place PIV.

## 2021-02-06 NOTE — ED PROVIDER NOTE - PROGRESS NOTE DETAILS
pt appears to have converted to nsr after first iv push metoprolol. hr in 70's. reports palpitations resolved. pt remains in NSR after metoprolol.  CTA shows no PE but extensive metastatic lung CA w/ collapse of  right middle and lower lobes secondary to right hilar.  pt not hypoxic but still c/o sob when NC removed.  will admit to medicine for pulm/optimization Connor:  received s/o ~2300. PT remains in NSR over last few hrs. Pt states SOB resolved w/ NC. Well appearing. will admit medicine for further care. clinically does not require higher level of care at this time.

## 2021-02-06 NOTE — ED PROVIDER NOTE - CARE PLAN
Principal Discharge DX:	Atrial fibrillation with rapid ventricular response  Secondary Diagnosis:	Hyperkalemia

## 2021-02-06 NOTE — ED ADULT TRIAGE NOTE - CHIEF COMPLAINT QUOTE
Patient c/o palpitations and sob for 3 days , denies any chest pain . History of lung cancer last radiation was yesterday .

## 2021-02-06 NOTE — ED ADULT NURSE NOTE - SUICIDE SCREENING QUESTION 1
"Noises were heard in hallway coming from pt's room. I knocked on the door and opened door to see if pt needed help. Pt was ambulating to bathroom. Pt refused help and said, "Im a man, and men can do these things alone." I reminded pt that he recently had surgery and he is on pain medication...and at risk for falling. Pt refused help after educating pt on individual falls risks. Pt also refused bed alarm.   " No

## 2021-02-06 NOTE — ED PROVIDER NOTE - OBJECTIVE STATEMENT
history of metastatic lung cancer, currently undergoing radiation with last dose yesterday, here with palpations and shortness of breath for a few days. Denies chest pain, fever/chills. Associated dry cough. Denies sick contacts. Says she tested neg for covid 3 weeks ago. Does note decreased appetite and loss of taste but attributes that to her treatment. Screening ecg in triage with afib with rvr. Reports she was told she has afib in past but not taking any medicines and wasn't treated as far as she remembers. Only takes aspirin 81mg daily.

## 2021-02-06 NOTE — ED PROVIDER NOTE - CLINICAL SUMMARY MEDICAL DECISION MAKING FREE TEXT BOX
history of lung cancer, here with palpitaitons/ sob. found to be in afib with rvr, hr in 160's. bp stable. given iv metoprolol 5mg. hr improved to 130's. additional 5 mg iv ordered but before given noted to be in nsr. po dose 25mg given. labs done to eval acs, infection, electrolyte abnormality, pe, covid. notable for k 5.5, d dimer mildly elevated, elevated bnp, neg troponin. given lokelma 10mg po for hyperkalemia. cta chest ordered to r/o pe given elevated d dimer. ct head also ordered to r/o brain mets in case pt requires anticoagulation for afib or pe. signed out to Dr. Ryan/ MARTHA Pena pending imaging results and dispo.

## 2021-02-06 NOTE — ED PROVIDER NOTE - CRITICAL CARE INDICATION, MLM
Patient was critically ill with a high probability of imminent or life threatening deterioration. afib with rvr requiring iv metoprolol patient was critically ill...

## 2021-02-06 NOTE — ED PROVIDER NOTE - WR INTERPRETATION 1
CXR negative - No pneumothorax, No free air, right sided effusion, can't r/o infiltarate. heart normal. bones normal.

## 2021-02-07 NOTE — CONSULT NOTE ADULT - ASSESSMENT
84yo F current-smoker (>65 pack-year), with hx of likely Stage IV Lung Ca (LLL NSC, R endobronchial SCC, +4R LN for large cell neuroendocrine carcinoma), Adenocarcinoma (R submandibular gland), HTN, DM,   Presenting on 2/7/21 with several days of worsening SOB and palpitations,   Found in AFib w/ RVR and moderate R-sided pleural effusion     #Pleural Effusion - Right-sided, moderate, simple-appearing. Either malignant or consequence of possible trapped lung. Low concern for infection. Appears comfortable, not HD-unstable. Present since 8/2020, but now grown much larger in size.   #Stage IV Lung Ca - LLL Non-Small Cell with separate R endobronchial Squamous Cell Ca, possibility of large cell neuroendocrine carcinoma. CT imaging from today with comparison to priors shows now nearly complete obliteration of RML and RLL bronchus causing atelectasis of RML and RLL. Follows with Dr. Wellington for Onc treatment. Showing progression of disease.   #Shortness of Breath - 2/2 to above, plus deconditioning       - Can attempt diagnostic thoracentesis to help confirm etiology of effusion (may attempt Monday, 2/8/21), but patient still contemplating   - No emergent need for drainage  - Can continue Heparin gtt until seen by Pulmonary service tomorrow to better determine timing of procedure   - If malignant with rapid re-accumulation, return of symptoms, and absence of trapped-physiology, will benefit from PleurX in-dwelling pleural catheter   - Further work-up may include bronchoscopy with possible endobronchial stent placement due to tumor compression   - Encourage ambulation, incentive spirometry  86yo F current-smoker (>65 pack-year), with hx of multiple lung cancers (AJCC IIIB NSCL -LLL lesion and  SCC endobronchial right lung ) and head and neck ca? Adenocarcinoma (R submandibular gland) s/p chemo and RT, not clear to which areas follow by Dr. Wellington.   Presenting on 2/7/21 with several days of worsening SOB and palpitations,   Found in AFib w/ RVR and moderate R-sided pleural effusion     #Pleural Effusion - Right-sided, moderate, simple-appearing. Either malignant or consequence of possible trapped lung related to worsening of right hilum malignant lesions causing segmental right lower lobe airway obstruction with subsequent atelectasis. Low concern for infection. Appears comfortable, not HD-unstable. Fluid started to be seen  on PET from 8/2020, but now grown much larger in size as also seen on per from january 2021.    - plan to attempt diagnostic and therapeutic to help confirm etiology of effusion (may attempt Monday, 2/8/21), but patient still contemplating.   - No emergent need for drainage  - Can continue Heparin gtt until seen by Pulmonary service tomorrow to better determine timing of procedure   - If malignant with rapid re-accumulation, return of symptoms, will benefit from  in-dwelling pleural catheter   - Encourage ambulation, incentive spirometry

## 2021-02-07 NOTE — CONSULT NOTE ADULT - SUBJECTIVE AND OBJECTIVE BOX
HPI:    85 y/o F PMHx DM, HTN, Stage IIIb LLL NSCLC metastatic, c/l R endobronchial SCC, TOMMY presenting to [facility] with palpitations and shortness of breath for the past 3 days. She has noticed worsening of her symptoms and decided to come to the ED. Patient denies any recent travel, abx use, hospitalizations, and sick contacts. Patient denies h/n/v/d, fever, chills, cp, abd pain, leg swelling, rashes, dysuria, and changes in BM.       In the ED- VS were T 98, HR , -125/56-61, RR16 SP02 100 on 2L NC   Labs significant for wbc 13.3, hgb 11.2, D-Dimer 418, INR 1.35, K 5.5, Bicarb 21, BUN/Cr 36/1.65, Ca 11, BNP 5352  CXR showed right sided opacification to the midlung field  CT PE protocol negative for PE but does show progression and extension of metastatic disease with collapse of right midle and lower lobes with right hilar mass.   CT head negative for metastasis   EKG showed afib with rvr, no ischemic changes   Given Metoprolol 5mg IVP, Lopressor 25mg oral, Lokelma 10mg and admitted for further management of worsening tumor burden.  (07 Feb 2021 03:27)      ROS: A 10-point review of systems was otherwise negative.    PAST MEDICAL & SURGICAL HISTORY:  Heart murmur    Tobacco use    Squamous cell carcinoma of left lung    Adenocarcinoma of head and neck    Submandibular gland mass    DM (diabetes mellitus)    H/O: hysterectomy        SOCIAL HISTORY:  FAMILY HISTORY:      ALLERGIES: 	  No Known Allergies            MEDICATIONS:  acetaminophen   Tablet .. 650 milliGRAM(s) Oral every 4 hours PRN  aspirin  chewable 81 milliGRAM(s) Oral daily  atorvastatin 10 milliGRAM(s) Oral at bedtime  heparin  Infusion 1000 Unit(s)/Hr IV Continuous <Continuous>  ondansetron    Tablet 4 milliGRAM(s) Oral every 8 hours PRN  pantoprazole    Tablet 40 milliGRAM(s) Oral before breakfast  QUEtiapine 25 milliGRAM(s) Oral daily  sodium chloride 0.9% Bolus 500 milliLiter(s) IV Bolus once      PHYSICAL EXAM:  T(C): 37.7 (02-07-21 @ 08:48), Max: 37.7 (02-07-21 @ 08:48)  HR: 156 (02-07-21 @ 09:42) (75 - 157)  BP: 101/65 (02-07-21 @ 09:42) (96/62 - 156/70)  RR: 16 (02-07-21 @ 09:42) (16 - 20)  SpO2: 97% (02-07-21 @ 09:42) (92% - 100%)  Wt(kg): --      GEN: Awake, comfortable. NAD.   HEENT: NCAT, PERRL, EOMI. Mucosa moist.   NECK: Supple, no JVD.   RESP: CTA b/l  CV: RRR, normal s1/s2. No m/r/g.  ABD: Soft, NTND. BS+  EXT: Warm. No edema, clubbing, or cyanosis.   NEURO: AAOx3. No focal deficits.    I&O's Summary    Height (cm): 160 (02-06 @ 19:23)  Weight (kg): 53.5 (02-06 @ 19:23)  BMI (kg/m2): 20.9 (02-06 @ 19:23)  BSA (m2): 1.54 (02-06 @ 19:23)  	  LABS:	 	    CARDIAC MARKERS:                                  11.2   13.33 )-----------( 379      ( 06 Feb 2021 20:31 )             34.0     02-06    142  |  106  |  36<H>  ----------------------------<  119<H>  5.5<H>   |  21<L>  |  1.65<H>    Ca    11.0<H>      06 Feb 2021 20:31  Mg     2.3     02-06    TPro  7.7  /  Alb  3.6  /  TBili  0.5  /  DBili  x   /  AST  14  /  ALT  8<L>  /  AlkPhos  57  02-06    proBNP: Serum Pro-Brain Natriuretic Peptide: 5352 pg/mL (02-06 @ 20:31)    Lipid Profile:   HgA1c:   TSH:     TELEMETRY: 	    ECG:  	  RADIOLOGY:   ECHO:  STRESS:  CATH:   HPI:    85 y/o F PMHx DM, HTN, Stage IIIb LLL NSCLC metastatic, c/l R endobronchial SCC, TOMMY presenting to [facility] with palpitations and shortness of breath for the past 3 days. She has noticed worsening of her symptoms and decided to come to the ED. Patient denies any recent travel, abx use, hospitalizations, and sick contacts. Patient denies h/n/v/d, fever, chills, cp, abd pain, leg swelling, rashes, dysuria, and changes in BM.       In the ED- VS were T 98, HR , -125/56-61, RR16 SP02 100 on 2L NC   Labs significant for wbc 13.3, hgb 11.2, D-Dimer 418, INR 1.35, K 5.5, Bicarb 21, BUN/Cr 36/1.65, Ca 11, BNP 5352  CXR showed right sided opacification to the midlung field  CT PE protocol negative for PE but does show progression and extension of metastatic disease with collapse of right midle and lower lobes with right hilar mass.   CT head negative for metastasis   EKG showed afib with rvr, no ischemic changes   Given Metoprolol 5mg IVP, Lopressor 25mg oral, Lokelma 10mg and admitted for further management of worsening tumor burden.  (07 Feb 2021 03:27)    Adm to [facility] for management of new pAF in setting of R lung collapse 2/2 R hilar mass. Cardiology called after patient developed RVR. Patient given 5mg lopressor IV x2, Lopressor 25mg PO x1, and 500 cc bolus. Patient converted back to sinus rhythm at that time.    Patient seen and examined at bedside. Denies chest pain, palpitations, SOB at the time of exam. No dizziness, no LOC. Does not follow regularly with cardiologist. Has never had diagnosis of atrial fibrillation in the past.    ROS: A 10-point review of systems was otherwise negative.    PAST MEDICAL & SURGICAL HISTORY:  Heart murmur    Tobacco use    Squamous cell carcinoma of left lung    Adenocarcinoma of head and neck    Submandibular gland mass    DM (diabetes mellitus)    H/O: hysterectomy        SOCIAL HISTORY:  FAMILY HISTORY:      ALLERGIES: 	  No Known Allergies            MEDICATIONS:  acetaminophen   Tablet .. 650 milliGRAM(s) Oral every 4 hours PRN  aspirin  chewable 81 milliGRAM(s) Oral daily  atorvastatin 10 milliGRAM(s) Oral at bedtime  heparin  Infusion 1000 Unit(s)/Hr IV Continuous <Continuous>  ondansetron    Tablet 4 milliGRAM(s) Oral every 8 hours PRN  pantoprazole    Tablet 40 milliGRAM(s) Oral before breakfast  QUEtiapine 25 milliGRAM(s) Oral daily  sodium chloride 0.9% Bolus 500 milliLiter(s) IV Bolus once      PHYSICAL EXAM:  T(C): 37.7 (02-07-21 @ 08:48), Max: 37.7 (02-07-21 @ 08:48)  HR: 156 (02-07-21 @ 09:42) (75 - 157)  BP: 101/65 (02-07-21 @ 09:42) (96/62 - 156/70)  RR: 16 (02-07-21 @ 09:42) (16 - 20)  SpO2: 97% (02-07-21 @ 09:42) (92% - 100%)  Wt(kg): --      GEN: Awake, comfortable. NAD.   HEENT: NCAT, PERRL, EOMI. Mucosa moist.   NECK: Supple, no JVD.   RESP: CTA b/l  CV: RRR, normal s1/s2. No m/r/g.  ABD: Soft, NTND. BS+  EXT: Warm. No edema, clubbing, or cyanosis.   NEURO: AAOx3. No focal deficits.    I&O's Summary    Height (cm): 160 (02-06 @ 19:23)  Weight (kg): 53.5 (02-06 @ 19:23)  BMI (kg/m2): 20.9 (02-06 @ 19:23)  BSA (m2): 1.54 (02-06 @ 19:23)  	  LABS:	 	    CARDIAC MARKERS:                                  11.2   13.33 )-----------( 379      ( 06 Feb 2021 20:31 )             34.0     02-06    142  |  106  |  36<H>  ----------------------------<  119<H>  5.5<H>   |  21<L>  |  1.65<H>    Ca    11.0<H>      06 Feb 2021 20:31  Mg     2.3     02-06    TPro  7.7  /  Alb  3.6  /  TBili  0.5  /  DBili  x   /  AST  14  /  ALT  8<L>  /  AlkPhos  57  02-06    proBNP: Serum Pro-Brain Natriuretic Peptide: 5352 pg/mL (02-06 @ 20:31)    Lipid Profile:   HgA1c:   TSH:     TELEMETRY: 	    ECG:  	  RADIOLOGY:   ECHO:  STRESS:  CATH:

## 2021-02-07 NOTE — DIETITIAN INITIAL EVALUATION ADULT. - OTHER CALCULATIONS
ABW used for calculations as pt between % of IBW. (103%). Needs adjusted for suspected severe malnutrition, CA (hypermetabolic condition) and advanced age

## 2021-02-07 NOTE — DIETITIAN INITIAL EVALUATION ADULT. - SIGNS/SYMPTOMS
AEB susepct meeting <50% est needs x3 months, -24% wt loss x3 months, moderate muscle and fat wastin

## 2021-02-07 NOTE — PROGRESS NOTE ADULT - PROBLEM SELECTOR PLAN 4
Hypercalcemia of 11, Potassium 5.5, s/p Lokelma 10mg. No EKG changes (peaked T waves or prolonged QT), likely 2/2 metastases, unknown if there is bone involvement. Differential includes tumor lysis syndrome.   - f/u PTH and TSH  - f/u Phos and Uric Acid (r/o tumor lysis given recent radiation/chemo)  - Trend BMP

## 2021-02-07 NOTE — H&P ADULT - PROBLEM SELECTOR PLAN 4
Hypercalcemia of 11, Potassium 5.5, s/p Lokelma 10mg. No EKG changes (peaked T waves or prolonged QT), likely 2/2 metastases, unknown if there is bone involvement   - PTH level in AM  - TSH level in AM  - trend BMP Hypercalcemia of 11, Potassium 5.5, s/p Lokelma 10mg. No EKG changes (peaked T waves or prolonged QT), likely 2/2 metastases, unknown if there is bone involvement. Differential includes tumor lysis syndrome.   - PTH level in AM  - TSH level in AM  - trend BMP  - will check phosphorus and uric acid levels

## 2021-02-07 NOTE — H&P ADULT - NSHPLABSRESULTS_GEN_ALL_CORE
.  LABS:                         11.2   13.33 )-----------( 379      ( 06 Feb 2021 20:31 )             34.0     02-06    142  |  106  |  36<H>  ----------------------------<  119<H>  5.5<H>   |  21<L>  |  1.65<H>    Ca    11.0<H>      06 Feb 2021 20:31  Mg     2.3     02-06    TPro  7.7  /  Alb  3.6  /  TBili  0.5  /  DBili  x   /  AST  14  /  ALT  8<L>  /  AlkPhos  57  02-06    PT/INR - ( 06 Feb 2021 20:31 )   PT: 16.0 sec;   INR: 1.35          PTT - ( 06 Feb 2021 20:31 )  PTT:27.4 sec    CARDIAC MARKERS ( 06 Feb 2021 20:31 )  x     / <0.01 ng/mL / x     / x     / x          Serum Pro-Brain Natriuretic Peptide: 5352 pg/mL (02-06 @ 20:31)    RADIOLOGY, EKG & ADDITIONAL TESTS: Reviewed.

## 2021-02-07 NOTE — H&P ADULT - NSHPPHYSICALEXAM_GEN_ALL_CORE
.  VITAL SIGNS:  T(C): 36.7 (02-07-21 @ 00:50), Max: 36.8 (02-06-21 @ 19:23)  T(F): 98 (02-07-21 @ 00:50), Max: 98.2 (02-06-21 @ 19:23)  HR: 105 (02-07-21 @ 02:17) (75 - 157)  BP: 122/61 (02-07-21 @ 02:17) (108/56 - 156/70)  BP(mean): 83 (02-07-21 @ 02:17) (78 - 83)  RR: 16 (02-07-21 @ 02:17) (16 - 20)  SpO2: 100% (02-07-21 @ 02:17) (95% - 100%)  Wt(kg): --    PHYSICAL EXAM:    Constitutional: WDWN resting comfortably in bed; NAD  Head: NC/AT  Eyes: PERRL, EOMI, anicteric sclera  ENT: no nasal discharge; uvula midline, no oropharyngeal erythema or exudates; MMM  Neck: supple; no JVD or thyromegaly  Respiratory: CTA B/L; no W/R/R, no retractions  Cardiac: +S1/S2; RRR; no M/R/G; PMI non-displaced  Gastrointestinal: abdomen soft, NT/ND; no rebound or guarding; +BSx4  Back: spine midline, no bony tenderness or step-offs; no CVAT B/L  Extremities: WWP, no clubbing or cyanosis; no peripheral edema  Musculoskeletal: NROM x4; no joint swelling, tenderness or erythema  Vascular: 2+ radial, femoral, DP/PT pulses B/L  Dermatologic: skin warm, dry and intact; no rashes, wounds, or scars  Lymphatic: no submandibular or cervical LAD  Neurologic: AAOx3; CNII-XII grossly intact; no focal deficits  Psychiatric: affect and characteristics of appearance, verbalizations, behaviors are appropriate

## 2021-02-07 NOTE — PROGRESS NOTE ADULT - PROBLEM SELECTOR PLAN 2
s/p 3 cycles of Carboplatin/Etoposide, follows with Dr. Wellington. CXR shows RIGHT Lower Lobe opacity likely due to underlying malignancy.  - Pt noted to have large R pleural effusion on CT chest  - Currently undergoing radiation, last session yesterday   - Heme/Onc team made aware of patient's arrival to [facility]  - Palliative Care consult ordered, f/u recs  - Pulm consulted, recommending thoracentesis which will likely be 2/8 based on patient preference  - c/w Heparin gtt until after thoracentesis as above

## 2021-02-07 NOTE — PROGRESS NOTE ADULT - PROBLEM SELECTOR PLAN 2
s/p 3 cycles of Carboplatin/Etoposide, follows with Dr. Wellington. CXR shows LLL haziness likely due to underlying malignancy  - currently undergoing radiation, last session yesterday   - inform Onc fellow that patient is admitted  - consult palliative for goals of care (order placed)  - consult pulm as patient with effusion on CT and has not been seen recently by pulm here

## 2021-02-07 NOTE — PROGRESS NOTE ADULT - ASSESSMENT
83 y/o F PMHx DM, HTN, Stage IIIb LLL NSCLC metastatic, c/l R endobronchial SCC, TOMMY presenting to [facility] with palpitations and shortness of breath for the past 3 days, admitted for Paroxysmal Afib and right lung lobe collapse with hilar involvement

## 2021-02-07 NOTE — CONSULT NOTE ADULT - ASSESSMENT
#pAF - new onset, currently in sinus on monitor. [id number] - 5.  - c/w lopressor 25 PO bid w/hold parameters for HR< 60, SBP < 90  - recommend full dose anticoagulation given high stroke risk. Can start NOAC (e.g. eliquis 2.5mg bid)   - given hx of TOMMY & worsening lung mass with recent radiation therapy, suspect Afib will recur  - nonurgent echocardiogram on Monday    Recommendations are final when signed by attending.    --  Fan Zurita MD  Cardiology PGY5 #pAF - new onset, currently in sinus on monitor. [id number] - 5.  - c/w lopressor 25 PO bid w/hold parameters for HR< 60, SBP < 90  - recommend full dose anticoagulation given high stroke risk. Can start NOAC (e.g. eliquis 2.5mg bid)   - given hx of TOMMY & worsening lung mass with recent radiation therapy, suspect Afib will recur  - nonurgent echocardiogram on Monday    --  Fan Zurita MD  Cardiology PGY5 A/P: 84F w/HTN, DM, Stage IIIb NSCLC metastatic cancer, R endobrachial SCC, TOMMY adm on 2/6 to [facility] for pAF in setting of R lung collapse 2/2 R hilar mass. Cardiology consulted 2/7 for Afib recommendations.     #pAF - new onset, currently in sinus on monitor. [id number] - 5.  - c/w lopressor 25 PO bid w/hold parameters for HR< 60, SBP < 90  - recommend full dose anticoagulation given high stroke risk. Can start NOAC (e.g. eliquis 2.5mg bid)   - given hx of TOMMY & worsening lung mass with recent radiation therapy, suspect Afib will recur  - nonurgent echocardiogram on Monday    --  Fan Zurita MD  Cardiology PGY5

## 2021-02-07 NOTE — PROGRESS NOTE ADULT - PROBLEM SELECTOR PLAN 4
Hypercalcemia of 11, Potassium 5.5, s/p Lokelma 10mg. No EKG changes (peaked T waves or prolonged QT), likely 2/2 metastases, unknown if there is bone involvement. Differential includes tumor lysis syndrome.   - PTH level in AM  - TSH level in AM  - trend BMP  - will check phosphorus and uric acid levels

## 2021-02-07 NOTE — H&P ADULT - PROBLEM SELECTOR PLAN 6
F: None   E: Replete as necessary K>4 Mg>2  N: DASH diet     DVT Prophylaxis: Heparin gtt   GI prophylaxis: None   CODE STATUS: FULL

## 2021-02-07 NOTE — DIETITIAN NUTRITION RISK NOTIFICATION - ADDITIONAL COMMENTS/DIETITIAN RECOMMENDATIONS
1.DASH/ TLC diet   >> Recommend liberalize to regular diet when feasible   2. Cont to monitor lytes and hydration status per team   3. RD diet edu prn   4. Closely monitor %PO intake   5. Recommend addition of MVI when feasible

## 2021-02-07 NOTE — H&P ADULT - PROBLEM SELECTOR PLAN 1
Chads-VAS score of 5, prior history of Afib but patient reports never been on rate controlling agent or AC. Now s/p Lopressor 5mg IVP and 25mg Oral  in NSR.   - will start Heparin gtt with plan to transition to NOAC once decision made if pulmonary intervention necessary   - c/w Lopressor 12.5mg Oral BID   - AM EKG

## 2021-02-07 NOTE — CONSULT NOTE ADULT - SUBJECTIVE AND OBJECTIVE BOX
Consultation Requested by: Dr. Anderson    HPI:  83 y/o F PMHx DM, HTN, Stage IIIb LLL NSCLC metastatic, c/l R endobronchial SCC, TOMMY presenting to [facility] with palpitations and shortness of breath for the past 3 days. She has noticed worsening of her symptoms and decided to come to the ED. Patient denies any recent travel, abx use, hospitalizations, and sick contacts. Patient denies h/n/v/d, fever, chills, cp, abd pain, leg swelling, rashes, dysuria, and changes in BM.     She was found to have afib rvr to 150s 2/7 BP stable, lopressor 5 mg iv times 2 given and oral lopressor without relief. Stepped up to [facility] for management of afib. CT PE showed right middle and lower lobe collapse from hilar mass, and evidence of worsened metastasis. Cardiology consulted, started on heparin gtt and lopressor 25 BiD      In the ED- VS were T 98, HR , -125/56-61, RR16 SP02 100 on 2L NC   Labs significant for wbc 13.3, hgb 11.2, D-Dimer 418, INR 1.35, K 5.5, Bicarb 21, BUN/Cr 36/1.65, Ca 11, BNP 5352  CXR showed right sided opacification to the midlung field  CT PE protocol negative for PE but does show progression and extension of metastatic disease with collapse of right midle and lower lobes with right hilar mass.   CT head negative for metastasis   EKG showed afib with rvr, no ischemic changes   Given Metoprolol 5mg IVP, Lopressor 25mg oral, Lokelma 10mg and admitted for further management of worsening tumor burden.  (07 Feb 2021 03:27)      MEDICATIONS  (STANDING):  aspirin  chewable 81 milliGRAM(s) Oral daily  atorvastatin 10 milliGRAM(s) Oral at bedtime  heparin  Infusion 1200 Unit(s)/Hr (12 mL/Hr) IV Continuous <Continuous>  metoprolol tartrate 25 milliGRAM(s) Oral every 12 hours  pantoprazole    Tablet 40 milliGRAM(s) Oral before breakfast  QUEtiapine 25 milliGRAM(s) Oral daily    MEDICATIONS  (PRN):  acetaminophen   Tablet .. 650 milliGRAM(s) Oral every 4 hours PRN Mild Pain (1 - 3)  ondansetron    Tablet 4 milliGRAM(s) Oral every 8 hours PRN Nausea and/or Vomiting    PAST MEDICAL & SURGICAL HISTORY:  Heart murmur  Tobacco use  Squamous cell carcinoma of left lung  Adenocarcinoma of head and neck  Submandibular gland mass  DM (diabetes mellitus)  H/O: hysterectomy        Daily Height in cm: 160.02 (06 Feb 2021 19:23)    Daily   Head Circumference:  Vital Signs Last 24 Hrs  T(C): 36.8 (07 Feb 2021 17:43), Max: 37.7 (07 Feb 2021 08:48)  T(F): 98.2 (07 Feb 2021 17:43), Max: 99.9 (07 Feb 2021 08:48)  HR: 80 (07 Feb 2021 16:44) (75 - 157)  BP: 128/60 (07 Feb 2021 16:44) (96/62 - 156/70)  BP(mean): 86 (07 Feb 2021 16:44) (78 - 86)  RR: 17 (07 Feb 2021 16:44) (16 - 22)  SpO2: 99% (07 Feb 2021 16:44) (92% - 100%)    PHYSICAL EXAM    Respiratory Support:		[] No	[] Yes:  Vasoactive medication infusion:	[] No	[] Yes:  Venous catheters:		[] No	[] Yes:  Bladder catheter:		        [] No	[] Yes:  Other catheters or tubes:	[] No	[] Yes:    Lab Results:                        11.2   13.33 )-----------( 379      ( 06 Feb 2021 20:31 )             34.0     02-07    138  |  104  |  34<H>  ----------------------------<  108<H>  4.6   |  20<L>  |  1.52<H>    Ca    9.6      07 Feb 2021 10:44  Mg     2.0     02-07    TPro  7.7  /  Alb  3.6  /  TBili  0.5  /  DBili  x   /  AST  14  /  ALT  8<L>  /  AlkPhos  57  02-06    LIVER FUNCTIONS - ( 06 Feb 2021 20:31 )  Alb: 3.6 g/dL / Pro: 7.7 g/dL / ALK PHOS: 57 U/L / ALT: 8 U/L / AST: 14 U/L / GGT: x           PT/INR - ( 06 Feb 2021 20:31 )   PT: 16.0 sec;   INR: 1.35          PTT - ( 07 Feb 2021 15:00 )  PTT:36.7 sec

## 2021-02-07 NOTE — PROGRESS NOTE ADULT - PROBLEM SELECTOR PLAN 1
Chads-VAS score of 5, prior history of Afib but patient reports never been on rate controlling agent or AC. Now s/p Lopressor 5mg IVP and 25mg Oral  in NSR.   - c/w Heparin gtt, pending recs from Pul regarding interventions  - Cardiology consulted, recommending Lopressor 25mg BID PO, TTE, Eliquis 2.5mg BID once off Heparin gtt  - c/w Lopressor 25mg PO BID, Heparin gtt  - f/u TTE

## 2021-02-07 NOTE — PROGRESS NOTE ADULT - PROBLEM SELECTOR PLAN 1
Chads-VAS score of 5, prior history of Afib but patient reports never been on rate controlling agent or AC. Now s/p Lopressor 5mg IVP and 25mg Oral  in NSR.   - was started on Heparin gtt with plan to transition to NOAC once decision made if pulmonary intervention necessary   - pt went back into afib this AM with HR in 130s-160s. Was given lopressor 5 IV x 2 plus another 25mg metoprolol tartrate PO. Also got 500cc bolus and converted back into sinus rhythm after bolus  - f/u with cards regarding afib recommendations (meds)

## 2021-02-07 NOTE — PROGRESS NOTE ADULT - ASSESSMENT
85 y/o F PMHx DM, HTN, Stage IIIb LLL NSCLC metastatic, c/l R endobronchial SCC, TOMMY presenting to [facility] with palpitations and shortness of breath for the past 3 days, admitted for Paroxysmal Afib and right lung lobe collapse with hilar involvement

## 2021-02-07 NOTE — PROGRESS NOTE ADULT - ATTENDING COMMENTS
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

## 2021-02-07 NOTE — H&P ADULT - HISTORY OF PRESENT ILLNESS
In the ED- VS were T 98, HR , -125/56-61, RR16 SP02 100 on 2L NC   Labs significant for wbc 13.3, hgb 11.2, D-Dimer 418, INR 1.35, K 5.5, Bicarb 21, BUN/Cr 36/1.65, Ca 11, BNP 5352  CXR showed right sided opacification to the midlung field  CT PE protocol negative for PE but does show progression and extension of metastatic disease with collapse of right midle and lower lobes with right hilar mass.   CT head negative for metastasis   EKG showed afib with rvr, no ischemic changes   Given Metoprolol 5mg IVP, Lopressor 25mg oral, Lokelma 10mg and admitted for further management of worsening tumor burden.    85 y/o F PMHx DM, HTN, Stage IIIb LLL NSCLC metastatic, c/l R endobronchial SCC, TOMMY presenting to [facility] with palpitations and shortness of breath for the past 3 days. She has noticed worsening of her symptoms and decided to come to the ED. Patient denies any recent travel, abx use, hospitalizations, and sick contacts. Patient denies h/n/v/d, fever, chills, cp, abd pain, leg swelling, rashes, dysuria, and changes in BM.       In the ED- VS were T 98, HR , -125/56-61, RR16 SP02 100 on 2L NC   Labs significant for wbc 13.3, hgb 11.2, D-Dimer 418, INR 1.35, K 5.5, Bicarb 21, BUN/Cr 36/1.65, Ca 11, BNP 5352  CXR showed right sided opacification to the midlung field  CT PE protocol negative for PE but does show progression and extension of metastatic disease with collapse of right midle and lower lobes with right hilar mass.   CT head negative for metastasis   EKG showed afib with rvr, no ischemic changes   Given Metoprolol 5mg IVP, Lopressor 25mg oral, Lokelma 10mg and admitted for further management of worsening tumor burden.

## 2021-02-07 NOTE — H&P ADULT - ATTENDING COMMENTS
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

## 2021-02-07 NOTE — PROGRESS NOTE ADULT - SUBJECTIVE AND OBJECTIVE BOX
**Transfer Acceptance Note from [facility] to [facility]**  Hospital Course:  85 y/o F PMHx DM, HTN, Stage IIIb LLL NSCLC metastatic, c/l R endobronchial SCC, TOMMY presenting to [facility] with palpitations and shortness of breath for the past 3 days. She has noticed worsening of her symptoms and decided to come to the ED. Patient denies any recent travel, abx use, hospitalizations, and sick contacts. Patient denies h/n/v/d, fever, chills, cp, abd pain, leg swelling, rashes, dysuria, and changes in BM.   In the ED- VS were T 98, HR , -125/56-61, RR16 SP02 100 on 2L NC, labs significant for wbc 13.3, hgb 11.2, D-Dimer 418, INR 1.35, K 5.5, Bicarb 21, BUN/Cr 36/1.65, Ca 11, BNP 5352. CXR showed right sided opacification to the midlung field. CT PE protocol negative for PE but does show progression and extension of metastatic disease with collapse of right midle and lower lobes with right hilar mass. CT head negative for metastasis    EKG at the time in the ED showed afib with RVR and no ischemic changes. Pt was given 5 lopressor IVP, 25mg lopressor oral, lokelma 10. She converted back to sinus rhythm with BP in the 120s systolic and HR in 80s-90s. When seen at bedside this AM was still sinus rhythm, sitting comfortably. However, approx 8:15 AM, was notified by nurse that patient in afib again with HR in 150s-160s. gave 5 IV lopressor plus 25mg metoprolol tartrate PO. HR did not decrease therefore gave another 5 lopressor IV. Still without change, therefore gave 500cc bolus after which patient converted to sinus rhythm with HR in the 70s-80s. Cardiology was consulted for recommendation for further management of afib. Pt to be transferred to [facility] for further management.     SUBJECTIVE / INTERVAL HPI: Patient seen and examined at bedside. Pt states that she felt distressing palpitations leading up to her transfer from [facility] to Stepdown unit, but that her palpitations have now resolved. She currently denies any chest pain, dyspnea, nausea/vomiting, abdominal pain, or leg pain.    VITAL SIGNS:  Vital Signs Last 24 Hrs  T(C): 36.4 (07 Feb 2021 13:34), Max: 37.7 (07 Feb 2021 08:48)  T(F): 97.6 (07 Feb 2021 13:34), Max: 99.9 (07 Feb 2021 08:48)  HR: 78 (07 Feb 2021 12:27) (75 - 157)  BP: 123/58 (07 Feb 2021 12:27) (96/62 - 156/70)  BP(mean): 83 (07 Feb 2021 12:27) (78 - 83)  RR: 22 (07 Feb 2021 12:27) (16 - 22)  SpO2: 100% (07 Feb 2021 12:27) (92% - 100%)    PHYSICAL EXAM:    General: Frail, thin, elderly female patient in NAD  HEENT: NC/AT; PERRL, anicteric sclera; MMM dry  Neck: supple  Cardiovascular: +S1/S2, RRR  Respiratory: CTA B/L but w/decreased breath sounds on Right; no W/R/R  Gastrointestinal: soft, NT/ND; +BSx4  Extremities: WWP; no edema, clubbing or cyanosis  Vascular: 2+ radial, DP pulses B/L  Neurological: AAOx3; no focal deficits    MEDICATIONS:  MEDICATIONS  (STANDING):  aspirin  chewable 81 milliGRAM(s) Oral daily  atorvastatin 10 milliGRAM(s) Oral at bedtime  heparin  Infusion 1200 Unit(s)/Hr (12 mL/Hr) IV Continuous <Continuous>  metoprolol tartrate 25 milliGRAM(s) Oral every 12 hours  pantoprazole    Tablet 40 milliGRAM(s) Oral before breakfast  QUEtiapine 25 milliGRAM(s) Oral daily    MEDICATIONS  (PRN):  acetaminophen   Tablet .. 650 milliGRAM(s) Oral every 4 hours PRN Mild Pain (1 - 3)  ondansetron    Tablet 4 milliGRAM(s) Oral every 8 hours PRN Nausea and/or Vomiting      ALLERGIES:  Allergies    No Known Allergies    Intolerances        LABS:                        11.2   13.33 )-----------( 379      ( 06 Feb 2021 20:31 )             34.0     02-07    138  |  104  |  34<H>  ----------------------------<  108<H>  4.6   |  20<L>  |  1.52<H>    Ca    9.6      07 Feb 2021 10:44  Mg     2.0     02-07    TPro  7.7  /  Alb  3.6  /  TBili  0.5  /  DBili  x   /  AST  14  /  ALT  8<L>  /  AlkPhos  57  02-06    PT/INR - ( 06 Feb 2021 20:31 )   PT: 16.0 sec;   INR: 1.35          PTT - ( 07 Feb 2021 15:00 )  PTT:36.7 sec    CAPILLARY BLOOD GLUCOSE          RADIOLOGY & ADDITIONAL TESTS: Reviewed.

## 2021-02-07 NOTE — CONSULT NOTE ADULT - ASSESSMENT
85 y/o F PMHx DM, HTN, Stage IIIb LLL NSCLC metastatic, c/l R endobronchial SCC, TOMMY presenting to [facility] with palpitations and shortness of breath for the past 3 days found to have afib rvr    #Pulmonary  -collapse of right middle and lower lobes, with hilar mass  -follows Dr. Wellington for NSCLC now stage 4, status post chemo/rads recently  -on RA saturation 98%    #Cardiac  afib rvr  -continue with heparin drip   -lopressor 25 BID  -TTE  -f/u thyroid function tests  -f/u cardiology    Dispo to [facility] case discussed with Dr. Roldan

## 2021-02-07 NOTE — H&P ADULT - PROBLEM SELECTOR PLAN 2
s/p 3 cycles of Carboplatin/Etoposide, follows with Dr. Wellington. CXR shows LLL haziness likely due to underlying malignancy  - currently undergoing radiation, last session yesterday   - inform Onc fellow in the AM as courtesy

## 2021-02-07 NOTE — PROGRESS NOTE ADULT - SUBJECTIVE AND OBJECTIVE BOX
-------TRANSFER NOTE [facility] TO [facility]--------  OVERNIGHT EVENTS: patient admitted overnight no other events    HOSPITAL COURSE:  83 y/o F PMHx DM, HTN, Stage IIIb LLL NSCLC metastatic, c/l R endobronchial SCC, TOMMY presenting to [facility] with palpitations and shortness of breath for the past 3 days. She has noticed worsening of her symptoms and decided to come to the ED. Patient denies any recent travel, abx use, hospitalizations, and sick contacts. Patient denies h/n/v/d, fever, chills, cp, abd pain, leg swelling, rashes, dysuria, and changes in BM.       In the ED- VS were T 98, HR , -125/56-61, RR16 SP02 100 on 2L NC   Labs significant for wbc 13.3, hgb 11.2, D-Dimer 418, INR 1.35, K 5.5, Bicarb 21, BUN/Cr 36/1.65, Ca 11, BNP 5352  CXR showed right sided opacification to the midlung field  CT PE protocol negative for PE but does show progression and extension of metastatic disease with collapse of right midle and lower lobes with right hilar mass.   CT head negative for metastasis   EKG showed afib with rvr, no ischemic changes   Given Metoprolol 5mg IVP, Lopressor 25mg oral, Lokelma 10mg and admitted for further management of worsening tumor burden.       VITAL SIGNS:  Vital Signs Last 24 Hrs  T(C): 37.7 (07 Feb 2021 08:48), Max: 37.7 (07 Feb 2021 08:48)  T(F): 99.9 (07 Feb 2021 08:48), Max: 99.9 (07 Feb 2021 08:48)  HR: 156 (07 Feb 2021 09:42) (75 - 157)  BP: 101/65 (07 Feb 2021 09:42) (96/62 - 156/70)  BP(mean): 83 (07 Feb 2021 02:17) (78 - 83)  RR: 16 (07 Feb 2021 09:42) (16 - 20)  SpO2: 97% (07 Feb 2021 09:42) (92% - 100%)    PHYSICAL EXAM:    General: WDWN  HEENT: NC/AT; PERRL, anicteric sclera; MMM  Neck: supple  Cardiovascular: +S1/S2; RRR  Respiratory: CTA B/L; no W/R/R  Gastrointestinal: soft, NT/ND; +BSx4  Extremities: WWP; no edema, clubbing or cyanosis  Vascular: 2+ radial, DP/PT pulses B/L  Neurological: AAOx3; no focal deficits    MEDICATIONS:  MEDICATIONS  (STANDING):  aspirin  chewable 81 milliGRAM(s) Oral daily  atorvastatin 10 milliGRAM(s) Oral at bedtime  heparin  Infusion 1000 Unit(s)/Hr (10 mL/Hr) IV Continuous <Continuous>  pantoprazole    Tablet 40 milliGRAM(s) Oral before breakfast  QUEtiapine 25 milliGRAM(s) Oral daily  sodium chloride 0.9% Bolus 500 milliLiter(s) IV Bolus once    MEDICATIONS  (PRN):  acetaminophen   Tablet .. 650 milliGRAM(s) Oral every 4 hours PRN Mild Pain (1 - 3)  ondansetron    Tablet 4 milliGRAM(s) Oral every 8 hours PRN Nausea and/or Vomiting      ALLERGIES:  Allergies    No Known Allergies    Intolerances        LABS:                        11.2   13.33 )-----------( 379      ( 06 Feb 2021 20:31 )             34.0     02-07    138  |  104  |  x   ----------------------------<  x   4.6   |  x   |  x     Ca    9.6      07 Feb 2021 10:44  Mg     2.0     02-07    TPro  7.7  /  Alb  3.6  /  TBili  0.5  /  DBili  x   /  AST  14  /  ALT  8<L>  /  AlkPhos  57  02-06    PT/INR - ( 06 Feb 2021 20:31 )   PT: 16.0 sec;   INR: 1.35          PTT - ( 06 Feb 2021 20:31 )  PTT:27.4 sec    CAPILLARY BLOOD GLUCOSE          RADIOLOGY & ADDITIONAL TESTS: Reviewed.   -------TRANSFER NOTE [facility] TO [facility]--------  OVERNIGHT EVENTS: patient admitted overnight no other events    HOSPITAL COURSE:  83 y/o F PMHx DM, HTN, Stage IIIb LLL NSCLC metastatic, c/l R endobronchial SCC, TOMMY presenting to [facility] with palpitations and shortness of breath for the past 3 days. She has noticed worsening of her symptoms and decided to come to the ED. Patient denies any recent travel, abx use, hospitalizations, and sick contacts. Patient denies h/n/v/d, fever, chills, cp, abd pain, leg swelling, rashes, dysuria, and changes in BM.   In the ED- VS were T 98, HR , -125/56-61, RR16 SP02 100 on 2L NC, labs significant for wbc 13.3, hgb 11.2, D-Dimer 418, INR 1.35, K 5.5, Bicarb 21, BUN/Cr 36/1.65, Ca 11, BNP 5352. CXR showed right sided opacification to the midlung field. CT PE protocol negative for PE but does show progression and extension of metastatic disease with collapse of right midle and lower lobes with right hilar mass. CT head negative for metastasis    EKG at the time in the ED showed afib with RVR and no ischemic changes. Pt was given 5 lopressor IVP, 25mg lopressor oral, lokelma 10. She converted back to sinus rhythm with BP in the 120s systolic and HR in 80s-90s. When seen at bedside this AM was still sinus rhythm, sitting comfortably. However, approx 8:15 AM, was notified by nurse that patient in afib again with HR in 150s-160s. gave 5 IV lopressor plus 25mg metoprolol tartrate PO. HR did not decrease therefore gave another 5 lopressor IV. Still without change, therefore gave 500cc bolus after which patient converted to sinus rhythm with HR in the 70s-80s. Cardiology was consulted for recommendation for further management of afib. Pt to be transferred to [facility] for further management.     VITAL SIGNS:  Vital Signs Last 24 Hrs  T(C): 37.7 (07 Feb 2021 08:48), Max: 37.7 (07 Feb 2021 08:48)  T(F): 99.9 (07 Feb 2021 08:48), Max: 99.9 (07 Feb 2021 08:48)  HR: 156 (07 Feb 2021 09:42) (75 - 157)  BP: 101/65 (07 Feb 2021 09:42) (96/62 - 156/70)  BP(mean): 83 (07 Feb 2021 02:17) (78 - 83)  RR: 16 (07 Feb 2021 09:42) (16 - 20)  SpO2: 97% (07 Feb 2021 09:42) (92% - 100%)    PHYSICAL EXAM:    General: cachectic woman sitting in bed in NAD  HEENT: NC/AT; PERRL, anicteric sclera; dry MM  Neck: supple  Cardiovascular: +S1/S2; RRR  Respiratory: CTA B/L with decreased breath sounds on R compared to L; no wheezes, rales, or rhonchi  Gastrointestinal: soft, NT/ND; +BSx4  Extremities: WWP; no edema, clubbing or cyanosis  Vascular: 2+ radial, DP/PT pulses B/L  Neurological: AAOx3; no focal deficits    MEDICATIONS:  MEDICATIONS  (STANDING):  aspirin  chewable 81 milliGRAM(s) Oral daily  atorvastatin 10 milliGRAM(s) Oral at bedtime  heparin  Infusion 1000 Unit(s)/Hr (10 mL/Hr) IV Continuous <Continuous>  pantoprazole    Tablet 40 milliGRAM(s) Oral before breakfast  QUEtiapine 25 milliGRAM(s) Oral daily  sodium chloride 0.9% Bolus 500 milliLiter(s) IV Bolus once    MEDICATIONS  (PRN):  acetaminophen   Tablet .. 650 milliGRAM(s) Oral every 4 hours PRN Mild Pain (1 - 3)  ondansetron    Tablet 4 milliGRAM(s) Oral every 8 hours PRN Nausea and/or Vomiting      ALLERGIES:  Allergies    No Known Allergies    Intolerances        LABS:                        11.2   13.33 )-----------( 379      ( 06 Feb 2021 20:31 )             34.0     02-07    138  |  104  |  x   ----------------------------<  x   4.6   |  x   |  x     Ca    9.6      07 Feb 2021 10:44  Mg     2.0     02-07    TPro  7.7  /  Alb  3.6  /  TBili  0.5  /  DBili  x   /  AST  14  /  ALT  8<L>  /  AlkPhos  57  02-06    PT/INR - ( 06 Feb 2021 20:31 )   PT: 16.0 sec;   INR: 1.35          PTT - ( 06 Feb 2021 20:31 )  PTT:27.4 sec    CAPILLARY BLOOD GLUCOSE          RADIOLOGY & ADDITIONAL TESTS: Reviewed.

## 2021-02-07 NOTE — CONSULT NOTE ADULT - SUBJECTIVE AND OBJECTIVE BOX
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 100% O2 on 2L NC  WBC 13, K 5.5, BUN/Cr 36/1.65, Ca 11, BNP 5352  CXR with right-sided opacification of lower-half hemithorax  CT PE neg for PE, but with moderate R-pleural effusion with endobronchial lesions causing obstruction and subsequent atelectasis of RML and RLL due to R hilar mass     Pt was rate-controlled and given hyperK treatment. [facility] care.   Started on Hep gtt     --    Pt seen and examined this afternoon.   On RA, not tachypneic, appears comfortable, but states that she is SOB.  Possibility of thoracentesis or PleurX placement was discussed with her, and she would like to discuss with family members and was concerned that she is ?about to start RT tomorrow, and doesn't want the procedure to interfere with that.     Bedside POCUS with moderate-sized R pleural effusion, simple-appearing     --    Further Oncology/Pulmonary hx from chart review:     Pt was previously followed by Pulmonary Dr. Falcon, and CTSur[facility] Dr. Youssef, however recent documentation not made in 1-2 years. However, she continues to follow with Dr. Wellington for oncology treatment.     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 Falcon 10/2019  - Now metastic lung cancer extending to neck and ?Pelvic mets  - CT and PET 10/2019 with new hypermetabolic parenchymal infiltrates, asymptomatic     Also seen by ENT last 12/2019 for right submandibular gland adenocarcinoma     Last Brain MRI 1/2021 negative for mets  Last PET 1/2021 with overall progression of disease, with moderate right-side pleural effusion   Prior PET 8/2020 with small right-sided pleural effusion, increasing BL nodular lesions      -------------------------------------------------------------  PAST MEDICAL & SURGICAL HISTORY:  Heart murmur    Tobacco use    Squamous cell carcinoma of left lung    Adenocarcinoma of head and neck    Submandibular gland mass    DM (diabetes mellitus)    H/O: hysterectomy        FAMILY HISTORY:      SOCIAL HISTORY:  Smoking Status: [X] Current, [ ] Former, [ ] Never  Pack Years:    MEDICATIONS:  Pulmonary:    Antimicrobials:    Anticoagulants:  aspirin  chewable 81 milliGRAM(s) Oral daily  heparin  Infusion 1000 Unit(s)/Hr IV Continuous <Continuous>    Onc:    GI/:  pantoprazole    Tablet 40 milliGRAM(s) Oral before breakfast    Endocrine:  atorvastatin 10 milliGRAM(s) Oral at bedtime    Cardiac:  metoprolol tartrate 25 milliGRAM(s) Oral every 12 hours    Other Medications:  acetaminophen   Tablet .. 650 milliGRAM(s) Oral every 4 hours PRN  ondansetron    Tablet 4 milliGRAM(s) Oral every 8 hours PRN  QUEtiapine 25 milliGRAM(s) Oral daily      Allergies    No Known Allergies    Intolerances        Vital Signs Last 24 Hrs  T(C): 36.4 (07 Feb 2021 13:34), Max: 37.7 (07 Feb 2021 08:48)  T(F): 97.6 (07 Feb 2021 13:34), Max: 99.9 (07 Feb 2021 08:48)  HR: 78 (07 Feb 2021 12:27) (75 - 157)  BP: 123/58 (07 Feb 2021 12:27) (96/62 - 156/70)  BP(mean): 83 (07 Feb 2021 12:27) (78 - 83)  RR: 22 (07 Feb 2021 12:27) (16 - 22)  SpO2: 100% (07 Feb 2021 12:27) (92% - 100%)    02-07 @ 07:01  -  02-07 @ 15:29  --------------------------------------------------------  IN: 40 mL / OUT: 0 mL / NET: 40 mL          -------------------------------------------------------------  PHYSICAL EXAM:    General: cachectic woman sitting in bed in NAD  HEENT: NC/AT; PERRL, anicteric sclera; dry MM  Neck: supple  Cardiovascular: +S1/S2; RRR  Respiratory: CTA B/L with decreased breath sounds on R; no wheezes, rales, or rhonchi  Gastrointestinal: soft, NT/ND; +BSx4  Extremities: WWP; no edema, clubbing or cyanosis  Vascular: 2+ radial, DP/PT pulses B/L  Neurological: AAOx3; no focal deficits      -------------------------------------------------------------  LABS:        CBC Full  -  ( 06 Feb 2021 20:31 )  WBC Count : 13.33 K/uL  RBC Count : 3.80 M/uL  Hemoglobin : 11.2 g/dL  Hematocrit : 34.0 %  Platelet Count - Automated : 379 K/uL  Mean Cell Volume : 89.5 fl  Mean Cell Hemoglobin : 29.5 pg  Mean Cell Hemoglobin Concentration : 32.9 gm/dL  Auto Neutrophil # : 9.88 K/uL  Auto Lymphocyte # : 1.96 K/uL  Auto Monocyte # : 1.15 K/uL  Auto Eosinophil # : 0.35 K/uL  Auto Basophil # : 0.00 K/uL  Auto Neutrophil % : 74.1 %  Auto Lymphocyte % : 14.7 %  Auto Monocyte % : 8.6 %  Auto Eosinophil % : 2.6 %  Auto Basophil % : 0.0 %    02-07    138  |  104  |  34<H>  ----------------------------<  108<H>  4.6   |  20<L>  |  1.52<H>    Ca    9.6      07 Feb 2021 10:44  Mg     2.0     02-07    TPro  7.7  /  Alb  3.6  /  TBili  0.5  /  DBili  x   /  AST  14  /  ALT  8<L>  /  AlkPhos  57  02-06    PT/INR - ( 06 Feb 2021 20:31 )   PT: 16.0 sec;   INR: 1.35          PTT - ( 07 Feb 2021 15:00 )  PTT:36.7 sec                  -------------------------------------------------------------  RADIOLOGY & ADDITIONAL STUDIES:    Chest CTA (2/7/21):    INTERPRETATION:  CTA (CT angiography) of the CHEST    INDICATION: Positive d-dimer's, intermediate probably for PE suspected.    TECHNIQUE: CT angiography of the chest was performed during bolus injection of intravenous contrast.  Post-processing including the production of axial, coronal and sagittal multiplanar reformatted images and axial and coronal maximum intensity projections (MIPs) was performed.    PRIOR STUDY: CT chest from 3/28/2019    FINDINGS:    Pulmonary arteries: No pulmonary embolism is seen.    Lungs and large airways: Moderate centrilobular emphysema. There is a poorly defined right perihilar mass. The right middle lobe and right lower lobe are collapsed, likely secondary to involvement of the associated bronchioles. There is also areas of narrowing involving the right upper lobe bronchioles. There is also mild narrowing of the right main bronchus. Bronchiectatic and fibrotic changes seen in the right upper lobe. Multiple new nodules are seen in the right lung one of which has notable scarring in the anterior segment in series 12 image 26. There are also noted numerous nodules in the left lung some of which are spiculated or microlobulated, largest measuring 7 mm in the left upper lobe apicoposterior segment in series 12 image 29. A spiculated nodule is also seen in left lower lobe measuring 1.2 cm. There is irregular interlobular septal thickening and reticulation in the right lung concerning for lymphangitic spread of tumor.    Pleura:  Moderate sized right-sided pleural effusion.    Mediastinum and hilar regions: Right hilar lymphadenopathy noted measuring up to 1.3 cm. Incidental finding of 1.4 cm calcified lesion in the wall of the right mid esophagus seen in the previous study 2019. Mediastinal lymphadenopathy also noted including prevascular lymph node measuring 1.4 cm. There is also interval development of significant crural lymphadenopathy.    Cardiovascular:  Heart size is normal. No thoracic aortic aneurysm. Mild coronary artery calcifications. A few aortic cusp calcifications also noted.    Pericardial effusion: No pericardial effusion.    Chest wall and lower neck:  No change in 2 cm low-density partially calcified left thyroid nodule.    Upper abdomen: Few simple cysts are identified in the kidneys bilaterally largest measuring 2.8 cm in the left kidney. There is a hyperdensity in the right kidney measuring 7 mm which may represent hemorrhagic cyst.    Bones: Moderate spondylosis of the spine. No aggressive lytic or blastic lesions.      IMPRESSION:  There is no pulmonary embolism.  Since the previous study from 3/28/2019, there is collapse of the right middle and lower lobes secondary to right hilar mass as well as narrowing of the right upper lobe bronchioles and right bronchus. There is also evidence of metastasis more extensive the lungs bilaterally. Findings concerning for lymphangitic spread of tumor in the right lung.

## 2021-02-07 NOTE — DIETITIAN INITIAL EVALUATION ADULT. - OTHER INFO
85F PMHx DM, HTN, Stage IIIb LLL NSCLC metastatic, c/l R endobronchial SCC, TOMMY presenting to [facility] with palpitations and shortness of breath for the past 3 days, admitted for Paroxysmal Afib and right lung lobe collapse with hilar involvement. Plan for echocardiogram monday 2/8.     On assessment, pt resting in bed without complaints. Currently on DASH/ TLC diet noting poor PO intake. Pt only consumed a few sips of coffee and 1-2 bites of toast (<25% of meal). No n/v/d. +constipation (last BM 2/3). No abd distention. Skin WDL. No pain reported at this time. Pt noting that over the last 3 months, pt appetite has been reduced 2/2 CA, s/p RT and chemo therapy. Appetite decrease was exacerbated over the last week when pt started to notice Afib. Suspect meeting <50% est needs x3 months. Pt noting that she has attempted to add ONS but dislikes the taste. Son and family working to increase kcal- RD disc some recipes and foods to add that will increase overall kcal/ pro intake- pt receptive. Pt reported her UBW was ~156lbs (November 2020) now admitting at 118lbs (2/7) revealing a 38lb/ 24% weight loss over 3 months. NFPE was remarkable for moderate muscle and fat wasting. Per APSEN guidelines, pt meets criteria for severe malnutrition- team made aware. NKFA. RD also provided education on importance of adequate PO intake to prevent further s/sx of wt loss- pt appears motivated but awaiting her appetite to return. Please see nutr recs below. RD to follow.

## 2021-02-07 NOTE — CONSULT NOTE ADULT - ATTENDING COMMENTS
85 y/o F PMHx DM, HTN, Stage IIIb LLL NSCLC metastatic, c/l R endobronchial SCC, TOMMY presenting to [facility] with palpitations and shortness of breath for the past 3 days. She has noticed worsening of her symptoms and decided to come to the ED. Patient denies any recent travel, abx use, hospitalizations, and sick contacts. Patient denies h/n/v/d, fever, chills, cp, abd pain, leg swelling, rashes, dysuria, and changes in BM.    #pAF - new onset, currently in sinus on monitor. [id number] - 5.  - c/w lopressor 25 PO bid w/hold parameters for HR< 60, SBP < 90  - recommend full dose anticoagulation given high stroke risk. Can start NOAC (e.g. eliquis 2.5mg bid)   - given hx of TOMMY & worsening lung mass with recent radiation therapy, suspect Afib will recur  - nonurgent echocardiogram on Monday    LIYA Curiel

## 2021-02-07 NOTE — H&P ADULT - ASSESSMENT
83 y/o F PMHx DM, HTN, Stage IIIb LLL NSCLC metastatic, c/l R endobronchial SCC, TOMMY presenting to [facility] with palpitations and shortness of breath for the past 3 days, admitted for Paroxysmal Afib and right lung lobe collapse with hilar involvement

## 2021-02-08 NOTE — PROGRESS NOTE ADULT - PROBLEM SELECTOR PLAN 2
Pt has hx of LLL NSCLC and R endobronchial CSS s/p 3 cycles of Carboplatin/Etoposide, follows with Dr. Wellington. CXR shows RIGHT Lower Lobe opacity likely due to underlying malignancy.  - Pt noted to have large R pleural effusion on CT chest  - Currently undergoing radiation, last session yesterday   - Dr. Wellington made aware of patient's arrival to [facility], will see patient 2/8  - Palliative Care consult ordered, f/u recs  - Pulm consulted, recommending thoracentesis once pt consents  - Zofran PRN for nausea  - Currently SpO2 % on RA, intermittently desats with lateral decubitus positioning  - Monitor respiratory status

## 2021-02-08 NOTE — PROGRESS NOTE ADULT - PROBLEM SELECTOR PLAN 3
Cr. 1.6, baseline 1.4-1.6  - avoid nephrotoxic agents Cr. 1.6, baseline 1.4-1.6  - Avoid nephrotoxic agents  - Pt currently refusing labs as of 2/8, will trend when possible Pt noted to be increasingly agitated as of 2/7.  - c/w Seroquel 25mg qHS

## 2021-02-08 NOTE — PROGRESS NOTE ADULT - SUBJECTIVE AND OBJECTIVE BOX
INTERVAL EVENTS: Patient feeling well today. Per primary team, refusing most medications and interventions, until she speaks with Dr. Wellington     PAST MEDICAL & SURGICAL HISTORY:  Heart murmur    Depression    Tobacco use    Squamous cell carcinoma of left lung    Adenocarcinoma of head and neck    Submandibular gland mass    DM (diabetes mellitus)    H/O: hysterectomy        MEDICATIONS  (STANDING):  aspirin  chewable 81 milliGRAM(s) Oral daily  atorvastatin 10 milliGRAM(s) Oral at bedtime  dextrose 40% Gel 15 Gram(s) Oral once  dextrose 5%. 1000 milliLiter(s) (50 mL/Hr) IV Continuous <Continuous>  dextrose 5%. 1000 milliLiter(s) (100 mL/Hr) IV Continuous <Continuous>  dextrose 50% Injectable 25 Gram(s) IV Push once  dextrose 50% Injectable 12.5 Gram(s) IV Push once  dextrose 50% Injectable 25 Gram(s) IV Push once  glucagon  Injectable 1 milliGRAM(s) IntraMuscular once  insulin lispro (ADMELOG) corrective regimen sliding scale   SubCutaneous Before meals and at bedtime  metoprolol tartrate 25 milliGRAM(s) Oral once  pantoprazole    Tablet 40 milliGRAM(s) Oral before breakfast  QUEtiapine 25 milliGRAM(s) Oral daily    MEDICATIONS  (PRN):  acetaminophen   Tablet .. 650 milliGRAM(s) Oral every 4 hours PRN Mild Pain (1 - 3)  metoprolol tartrate Injectable 5 milliGRAM(s) IV Push once PRN afib rvr  ondansetron    Tablet 4 milliGRAM(s) Oral every 8 hours PRN Nausea and/or Vomiting      Vital Signs Last 24 Hrs  T(C): 36.9 (08 Feb 2021 02:00), Max: 36.9 (08 Feb 2021 02:00)  T(F): 98.5 (08 Feb 2021 02:00), Max: 98.5 (08 Feb 2021 02:00)  HR: 72 (08 Feb 2021 12:38) (68 - 94)  BP: 109/55 (08 Feb 2021 12:38) (104/51 - 138/63)  BP(mean): 79 (08 Feb 2021 12:38) (73 - 91)  RR: 16 (08 Feb 2021 12:38) (16 - 18)  SpO2: 96% (08 Feb 2021 12:38) (90% - 100%)     PHYSICAL EXAM:  GEN: Awake, alert. NAD.   HEENT: NCAT, PERRL, EOMI. Mucosa moist. No JVD.  RESP: CTA b/l  CV: RRR. Normal S1/S2. No m/r/g.  ABD: Soft. NT/ND. BS+  EXT: Warm. No edema, clubbing, or cyanosis.   NEURO: AAOx3. No focal deficits.     LABS:                        11.2   13.33 )-----------( 379      ( 06 Feb 2021 20:31 )             34.0     02-07    138  |  104  |  34<H>  ----------------------------<  108<H>  4.6   |  20<L>  |  1.52<H>    Ca    9.6      07 Feb 2021 10:44  Mg     2.0     02-07    TPro  7.7  /  Alb  3.6  /  TBili  0.5  /  DBili  x   /  AST  14  /  ALT  8<L>  /  AlkPhos  57  02-06    CARDIAC MARKERS ( 06 Feb 2021 20:31 )  x     / <0.01 ng/mL / x     / x     / x          PT/INR - ( 06 Feb 2021 20:31 )   PT: 16.0 sec;   INR: 1.35          PTT - ( 07 Feb 2021 15:00 )  PTT:36.7 sec    I&O's Summary    07 Feb 2021 07:01  -  08 Feb 2021 07:00  --------------------------------------------------------  IN: 88 mL / OUT: 300 mL / NET: -212 mL      Echo: < from: TTE Echo Complete w/o Contrast w/ Doppler (02.08.21 @ 13:15) >   1. The left ventricle is normal in size, wall thickness, and systolic function with a calculated ejection fraction of 65%.   2. The right ventricle is normal in size. Right ventricular systolic function is normal.   3. The left atrium is borderline dilated.   4. The aortic valve is calcified (morphology not well seen). There is mild aortic stenosis. The peak transvalvular velocity is 2.74 m/s, the mean transvalvular gradient is 17.00 mmHg, and the LVOT/AV velocity ratio is 0.36. The peak transaortic gradient is 30.03 mmHg. The mean transaortic gradient is 17.00 mmHg. The aortic valve area (estimated via the continuity method) is 1.36 cm². There is mild to moderate aortic regurgitation.   5. The mitral valve is moderately thickened and calcified. Mitral annular calcification noted. There is mild mitral regurgitation.   6. There is moderate tricuspid regurgitation. There is mild pulmonary hypertension, pulmonary artery systolic pressure is 47 mmHg.   7. The aortic root is normal in size.   8.No pericardial effusion is seen.      < end of copied text >

## 2021-02-08 NOTE — CONSULT NOTE ADULT - ASSESSMENT
86 y/o F PMHx DM, HTN, NSCLC metastatic, c/l R endobronchial SCC, TOMMY presenting to [facility] with palpitations and shortness of breath for the past 3 days   Patient continues to have right neck pain with worsening shortness of breath.  Was referred  to Rad/Onc Dr. Drake to start her on palliative radiation for right neck pain which is now on hold given recent symptoms leading to hospitalization     #Large cell neuroendocrine carcinoma (HCC)  #Pleural effusion  -Stage IV disease with worsening disease on recent imaging  - Initially diagnosed following R submandibular biopsy May 2019 c/w high grade large cell neuroendocrine cancer. Unclear if new disease vs transformation of prior squamous cell cancer. -S/p palliative excision of mass (which improved local symptoms)  - NGS testing performed and positive for TP53 but no actionable mutations including EGFR  - Found to have metastatic disease to bones anddiffuse LNs. Initially was treated with neuroendocrine-intensive combination chemotherapy regimen Carbo/Etoposide s/p 4 cycles which she tolerated very well.  - F/up PET scan 12/2019 with significant progression of solitary sites of osseous metastatic disease involving left inferior pubic ramus but waxing/waning appearance in SARAH c/w inflammatory/infectious pattern.   - Developed new soft tissue mass in R submandibular triangle without progression of distant disease on imaging. Declined systemic chemotherapy at that time, was retreated with palliative radiation to right jaw/neck.     -Developed large lump under her chin and restaging PET 8/2020 with diffuse recurrence of disease including neck LNs and pulmonary nodules. Patient was restarted on systemic therapy with Carboplatin/Etoposide since strong and sustained response to same regimen originally. S/p 4 cycles ending December w/ decrease in size of submental LN clinically.  - Recently developed multiple new nodules/masses in R side of neck. Was referred back to Rad/Onc and plan to retreat with palliative radiation to new area of disease.   -PET scan 1/20 shows diffuse progression of disease including in neck as well as hilar/mediastinal and abdominal LAD. Discussed next line of therapy with patient including immunotherapy. Would recommend single-agent Nivolumab 240mg q2 weeks based on CheckMate 057 results in patients with non-squamous non-small cell lung cancer who progressed on Taxol-based therapy. Have ordered more comprehensive NGS through [facility] as well to identify other potential targets (although previous NGS through [facility] only with TP53).   - Will hold off starting immunotherapy until completed her 5 treatment course of radiation therapy due to start this week  - Currently plan is to do to therapeutic paracentesis given findings on scans, to send fluid for analysis   - Will need to follow up with Radiation oncology once discharged, to start  radiation.      Patient S/E/D with Dr. Wellington

## 2021-02-08 NOTE — PROGRESS NOTE ADULT - SUBJECTIVE AND OBJECTIVE BOX
INCOMPLETE NOTE    **** Acceptance Not From [facility] to [facility] ***    Hospital Course:  85F with PMHx DM, HTN, HLD, Stage IIIb LLL NSCLC metastatic, c/l R endobronchial SCC (Dr. Wellington), TOMMY presenting to [facility] with palpitations and shortness of breath that have worsened for the past 3 days, found to have large R sided pleural effusion with RML/RLL collapse and R hilar mass, initially admitted to [facility] for management of her pleural effusion but transferred to [facility] because of new onset AFib with RVR. EKG at the time of AFib showed AFib with no ischemic changes and pt was given Lopressor 5 IVP + Lopressor 25mg PO, which converted her back to NSR. She has been well controlled with Lopressor 25mg PO BID and was on a Heparin gtt for AC, but as of 2/8 was refusing all blood draws/other interventions because she demanded to speak with Dr. Wellington (her outpatient Heme/Oncologist) first; pt is AAOx3 and does have capacity. Pulmonology recommends thoracentesis, now pending patient's consent. Cardiology was consulted and as of 2/8 is recommending Toprol 50mg qd and Lovenox for AC if allowed by patient.    SUBJECTIVE: Patient seen and examined at bedside.    Vital Signs Last 12 Hrs  T(F): --  HR: 72 (02-08-21 @ 12:38) (72 - 72)  BP: 109/55 (02-08-21 @ 12:38) (109/55 - 109/55)  BP(mean): 79 (02-08-21 @ 12:38) (79 - 79)  RR: 16 (02-08-21 @ 12:38) (16 - 16)  SpO2: 96% (02-08-21 @ 12:38) (96% - 96%)  I&O's Summary    07 Feb 2021 07:01  -  08 Feb 2021 07:00  --------------------------------------------------------  IN: 88 mL / OUT: 300 mL / NET: -212 mL    PHYSICAL EXAM:  Constitutional: NAD, comfortable in bed.  HEENT: NC/AT, PERRLA, EOMI, no conjunctival pallor or scleral icterus, MMM  Neck: Supple, no JVD  Respiratory: CTA B/L. No w/r/r.   Cardiovascular: RRR, normal S1 and S2, no m/r/g.   Gastrointestinal: +BS, soft NTND, no guarding or rebound tenderness, no palpable masses   Extremities: wwp; no cyanosis, clubbing or edema.   Vascular: Pulses equal and strong throughout.   Neurological: AAOx3, no CN deficits, strength and sensation intact throughout.   Skin: No gross skin abnormalities or rashes    LABS:                        11.2   13.33 )-----------( 379      ( 06 Feb 2021 20:31 )             34.0     02-07    138  |  104  |  34<H>  ----------------------------<  108<H>  4.6   |  20<L>  |  1.52<H>    Ca    9.6      07 Feb 2021 10:44  Mg     2.0     02-07    TPro  7.7  /  Alb  3.6  /  TBili  0.5  /  DBili  x   /  AST  14  /  ALT  8<L>  /  AlkPhos  57  02-06    PT/INR - ( 06 Feb 2021 20:31 )   PT: 16.0 sec;   INR: 1.35       PTT - ( 07 Feb 2021 15:00 )  PTT:36.7 sec    MEDICATIONS  (STANDING):  aspirin  chewable 81 milliGRAM(s) Oral daily  atorvastatin 10 milliGRAM(s) Oral at bedtime  dextrose 40% Gel 15 Gram(s) Oral once  dextrose 5%. 1000 milliLiter(s) (50 mL/Hr) IV Continuous <Continuous>  dextrose 5%. 1000 milliLiter(s) (100 mL/Hr) IV Continuous <Continuous>  dextrose 50% Injectable 25 Gram(s) IV Push once  dextrose 50% Injectable 12.5 Gram(s) IV Push once  dextrose 50% Injectable 25 Gram(s) IV Push once  glucagon  Injectable 1 milliGRAM(s) IntraMuscular once  insulin lispro (ADMELOG) corrective regimen sliding scale   SubCutaneous Before meals and at bedtime  metoprolol tartrate 25 milliGRAM(s) Oral once  pantoprazole    Tablet 40 milliGRAM(s) Oral before breakfast  QUEtiapine 25 milliGRAM(s) Oral daily    MEDICATIONS  (PRN):  acetaminophen   Tablet .. 650 milliGRAM(s) Oral every 4 hours PRN Mild Pain (1 - 3)  metoprolol tartrate Injectable 5 milliGRAM(s) IV Push once PRN afib rvr  ondansetron    Tablet 4 milliGRAM(s) Oral every 8 hours PRN Nausea and/or Vomiting   **** Acceptance Not From 7L to 7W ***    Hospital Course:  85F with PMHx DM, HTN, HLD, Stage IIIb LLL NSCLC metastatic, c/l R endobronchial SCC (Dr. Wellington), TOMMY presenting to [facility] with palpitations and shortness of breath that have worsened for the past 3 days, found to have large R sided pleural effusion with RML/RLL collapse and R hilar mass, initially admitted to [facility] for management of her pleural effusion but transferred to [facility] because of new onset AFib with RVR. EKG at the time of AFib showed AFib with no ischemic changes and pt was given Lopressor 5 IVP + Lopressor 25mg PO, which converted her back to NSR. She has been well controlled with Lopressor 25mg PO BID and was on a Heparin gtt for AC, but as of 2/8 was refusing all blood draws/other interventions because she demanded to speak with Dr. Wellington (her outpatient Heme/Oncologist) first; pt is AAOx3 and does have capacity. Pulmonology recommends thoracentesis, now pending patient's consent. Cardiology was consulted and as of 2/8 is recommending Toprol 50mg qd and Lovenox for AC if allowed by patient.    SUBJECTIVE: Patient seen and examined at bedside. Patient complains of itchiness in her cervical area, patient with low apatite and is very scared for the thoracocentesis tomorrow, wishing he grandson to be with her during the procedure. Patient did not had BM for a few days now. Patient denies h/n/v/d, fever, chills, cp, palpitations, sob, abd pain, leg swelling, rashes, dysuria, and changes in BM.     Vital Signs Last 12 Hrs  T(F): --  HR: 72 (02-08-21 @ 12:38) (72 - 72)  BP: 109/55 (02-08-21 @ 12:38) (109/55 - 109/55)  BP(mean): 79 (02-08-21 @ 12:38) (79 - 79)  RR: 16 (02-08-21 @ 12:38) (16 - 16)  SpO2: 96% (02-08-21 @ 12:38) (96% - 96%)  I&O's Summary    07 Feb 2021 07:01  -  08 Feb 2021 07:00  --------------------------------------------------------  IN: 88 mL / OUT: 300 mL / NET: -212 mL    PHYSICAL EXAM:  Constitutional: Cachetic, NAD, comfortable in bed.  HEENT: NC/AT, PERRLA, EOMI, no conjunctival pallor or scleral icterus, MMM  Neck: Supple, no JVD, multiple enlarged lymph nodes on the right cervical from the mandibular wong to the clavicle.   Respiratory: Decrease BS on the right, with inspiratory whizzing.   Cardiovascular: RRR, normal S1 and S2, holosystolic murmur over the aortic point.   Gastrointestinal: +BS, soft NTND, no guarding or rebound tenderness, no palpable masses   Extremities: wwp; no cyanosis, clubbing or edema.   Vascular: Pulses equal and strong throughout.   Neurological: AAOx3, no CN deficits, strength and sensation intact throughout.   Skin: No gross skin abnormalities or rashes    LABS:                        11.2   13.33 )-----------( 379      ( 06 Feb 2021 20:31 )             34.0     02-07    138  |  104  |  34<H>  ----------------------------<  108<H>  4.6   |  20<L>  |  1.52<H>    Ca    9.6      07 Feb 2021 10:44  Mg     2.0     02-07    TPro  7.7  /  Alb  3.6  /  TBili  0.5  /  DBili  x   /  AST  14  /  ALT  8<L>  /  AlkPhos  57  02-06    PT/INR - ( 06 Feb 2021 20:31 )   PT: 16.0 sec;   INR: 1.35       PTT - ( 07 Feb 2021 15:00 )  PTT:36.7 sec    MEDICATIONS  (STANDING):  aspirin  chewable 81 milliGRAM(s) Oral daily  atorvastatin 10 milliGRAM(s) Oral at bedtime  dextrose 40% Gel 15 Gram(s) Oral once  dextrose 5%. 1000 milliLiter(s) (50 mL/Hr) IV Continuous <Continuous>  dextrose 5%. 1000 milliLiter(s) (100 mL/Hr) IV Continuous <Continuous>  dextrose 50% Injectable 25 Gram(s) IV Push once  dextrose 50% Injectable 12.5 Gram(s) IV Push once  dextrose 50% Injectable 25 Gram(s) IV Push once  glucagon  Injectable 1 milliGRAM(s) IntraMuscular once  insulin lispro (ADMELOG) corrective regimen sliding scale   SubCutaneous Before meals and at bedtime  metoprolol tartrate 25 milliGRAM(s) Oral once  pantoprazole    Tablet 40 milliGRAM(s) Oral before breakfast  QUEtiapine 25 milliGRAM(s) Oral daily    MEDICATIONS  (PRN):  acetaminophen   Tablet .. 650 milliGRAM(s) Oral every 4 hours PRN Mild Pain (1 - 3)  metoprolol tartrate Injectable 5 milliGRAM(s) IV Push once PRN afib rvr  ondansetron    Tablet 4 milliGRAM(s) Oral every 8 hours PRN Nausea and/or Vomiting   **** Acceptance Not From  to  ***    Hospital Course:  85F with PMHx DM, HTN, HLD, Stage IIIb LLL NSCLC metastatic, c/l R endobronchial SCC (Dr. Wellington), TOMMY presenting to [facility] with palpitations and shortness of breath that have worsened for the past 3 days, found to have large R sided pleural effusion with RML/RLL collapse and R hilar mass, initially admitted to [facility] for management of her pleural effusion but transferred to [facility] because of new onset AFib with RVR. EKG at the time of AFib showed AFib with no ischemic changes and pt was given Lopressor 5 IVP + Lopressor 25mg PO, which converted her back to NSR. She has been well controlled with Lopressor 25mg PO BID and was on a Heparin gtt for AC, but as of 2/8 was refusing all blood draws/other interventions because she demanded to speak with Dr. Wellington (her outpatient Heme/Oncologist) first; pt is AAOx3 and does have capacity. Pulmonology recommends thoracentesis, now willing to go foreword with plane. Cardiology was consulted and as of 2/8 is recommending Toprol 50mg qd and Lovenox for AC to improve compliance. Patient is HD, and respiratory stable on RA and was transferred to .    SUBJECTIVE: Patient seen and examined at bedside. Patient complains of itchiness in her cervical area, patient with low apatite and is very scared for the thoracocentesis tomorrow, wishing he grandson to be with her during the procedure. Patient did not had BM for a few days now. Patient denies h/n/v/d, fever, chills, cp, palpitations, sob, abd pain, leg swelling, rashes, dysuria, and changes in BM.     Vital Signs Last 12 Hrs  T(F): --  HR: 72 (02-08-21 @ 12:38) (72 - 72)  BP: 109/55 (02-08-21 @ 12:38) (109/55 - 109/55)  BP(mean): 79 (02-08-21 @ 12:38) (79 - 79)  RR: 16 (02-08-21 @ 12:38) (16 - 16)  SpO2: 96% (02-08-21 @ 12:38) (96% - 96%)  I&O's Summary    07 Feb 2021 07:01  -  08 Feb 2021 07:00  --------------------------------------------------------  IN: 88 mL / OUT: 300 mL / NET: -212 mL    PHYSICAL EXAM:  Constitutional: Cachetic, NAD, comfortable in bed.  HEENT: NC/AT, PERRLA, EOMI, no conjunctival pallor or scleral icterus, MMM  Neck: Supple, no JVD, multiple enlarged lymph nodes on the right cervical from the mandibular wong to the clavicle.   Respiratory: Decrease BS on the right, with inspiratory whizzing.   Cardiovascular: RRR, normal S1 and S2, holosystolic murmur over the aortic point.   Gastrointestinal: +BS, soft NTND, no guarding or rebound tenderness, no palpable masses   Extremities: wwp; no cyanosis, clubbing or edema.   Vascular: Pulses equal and strong throughout.   Neurological: AAOx3, no CN deficits, strength and sensation intact throughout.   Skin: No gross skin abnormalities or rashes    LABS:                        11.2   13.33 )-----------( 379      ( 06 Feb 2021 20:31 )             34.0     02-07    138  |  104  |  34<H>  ----------------------------<  108<H>  4.6   |  20<L>  |  1.52<H>    Ca    9.6      07 Feb 2021 10:44  Mg     2.0     02-07    TPro  7.7  /  Alb  3.6  /  TBili  0.5  /  DBili  x   /  AST  14  /  ALT  8<L>  /  AlkPhos  57  02-06    PT/INR - ( 06 Feb 2021 20:31 )   PT: 16.0 sec;   INR: 1.35       PTT - ( 07 Feb 2021 15:00 )  PTT:36.7 sec    MEDICATIONS  (STANDING):  aspirin  chewable 81 milliGRAM(s) Oral daily  atorvastatin 10 milliGRAM(s) Oral at bedtime  dextrose 40% Gel 15 Gram(s) Oral once  dextrose 5%. 1000 milliLiter(s) (50 mL/Hr) IV Continuous <Continuous>  dextrose 5%. 1000 milliLiter(s) (100 mL/Hr) IV Continuous <Continuous>  dextrose 50% Injectable 25 Gram(s) IV Push once  dextrose 50% Injectable 12.5 Gram(s) IV Push once  dextrose 50% Injectable 25 Gram(s) IV Push once  glucagon  Injectable 1 milliGRAM(s) IntraMuscular once  insulin lispro (ADMELOG) corrective regimen sliding scale   SubCutaneous Before meals and at bedtime  metoprolol tartrate 25 milliGRAM(s) Oral once  pantoprazole    Tablet 40 milliGRAM(s) Oral before breakfast  QUEtiapine 25 milliGRAM(s) Oral daily    MEDICATIONS  (PRN):  acetaminophen   Tablet .. 650 milliGRAM(s) Oral every 4 hours PRN Mild Pain (1 - 3)  metoprolol tartrate Injectable 5 milliGRAM(s) IV Push once PRN afib rvr  ondansetron    Tablet 4 milliGRAM(s) Oral every 8 hours PRN Nausea and/or Vomiting

## 2021-02-08 NOTE — PROGRESS NOTE ADULT - PROBLEM SELECTOR PLAN 2
s/p 3 cycles of Carboplatin/Etoposide, follows with Dr. Wellington. CXR shows RIGHT Lower Lobe opacity likely due to underlying malignancy.  - Pt noted to have large R pleural effusion on CT chest  - Currently undergoing radiation, last session yesterday   - Heme/Onc team made aware of patient's arrival to [facility]  - Palliative Care consult ordered, f/u recs  - Pulm consulted, recommending thoracentesis which will likely be 2/8 based on patient preference  - c/w Heparin gtt until after thoracentesis as above Pt has hx of LLL NSCLC and R endobronchial CSS s/p 3 cycles of Carboplatin/Etoposide, follows with Dr. Wellington. CXR shows RIGHT Lower Lobe opacity likely due to underlying malignancy.  - Pt noted to have large R pleural effusion on CT chest  - Currently undergoing radiation, last session yesterday   - Dr. Wellington made aware of patient's arrival to [facility], will see patient 2/8  - Palliative Care consult ordered, f/u recs  - Pulm consulted, recommending thoracentesis once pt consents Pt has hx of LLL NSCLC and R endobronchial CSS s/p 3 cycles of Carboplatin/Etoposide, follows with Dr. Wellington. CXR shows RIGHT Lower Lobe opacity likely due to underlying malignancy.  - Pt noted to have large R pleural effusion on CT chest  - Currently undergoing radiation, last session yesterday   - Dr. Wellington made aware of patient's arrival to [facility], will see patient 2/8  - Palliative Care consult ordered, f/u recs  - Pulm consulted, recommending thoracentesis once pt consents  - Zofran PRN for nausea  - Currently SpO2 % on RA, intermittently desats with lateral decubitus positioning  - Monitor respiratory status Pt has hx of LLL NSCLC and R endobronchial CSS s/p 3 cycles of Carboplatin/Etoposide, follows with Dr. Wellington. CXR shows RIGHT Lower Lobe opacity likely due to underlying malignancy.  - Pt noted to have large R pleural effusion on CT chest  - Currently undergoing radiation, last session yesterday   - Dr. Wellington made aware of patient's arrival to [facility], will see patient 2/8  - Palliative Care consult ordered, f/u recs  - Pulm consulted, recommending thoracentesis once pt consents  - Zofran PRN for nausea  - Currently SpO2 % on RA, intermittently desats with lateral decubitus positioning  - PET scan 1/20 shows diffuse progression of disease including in neck as well as hilar/mediastinal and abdominal LAD.  - Monitor respiratory status

## 2021-02-08 NOTE — PROGRESS NOTE ADULT - ASSESSMENT
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 Wellington.   Presenting on 2/7/21 with several days of worsening SOB and palpitations,   Found in AFib w/ RVR and moderate R-sided pleural effusion     #Pleural Effusion - Right-sided, moderate, simple-appearing. Either malignant or consequence of possible trapped lung related to worsening of right hilum malignant lesions causing segmental right lower lobe airway obstruction with subsequent atelectasis. Low concern for infection. Appears comfortable, not HD-unstable. Fluid started to be seen  on PET from 8/2020, but now grown much larger in size as also seen on per from january 2021.    - Thoracentesis now planed for 2/9  - No emergent need for drainage  - If malignant with rapid re-accumulation, return of symptoms, will benefit from  in-dwelling pleural catheter

## 2021-02-08 NOTE — CHART NOTE - NSCHARTNOTEFT_GEN_A_CORE
PALLIATIVE MEDICINE COORDINATION OF CARE NOTE FOR CRISTI PEREA  [  ] ED Trigger   [  ] MICU Trigger     [ X ] Consult    [ X ] AI Comanagement     30 Minutes; Start: 12:30pm End: 1:00pm, of non-face-to-face prolonged service provided that relates to (face-to-face) care that has or will occur and ongoing patient management, including one or more of the following:   - Reviewed records from other physicians or other health care professional services, including one or more of the following: other medical records and diagnostic / radiology study results     HPI:  85 y/o F PMHx DM, HTN, Stage IIIb LLL NSCLC metastatic, c/l R endobronchial SCC, TOMMY presenting to [facility] with palpitations and shortness of breath for the past 3 days. She has noticed worsening of her symptoms and decided to come to the ED. Patient denies any recent travel, abx use, hospitalizations, and sick contacts. Patient denies h/n/v/d, fever, chills, cp, abd pain, leg swelling, rashes, dysuria, and changes in BM.   In the ED- VS were T 98, HR , -125/56-61, RR16 SP02 100 on 2L NC   Labs significant for wbc 13.3, hgb 11.2, D-Dimer 418, INR 1.35, K 5.5, Bicarb 21, BUN/Cr 36/1.65, Ca 11, BNP 5352  CXR showed right sided opacification to the midlung field  CT PE protocol negative for PE but does show progression and extension of metastatic disease with collapse of right midle and lower lobes with right hilar mass.   CT head negative for metastasis   EKG showed afib with rvr, no ischemic changes   Given Metoprolol 5mg IVP, Lopressor 25mg oral, Lokelma 10mg and admitted for further management of worsening tumor burden.  (07 Feb 2021 03:27)    - Other: [facility] reviewed.    Rx for TYLENOL #3 and PERCOCET found on iStop review. Ref #: [id number]    - Other: Medication reviewed.    The patient HAS used PRN's in the last 24h. Patient received 1 dose of APAP 650mg and 1 dose of Seroquel in the last 24h. MME: 0mg    MEDICATIONS  (STANDING):  aspirin  chewable 81 milliGRAM(s) Oral daily  atorvastatin 10 milliGRAM(s) Oral at bedtime  enoxaparin Injectable 50 milliGRAM(s) SubCutaneous every 24 hours  glucagon  Injectable 1 milliGRAM(s) IntraMuscular once  insulin lispro (ADMELOG) corrective regimen sliding scale   SubCutaneous Before meals and at bedtime  metoprolol tartrate 25 milliGRAM(s) Oral once  pantoprazole    Tablet 40 milliGRAM(s) Oral before breakfast  QUEtiapine 25 milliGRAM(s) Oral daily    MEDICATIONS  (PRN):  acetaminophen   Tablet .. 650 milliGRAM(s) Oral every 4 hours PRN Mild Pain (1 - 3)  metoprolol tartrate Injectable 5 milliGRAM(s) IV Push once PRN afib rvr  ondansetron    Tablet 4 milliGRAM(s) Oral every 8 hours PRN Nausea and/or Vomiting    - Other: Advanced directives     Full Code     No documented MOLST form found on [facility]     No documented HCP form found on [facility]     Legal surrogates: Rajat Brothers [phone]     Other surrogates: Homa Arthur [phone]     No Living will / POA / Advance directives found on [place] / [facility].     No documented GOC notes on [facility]    - Other: Coordination/Plan of care     1 admissions in 1 year     Current admission LOS: 1 days     LACE score: 10 ADVANCE ILLNESS PATIENT.     Patient NOT previously seen by palliative medicine consult service.      Consult request for: " goals of care, metastatic lung cancer, admitted for SOB and afib   "    Patient is an Advanced Illness patient hence the primary team will need to complete GOC document of any prior GOC discussions that were done during this admission so far as well as information available for Proxy/surrogates/NOK/guardians prior to a palliative contact.    Attempted to see patient but currently off the floors in echocardiogram.  Full consult to follow within 24h.

## 2021-02-08 NOTE — PROGRESS NOTE ADULT - PROBLEM SELECTOR PLAN 1
Chads-VAS score of 5, prior history of Afib but patient reports never been on rate controlling agent or AC. Now s/p Lopressor 5mg IVP and 25mg Oral  in NSR.   - Pt refusing PTT draws, now on full dose AC Lovenox as tolerated  - c/w Lovenox 50mg daily - due to renal function  - c/w Toprol 50mg qd starting 2/9 (for compliance)  - PRN 5mg Lopressor   - f/u TTE Chads-VAS score of 5, prior history of Afib but patient reports never been on rate controlling agent or AC. Now s/p Lopressor 5mg IVP and 25mg Oral  in NSR.   - Pt refusing PTT draws, now on full dose AC Lovenox as tolerated  - c/w Lovenox 50mg daily - due to renal function  - c/w Toprol 50mg qd starting 2/9 (for compliance)  - PRN 5mg Lopressor   - TTE, AS calcified aortic valve with area 1.36cm². Moderate TC regurgitation. normal LV/RV size and function, LVEF 65%.

## 2021-02-08 NOTE — PROGRESS NOTE ADULT - PROBLEM SELECTOR PLAN 5
Hypercalcemia of 11, Potassium 5.5, s/p Lokelma 10mg. No EKG changes (peaked T waves or prolonged QT), likely 2/2 metastases, unknown if there is bone involvement. Differential includes tumor lysis syndrome.   - TSH wnl  - Uric acid and PTH both elevated, likely 2/2 active pulmonary malignancy  - f/u Heme/Onc (Dr. Wellington's) recs

## 2021-02-08 NOTE — PROGRESS NOTE ADULT - PROBLEM SELECTOR PLAN 4
Cr. 1.6, baseline 1.4-1.6  - Avoid nephrotoxic agents  - Pt currently refusing labs as of 2/8, will trend when possible

## 2021-02-08 NOTE — PROGRESS NOTE ADULT - SUBJECTIVE AND OBJECTIVE BOX
Interval Events:  Patient seen and examined at bedside.    MEDICATIONS:  Pulmonary:    Antimicrobials:    Anticoagulants:  aspirin  chewable 81 milliGRAM(s) Oral daily  heparin  Infusion 1200 Unit(s)/Hr IV Continuous <Continuous>    Cardiac:  metoprolol tartrate 25 milliGRAM(s) Oral every 12 hours  metoprolol tartrate Injectable 5 milliGRAM(s) IV Push once PRN    Endocrine:  atorvastatin 10 milliGRAM(s) Oral at bedtime    Allergies    No Known Allergies    Intolerances        Vital Signs Last 24 Hrs  T(C): 36.9 (08 Feb 2021 02:00), Max: 37.7 (07 Feb 2021 08:48)  T(F): 98.5 (08 Feb 2021 02:00), Max: 99.9 (07 Feb 2021 08:48)  HR: 74 (08 Feb 2021 03:42) (68 - 156)  BP: 104/51 (08 Feb 2021 03:42) (101/65 - 138/63)  BP(mean): 73 (08 Feb 2021 03:42) (73 - 91)  RR: 18 (08 Feb 2021 03:42) (16 - 22)  SpO2: 100% (08 Feb 2021 03:42) (90% - 100%)    02-07 @ 07:01  -  02-08 @ 07:00  --------------------------------------------------------  IN: 88 mL / OUT: 300 mL / NET: -212 mL          LABS:      CBC Full  -  ( 06 Feb 2021 20:31 )  WBC Count : 13.33 K/uL  RBC Count : 3.80 M/uL  Hemoglobin : 11.2 g/dL  Hematocrit : 34.0 %  Platelet Count - Automated : 379 K/uL  Mean Cell Volume : 89.5 fl  Mean Cell Hemoglobin : 29.5 pg  Mean Cell Hemoglobin Concentration : 32.9 gm/dL  Auto Neutrophil # : 9.88 K/uL  Auto Lymphocyte # : 1.96 K/uL  Auto Monocyte # : 1.15 K/uL  Auto Eosinophil # : 0.35 K/uL  Auto Basophil # : 0.00 K/uL  Auto Neutrophil % : 74.1 %  Auto Lymphocyte % : 14.7 %  Auto Monocyte % : 8.6 %  Auto Eosinophil % : 2.6 %  Auto Basophil % : 0.0 %    02-07    138  |  104  |  34<H>  ----------------------------<  108<H>  4.6   |  20<L>  |  1.52<H>    Ca    9.6      07 Feb 2021 10:44  Mg     2.0     02-07    TPro  7.7  /  Alb  3.6  /  TBili  0.5  /  DBili  x   /  AST  14  /  ALT  8<L>  /  AlkPhos  57  02-06    PT/INR - ( 06 Feb 2021 20:31 )   PT: 16.0 sec;   INR: 1.35          PTT - ( 07 Feb 2021 15:00 )  PTT:36.7 sec                  RADIOLOGY & ADDITIONAL STUDIES (The following images were personally reviewed): Interval Events:  Patient seen and examined at bedside.    Patient initially not receptive to conversation however later in the day discussed plan with pulmonary team,. Currently without respiratory complaints.     MEDICATIONS:  Pulmonary:    Antimicrobials:    Anticoagulants:  aspirin  chewable 81 milliGRAM(s) Oral daily  heparin  Infusion 1200 Unit(s)/Hr IV Continuous <Continuous>    Cardiac:  metoprolol tartrate 25 milliGRAM(s) Oral every 12 hours  metoprolol tartrate Injectable 5 milliGRAM(s) IV Push once PRN    Endocrine:  atorvastatin 10 milliGRAM(s) Oral at bedtime    Allergies    No Known Allergies    Intolerances    Vital Signs Last 24 Hrs  T(C): 36.9 (08 Feb 2021 02:00), Max: 37.7 (07 Feb 2021 08:48)  T(F): 98.5 (08 Feb 2021 02:00), Max: 99.9 (07 Feb 2021 08:48)  HR: 74 (08 Feb 2021 03:42) (68 - 156)  BP: 104/51 (08 Feb 2021 03:42) (101/65 - 138/63)  BP(mean): 73 (08 Feb 2021 03:42) (73 - 91)  RR: 18 (08 Feb 2021 03:42) (16 - 22)  SpO2: 100% (08 Feb 2021 03:42) (90% - 100%)    02-07 @ 07:01  -  02-08 @ 07:00  --------------------------------------------------------  IN: 88 mL / OUT: 300 mL / NET: -212 mL    Physical Exam:  Patient declined exam      LABS:  CBC Full  -  ( 06 Feb 2021 20:31 )  WBC Count : 13.33 K/uL  RBC Count : 3.80 M/uL  Hemoglobin : 11.2 g/dL  Hematocrit : 34.0 %  Platelet Count - Automated : 379 K/uL  Mean Cell Volume : 89.5 fl  Mean Cell Hemoglobin : 29.5 pg  Mean Cell Hemoglobin Concentration : 32.9 gm/dL  Auto Neutrophil # : 9.88 K/uL  Auto Lymphocyte # : 1.96 K/uL  Auto Monocyte # : 1.15 K/uL  Auto Eosinophil # : 0.35 K/uL  Auto Basophil # : 0.00 K/uL  Auto Neutrophil % : 74.1 %  Auto Lymphocyte % : 14.7 %  Auto Monocyte % : 8.6 %  Auto Eosinophil % : 2.6 %  Auto Basophil % : 0.0 %    02-07    138  |  104  |  34<H>  ----------------------------<  108<H>  4.6   |  20<L>  |  1.52<H>    Ca    9.6      07 Feb 2021 10:44  Mg     2.0     02-07    TPro  7.7  /  Alb  3.6  /  TBili  0.5  /  DBili  x   /  AST  14  /  ALT  8<L>  /  AlkPhos  57  02-06    PT/INR - ( 06 Feb 2021 20:31 )   PT: 16.0 sec;   INR: 1.35       PTT - ( 07 Feb 2021 15:00 )  PTT:36.7 sec    RADIOLOGY & ADDITIONAL STUDIES:  reviewed

## 2021-02-08 NOTE — PROGRESS NOTE ADULT - ASSESSMENT
83 y/o F PMHx DM, HTN, Stage IIIb LLL NSCLC metastatic, c/l R endobronchial SCC, TOMMY presenting to [facility] with palpitations and shortness of breath for the past 3 days, admitted for Paroxysmal Afib and right lung lobe collapse with hilar involvement  83 y/o F PMHx DM, HTN, HLD, Stage IIIb LLL NSCLC metastatic, c/l R endobronchial SCC, TOMMY presenting to [facility] with palpitations and shortness of breath for the past 3 days, admitted for Paroxysmal Afib and right lung lobe collapse with hilar involvement

## 2021-02-08 NOTE — PROGRESS NOTE ADULT - PROBLEM SELECTOR PLAN 5
Moderate Insulin Sliding scale - c/w mISS  - Carb consistent diet Hypercalcemia of 11, Potassium 5.5, s/p Lokelma 10mg. No EKG changes (peaked T waves or prolonged QT), likely 2/2 metastases, unknown if there is bone involvement. Differential includes tumor lysis syndrome.   - TSH wnl  - Uric acid and PTH both elevated, likely 2/2 active pulmonary malignancy  - f/u Heme/Onc (Dr. Wellington's) recs

## 2021-02-08 NOTE — PROGRESS NOTE ADULT - PROBLEM SELECTOR PLAN 6
F: None   E: Replete as necessary K>4 Mg>2  N: DASH diet     DVT Prophylaxis: Heparin gtt   GI prophylaxis: None   CODE STATUS: FULL F: None   E: Replete as necessary K>4 Mg>2  N: DASH w/CC diet  DVT Prophylaxis: Lovenox 50mg BID  GI prophylaxis: None   CODE STATUS: FULL CODE  Dispo: [facility] to [facility] - c/w mISS  - Carb consistent diet

## 2021-02-08 NOTE — PROGRESS NOTE ADULT - ASSESSMENT
83 y/o F PMHx DM, HTN, HLD, Stage IIIb LLL NSCLC metastatic, c/l R endobronchial SCC, TOMMY presenting to [facility] with palpitations and shortness of breath for the past 3 days, admitted for Paroxysmal Afib and right lung lobe collapse with hilar involvement  85 y/o F PMHx DM, HTN, HLD, Stage IIIb LLL NSCLC metastatic, c/l R endobronchial SCC, TOMMY presenting to [facility] with palpitations and shortness of breath for the past 3 days, admitted for R. pleural effusion with right lung lobe collapse with hilar involvement   and paroxysmal Afib.

## 2021-02-08 NOTE — PROGRESS NOTE ADULT - ASSESSMENT
A/P: 84F w/HTN, DM, Stage IIIb NSCLC metastatic cancer, R endobrachial SCC, TOMMY adm on 2/6 to [facility] for pAF in setting of R lung collapse 2/2 R hilar mass. Cardiology consulted 2/7 for Afib recommendations.     #pAF - new onset, currently in sinus on monitor. [id number] - 5.  - Change lopressor to Toprol XL 50mg daily w/hold parameters for HR< 60, SBP < 90  - recommend full dose anticoagulation given high stroke risk. Can start NOAC (e.g. eliquis 2.5mg bid)   - given hx of TOMMY & worsening lung mass with recent radiation therapy, suspect Afib will recur    Discussed with attending and primary team.

## 2021-02-08 NOTE — CONSULT NOTE ADULT - ATTENDING COMMENTS
Patient seen and examined with Dr. Love on 2/08. Patient admitted with respiratory failure, states symptoms have improved. CT scan with moderate sized R pleural effusion and underlying progression of malignancy. Patient was refusing thoracentesis but no agreeable to procedure. Dr. Zaidi was contacted and will schedule patient for tomorrow. Will need repeat imaging and to discuss long-term goals of care and cancer treatment once clinically stable following procedure. Daughter and son were contacted and agree with plan.

## 2021-02-08 NOTE — CHART NOTE - NSCHARTNOTEFT_GEN_A_CORE
Notified by nurse around 8pm that patient was refusing care by nurse and did not want anyone in her room. She was refusing to have blood pressure cuff on her arm to check her blood pressure. Primary team evaluated patient at bedside. Patient refused to speak to us, said that she did not want anyone in the room, did not want anyone to touch her, or to talk to anyone. When asked why she not want, she said that she did not trust any of us and that was the end of the discussion. She did state that she came to get better, but could not say [facility], what had happened earlier, or what was needed to get better. At time, heart rate remained rate controlled and patient was sitting in bed. I had a phone discussion with Jeremie and Homa (son/daughter HCPs) and updated them on the situation. They stated that this type of behavior was not normal for her and that even earlier today she had not been acting like this. She does not normally get confused at night and takes nightly seroquel 25mg, however, over last 2 days has been a little confused at night. Discussed that there were risks and benefits of not having continue monitoring and that we could not continue the heparin gtt without close PTT monitoring. I explained to both the patient and children, that without the blood thinner, she was at risk of developing a blood clot that could cause stroke or possibly even death. The patient refused to acknowledge the role of blood thinner or the episode of afib RVR. Family discussed multiple phone calls with patient to try and explain why we needed to monitor her without success. Bed alarm placed and primary team/nurses monitoring patient for fall risk. Approval for family member to visit per ADN to redirect patient for critical care needs. Patient asking to use bathroom, but would not let nurses disconnect her from the monitor or IV, patient insisted to stay connected, despite explaining that she would rip her IV out and still be connected to the wall and fall to the ground. She continued to shout at all staff that entered room. At approximately 11pm, patient converted back into afib with RVR, HR 150s-170s, asymptomatic, but unable to check blood pressure. Explained to patient significant risk of worsening health and potentially death if HR not treated and monitored. Patient refused assistance. Family member able to redirect patient and allowed for Lopressor 5mg IV x1 to be given. Afib with RVR resolved after 1 push. HR in 70s, BP 130s/70s, answering questions. Discussed with children need for monitoring and importance of PTT monitoring for heparin gtt. Patient and daughter both aware of risk of not having anticoagulation with atrial fibrillation. Patient states that she will consider it in the morning. Patient continues to refuse despite strenuous explanation of risks and patient verbalized understanding and does not want any intervention tonight. Discussed with patient, family, nurses, and primary medical team.

## 2021-02-08 NOTE — PROGRESS NOTE ADULT - SUBJECTIVE AND OBJECTIVE BOX
OVERNIGHT EVENTS:    SUBJECTIVE / INTERVAL HPI: Patient seen and examined at bedside.     VITAL SIGNS:  Vital Signs Last 24 Hrs  T(C): 36.9 (08 Feb 2021 02:00), Max: 37.7 (07 Feb 2021 08:48)  T(F): 98.5 (08 Feb 2021 02:00), Max: 99.9 (07 Feb 2021 08:48)  HR: 74 (08 Feb 2021 03:42) (68 - 156)  BP: 104/51 (08 Feb 2021 03:42) (96/62 - 138/63)  BP(mean): 73 (08 Feb 2021 03:42) (73 - 91)  RR: 18 (08 Feb 2021 03:42) (16 - 22)  SpO2: 100% (08 Feb 2021 03:42) (90% - 100%)    PHYSICAL EXAM:    General: WDWN  HEENT: NC/AT; PERRL, anicteric sclera; MMM  Neck: supple  Cardiovascular: +S1/S2, RRR  Respiratory: CTA B/L; no W/R/R  Gastrointestinal: soft, NT/ND; +BSx4  Extremities: WWP; no edema, clubbing or cyanosis  Vascular: 2+ radial, DP/PT pulses B/L  Neurological: AAOx3; no focal deficits    MEDICATIONS:  MEDICATIONS  (STANDING):  aspirin  chewable 81 milliGRAM(s) Oral daily  atorvastatin 10 milliGRAM(s) Oral at bedtime  heparin  Infusion 1200 Unit(s)/Hr (12 mL/Hr) IV Continuous <Continuous>  metoprolol tartrate 25 milliGRAM(s) Oral every 12 hours  pantoprazole    Tablet 40 milliGRAM(s) Oral before breakfast  QUEtiapine 25 milliGRAM(s) Oral daily    MEDICATIONS  (PRN):  acetaminophen   Tablet .. 650 milliGRAM(s) Oral every 4 hours PRN Mild Pain (1 - 3)  metoprolol tartrate Injectable 5 milliGRAM(s) IV Push once PRN afib rvr  ondansetron    Tablet 4 milliGRAM(s) Oral every 8 hours PRN Nausea and/or Vomiting      ALLERGIES:  Allergies    No Known Allergies    Intolerances        LABS:                        11.2   13.33 )-----------( 379      ( 06 Feb 2021 20:31 )             34.0     02-07    138  |  104  |  34<H>  ----------------------------<  108<H>  4.6   |  20<L>  |  1.52<H>    Ca    9.6      07 Feb 2021 10:44  Mg     2.0     02-07    TPro  7.7  /  Alb  3.6  /  TBili  0.5  /  DBili  x   /  AST  14  /  ALT  8<L>  /  AlkPhos  57  02-06    PT/INR - ( 06 Feb 2021 20:31 )   PT: 16.0 sec;   INR: 1.35          PTT - ( 07 Feb 2021 15:00 )  PTT:36.7 sec    CAPILLARY BLOOD GLUCOSE      POCT Blood Glucose.: 142 mg/dL (07 Feb 2021 17:13)      RADIOLOGY & ADDITIONAL TESTS: Reviewed. **Transfer Note from [address] to [address] [place] [address]**  Hospital Course:  85F with PMHx DM, HTN, Stage IIIb LLL NSCLC metastatic, c/l R endobronchial SCC, TOMMY presenting to [facility] with palpitations and shortness of breath that have worsened for the past 3 days, found to have large R sided pleural effusion with RML/RLL collapse and R hilar mass, initially admitted to [facility] for management of her pleural effusion but transferred to [address] because of new onset AFib with RVR. EKG at the time of AFib showed AFib with no ischemic changes and pt was given Lopressor 5 IVP + Lopressor 25mg PO, which converted her back to NSR. She has been well controlled with Lopressor 25mg PO BID and was on a Heparin gtt for AC, but as of 2/8 was refusing all blood draws/other interventions because she demanded to speak with Dr. Wellington (her outpatient Heme/Oncologist) first; pt is AAOx3 and does have capacity. Pulmonology recommends thoracentesis, now pending patient's consent. Cardiology was consulted and as of 2/8 is recommending Toprol 50mg qd and Lovenox for AC if allowed by patient.    OVERNIGHT EVENTS: Agitated but AAOx3, retaining capacity and not confused. Refused all interventions including blood draws for PTT checks; heparin gtt stopped due to lack of monitoring. Pt had 1 bout of AFib w/RVR and was given Lopressor 5mg x1 which returned pt to NSR. Demanding to speak with Dr. Wellington.    SUBJECTIVE / INTERVAL HPI: Patient seen and examined at bedside. Pt refused to answer subjective questioning, demands to speak with Dr. Wellington.    VITAL SIGNS:  Vital Signs Last 24 Hrs  T(C): 36.9 (08 Feb 2021 02:00), Max: 37.7 (07 Feb 2021 08:48)  T(F): 98.5 (08 Feb 2021 02:00), Max: 99.9 (07 Feb 2021 08:48)  HR: 74 (08 Feb 2021 03:42) (68 - 156)  BP: 104/51 (08 Feb 2021 03:42) (96/62 - 138/63)  BP(mean): 73 (08 Feb 2021 03:42) (73 - 91)  RR: 18 (08 Feb 2021 03:42) (16 - 22)  SpO2: 100% (08 Feb 2021 03:42) (90% - 100%)    PHYSICAL EXAM:    Refused all examination except for basic lung exam:  Respiratory: Diminished breath sounds over R lung fields, no crackles    MEDICATIONS:  MEDICATIONS  (STANDING):  aspirin  chewable 81 milliGRAM(s) Oral daily  atorvastatin 10 milliGRAM(s) Oral at bedtime  heparin  Infusion 1200 Unit(s)/Hr (12 mL/Hr) IV Continuous <Continuous>  metoprolol tartrate 25 milliGRAM(s) Oral every 12 hours  pantoprazole    Tablet 40 milliGRAM(s) Oral before breakfast  QUEtiapine 25 milliGRAM(s) Oral daily    MEDICATIONS  (PRN):  acetaminophen   Tablet .. 650 milliGRAM(s) Oral every 4 hours PRN Mild Pain (1 - 3)  metoprolol tartrate Injectable 5 milliGRAM(s) IV Push once PRN afib rvr  ondansetron    Tablet 4 milliGRAM(s) Oral every 8 hours PRN Nausea and/or Vomiting      ALLERGIES:  Allergies    No Known Allergies    Intolerances        LABS:                        11.2   13.33 )-----------( 379      ( 06 Feb 2021 20:31 )             34.0     02-07    138  |  104  |  34<H>  ----------------------------<  108<H>  4.6   |  20<L>  |  1.52<H>    Ca    9.6      07 Feb 2021 10:44  Mg     2.0     02-07    TPro  7.7  /  Alb  3.6  /  TBili  0.5  /  DBili  x   /  AST  14  /  ALT  8<L>  /  AlkPhos  57  02-06    PT/INR - ( 06 Feb 2021 20:31 )   PT: 16.0 sec;   INR: 1.35          PTT - ( 07 Feb 2021 15:00 )  PTT:36.7 sec    CAPILLARY BLOOD GLUCOSE      POCT Blood Glucose.: 142 mg/dL (07 Feb 2021 17:13)      RADIOLOGY & ADDITIONAL TESTS: Reviewed. **Transfer Note from [address] to [address] [place] [address]**  Hospital Course:  85F with PMHx DM, HTN, HLD, Stage IIIb LLL NSCLC metastatic, c/l R endobronchial SCC, TOMMY presenting to [facility] with palpitations and shortness of breath that have worsened for the past 3 days, found to have large R sided pleural effusion with RML/RLL collapse and R hilar mass, initially admitted to [facility] for management of her pleural effusion but transferred to [address] [facility] because of new onset AFib with RVR. EKG at the time of AFib showed AFib with no ischemic changes and pt was given Lopressor 5 IVP + Lopressor 25mg PO, which converted her back to NSR. She has been well controlled with Lopressor 25mg PO BID and was on a Heparin gtt for AC, but as of 2/8 was refusing all blood draws/other interventions because she demanded to speak with Dr. Wellington (her outpatient Heme/Oncologist) first; pt is AAOx3 and does have capacity. Pulmonology recommends thoracentesis, now pending patient's consent. Cardiology was consulted and as of 2/8 is recommending Toprol 50mg qd and Lovenox for AC if allowed by patient.    OVERNIGHT EVENTS: Agitated but AAOx3, retaining capacity and not confused. Refused all interventions including blood draws for PTT checks; heparin gtt stopped due to lack of monitoring. Pt had 1 bout of AFib w/RVR and was given Lopressor 5mg x1 which returned pt to NSR. Demanding to speak with Dr. Wellington.    SUBJECTIVE / INTERVAL HPI: Patient seen and examined at bedside. Pt refused to answer subjective questioning, demands to speak with Dr. Wellington.    VITAL SIGNS:  Vital Signs Last 24 Hrs  T(C): 36.9 (08 Feb 2021 02:00), Max: 37.7 (07 Feb 2021 08:48)  T(F): 98.5 (08 Feb 2021 02:00), Max: 99.9 (07 Feb 2021 08:48)  HR: 74 (08 Feb 2021 03:42) (68 - 156)  BP: 104/51 (08 Feb 2021 03:42) (96/62 - 138/63)  BP(mean): 73 (08 Feb 2021 03:42) (73 - 91)  RR: 18 (08 Feb 2021 03:42) (16 - 22)  SpO2: 100% (08 Feb 2021 03:42) (90% - 100%)    PHYSICAL EXAM:    Refused all examination except for basic lung exam:  Respiratory: Diminished breath sounds over R lung fields, no crackles    MEDICATIONS:  MEDICATIONS  (STANDING):  aspirin  chewable 81 milliGRAM(s) Oral daily  atorvastatin 10 milliGRAM(s) Oral at bedtime  heparin  Infusion 1200 Unit(s)/Hr (12 mL/Hr) IV Continuous <Continuous>  metoprolol tartrate 25 milliGRAM(s) Oral every 12 hours  pantoprazole    Tablet 40 milliGRAM(s) Oral before breakfast  QUEtiapine 25 milliGRAM(s) Oral daily    MEDICATIONS  (PRN):  acetaminophen   Tablet .. 650 milliGRAM(s) Oral every 4 hours PRN Mild Pain (1 - 3)  metoprolol tartrate Injectable 5 milliGRAM(s) IV Push once PRN afib rvr  ondansetron    Tablet 4 milliGRAM(s) Oral every 8 hours PRN Nausea and/or Vomiting      ALLERGIES:  Allergies    No Known Allergies    Intolerances        LABS:                        11.2   13.33 )-----------( 379      ( 06 Feb 2021 20:31 )             34.0     02-07    138  |  104  |  34<H>  ----------------------------<  108<H>  4.6   |  20<L>  |  1.52<H>    Ca    9.6      07 Feb 2021 10:44  Mg     2.0     02-07    TPro  7.7  /  Alb  3.6  /  TBili  0.5  /  DBili  x   /  AST  14  /  ALT  8<L>  /  AlkPhos  57  02-06    PT/INR - ( 06 Feb 2021 20:31 )   PT: 16.0 sec;   INR: 1.35          PTT - ( 07 Feb 2021 15:00 )  PTT:36.7 sec    CAPILLARY BLOOD GLUCOSE      POCT Blood Glucose.: 142 mg/dL (07 Feb 2021 17:13)      RADIOLOGY & ADDITIONAL TESTS: Reviewed.

## 2021-02-08 NOTE — PROGRESS NOTE ADULT - PROBLEM SELECTOR PLAN 1
Chads-VAS score of 5, prior history of Afib but patient reports never been on rate controlling agent or AC. Now s/p Lopressor 5mg IVP and 25mg Oral  in NSR.   - c/w Heparin gtt, pending recs from Pul regarding interventions  - Cardiology consulted, recommending Lopressor 25mg BID PO, TTE, Eliquis 2.5mg BID once off Heparin gtt  - c/w Lopressor 25mg PO BID, Heparin gtt  - f/u TTE Chads-VAS score of 5, prior history of Afib but patient reports never been on rate controlling agent or AC. Now s/p Lopressor 5mg IVP and 25mg Oral  in NSR.   - Pt refusing PTT draws, now on full dose AC Lovenox as tolerated  - c/w Lovenox 50mg BID as tolerated  - c/w Toprol 50mg qd starting 2/9  - f/u TTE

## 2021-02-08 NOTE — PROGRESS NOTE ADULT - ATTENDING COMMENTS
Initial attending contact date  2/8/21    . See fellow note written above for details. I reviewed the fellow documentation. I have personally seen and examined this patient. I reviewed vitals, labs, medications, cardiac studies, and additional imaging. I agree with the above fellow's findings and plans as written above

## 2021-02-08 NOTE — CONSULT NOTE ADULT - SUBJECTIVE AND OBJECTIVE BOX
Hematology Oncology Consult Note    HPI:  86 y/o F PMHx DM, HTN, NSCLC metastatic, c/l R endobronchial SCC, TOMMY presenting to [facility] with palpitations and shortness of breath for the past 3 days. She has noticed worsening of her symptoms and decided to come to the ED. Patient denies any recent travel, abx use, hospitalizations, and sick contacts. Patient denies h/n/v/d, fever, chills, cp, abd pain, leg swelling, rashes, dysuria, and changes in BM.   In the ED- VS were T 98, HR , -125/56-61, RR16 SP02 100 on 2L NC   Labs significant for wbc 13.3, hgb 11.2, D-Dimer 418, INR 1.35, K 5.5, Bicarb 21, BUN/Cr 36/1.65, Ca 11, BNP 5352  CXR showed right sided opacification to the midlung field  CT PE protocol negative for PE but does show progression and extension of metastatic disease with collapse of right midle and lower lobes with right hilar mass.   CT head negative for metastasis   EKG showed afib with rvr, no ischemic changes   Given Metoprolol 5mg IVP, Lopressor 25mg oral, Lokelma 10mg and admitted for further management of worsening tumor burden.  (07 Feb 2021 03:27)    Oncology consulted for patient as known to service. IN summary this is a 85 year old F with Stage IV lung Cancer (large cell) who is being followed by Dr. Wellington. Admitted for shirtness of breath and found to have large effusion.     Allergies    No Known Allergies    Intolerances        MEDICATIONS  (STANDING):  aspirin  chewable 81 milliGRAM(s) Oral daily  atorvastatin 10 milliGRAM(s) Oral at bedtime  dextrose 40% Gel 15 Gram(s) Oral once  dextrose 5%. 1000 milliLiter(s) (50 mL/Hr) IV Continuous <Continuous>  dextrose 5%. 1000 milliLiter(s) (100 mL/Hr) IV Continuous <Continuous>  dextrose 50% Injectable 25 Gram(s) IV Push once  dextrose 50% Injectable 12.5 Gram(s) IV Push once  dextrose 50% Injectable 25 Gram(s) IV Push once  enoxaparin Injectable 50 milliGRAM(s) SubCutaneous every 24 hours  glucagon  Injectable 1 milliGRAM(s) IntraMuscular once  insulin lispro (ADMELOG) corrective regimen sliding scale   SubCutaneous Before meals and at bedtime  metoprolol tartrate 25 milliGRAM(s) Oral once  pantoprazole    Tablet 40 milliGRAM(s) Oral before breakfast  QUEtiapine 25 milliGRAM(s) Oral daily    MEDICATIONS  (PRN):  acetaminophen   Tablet .. 650 milliGRAM(s) Oral every 4 hours PRN Mild Pain (1 - 3)  metoprolol tartrate Injectable 5 milliGRAM(s) IV Push once PRN afib rvr  ondansetron    Tablet 4 milliGRAM(s) Oral every 8 hours PRN Nausea and/or Vomiting      PAST MEDICAL & SURGICAL HISTORY:  Heart murmur    Tobacco use    Squamous cell carcinoma of left lung    Adenocarcinoma of head and neck    Submandibular gland mass    DM (diabetes mellitus)    H/O: hysterectomy        SOCIAL HISTORY: No EtOH, no tobacco    T(F): 98.5 (02-08-21 @ 02:00), Max: 98.5 (02-08-21 @ 02:00)  HR: 72 (02-08-21 @ 12:38)  BP: 109/55 (02-08-21 @ 12:38)  RR: 16 (02-08-21 @ 12:38)  SpO2: 96% (02-08-21 @ 12:38)  Wt(kg): --    General: Frail, thin, elderly female patient in NAD  HEENT: NC/AT; PERRL, anicteric sclera; MMM dry  Neck: supple  Cardiovascular: +S1/S2, RRR  Respiratory: CTA B/L but w/decreased breath sounds on Right; no W/R/R  Gastrointestinal: soft, NT/ND; +BSx4  Extremities: WWP; no edema, clubbing or cyanosis  Vascular: 2+ radial, DP pulses B/L  Neurological: AAOx3; no focal deficits                          11.2   13.33 )-----------( 379      ( 06 Feb 2021 20:31 )             34.0       02-07    138  |  104  |  34<H>  ----------------------------<  108<H>  4.6   |  20<L>  |  1.52<H>    Ca    9.6      07 Feb 2021 10:44  Mg     2.0     02-07    TPro  7.7  /  Alb  3.6  /  TBili  0.5  /  DBili  x   /  AST  14  /  ALT  8<L>  /  AlkPhos  57  02-06

## 2021-02-08 NOTE — PROGRESS NOTE ADULT - PROBLEM SELECTOR PLAN 4
Hypercalcemia of 11, Potassium 5.5, s/p Lokelma 10mg. No EKG changes (peaked T waves or prolonged QT), likely 2/2 metastases, unknown if there is bone involvement. Differential includes tumor lysis syndrome.   - f/u PTH and TSH  - f/u Phos and Uric Acid (r/o tumor lysis given recent radiation/chemo)  - Trend BMP Hypercalcemia of 11, Potassium 5.5, s/p Lokelma 10mg. No EKG changes (peaked T waves or prolonged QT), likely 2/2 metastases, unknown if there is bone involvement. Differential includes tumor lysis syndrome.   - TSH wnl  - Uric acid and PTH both elevated, likely 2/2 active pulmonary malignancy  - f/u Heme/Onc (Dr. Wellington's) recs Cr. 1.6, baseline 1.4-1.6  - Avoid nephrotoxic agents  - Pt currently refusing labs as of 2/8, will trend when possible

## 2021-02-09 NOTE — PROGRESS NOTE ADULT - PROBLEM SELECTOR PLAN 2
Pt has hx of LLL NSCLC and R endobronchial CSS s/p 3 cycles of Carboplatin/Etoposide, follows with Dr. Wellington. CXR shows RIGHT Lower Lobe opacity likely due to underlying malignancy.  - Pt noted to have large R pleural effusion on CT chest - scheduled for thoracentesis on 2/9 with Dr. Parker pulmonology  - Heme onc saw patient on 2/8, recommend holding off on starting immunotherapy until 5 treatment course of radiation therapy is complete  - Currently plan is to do to therapeutic paracentesis given findings on scans, to send fluid for analysis   - Will need to follow up with Radiation oncology once discharged, to start  radiation  - Palliative Care consult ordered, f/u recs   - Zofran PRN for nausea  - Currently SpO2 % on RA, intermittently desats with lateral decubitus positioning  - Monitor respiratory status

## 2021-02-09 NOTE — CONSULT NOTE ADULT - CONVERSATION DETAILS
In addition to the EM visit, an advance care planning meeting was performed  Start time:100pm  End time: 120pm  Total time: 20 minutes  A face to face meeting to discuss advance care planning was held today regarding: CRISTI PEREA  Primary decision maker: Patient is able to make decisions for herself  Alternate/surrogate: Palmira  Discussed advance directives including, but not limited to, healthcare proxy and code status.  Decision regarding code status: none  Documentation completed today: non    Discussion regarding advance directives was started and code status was addressed, but patient did not want make any decisions regarding code status at this time. Health care proxies was also discussed and she wanted her son and daughter to be the health care proxies, but also did not want to fill out any forms at this time. Form was left with patient and daughter to review.

## 2021-02-09 NOTE — PROGRESS NOTE ADULT - ATTENDING COMMENTS
right sided moderate to large, simple appearing pleural effusion and RML and RLL collapse 2/2 hilar mass.   right thoracentesis performed at bedside today, 1300cc of serous appearing pleural fluid drained.   Follow post procedure CXR and pleural fluid studies.

## 2021-02-09 NOTE — CONSULT NOTE ADULT - PROBLEM SELECTOR RECOMMENDATION 6
Patient remains full code. Palliative medicine was introduced to the patient and her daughter, will continue to manage patient symptoms as they arise.   As discussed during the palliative IDT meeting, the patients PSSA screening did not identify any current psychosocial need or spiritual support deficits.

## 2021-02-09 NOTE — PROGRESS NOTE ADULT - SUBJECTIVE AND OBJECTIVE BOX
INTERVAL EVENTS: Patient complained of palpitations when she was in AF with RVR earlier today. Started on Cardizem po after Cardizem 5mg IVPx1. Patient since converted to NSR. She says she feels better. She also is amenable to AC after her thoracentesis.     PAST MEDICAL & SURGICAL HISTORY:  Heart murmur    Depression    Tobacco use    Squamous cell carcinoma of left lung    Adenocarcinoma of head and neck    Submandibular gland mass    DM (diabetes mellitus)    H/O: hysterectomy        MEDICATIONS  (STANDING):  aspirin  chewable 81 milliGRAM(s) Oral daily  atorvastatin 10 milliGRAM(s) Oral at bedtime  dextrose 40% Gel 15 Gram(s) Oral once  dextrose 5%. 1000 milliLiter(s) (50 mL/Hr) IV Continuous <Continuous>  dextrose 5%. 1000 milliLiter(s) (100 mL/Hr) IV Continuous <Continuous>  dextrose 50% Injectable 25 Gram(s) IV Push once  dextrose 50% Injectable 12.5 Gram(s) IV Push once  dextrose 50% Injectable 25 Gram(s) IV Push once  diltiazem    Tablet 30 milliGRAM(s) Oral every 8 hours  glucagon  Injectable 1 milliGRAM(s) IntraMuscular once  insulin lispro (ADMELOG) corrective regimen sliding scale   SubCutaneous Before meals and at bedtime  metoprolol succinate ER 50 milliGRAM(s) Oral every 24 hours  pantoprazole    Tablet 40 milliGRAM(s) Oral before breakfast  QUEtiapine 25 milliGRAM(s) Oral at bedtime    MEDICATIONS  (PRN):  acetaminophen   Tablet .. 650 milliGRAM(s) Oral every 4 hours PRN Mild Pain (1 - 3)  calamine/zinc oxide Lotion 1 Application(s) Topical three times a day PRN Itching  metoprolol tartrate Injectable 5 milliGRAM(s) IV Push once PRN afib rvr  ondansetron    Tablet 4 milliGRAM(s) Oral every 8 hours PRN Nausea and/or Vomiting      Vital Signs Last 24 Hrs  T(C): 36.7 (09 Feb 2021 10:00), Max: 36.8 (08 Feb 2021 20:49)  T(F): 98 (09 Feb 2021 10:00), Max: 98.2 (08 Feb 2021 20:49)  HR: 114 (09 Feb 2021 12:34) (71 - 165)  BP: 100/65 (09 Feb 2021 12:34) (95/56 - 163/92)  BP(mean): 77 (09 Feb 2021 12:34) (74 - 91)  RR: 18 (09 Feb 2021 12:34) (16 - 18)  SpO2: 100% (09 Feb 2021 12:34) (93% - 100%)     PHYSICAL EXAM:  GEN: Awake, alert. Cachectic.   HEENT: NCAT, PERRL, EOMI. Mucosa moist. No JVD.  RESP: CTA b/l  CV: RRR. Normal S1/S2. No m/r/g.  ABD: Soft. NT/ND. BS+  EXT: Warm. No edema, clubbing, or cyanosis.   NEURO: AAOx3. No focal deficits.     LABS:                        9.2    10.17 )-----------( 245      ( 09 Feb 2021 05:44 )             28.8     02-09    137  |  104  |  28<H>  ----------------------------<  101<H>  4.6   |  20<L>  |  1.55<H>    Ca    9.0      09 Feb 2021 05:44  Phos  3.3     02-09  Mg     2.0     02-09          PT/INR - ( 08 Feb 2021 17:10 )   PT: 14.9 sec;   INR: 1.26          PTT - ( 08 Feb 2021 17:10 )  PTT:24.2 sec    I&O's Summary    08 Feb 2021 07:01  -  09 Feb 2021 07:00  --------------------------------------------------------  IN: 30 mL / OUT: 0 mL / NET: 30 mL      BNP  RADIOLOGY & ADDITIONAL STUDIES:    TELEMETRY:    EKG:

## 2021-02-09 NOTE — CONSULT NOTE ADULT - PROBLEM SELECTOR RECOMMENDATION 3
Patient has right sided pleural effusion which plan it so have thoracentesis for. Appreciate Pulmonary involvement.

## 2021-02-09 NOTE — PROGRESS NOTE ADULT - SUBJECTIVE AND OBJECTIVE BOX
OVERNIGHT EVENTS:    SUBJECTIVE / INTERVAL HPI: Patient seen and examined at bedside.     VITAL SIGNS:  Vital Signs Last 24 Hrs  T(C): 36.2 (09 Feb 2021 06:00), Max: 36.8 (08 Feb 2021 20:49)  T(F): 97.2 (09 Feb 2021 06:00), Max: 98.2 (08 Feb 2021 20:49)  HR: 76 (09 Feb 2021 03:40) (71 - 165)  BP: 104/52 (09 Feb 2021 03:40) (95/56 - 163/92)  BP(mean): 74 (09 Feb 2021 03:40) (74 - 79)  RR: 18 (09 Feb 2021 03:40) (16 - 18)  SpO2: 100% (09 Feb 2021 03:40) (93% - 100%)    PHYSICAL EXAM:    General: WDWN  HEENT: NC/AT; PERRL, anicteric sclera; MMM  Neck: supple  Cardiovascular: +S1/S2, RRR  Respiratory: CTA B/L; no W/R/R  Gastrointestinal: soft, NT/ND; +BSx4  Extremities: WWP; no edema, clubbing or cyanosis  Vascular: 2+ radial, DP/PT pulses B/L  Neurological: AAOx3; no focal deficits    MEDICATIONS:  MEDICATIONS  (STANDING):  aspirin  chewable 81 milliGRAM(s) Oral daily  atorvastatin 10 milliGRAM(s) Oral at bedtime  dextrose 40% Gel 15 Gram(s) Oral once  dextrose 5%. 1000 milliLiter(s) (50 mL/Hr) IV Continuous <Continuous>  dextrose 5%. 1000 milliLiter(s) (100 mL/Hr) IV Continuous <Continuous>  dextrose 50% Injectable 25 Gram(s) IV Push once  dextrose 50% Injectable 12.5 Gram(s) IV Push once  dextrose 50% Injectable 25 Gram(s) IV Push once  glucagon  Injectable 1 milliGRAM(s) IntraMuscular once  insulin lispro (ADMELOG) corrective regimen sliding scale   SubCutaneous Before meals and at bedtime  metoprolol succinate ER 50 milliGRAM(s) Oral every 24 hours  pantoprazole    Tablet 40 milliGRAM(s) Oral before breakfast  QUEtiapine 25 milliGRAM(s) Oral at bedtime    MEDICATIONS  (PRN):  acetaminophen   Tablet .. 650 milliGRAM(s) Oral every 4 hours PRN Mild Pain (1 - 3)  calamine/zinc oxide Lotion 1 Application(s) Topical three times a day PRN Itching  metoprolol tartrate Injectable 5 milliGRAM(s) IV Push once PRN afib rvr  ondansetron    Tablet 4 milliGRAM(s) Oral every 8 hours PRN Nausea and/or Vomiting      ALLERGIES:  Allergies    No Known Allergies    Intolerances        LABS:                        9.2    10.17 )-----------( 245      ( 09 Feb 2021 05:44 )             28.8     02-09    137  |  104  |  28<H>  ----------------------------<  101<H>  4.6   |  20<L>  |  1.55<H>    Ca    9.0      09 Feb 2021 05:44  Phos  3.3     02-09  Mg     2.0     02-09      PT/INR - ( 08 Feb 2021 17:10 )   PT: 14.9 sec;   INR: 1.26          PTT - ( 08 Feb 2021 17:10 )  PTT:24.2 sec    CAPILLARY BLOOD GLUCOSE      POCT Blood Glucose.: 114 mg/dL (09 Feb 2021 00:44)      RADIOLOGY & ADDITIONAL TESTS: Reviewed. OVERNIGHT EVENTS: Stepped up to [facility] due to episode of AFib w/RVR to HR 160s requiring Lopressor 5mg x2, and SVT requiring Dilitiazem and Adenosine with return to NSR.     SUBJECTIVE / INTERVAL HPI: Patient seen and examined at bedside. Admits to mild dyspnea no worse than yesterday and pruritus of R lateral neck. Denies chest pain, fever, nausea/vomiting, neck pain, abdominal pain, diarrhea, leg pain, headache.     VITAL SIGNS:  Vital Signs Last 24 Hrs  T(C): 36.2 (09 Feb 2021 06:00), Max: 36.8 (08 Feb 2021 20:49)  T(F): 97.2 (09 Feb 2021 06:00), Max: 98.2 (08 Feb 2021 20:49)  HR: 76 (09 Feb 2021 03:40) (71 - 165)  BP: 104/52 (09 Feb 2021 03:40) (95/56 - 163/92)  BP(mean): 74 (09 Feb 2021 03:40) (74 - 79)  RR: 18 (09 Feb 2021 03:40) (16 - 18)  SpO2: 100% (09 Feb 2021 03:40) (93% - 100%)    PHYSICAL EXAM:    General: Elderly female patient in NAD  HEENT: NC/AT; PERRL, anicteric sclera; MMM  Neck: supple; 4 pink-purple 2-3cm hard & fixed palpable lymph nodes, tender to palpation  Cardiovascular: +S1/S2, RRR, crescendo-decrescendo systolic murmur heard best over aortic region  Respiratory: Decreased breath sounds over posterior middle/inferior right lung fields; no crackles, no wheezes, otherwise vesicular  Gastrointestinal: soft, NT/ND; +BSx4  Extremities: WWP; no edema, clubbing or cyanosis  Vascular: Palpable radial, DP pulses B/L  Neurological: AAOx3; no focal deficits    MEDICATIONS:  MEDICATIONS  (STANDING):  aspirin  chewable 81 milliGRAM(s) Oral daily  atorvastatin 10 milliGRAM(s) Oral at bedtime  dextrose 40% Gel 15 Gram(s) Oral once  dextrose 5%. 1000 milliLiter(s) (50 mL/Hr) IV Continuous <Continuous>  dextrose 5%. 1000 milliLiter(s) (100 mL/Hr) IV Continuous <Continuous>  dextrose 50% Injectable 25 Gram(s) IV Push once  dextrose 50% Injectable 12.5 Gram(s) IV Push once  dextrose 50% Injectable 25 Gram(s) IV Push once  glucagon  Injectable 1 milliGRAM(s) IntraMuscular once  insulin lispro (ADMELOG) corrective regimen sliding scale   SubCutaneous Before meals and at bedtime  metoprolol succinate ER 50 milliGRAM(s) Oral every 24 hours  pantoprazole    Tablet 40 milliGRAM(s) Oral before breakfast  QUEtiapine 25 milliGRAM(s) Oral at bedtime    MEDICATIONS  (PRN):  acetaminophen   Tablet .. 650 milliGRAM(s) Oral every 4 hours PRN Mild Pain (1 - 3)  calamine/zinc oxide Lotion 1 Application(s) Topical three times a day PRN Itching  metoprolol tartrate Injectable 5 milliGRAM(s) IV Push once PRN afib rvr  ondansetron    Tablet 4 milliGRAM(s) Oral every 8 hours PRN Nausea and/or Vomiting      ALLERGIES:  Allergies    No Known Allergies    Intolerances        LABS:                        9.2    10.17 )-----------( 245      ( 09 Feb 2021 05:44 )             28.8     02-09    137  |  104  |  28<H>  ----------------------------<  101<H>  4.6   |  20<L>  |  1.55<H>    Ca    9.0      09 Feb 2021 05:44  Phos  3.3     02-09  Mg     2.0     02-09      PT/INR - ( 08 Feb 2021 17:10 )   PT: 14.9 sec;   INR: 1.26          PTT - ( 08 Feb 2021 17:10 )  PTT:24.2 sec    CAPILLARY BLOOD GLUCOSE      POCT Blood Glucose.: 114 mg/dL (09 Feb 2021 00:44)      RADIOLOGY & ADDITIONAL TESTS: Reviewed.

## 2021-02-09 NOTE — CHART NOTE - NSCHARTNOTEFT_GEN_A_CORE
Paged by nurse ~12 saying that patients HR was in the 160s and BP was 90s/60s, changed from baseline throughout the day, afebrile, satting 100% on RA. Went and assessed patient at bedside, patient alert and oriented, stated that she felt like her heart was beating very quickly and that she had a headache. No chest pain or difficulty breathing. Patient with documented history of Afib with RVR. She was placed on a monitor. 5mg lopressor was pushed with initial break of afib and HR to 110s, then rise of HR and SVT seen on the monitor and 12 lead EKG. 6 adenosine was pushed without the rhythm breaking, an additional 5mg of lopressor was pushed as well with no change, diltiazem 5mg was pushed and the rhythm broke. Repeat vitals HR: 71, BP: 99/59, RR 16 with O2 sat of 97% on 4L NC. Patient still in Afib but not rapid. ICU was consulted. Will stay by the patient and continue to monitor and re-assess. Paged by nurse ~12 saying that patients HR was in the 160s and BP was 90s/60s, changed from baseline throughout the day, afebrile, satting 100% on RA. Went and assessed patient at bedside, patient alert and oriented, stated that she felt like her heart was beating very quickly and that she had a headache. No chest pain or difficulty breathing. Patient with documented history of Afib with RVR. She was placed on a monitor. 5mg lopressor was pushed with initial break of afib and HR to 110s, then rise of HR to 150s/160s and SVT seen on the monitor and 12 lead EKG. 6 adenosine was pushed without the rhythm breaking, an additional 5mg of lopressor was pushed as well with no change, diltiazem 5mg was pushed and the rhythm broke. Repeat vitals HR: 71, BP: 99/59, RR 16 with O2 sat of 97% on 4L NC. Patient still in Afib but not rapid. ICU was consulted. Will stay by the patient and continue to monitor and re-assess.

## 2021-02-09 NOTE — PROGRESS NOTE ADULT - PROBLEM SELECTOR PLAN 1
Prior hx of paroxysmal AFib, but patient reports never being on rate control or AC. CHADsVASc 5. Since admission patient has had multiple AFib RVR episodes, as high as 150/160 bpm. S/p multiple IV lopressor pushes, PO toprol and lopressor, as well as diltiazem. Stepped up from [facility] to [facility] for further monitoring.  -Per Cardiology recs, c/w Toprol XL 50 qd  -Holding AC at this time for thoracentesis on 2/9  -Start Eliquis 2.5mg BID thereafter   -TTE 2/8 shows AS with a calcified aortic valve with area 1.36cm². Moderate TC regurgitation. normal LV/RV size and function, LVEF 65%  -c/w Lopressor 5mg PRN for bouts of AFib; consider Diltiazem if needed      #SVT  Brief episode of SVT seen on monitor 2/8 overnight - given 6 of adenosine with no break of rhythm, however improved to AFib with 5mg diltiazem.   -Continue to monitor on telemetry Prior hx of paroxysmal AFib, but patient reports never being on rate control or AC. CHADsVASc 5. Since admission patient has had multiple AFib RVR episodes, as high as 150/160 bpm. S/p multiple IV lopressor pushes, PO toprol and lopressor, as well as diltiazem. Stepped up from [facility] to [facility] for further monitoring.  -Per Cardiology recs, c/w Toprol XL 50 qd  -Holding AC at this time for thoracentesis on 2/9  -Start Eliquis 2.5mg BID thereafter   -TTE 2/8 shows AS with a calcified aortic valve with area 1.36cm². Moderate TC regurgitation. normal LV/RV size and function, LVEF 65%  -c/w Lopressor 5mg PRN for bouts of AFib  -Started Diltiazem 30mg PO q8h      #SVT  Brief episode of SVT seen on monitor 2/8 overnight - given 6 of adenosine with no break of rhythm, however improved to AFib with 5mg diltiazem.   -Continue to monitor on telemetry

## 2021-02-09 NOTE — CONSULT NOTE ADULT - PROBLEM SELECTOR RECOMMENDATION 9
Patient has pain related her mets at her site of metastatic disease on her right neck area. Would recommend discontinues Gabapentin PRN and initiating Gabapentin 100mg at bedtime to start with goal of titrating upward. Would also recommend Dilaudid 0.2mg q3h IV PRN for moderate to severe pain.

## 2021-02-09 NOTE — CONSULT NOTE ADULT - SUBJECTIVE AND OBJECTIVE BOX
[facility] SERVICE CONSULTATION NOTE    CC: afib RVR    HPI:  85F with PMHx DM, HTN, HLD, Stage IIIb LLL NSCLC metastatic, c/l R endobronchial SCC (Dr. Wellington), TOMMY presenting to [facility] with palpitations and shortness of breath that have worsened for the past 3 days, found to have large R sided pleural effusion with RML/RLL collapse and R hilar mass, initially admitted to [facility] for management of her pleural effusion but transferred to [facility] because of new onset AFib with RVR. EKG at the time of AFib showed AFib with no ischemic changes and pt was given Lopressor 5 IVP + Lopressor 25mg PO, which converted her back to NSR. She has been well controlled with Lopressor 25mg PO BID and was on a Heparin gtt for AC, but as of 2/8 was refusing all blood draws/other interventions because she demanded to speak with Dr. Wellington (her outpatient Heme/Oncologist) first; pt is AAOx3 and does have capacity. Pulmonology recommends thoracentesis, now willing to go foreword with plane. Cardiology was consulted and as of 2/8 is recommending Toprol 50mg qd and Lovenox for AC to improve compliance. Patient was HD and transferred to  for upcoming thoracentesis.     ICU consulted 2/9 overnight for runs of narrow complex tachycardia with HR 160s. Was given adenosine 6mg, lopressor 10mg IVP, and diltiazem 5mg IVP and finally broke with underlying rhythm afib HR 70s. Patient hemodynamically stable the entire time, albeit has lower BPs at baseline (100s/60s).     ROS:  Otherwise negative, except as specified in HPI.    PAST MEDICAL & SURGICAL HISTORY:  Heart murmur  Tobacco use  Squamous cell carcinoma of left lung  Adenocarcinoma of head and neck  Submandibular gland mass  DM (diabetes mellitus)  H/O: hysterectomy    MEDICATIONS:    VITAL SIGNS:  ICU Vital Signs Last 24 Hrs  T(C): 36.2 (09 Feb 2021 06:00), Max: 36.8 (08 Feb 2021 20:49)  T(F): 97.2 (09 Feb 2021 06:00), Max: 98.2 (08 Feb 2021 20:49)  HR: 76 (09 Feb 2021 03:40) (71 - 165)  BP: 104/52 (09 Feb 2021 03:40) (95/56 - 163/92)  BP(mean): 74 (09 Feb 2021 03:40) (74 - 79)  ABP: --  ABP(mean): --  RR: 18 (09 Feb 2021 03:40) (16 - 18)  SpO2: 100% (09 Feb 2021 03:40) (93% - 100%)    CAPILLARY BLOOD GLUCOSE      POCT Blood Glucose.: 114 mg/dL (09 Feb 2021 00:44)      PHYSICAL EXAM:  Constitutional: Cachetic, NAD, comfortable in bed.  HEENT: NC/AT, PERRLA, EOMI, no conjunctival pallor or scleral icterus, MMM  Neck: Supple, no JVD, multiple enlarged lymph nodes on the right cervical from the mandibular wong to the clavicle.   Respiratory: Decrease BS on the right   Cardiovascular: RRR, normal S1 and S2, holosystolic murmur over the aortic point.   Gastrointestinal: +BS, soft NTND, no guarding or rebound tenderness, no palpable masses   Extremities: wwp; no cyanosis, clubbing or edema.   Vascular: Pulses equal and strong throughout.   Neurological: AAOx3, no CN deficits, strength and sensation intact throughout.   Skin: No gross skin abnormalities or rashes    LABS:                        9.2    10.17 )-----------( 245      ( 09 Feb 2021 05:44 )             28.8     02-08    141  |  109<H>  |  28<H>  ----------------------------<  80  4.9   |  22  |  1.53<H>    Ca    9.1      08 Feb 2021 17:11  Phos  3.0     02-08  Mg     2.1     02-08      PT/INR - ( 08 Feb 2021 17:10 )   PT: 14.9 sec;   INR: 1.26          PTT - ( 08 Feb 2021 17:10 )  PTT:24.2 sec              EKG: Reviewed.    RADIOLOGY & ADDITIONAL TESTS: Reviewed.

## 2021-02-09 NOTE — CONSULT NOTE ADULT - CONSULT REASON
Afib
afib RVR
pA.Fib
Rehab evaluation
Afib w/RVR
Lung Cancer
Pleural Effusion
Complex decision making related to goals of care

## 2021-02-09 NOTE — CONSULT NOTE ADULT - ASSESSMENT
85 yo F with history of  DM, HTN, Stage IIIb LLL NSCLC metastatic, c/l R endobronchial SCC, TOMMY presenting to [facility] with palpitations and shortness of breath for the past 3 days. Patient was noted to be in atrial fibrillation with RVR, R pleural effusion with lung collapse.  Patient is now in sinus rhythm , agree with  Toprol 50 mg for rate control, restart anticoagulation when possible. Patient is awaiting thoracentesis today. Will monitor telemetry, if patient continue to have frequent episodes of atrial fibrillation may consider amiodarone therapy.

## 2021-02-09 NOTE — CONSULT NOTE ADULT - SUBJECTIVE AND OBJECTIVE BOX
Patient is a 85y old  Female who presents with a chief complaint of palpitations and SOB (09 Feb 2021 03:39)       HPI:    83 y/o F PMHx DM, HTN, Stage IIIb LLL NSCLC metastatic, c/l R endobronchial SCC, TOMMY presenting to [facility] with palpitations and shortness of breath for the past 3 days. She has noticed worsening of her symptoms and decided to come to the ED. Patient denies any recent travel, abx use, hospitalizations, and sick contacts. Patient denies h/n/v/d, fever, chills, cp, abd pain, leg swelling, rashes, dysuria, and changes in BM.       In the ED- VS were T 98, HR , -125/56-61, RR16 SP02 100 on 2L NC   Labs significant for wbc 13.3, hgb 11.2, D-Dimer 418, INR 1.35, K 5.5, Bicarb 21, BUN/Cr 36/1.65, Ca 11, BNP 5352  CXR showed right sided opacification to the midlung field  CT PE protocol negative for PE but does show progression and extension of metastatic disease with collapse of right midle and lower lobes with right hilar mass.   CT head negative for metastasis   EKG showed afib with rvr, no ischemic changes   Given Metoprolol 5mg IVP, Lopressor 25mg oral, Lokelma 10mg and admitted for further management of worsening tumor burden.  (07 Feb 2021 03:27)      PAST MEDICAL & SURGICAL HISTORY:  Heart murmur    Tobacco use    Squamous cell carcinoma of left lung    Adenocarcinoma of head and neck    Submandibular gland mass    DM (diabetes mellitus)    H/O: hysterectomy        MEDICATIONS  (STANDING):  aspirin  chewable 81 milliGRAM(s) Oral daily  atorvastatin 10 milliGRAM(s) Oral at bedtime  dextrose 40% Gel 15 Gram(s) Oral once  dextrose 5%. 1000 milliLiter(s) (50 mL/Hr) IV Continuous <Continuous>  dextrose 5%. 1000 milliLiter(s) (100 mL/Hr) IV Continuous <Continuous>  dextrose 50% Injectable 25 Gram(s) IV Push once  dextrose 50% Injectable 12.5 Gram(s) IV Push once  dextrose 50% Injectable 25 Gram(s) IV Push once  glucagon  Injectable 1 milliGRAM(s) IntraMuscular once  insulin lispro (ADMELOG) corrective regimen sliding scale   SubCutaneous Before meals and at bedtime  metoprolol succinate ER 50 milliGRAM(s) Oral every 24 hours  pantoprazole    Tablet 40 milliGRAM(s) Oral before breakfast  QUEtiapine 25 milliGRAM(s) Oral at bedtime    MEDICATIONS  (PRN):  acetaminophen   Tablet .. 650 milliGRAM(s) Oral every 4 hours PRN Mild Pain (1 - 3)  calamine/zinc oxide Lotion 1 Application(s) Topical three times a day PRN Itching  metoprolol tartrate Injectable 5 milliGRAM(s) IV Push once PRN afib rvr  ondansetron    Tablet 4 milliGRAM(s) Oral every 8 hours PRN Nausea and/or Vomiting      FAMILY HISTORY:      CBC Full  -  ( 09 Feb 2021 05:44 )  WBC Count : 10.17 K/uL  RBC Count : 3.15 M/uL  Hemoglobin : 9.2 g/dL  Hematocrit : 28.8 %  Platelet Count - Automated : 245 K/uL  Mean Cell Volume : 91.4 fl  Mean Cell Hemoglobin : 29.2 pg  Mean Cell Hemoglobin Concentration : 31.9 gm/dL  Auto Neutrophil # : 8.60 K/uL  Auto Lymphocyte # : 0.81 K/uL  Auto Monocyte # : 0.65 K/uL  Auto Eosinophil # : 0.03 K/uL  Auto Basophil # : 0.02 K/uL  Auto Neutrophil % : 84.5 %  Auto Lymphocyte % : 8.0 %  Auto Monocyte % : 6.4 %  Auto Eosinophil % : 0.3 %  Auto Basophil % : 0.2 %      02-09    137  |  104  |  28<H>  ----------------------------<  101<H>  4.6   |  20<L>  |  1.55<H>    Ca    9.0      09 Feb 2021 05:44  Phos  3.3     02-09  Mg     2.0     02-09              Radiology:    < from: Xray Chest 1 View- PORTABLE-Urgent (02.06.21 @ 21:10) >  EXAM:  XR CHEST PORTABLE URGENT 1V                          PROCEDURE DATE:  02/06/2021          INTERPRETATION:  Clinical History: Chest pain    Frontal examination of the chest demonstrates right effusion. Congestion and/or infiltrates. The heartis within normal limits in transverse diameter. Degenerative changes thoracic spine. Calcification aortic knob.    Impression: Congestion and/or infiltrates. Right effusion    < from: CT Head w/ IV Cont (02.06.21 @ 23:58) >    EXAM:  CT BRAIN IC                          PROCEDURE DATE:  02/06/2021          INTERPRETATION:  INDICATIONS: Non-small cell lung cancer, staging. Evaluate for brain metastases.    TECHNIQUE: Serial axial images were obtained from the skull base to the vertex with the use of intravenous contrast. Multiplanar reformats were obtained.    COMPARISON EXAMINATION: PET/CT study from 2/2/2018 and CT head from 12/19/2011.    FINDINGS:  VENTRICLES AND SULCI: Parenchymal volume is commensurate with patient age.  INTRA-AXIAL: No enhancing intracranial mass, acute hemorrhage, midline shift or acute transcortical infarct is seen.  EXTRA-AXIAL: No extra-axial fluid collection is present.  VISUALIZED SINUSES: No air-fluid levels are identified.  VISUALIZED MASTOIDS: Clear.  CALVARIUM: Normal. Hyperostosis frontalis bilaterally. There is diffuse osteopenia.    IMPRESSION: No enhancing intracranial masses identified.        < from: CT Angio Chest PE Protocol w/ IV Cont (02.06.21 @ 23:58) >    EXAM:  CT ANGIO CHEST PE PROTOCOL IC                          PROCEDURE DATE:  02/06/2021          INTERPRETATION:  CTA (CT angiography) of the CHEST    INDICATION: Positive d-dimer's, intermediate probably for PE suspected.    TECHNIQUE: CT angiography of the chest was performed during bolus injection of intravenous contrast.  Post-processing including the production of axial, coronal and sagittal multiplanar reformatted images and axial and coronal maximum intensity projections (MIPs) was performed.    PRIOR STUDY: CT chest from 3/28/2019    FINDINGS:    Pulmonary arteries: No pulmonary embolism is seen.    Lungs and large airways: Moderate centrilobular emphysema. There is a poorly defined right perihilar mass. The right middle lobe and right lower lobe are collapsed, likely secondary to involvement of the associated bronchioles. There is also areas of narrowing involving the right upper lobe bronchioles. There is also mild narrowing of the right main bronchus. Bronchiectatic and fibrotic changes seen in the right upper lobe. Multiple new nodules are seen in the right lung one of which has notable scarring in the anterior segment in series 12 image 26. There are also noted numerous nodules in the left lung some of which are spiculated or microlobulated, largest measuring 7 mm in the left upper lobe apicoposterior segment in series 12 image 29. A spiculated nodule is also seen in left lower lobe measuring 1.2 cm. There is irregular interlobular septal thickening and reticulation in the right lung concerning for lymphangitic spread of tumor.    Pleura:  Moderate sized right-sided pleural effusion.    Mediastinum and hilar regions: Right hilar lymphadenopathy noted measuring up to 1.3 cm. Incidental finding of 1.4 cm calcified lesion in the wall of the right mid esophagus seen in the previous study 2019. Mediastinal lymphadenopathy also noted including prevascular lymph node measuring 1.4 cm. There is also interval development of significant crural lymphadenopathy.    Cardiovascular:  Heart size is normal. No thoracic aortic aneurysm. Mild coronary artery calcifications. A few aortic cusp calcifications also noted.    Pericardial effusion: No pericardial effusion.    Chest wall and lower neck:  No change in 2 cm low-density partially calcified left thyroid nodule.    Upper abdomen: Few simple cysts are identified in the kidneys bilaterally largest measuring 2.8 cm in the left kidney. There is a hyperdensity in the right kidney measuring 7 mm which may represent hemorrhagic cyst.    Bones: Moderate spondylosis of the spine. No aggressive lytic or blastic lesions.      IMPRESSION:  There is no pulmonary embolism.  Since the previous study from 3/28/2019, there is collapse of the right middle and lower lobes secondary to right hilar mass as well as narrowing of the right upper lobe bronchioles and right bronchus. There is also evidence of metastasis more extensive the lungs bilaterally. Findings concerning for lymphangitic spread of tumor in the right lung.                Vital Signs Last 24 Hrs  T(C): 36.2 (09 Feb 2021 06:00), Max: 36.8 (08 Feb 2021 20:49)  T(F): 97.2 (09 Feb 2021 06:00), Max: 98.2 (08 Feb 2021 20:49)  HR: 76 (09 Feb 2021 03:40) (71 - 165)  BP: 104/52 (09 Feb 2021 03:40) (95/56 - 163/92)  BP(mean): 74 (09 Feb 2021 03:40) (74 - 79)  RR: 18 (09 Feb 2021 03:40) (16 - 18)  SpO2: 100% (09 Feb 2021 03:40) (93% - 100%)    REVIEW OF SYSTEMS:    CONSTITUTIONAL: No fever, weight loss, or fatigue  EYES: No eye pain, visual disturbances, or discharge  ENMT:  No difficulty hearing, tinnitus, vertigo; No sinus or throat pain  NECK: No pain or stiffness  BREASTS: No pain, masses, or nipple discharge  RESPIRATORY:   shortness of breath x 3 days  CARDIOVASCULAR: No chest pain, palpitations, dizziness, or leg swelling  GASTROINTESTINAL: No abdominal or epigastric pain. No nausea, vomiting, or hematemesis; No diarrhea or constipation. No melena or hematochezia.  GENITOURINARY: No dysuria, frequency, hematuria, or incontinence  NEUROLOGICAL: No headaches, memory loss, loss of strength, numbness, or tremors  SKIN: No itching, burning, rashes, or lesions   LYMPH NODES: No enlarged glands  ENDOCRINE: No heat or cold intolerance; No hair loss  MUSCULOSKELETAL: No joint pain or swelling; No muscle, back, or extremity pain  PSYCHIATRIC: No depression, anxiety, mood swings, or difficulty sleeping  HEME/LYMPH: No easy bruising, or bleeding gums  ALLERGY AND IMMUNOLOGIC: No hives or eczema  VASCULAR: no swelling, erythema,   : no dysuria, hematuria, frequency        Physical Exam: frail cachectic 84 yo  woman lying in bed, no acute complaints    Head: normocephalic, atraumatic    Eyes: PERRLA, EOMI, no nystagmus, sclera anicteric    ENT: nasal discharge, uvula midline, no oropharyngeal erythema/exudate    Neck: supple, negative JVD, negative carotid bruits, no thyromegaly, R submandibular LN    Chest: dec breath sounds at right base    Cardiovascular: regular rate and rhythm, III/VI systolic murmur    Abdomen: soft, non distended, non tender to palpation in all 4 quadrants, negative rebound/guarding, normal bowel sounds    Extremities: WWP, neg cyanosis/clubbing/edema, negative calf tenderness to palpation, negative Luisa's sign    Musculoskeletal:    Skin:      :     Neurologic Exam:    Alert and oriented to person, place, date/year, speech fluent w/o dysarthria, follows commands, recent and remote memory intact, repetition intact, comprehension intact,  attention/concentration intact, fund of knowledge appropriate    Cranial Nerves:     II:                       pupils equal, round and reactive to light, visual fields intact   III/ IV/VI:            extraocular movements intact, neg nystagmus, neg ptosis  V:                       facial sensation intact, V1-3 normal  VII:                     face symmetric, no droop, normal eye closure and smile  VIII:                    hearing intact to finger rub bilaterally  IX/ X:                 soft palate rise symmetrical  XI:                      head turning, shoulder shrug normal  XII:                     tongue midline    Motor Exam:    Right UE:    no focal weakness > 3+/5                      Left UE:    no focal weakness > 3+/5    Right LE:    no focal weakness > 3+/5    Left LE:     no focal weakness > 3+/5               Sensation: Intact to light touch x 4 extremities                                       DTR:                        biceps/brachioradialis: equal bilaterally                   patella/ankle: equal bilaterally                 neg clonus          neg Babinski                          Gait:  not tested        PM&R Impression:    1) deconditioned  2) no focal weakness  3) cachexia    Plan: cleared to begin rehab    1) Physical therapy focusing on therapeutic exercises, bed mobility/transfer out of bed evaluation, progressive ambulation with assistive devices prn.    2) Anticipated Disposition Plan/Recs:   pending functional progress

## 2021-02-09 NOTE — PROGRESS NOTE ADULT - SUBJECTIVE AND OBJECTIVE BOX
Interval Events:  Patient seen and examined at bedside.    Has no complaints this morning. Expressed some concerns regarding pending procedure but is agreeable. denies any chest pain or SOB. On room air.     MEDICATIONS:  Pulmonary:    Antimicrobials:    Anticoagulants:  aspirin  chewable 81 milliGRAM(s) Oral daily    Cardiac:  diltiazem    Tablet 30 milliGRAM(s) Oral every 8 hours  metoprolol succinate ER 50 milliGRAM(s) Oral every 24 hours  metoprolol tartrate Injectable 5 milliGRAM(s) IV Push once PRN    Endocrine:  atorvastatin 10 milliGRAM(s) Oral at bedtime  dextrose 40% Gel 15 Gram(s) Oral once  dextrose 50% Injectable 25 Gram(s) IV Push once  dextrose 50% Injectable 12.5 Gram(s) IV Push once  dextrose 50% Injectable 25 Gram(s) IV Push once  glucagon  Injectable 1 milliGRAM(s) IntraMuscular once  insulin lispro (ADMELOG) corrective regimen sliding scale   SubCutaneous Before meals and at bedtime    Allergies    No Known Allergies    Intolerances    Vital Signs Last 24 Hrs  T(C): 36.7 (09 Feb 2021 10:00), Max: 36.8 (08 Feb 2021 20:49)  T(F): 98 (09 Feb 2021 10:00), Max: 98.2 (08 Feb 2021 20:49)  HR: 122 (09 Feb 2021 10:15) (71 - 165)  BP: 128/72 (09 Feb 2021 10:15) (95/56 - 163/92)  BP(mean): 91 (09 Feb 2021 10:15) (74 - 91)  RR: 18 (09 Feb 2021 10:15) (16 - 18)  SpO2: 100% (09 Feb 2021 10:15) (93% - 100%)    02-08 @ 07:01  -  02-09 @ 07:00  --------------------------------------------------------  IN: 30 mL / OUT: 0 mL / NET: 30 mL    Physical Exam:  General: NAD, AAO x3  HEENT: No icterus,. Moist mucous membranes  Neck: No JVD noted. Supple, no meningismus  Cardio: S1, S2 noted, RRR. No murmurs, rubs or gallops  Resp: no breath sounds at the r base. some crackles R apex. L side CTA.   Abdo: Soft, NT, bowel sounds present. No organomegaly  Extremities: No edema noted. Pulses present b/l  Neuro: AAO x3, grossly normal motor strength.  Lymphnodes: no lymphadenopathy identified.  Skin: Dry, no rashes    LABS:  CBC Full  -  ( 09 Feb 2021 05:44 )  WBC Count : 10.17 K/uL  RBC Count : 3.15 M/uL  Hemoglobin : 9.2 g/dL  Hematocrit : 28.8 %  Platelet Count - Automated : 245 K/uL  Mean Cell Volume : 91.4 fl  Mean Cell Hemoglobin : 29.2 pg  Mean Cell Hemoglobin Concentration : 31.9 gm/dL  Auto Neutrophil # : 8.60 K/uL  Auto Lymphocyte # : 0.81 K/uL  Auto Monocyte # : 0.65 K/uL  Auto Eosinophil # : 0.03 K/uL  Auto Basophil # : 0.02 K/uL  Auto Neutrophil % : 84.5 %  Auto Lymphocyte % : 8.0 %  Auto Monocyte % : 6.4 %  Auto Eosinophil % : 0.3 %  Auto Basophil % : 0.2 %    02-09    137  |  104  |  28<H>  ----------------------------<  101<H>  4.6   |  20<L>  |  1.55<H>    Ca    9.0      09 Feb 2021 05:44  Phos  3.3     02-09  Mg     2.0     02-09    PT/INR - ( 08 Feb 2021 17:10 )   PT: 14.9 sec;   INR: 1.26       PTT - ( 08 Feb 2021 17:10 )  PTT:24.2 sec    RADIOLOGY & ADDITIONAL STUDIES (The following images were personally reviewed):  No new studies.

## 2021-02-09 NOTE — PROGRESS NOTE ADULT - PROBLEM SELECTOR PLAN 1
Prior hx of paroxysmal AFib, but patient reports never being on rate control or AC. CHADsVASc 5. Since admission patient has had multiple AFib RVR episodes, as high as 150/160 bpm. S/p multiple IV lopressor pushes, PO toprol and lopressor, as well as diltiazem. Stepped up from [facility] to [facility] for further monitoring.  -Cardiology saw patient on 2/8 - recommend Toprol XL 50 qday   -Holding AC at this time i/s/o thoracentesis on 2/9, however can start Eliquis 2.5mg BID thereafter   -TTE: AS calcified aortic valve with area 1.36cm². Moderate TC regurgitation. normal LV/RV size and function, LVEF 65%  -Rhythm broke with 5mg IV diltiazem, can give additional dilt as needed      #SVT  Brief episode of SVT seen on monitor 2/8 overnight - given 6 of adenosine with no break of rhythm, however improved to AFib with 5mg diltiazem.   -Continue to monitor on telemetry

## 2021-02-09 NOTE — CONSULT NOTE ADULT - PROBLEM SELECTOR RECOMMENDATION 4
20 minutes was spent discussing advance care planning with patient and daughter  face to face at bedside.

## 2021-02-09 NOTE — PROGRESS NOTE ADULT - PROBLEM SELECTOR PLAN 2
Pt has hx of LLL NSCLC and R endobronchial CSS s/p 3 cycles of Carboplatin/Etoposide, follows with Dr. Wellington. CXR shows RIGHT Lower Lobe opacity likely due to underlying malignancy.  - Pt noted to have large R pleural effusion on CT chest - scheduled for thoracentesis on 2/9 with Dr. Parker pulmonology  - Heme onc saw patient on 2/8, recommend holding off on starting immunotherapy until 5 treatment course of radiation therapy is complete  - c/w Therapeutic thoracentesis + f/u fluid analysis   - Will need to follow up with Radiation oncology once discharged to start radiation  - Palliative Care consult ordered, f/u recs   - Zofran PRN for nausea  - Currently SpO2 % on RA, intermittently desats with lateral decubitus positioning  - Monitor respiratory status

## 2021-02-09 NOTE — CONSULT NOTE ADULT - CONSULT REQUESTED DATE/TIME
09-Feb-2021 12:50
07-Feb-2021 12:30
08-Feb-2021 16:19
09-Feb-2021 06:12
07-Feb-2021 10:35
09-Feb-2021 15:27
07-Feb-2021 18:22
09-Feb-2021 05:40

## 2021-02-09 NOTE — PROGRESS NOTE ADULT - ASSESSMENT
85 y/o F PMHx DM, HTN, HLD, Stage IIIb LLL NSCLC metastatic, c/l R endobronchial SCC, TOMMY presenting to [facility] with palpitations and shortness of breath for the past 3 days, admitted for R. pleural effusion with right lung lobe collapse with hilar involvement and paroxysmal Afib. Now stepped up to [facility] due to AFib RVR. Plan for thoracentesis on 2/9 with pulmonology.

## 2021-02-09 NOTE — PROGRESS NOTE ADULT - ASSESSMENT
84F w/HTN, DM, Stage IIIb NSCLC metastatic cancer, R endobrachial SCC, TOMMY adm on 2/6 to [facility] for pAF in setting of R lung collapse 2/2 R hilar mass. Cardiology consulted 2/7 for Afib recommendations.     #pAF - new onset, currently in sinus on monitor. [id number] - 5.  -Agree with Cardizem 30mg po Q8.   - Continue Toprol XL 50mg daily w/hold parameters for HR< 60, SBP < 90  - recommend full dose anticoagulation given high stroke risk. Can start NOAC (e.g. eliquis 2.5mg bid) after thoracentesis today.   - given hx of TOMMY & worsening lung mass with recent radiation therapy, suspect Afib will recur    Discussed with attending and primary team.

## 2021-02-09 NOTE — CONSULT NOTE ADULT - ASSESSMENT
85 y/o F PMHx DM, HTN, Stage IIIb LLL NSCLC metastatic, c/l R endobronchial SCC, TOMMY presenting to [facility] with palpitations and shortness of breath admitted for moderate to large right malignant pleural effusion as well as new onset afib. Transferred from [facility] to  2/8 for afib w RVR, subsequently transferred back to [facility] 2/8 for thoracentesis work up -- however, ICU consulted overnight 2/9 for SVT w HR 160s.       Cardiovascular    #Afib w RVR  Patient with SVT 150s overnight, s/p adenosine 6mg, lopressor 10mg IVP, diltiazem 5mg IVP; rhythm broke and consistent with afib. [id number] - 5.   - Continue Toprol 50mg once daily, can give IV diltiazem 5mg PRN (HR goal <110)  - Holding full AC as patient with possible thoracentesis 2/9; ok to continue dvt ppx (hep sc given renal function)  - Follow TTE  - Cardiology following, appreciate reccs       Pulmonary/Heme    #Stage 4 NSCLC w/ right moderate pleural effusion (likely malignant)   -Follows Dr. Wellington for NSCLC now stage 4 s/p chemo/XRT recently, management per heme/onc  -Pulmonary to do thoracentesis, potentially 2/9  -100% on room air, monitor clinically       Dispo: requires monitoring on [facility]

## 2021-02-09 NOTE — PROGRESS NOTE ADULT - SUBJECTIVE AND OBJECTIVE BOX
TRANSFER ACCEPTANCE NOTE 7 [facility] TO 7 [facility] COURSE:      TRANSFER ACCEPTANCE NOTE 7 [facility] TO [facility]    HOSPITAL COURSE:   85F with PMHx DM, HTN, HLD, Stage IIIb LLL NSCLC metastatic, c/l R endobronchial SCC (Dr. Wellington), TOMMY presenting to [facility] with palpitations and shortness of breath that have worsened for the past 3 days, found to have large R sided pleural effusion with RML/RLL collapse and R hilar mass, initially admitted to [facility] for management of her pleural effusion but transferred to [facility] because of new onset AFib with RVR. EKG at the time of AFib showed AFib with no ischemic changes and pt was given Lopressor 5 IVP + Lopressor 25mg PO, which converted her back to NSR. She has been well controlled with Lopressor 25mg PO BID and was on a Heparin gtt for AC, but as of 2/8 was refusing all blood draws/other interventions because she demanded to speak with Dr. Wellington (her outpatient Heme/Oncologist) first; pt is AAOx3 and does have capacity. Pulmonology recommends thoracentesis, now willing to go forward with plan. Cardiology was consulted and as of 2/8 is recommending Toprol 50mg qd and Lovenox for AC to improve compliance. Was briefly stepped down to [facility] on 2/8, however stepped back up to [facility] due to repeat episode of AFib RVR to 150s and hypotension (BP 90s/60s) with evidence of SVT on monitor - given 5mg IV lopressor x 2 and 6mg adenosine with no improvement in rhythm, however converted back into AFib (without RVR) after 5mg of diltiazem was given. Now on [facility], alternating between AFib and AFib RVR.     OVERNIGHT EVENTS: stepped up to [facility] as detailed above     SUBJECTIVE / INTERVAL HPI: Patient seen and examined at bedside. Patient denies any chest pain or shortness of breath, denies palpitations. She feels fine and is understanding that she will go for thoracentesis tomorrow.     VITAL SIGNS:  Vital Signs Last 24 Hrs  T(C): 36.8 (08 Feb 2021 20:49), Max: 36.8 (08 Feb 2021 20:49)  T(F): 98.2 (08 Feb 2021 20:49), Max: 98.2 (08 Feb 2021 20:49)  HR: 76 (09 Feb 2021 03:40) (71 - 165)  BP: 104/52 (09 Feb 2021 03:40) (95/56 - 163/92)  BP(mean): 74 (09 Feb 2021 03:40) (74 - 79)  RR: 18 (09 Feb 2021 03:40) (16 - 18)  SpO2: 100% (09 Feb 2021 03:40) (93% - 100%)    02-07-21 @ 07:01  -  02-08-21 @ 07:00  --------------------------------------------------------  IN: 88 mL / OUT: 300 mL / NET: -212 mL    PHYSICAL EXAM:    Constitutional: Cachetic, NAD, comfortable in bed.  HEENT: NC/AT, PERRLA, EOMI, no conjunctival pallor or scleral icterus, MMM  Neck: Supple, no JVD, multiple enlarged lymph nodes on the right cervical from the mandibular angle to the clavicle.   Respiratory: Decrease BS on the right, with inspiratory wheezing.   Cardiovascular: irregularly irregular rhythm, normal S1 and S2, holosystolic murmur over the aortic point.   Gastrointestinal: +BS, soft NTND, no guarding or rebound tenderness, no palpable masses   Extremities: wwp; no cyanosis, clubbing or edema.   Vascular: Pulses equal and strong throughout.   Neurological: AAOx3, no CN deficits, strength and sensation intact throughout.   Skin: No gross skin abnormalities or rashes    MEDICATIONS:  MEDICATIONS  (STANDING):  aspirin  chewable 81 milliGRAM(s) Oral daily  atorvastatin 10 milliGRAM(s) Oral at bedtime  dextrose 40% Gel 15 Gram(s) Oral once  dextrose 5%. 1000 milliLiter(s) (50 mL/Hr) IV Continuous <Continuous>  dextrose 5%. 1000 milliLiter(s) (100 mL/Hr) IV Continuous <Continuous>  dextrose 50% Injectable 25 Gram(s) IV Push once  dextrose 50% Injectable 12.5 Gram(s) IV Push once  dextrose 50% Injectable 25 Gram(s) IV Push once  glucagon  Injectable 1 milliGRAM(s) IntraMuscular once  heparin   Injectable 5000 Unit(s) SubCutaneous every 8 hours  insulin lispro (ADMELOG) corrective regimen sliding scale   SubCutaneous Before meals and at bedtime  metoprolol succinate ER 50 milliGRAM(s) Oral every 24 hours  pantoprazole    Tablet 40 milliGRAM(s) Oral before breakfast  QUEtiapine 25 milliGRAM(s) Oral at bedtime    MEDICATIONS  (PRN):  acetaminophen   Tablet .. 650 milliGRAM(s) Oral every 4 hours PRN Mild Pain (1 - 3)  calamine/zinc oxide Lotion 1 Application(s) Topical three times a day PRN Itching  metoprolol tartrate Injectable 5 milliGRAM(s) IV Push once PRN afib rvr  ondansetron    Tablet 4 milliGRAM(s) Oral every 8 hours PRN Nausea and/or Vomiting      ALLERGIES:  Allergies    No Known Allergies    Intolerances        LABS:                        9.4    8.25  )-----------( 260      ( 08 Feb 2021 17:11 )             28.7     02-08    141  |  109<H>  |  28<H>  ----------------------------<  80  4.9   |  22  |  1.53<H>    Ca    9.1      08 Feb 2021 17:11  Phos  3.0     02-08  Mg     2.1     02-08      PT/INR - ( 08 Feb 2021 17:10 )   PT: 14.9 sec;   INR: 1.26          PTT - ( 08 Feb 2021 17:10 )  PTT:24.2 sec    CAPILLARY BLOOD GLUCOSE      POCT Blood Glucose.: 114 mg/dL (09 Feb 2021 00:44)        RADIOLOGY & ADDITIONAL TESTS: Reviewed.    ASSESSMENT:    PLAN:

## 2021-02-09 NOTE — CONSULT NOTE ADULT - SUBJECTIVE AND OBJECTIVE BOX
Electrophysiology Consult Note:     CHIEF COMPLAINT:  Patient is a 85y old  Female who presents with a chief complaint of SOB (09 Feb 2021 09:27)        HISTORY OF PRESENT ILLNESS:   HPI:    85 yo F with history of  DM, HTN, Stage IIIb LLL NSCLC metastatic, c/l R endobronchial SCC, TOMMY presenting to [facility] with palpitations and shortness of breath for the past 3 days. Patient was noted to be in atrial fibrillation with RVR, R pleural effusion with lung collapse.   EPS called to evaluate.     PAST MEDICAL & SURGICAL HISTORY:  Heart murmur    Tobacco use    Squamous cell carcinoma of left lung    Adenocarcinoma of head and neck    Submandibular gland mass    DM (diabetes mellitus)    H/O: hysterectomy        FAMILY HISTORY:      SOCIAL HISTORY:    [x ] Non-smoker      Allergies    No Known Allergies    Intolerances    	    MEDICATIONS:  MEDICATIONS  (STANDING):  aspirin  chewable 81 milliGRAM(s) Oral daily  atorvastatin 10 milliGRAM(s) Oral at bedtime  dextrose 40% Gel 15 Gram(s) Oral once  dextrose 5%. 1000 milliLiter(s) (50 mL/Hr) IV Continuous <Continuous>  dextrose 5%. 1000 milliLiter(s) (100 mL/Hr) IV Continuous <Continuous>  dextrose 50% Injectable 25 Gram(s) IV Push once  dextrose 50% Injectable 12.5 Gram(s) IV Push once  dextrose 50% Injectable 25 Gram(s) IV Push once  diltiazem    Tablet 30 milliGRAM(s) Oral every 8 hours  glucagon  Injectable 1 milliGRAM(s) IntraMuscular once  insulin lispro (ADMELOG) corrective regimen sliding scale   SubCutaneous Before meals and at bedtime  metoprolol succinate ER 50 milliGRAM(s) Oral every 24 hours  pantoprazole    Tablet 40 milliGRAM(s) Oral before breakfast  QUEtiapine 25 milliGRAM(s) Oral at bedtime    MEDICATIONS  (PRN):  acetaminophen   Tablet .. 650 milliGRAM(s) Oral every 4 hours PRN Mild Pain (1 - 3)  calamine/zinc oxide Lotion 1 Application(s) Topical three times a day PRN Itching  metoprolol tartrate Injectable 5 milliGRAM(s) IV Push once PRN afib rvr  ondansetron    Tablet 4 milliGRAM(s) Oral every 8 hours PRN Nausea and/or Vomiting        REVIEW OF SYSTEMS:  CONSTITUTIONAL: No fever,  +weight loss, + fatigue  EYES: No eye pain, visual disturbances, or discharge  ENMT:  No difficulty hearing, tinnitus, vertigo; No sinus or throat pain  NECK: No pain or stiffness  RESPIRATORY: No cough, wheezing, chills or hemoptysis; No Shortness of Breath  CARDIOVASCULAR: No chest pain, palpitations, dizziness, or leg swelling  GASTROINTESTINAL: No abdominal or epigastric pain. No nausea, vomiting, or hematemesis; No diarrhea or constipation.   GENITOURINARY: No dysuria, frequency, hematuria, or incontinence  NEUROLOGICAL: No headaches, memory loss, loss of strength, numbness, or tremors  SKIN: No itching, burning, rashes, or lesions   LYMPH Nodes: No enlarged glands  ENDOCRINE: No heat or cold intolerance; No hair loss        PHYSICAL EXAM:  Vital Signs Last 24 Hrs  T(C): 36.7 (09 Feb 2021 10:00), Max: 36.8 (08 Feb 2021 20:49)  T(F): 98 (09 Feb 2021 10:00), Max: 98.2 (08 Feb 2021 20:49)  HR: 122 (09 Feb 2021 10:15) (71 - 165)  BP: 128/72 (09 Feb 2021 10:15) (95/56 - 163/92)  BP(mean): 91 (09 Feb 2021 10:15) (74 - 91)  RR: 18 (09 Feb 2021 10:15) (16 - 18)  SpO2: 100% (09 Feb 2021 10:15) (93% - 100%)  Daily     Daily     Constitutional: NAD	  HEENT:  PERRL, EOMI	  CVS:  S1 S2, No JVD  Pulm: Lungs clear to auscultation	  GI:  Soft, Non-tender, + BS	  Ext: No LE edema  Vascular: Peripheral pulses palpable   Neurologic: A&O x 3,  Skin: No rash or lesion       	  LABS:	                         9.2    10.17 )-----------( 245      ( 09 Feb 2021 05:44 )             28.8     02-09    137  |  104  |  28<H>  ----------------------------<  101<H>  4.6   |  20<L>  |  1.55<H>    Ca    9.0      09 Feb 2021 05:44  Phos  3.3     02-09  Mg     2.0     02-09      proBNP:   Lipid Profile:   HgA1c:   TSH: 	      EKG: < from: 12 Lead ECG (02.06.21 @ 19:33) >  Ventricular Rate 169 BPM    Atrial Rate 187 BPM    QRS Duration 68 ms    Q-T Interval 268 ms    QTC Calculation(Bazett) 449 ms    R Axis 83 degrees    T Axis 248 degrees    Diagnosis Line Atrial fibrillation with rapid ventricular response  Cannot rule out Septal infarct , age undetermined  Marked ST abnormality, possible inferior subendocardial injury      Telemetry:  sinus rhythm     Echo: < from: TTE Echo Complete w/o Contrast w/ Doppler (02.08.21 @ 13:15) >  1. The left ventricle is normal in size, wall thickness, and systolic function with a calculated ejection fraction of 65%.   2. The right ventricle is normal in size. Right ventricular systolic function is normal.   3. The left atrium is borderline dilated.   4. The aortic valve is calcified (morphology not well seen). There is mild aortic stenosis. The peak transvalvular velocity is 2.74 m/s, the mean transvalvular gradient is 17.00 mmHg, and the LVOT/AV velocity ratio is 0.36. The peak transaortic gradient is 30.03 mmHg. The mean transaortic gradient is 17.00 mmHg. The aortic valve area (estimated via the continuity method) is 1.36 cm². There is mild to moderate aortic regurgitation.   5. The mitral valve is moderately thickened and calcified. Mitral annular calcification noted. There is mild mitral regurgitation.   6. There is moderate tricuspid regurgitation. There is mild pulmonary hypertension, pulmonary artery systolic pressure is 47 mmHg.   7. The aortic root is normal in size.   8.No pericardial effusion is seen.

## 2021-02-09 NOTE — CONSULT NOTE ADULT - PROBLEM SELECTOR RECOMMENDATION 5
Patient with history of metastatic neuroendocrine carcinoma. Patient has history of being on Carboplatin/Etoposide. Per oncology documentation, outpatient PET scan on 1/20 showed diffused progression of disease. Per chest CT, evidence of metastasis more extensive the lungs bilaterally. Findings concerning for lymphangitic spread of tumor in the right lung. Appreciate oncology involvement.

## 2021-02-09 NOTE — CONSULT NOTE ADULT - ASSESSMENT
85 year old woman with neoplasm related pain, debility, Effusion, advance care planning, high grade large cell neuroendocrine cancer and encounter for palliative care.

## 2021-02-09 NOTE — PROGRESS NOTE ADULT - ASSESSMENT
83 y/o F PMHx DM, HTN, HLD, Stage IIIb LLL NSCLC metastatic, c/l R endobronchial SCC, TOMMY presenting to [facility] with palpitations and shortness of breath for the past 3 days, admitted for R. pleural effusion with right lung lobe collapse with hilar involvement and paroxysmal Afib. Now stepped up to [facility] due to AFib RVR. Plan for thoracentesis on 2/9 with pulmonology.

## 2021-02-09 NOTE — CONSULT NOTE ADULT - ASSESSMENT
per Internal Medicine    83 y/o F PMHx DM, HTN, HLD, Stage IIIb LLL NSCLC metastatic, c/l R endobronchial SCC, TOMMY presenting to [facility] with palpitations and shortness of breath for the past 3 days, admitted for R. pleural effusion with right lung lobe collapse with hilar involvement and paroxysmal Afib. Now stepped up to [facility] due to AFib RVR. Plan for thoracentesis on 2/9 with pulmonology.       Problem/Plan - 1:  ·  Problem: Atrial fibrillation with rapid ventricular response.  Plan: Prior hx of paroxysmal AFib, but patient reports never being on rate control or AC. CHADsVASc 5. Since admission patient has had multiple AFib RVR episodes, as high as 150/160 bpm. S/p multiple IV lopressor pushes, PO toprol and lopressor, as well as diltiazem. Stepped up from [facility] to [facility] for further monitoring.  -Per Cardiology recs, c/w Toprol XL 50 qd  -Holding AC at this time for thoracentesis on 2/9  -Start Eliquis 2.5mg BID thereafter   -TTE 2/8 shows AS with a calcified aortic valve with area 1.36cm². Moderate TC regurgitation. normal LV/RV size and function, LVEF 65%  -c/w Lopressor 5mg PRN for bouts of AFib; consider Diltiazem if needed      #SVT  Brief episode of SVT seen on monitor 2/8 overnight - given 6 of adenosine with no break of rhythm, however improved to AFib with 5mg diltiazem.   -Continue to monitor on telemetry.     Problem/Plan - 2:  ·  Problem: Squamous cell carcinoma of left lung.  Plan: Pt has hx of LLL NSCLC and R endobronchial CSS s/p 3 cycles of Carboplatin/Etoposide, follows with Dr. Wellington. CXR shows RIGHT Lower Lobe opacity likely due to underlying malignancy.  - Pt noted to have large R pleural effusion on CT chest - scheduled for thoracentesis on 2/9 with Dr. Parker pulmonology  - Heme onc saw patient on 2/8, recommend holding off on starting immunotherapy until 5 treatment course of radiation therapy is complete  - c/w Therapeutic thoracentesis + f/u fluid analysis   - Will need to follow up with Radiation oncology once discharged to start radiation  - Palliative Care consult ordered, f/u recs   - Zofran PRN for nausea  - Currently SpO2 % on RA, intermittently desats with lateral decubitus positioning  - Monitor respiratory status.     Problem/Plan - 3:  ·  Problem: Agitation.  Plan: Pt noted to be increasingly agitated as of 2/7.  - c/w Seroquel 25mg qHS.     Problem/Plan - 4:  ·  Problem: Chronic kidney disease.  Plan: Cr. 1.6, baseline 1.4-1.6  - Avoid nephrotoxic agents   - Continue to monitor.     Problem/Plan - 5:  ·  Problem: Electrolyte abnormality.  Plan: Hypercalcemia of 11, Potassium 5.5, s/p Lokelma 10mg. No EKG changes (peaked T waves or prolonged QT), likely 2/2 metastases, unknown if there is bone involvement. Differential includes tumor lysis syndrome.   - TSH wnl  - Uric acid and PTH both elevated, likely 2/2 active pulmonary malignancy  - f/u Heme/Onc (Dr. Wellington's) recs.     Problem/Plan - 6:  Problem: DM (diabetes mellitus). Plan: On metformin at home  - c/w mISS  - Carb consistent diet.    Problem/Plan - 7:  ·  Problem: HLD (hyperlipidemia).  Plan: - c/w Atorvastatin 10mg daily.     Problem/Plan - 8:  ·  Problem: Nutrition, metabolism, and development symptoms.  Plan: F: None   E: Replete as necessary K>4 Mg>2  N: DASH w/CC diet  DVT Prophylaxis: none at this time given scheduled thoracentesis   GI prophylaxis: Protonix 40mg daily  CODE STATUS: FULL CODE  Dispo: [facility].

## 2021-02-09 NOTE — CONSULT NOTE ADULT - PROVIDER SPECIALTY LIST ADULT
Cardiology
Heme/Onc
Critical Care
Critical Care
Electrophysiology
Rehab Medicine
Pulmonology
Palliative Care

## 2021-02-09 NOTE — CHART NOTE - NSCHARTNOTEFT_GEN_A_CORE
PALLIATIVE MEDICINE COORDINATION OF CARE NOTE FOR CRISTI PEREA  [  ] ED Trigger   [  ] MICU Trigger     [ x ] Consult    [  ] AI Comanagement    Never seen by palliative medicine.    ___30___ Minutes; Start: _0900am____  End: _0930am___, of non-face-to-face prolonged service provided that relates to (face-to-face) care that has or will occur and ongoing patient management, including one or more of the following:   - Reviewed records from other physicians or other health care professional services, including one or more of the following: other medical records and diagnostic / radiology study results     HPI:    83 y/o F PMHx DM, HTN, Stage IIIb LLL NSCLC metastatic, c/l R endobronchial SCC, TOMMY presenting to [facility] with palpitations and shortness of breath for the past 3 days. She has noticed worsening of her symptoms and decided to come to the ED. Patient denies any recent travel, abx use, hospitalizations, and sick contacts. Patient denies h/n/v/d, fever, chills, cp, abd pain, leg swelling, rashes, dysuria, and changes in BM.       In the ED- VS were T 98, HR , -125/56-61, RR16 SP02 100 on 2L NC   Labs significant for wbc 13.3, hgb 11.2, D-Dimer 418, INR 1.35, K 5.5, Bicarb 21, BUN/Cr 36/1.65, Ca 11, BNP 5352  CXR showed right sided opacification to the midlung field  CT PE protocol negative for PE but does show progression and extension of metastatic disease with collapse of right midle and lower lobes with right hilar mass.   CT head negative for metastasis   EKG showed afib with rvr, no ischemic changes   Given Metoprolol 5mg IVP, Lopressor 25mg oral, Lokelma 10mg and admitted for further management of worsening tumor burden.  (07 Feb 2021 03:27)    - Other: [facility] reviewed.   Rx for TYLENOL #3 and PERCOCET found on [facility] review. Ref #: [id number]    - Other: Medication reviewed.    The patient HAS NOT used PRN's in the last 24h.    MEDICATIONS  (STANDING):  aspirin  chewable 81 milliGRAM(s) Oral daily  atorvastatin 10 milliGRAM(s) Oral at bedtime  dextrose 40% Gel 15 Gram(s) Oral once  dextrose 5%. 1000 milliLiter(s) (50 mL/Hr) IV Continuous <Continuous>  dextrose 5%. 1000 milliLiter(s) (100 mL/Hr) IV Continuous <Continuous>  dextrose 50% Injectable 25 Gram(s) IV Push once  dextrose 50% Injectable 12.5 Gram(s) IV Push once  dextrose 50% Injectable 25 Gram(s) IV Push once  diltiazem    Tablet 30 milliGRAM(s) Oral every 8 hours  glucagon  Injectable 1 milliGRAM(s) IntraMuscular once  insulin lispro (ADMELOG) corrective regimen sliding scale   SubCutaneous Before meals and at bedtime  metoprolol succinate ER 50 milliGRAM(s) Oral every 24 hours  pantoprazole    Tablet 40 milliGRAM(s) Oral before breakfast  QUEtiapine 25 milliGRAM(s) Oral at bedtime    MEDICATIONS  (PRN):  acetaminophen   Tablet .. 650 milliGRAM(s) Oral every 4 hours PRN Mild Pain (1 - 3)  calamine/zinc oxide Lotion 1 Application(s) Topical three times a day PRN Itching  metoprolol tartrate Injectable 5 milliGRAM(s) IV Push once PRN afib rvr  ondansetron    Tablet 4 milliGRAM(s) Oral every 8 hours PRN Nausea and/or Vomiting      - Other: Advanced directives     Full Code     No documented MOLST form found on [facility]     No documented HCP form found on [facility]     Legal surrogates: [employer]at [employer] [phone]     Other surrogates: Homa Gibson [phone]     No Living will / POA / Advance directives found on [place] / [facility].     No documented GOC notes on [facility]    - Other: Coordination/Plan of care     1 admissions in 1 year     Current admission LOS: 2 days     LACE score: 10 ADVANCE ILLNESS PATIENT.     Patient NOT previously seen by palliative medicine consult service.      Consult request for: " goals of care, metastatic lung cancer, admitted for SOB and afib   "    Full consult to follow later today.

## 2021-02-09 NOTE — CONSULT NOTE ADULT - SUBJECTIVE AND OBJECTIVE BOX
CRISTI PEREA          MRN-[id number]            (1935)    HPI:    85 y/o F PMHx DM, HTN, Stage IIIb LLL NSCLC metastatic, c/l R endobronchial SCC, TOMMY presenting to [facility] with palpitations and shortness of breath for the past 3 days. She has noticed worsening of her symptoms and decided to come to the ED. Patient denies any recent travel, abx use, hospitalizations, and sick contacts. Patient denies h/n/v/d, fever, chills, cp, abd pain, leg swelling, rashes, dysuria, and changes in BM.       In the ED- VS were T 98, HR , -125/56-61, RR16 SP02 100 on 2L NC   Labs significant for wbc 13.3, hgb 11.2, D-Dimer 418, INR 1.35, K 5.5, Bicarb 21, BUN/Cr 36/1.65, Ca 11, BNP 5352  CXR showed right sided opacification to the midlung field  CT PE protocol negative for PE but does show progression and extension of metastatic disease with collapse of right midle and lower lobes with right hilar mass.   CT head negative for metastasis   EKG showed afib with rvr, no ischemic changes   Given Metoprolol 5mg IVP, Lopressor 25mg oral, Lokelma 10mg and admitted for further management of worsening tumor burden.  (2021 03:27)      PAST MEDICAL & SURGICAL HISTORY:  Heart murmur    Tobacco use    Squamous cell carcinoma of left lung    Adenocarcinoma of head and neck    Submandibular gland mass    DM (diabetes mellitus)    H/O: hysterectomy        FAMILY HISTORY:   Reviewed and found non contributory in mother or father    SOCIAL HISTORY:  Former smoker, no ETOH, no drugs     ROS:    Unable to attain due to:   n/a                     Dyspnea (Cortes 0-10):          0              N/V (Y/N):                    N          Secretions (Y/N) :         N       Agitation(Y/N): N  Pain (Y/N):     Yes  -Provocation/Palliation: movement makes pain worse  -Quality/Quantity: aching throbbing and shooting pain   -Radiating: pain radiates from right ear to right neck   -Severity: 10/10  -Timing/Frequency: Constant  -Impact on ADLs: pain impacts ability to perform ADL;'s     General:  + weakness   HEENT:    + cough   Neck:  Denied  CVS:  Denied  Resp:  Denied  GI:  Denied  :  Denied  Musc:  Denied  Neuro:  Denied  Psych:  Denied  Skin:  Denied  Lymph:  Denied    Allergies    No Known Allergies    Intolerances      Opiate Naive (Y/N): Y  -iStop reviewed (Y/N): Y  (Ref#:       [id number]     )    TYLENOL #3 and PERCOCET     Medications:      MEDICATIONS  (STANDING):  aspirin  chewable 81 milliGRAM(s) Oral daily  atorvastatin 10 milliGRAM(s) Oral at bedtime  dextrose 40% Gel 15 Gram(s) Oral once  dextrose 5%. 1000 milliLiter(s) (50 mL/Hr) IV Continuous <Continuous>  dextrose 5%. 1000 milliLiter(s) (100 mL/Hr) IV Continuous <Continuous>  dextrose 50% Injectable 25 Gram(s) IV Push once  dextrose 50% Injectable 12.5 Gram(s) IV Push once  dextrose 50% Injectable 25 Gram(s) IV Push once  diltiazem    Tablet 30 milliGRAM(s) Oral every 8 hours  glucagon  Injectable 1 milliGRAM(s) IntraMuscular once  insulin lispro (ADMELOG) corrective regimen sliding scale   SubCutaneous Before meals and at bedtime  metoprolol succinate ER 50 milliGRAM(s) Oral every 24 hours  pantoprazole    Tablet 40 milliGRAM(s) Oral before breakfast  QUEtiapine 25 milliGRAM(s) Oral at bedtime    MEDICATIONS  (PRN):  acetaminophen   Tablet .. 650 milliGRAM(s) Oral every 4 hours PRN Mild Pain (1 - 3)  calamine/zinc oxide Lotion 1 Application(s) Topical three times a day PRN Itching  fluocinonide 0.05% Solution 1 Application(s) Topical daily PRN Itching  gabapentin 300 milliGRAM(s) Oral three times a day PRN Neuropathy  metoprolol tartrate Injectable 5 milliGRAM(s) IV Push once PRN afib rvr  ondansetron    Tablet 4 milliGRAM(s) Oral every 8 hours PRN Nausea and/or Vomiting      Labs:    CBC:                        9.2    10.17 )-----------( 245      ( 2021 05:44 )             28.8     CMP:        137  |  104  |  28<H>  ----------------------------<  101<H>  4.6   |  20<L>  |  1.55<H>    Ca    9.0      2021 05:44  Phos  3.3       Mg     2.0           PT/INR - ( 2021 17:10 )   PT: 14.9 sec;   INR: 1.26          PTT - ( 2021 17:10 )  PTT:24.2 sec    Imaging:   < from: CT Head w/ IV Cont (21 @ 23:58) >  EXAM:  CT BRAIN IC                          PROCEDURE DATE:  2021  IMPRESSION: No enhancing intracranial masses identified.    < end of copied text >    < from: CT Angio Chest PE Protocol w/ IV Cont (21 @ 23:58) >  EXAM:  CT ANGIO CHEST PE PROTOCOL IC                          PROCEDURE DATE:  2021    IMPRESSION:  There is no pulmonary embolism.  Since the previous study from 3/28/2019, there is collapse of the right middle and lower lobes secondary to right hilar mass as well as narrowing of the right upper lobe bronchioles and right bronchus. There is also evidence of metastasis more extensive the lungs bilaterally. Findings concerning for lymphangitic spread of tumor in the right lung.      < from: Xray Chest 1 View- PORTABLE-Urgent (21 @ 21:10) >  EXAM:  XR CHEST PORTABLE URGENT 1V                          PROCEDURE DATE:  2021  Impression: Congestion and/or infiltrates. Right effusion    < end of copied text >      PEx:  T(C): 36.8 (21 @ 13:51), Max: 36.8 (21 @ 20:49)  HR: 116 (21 @ 14:56) (71 - 165)  BP: 112/85 (21 @ 14:56) (95/56 - 163/92)  RR: 18 (21 @ 14:56) (16 - 18)  SpO2: 97% (21 @ 14:56) (93% - 100%)  Wt(kg): 53.5mg  Daily     Daily   CAPILLARY BLOOD GLUCOSE      POCT Blood Glucose.: 118 mg/dL (2021 11:46)    I&O's Summary    2021 07:01  -  2021 07:00  --------------------------------------------------------  IN: 30 mL / OUT: 0 mL / NET: 30 mL    GENERAL:  [x ]Alert  [x ]Oriented x 3   [ ]Lethargic  [ ]Cachexia  [ ]Unarousable  [ ]Verbal  [ ]Non-Verbal  Behavioral:   [ ] Anxiety  [ ] Delirium [ ] Agitation [ x] Other - calm   HEENT:  [ ]Normal   [ ]Dry mouth   [ ]ET Tube/Trach  [ ]Oral lesions [x] nodules noted on right neck, painful to touch   PULMONARY:   [ ]Clear  [ ]Tachypnea  [ ]Audible excessive secretions   [ x]Rhonchi        [ ]Right [ ]Left [x ]Bilateral  [ ]Crackles        [ ]Right [ ]Left [ ]Bilateral  [ ]Wheezing     [ ]Right [ ]Left [ ]Bilateral  CARDIOVASCULAR:    [ ]Regular [ x]Irregular [x ]Tachy  [ ]Eric [ ]Murmur [ ]Other  GASTROINTESTINAL:  [x ]Soft  [ ]Distended   [ ]+BS  [x ]Non tender [ ]Tender  [ ]PEG [ ]OGT/ NGT  Last BM: 2021  GENITOURINARY:  [ ]Normal [ ] Incontinent   [ ]Oliguria/Anuria   [ ]Roman  MUSCULOSKELETAL:   [x ]Normal   [ ]Weakness  [ ]Bed/Wheelchair bound [ ]Edema  NEUROLOGIC:   [x ]No focal deficits  [ ] Cognitive impairment  [ ] Dysphagia [ ]Dysarthria [ ] Paresis [ ]Other   SKIN:   [ x]Normal   [ ]Pressure ulcer(s)  [ ]Rash      Preadmit Karnofsky:  70%           Current Karnofsky:    70 %  Cachexia (Y/N): N  BMI: 20.9kg/m2    Advanced Directives:     Full Code    DECISION MAKER: Patient is able to make decision for herself  LEGAL SURROGATE: Jeremie Moyer [phone]    GOALS OF CARE DISCUSSION       Palliative care info/counseling provided	           Family meeting       Advanced Directives addressed please see Advance Care Planning Note	           See previous Palliative Medicine Note       Documentation of GOC: Full code           REFERRALS	        Unit SW/Case Mgmt        PT/OT

## 2021-02-10 NOTE — PROGRESS NOTE ADULT - SUBJECTIVE AND OBJECTIVE BOX
OVERNIGHT EVENTS:    SUBJECTIVE / INTERVAL HPI: Patient seen and examined at bedside.     VITAL SIGNS:  Vital Signs Last 24 Hrs  T(C): 36.6 (10 Feb 2021 04:58), Max: 36.8 (09 Feb 2021 13:51)  T(F): 97.8 (10 Feb 2021 04:58), Max: 98.3 (09 Feb 2021 13:51)  HR: 74 (10 Feb 2021 05:06) (64 - 122)  BP: 106/52 (10 Feb 2021 05:06) (98/48 - 136/61)  BP(mean): 75 (10 Feb 2021 05:06) (69 - 93)  RR: 20 (10 Feb 2021 05:06) (16 - 20)  SpO2: 94% (10 Feb 2021 05:06) (94% - 100%)    PHYSICAL EXAM:    General: WDWN  HEENT: NC/AT; PERRL, anicteric sclera; MMM  Neck: supple  Cardiovascular: +S1/S2, RRR  Respiratory: CTA B/L; no W/R/R  Gastrointestinal: soft, NT/ND; +BSx4  Extremities: WWP; no edema, clubbing or cyanosis  Vascular: 2+ radial, DP/PT pulses B/L  Neurological: AAOx3; no focal deficits    MEDICATIONS:  MEDICATIONS  (STANDING):  aspirin  chewable 81 milliGRAM(s) Oral daily  atorvastatin 10 milliGRAM(s) Oral at bedtime  dextrose 40% Gel 15 Gram(s) Oral once  dextrose 5%. 1000 milliLiter(s) (50 mL/Hr) IV Continuous <Continuous>  dextrose 5%. 1000 milliLiter(s) (100 mL/Hr) IV Continuous <Continuous>  dextrose 50% Injectable 25 Gram(s) IV Push once  dextrose 50% Injectable 12.5 Gram(s) IV Push once  dextrose 50% Injectable 25 Gram(s) IV Push once  diltiazem    Tablet 30 milliGRAM(s) Oral every 8 hours  gabapentin 100 milliGRAM(s) Oral two times a day  glucagon  Injectable 1 milliGRAM(s) IntraMuscular once  heparin  Infusion 1500 Unit(s)/Hr (15 mL/Hr) IV Continuous <Continuous>  insulin lispro (ADMELOG) corrective regimen sliding scale   SubCutaneous Before meals and at bedtime  metoprolol succinate ER 50 milliGRAM(s) Oral every 24 hours  pantoprazole    Tablet 40 milliGRAM(s) Oral before breakfast  QUEtiapine 25 milliGRAM(s) Oral at bedtime    MEDICATIONS  (PRN):  acetaminophen   Tablet .. 650 milliGRAM(s) Oral every 4 hours PRN Mild Pain (1 - 3)  calamine/zinc oxide Lotion 1 Application(s) Topical three times a day PRN Itching  fluocinonide 0.05% Solution 1 Application(s) Topical daily PRN Itching  metoprolol tartrate Injectable 5 milliGRAM(s) IV Push once PRN afib rvr  ondansetron    Tablet 4 milliGRAM(s) Oral every 8 hours PRN Nausea and/or Vomiting      ALLERGIES:  Allergies    No Known Allergies    Intolerances        LABS:                        8.8    9.48  )-----------( 242      ( 10 Feb 2021 06:21 )             26.9     02-09    137  |  104  |  28<H>  ----------------------------<  101<H>  4.6   |  20<L>  |  1.55<H>    Ca    9.0      09 Feb 2021 05:44  Phos  3.3     02-09  Mg     2.0     02-09    TPro  x   /  Alb  3.0<L>  /  TBili  x   /  DBili  x   /  AST  x   /  ALT  x   /  AlkPhos  x   02-09    PT/INR - ( 08 Feb 2021 17:10 )   PT: 14.9 sec;   INR: 1.26          PTT - ( 10 Feb 2021 06:21 )  PTT:90.8 sec    CAPILLARY BLOOD GLUCOSE      POCT Blood Glucose.: 104 mg/dL (09 Feb 2021 22:32)      RADIOLOGY & ADDITIONAL TESTS: Reviewed. OVERNIGHT EVENTS: Heparin gtt therapeutic x1, stayed at rate of 15, repeat blood cultures collected.    SUBJECTIVE / INTERVAL HPI: Patient seen and examined at bedside.     VITAL SIGNS:  Vital Signs Last 24 Hrs  T(C): 36.6 (10 Feb 2021 04:58), Max: 36.8 (09 Feb 2021 13:51)  T(F): 97.8 (10 Feb 2021 04:58), Max: 98.3 (09 Feb 2021 13:51)  HR: 74 (10 Feb 2021 05:06) (64 - 122)  BP: 106/52 (10 Feb 2021 05:06) (98/48 - 136/61)  BP(mean): 75 (10 Feb 2021 05:06) (69 - 93)  RR: 20 (10 Feb 2021 05:06) (16 - 20)  SpO2: 94% (10 Feb 2021 05:06) (94% - 100%)    PHYSICAL EXAM:    General: WDWN  HEENT: NC/AT; PERRL, anicteric sclera; MMM  Neck: supple  Cardiovascular: +S1/S2, RRR  Respiratory: CTA B/L; no W/R/R  Gastrointestinal: soft, NT/ND; +BSx4  Extremities: WWP; no edema, clubbing or cyanosis  Vascular: 2+ radial, DP/PT pulses B/L  Neurological: AAOx3; no focal deficits    MEDICATIONS:  MEDICATIONS  (STANDING):  aspirin  chewable 81 milliGRAM(s) Oral daily  atorvastatin 10 milliGRAM(s) Oral at bedtime  dextrose 40% Gel 15 Gram(s) Oral once  dextrose 5%. 1000 milliLiter(s) (50 mL/Hr) IV Continuous <Continuous>  dextrose 5%. 1000 milliLiter(s) (100 mL/Hr) IV Continuous <Continuous>  dextrose 50% Injectable 25 Gram(s) IV Push once  dextrose 50% Injectable 12.5 Gram(s) IV Push once  dextrose 50% Injectable 25 Gram(s) IV Push once  diltiazem    Tablet 30 milliGRAM(s) Oral every 8 hours  gabapentin 100 milliGRAM(s) Oral two times a day  glucagon  Injectable 1 milliGRAM(s) IntraMuscular once  heparin  Infusion 1500 Unit(s)/Hr (15 mL/Hr) IV Continuous <Continuous>  insulin lispro (ADMELOG) corrective regimen sliding scale   SubCutaneous Before meals and at bedtime  metoprolol succinate ER 50 milliGRAM(s) Oral every 24 hours  pantoprazole    Tablet 40 milliGRAM(s) Oral before breakfast  QUEtiapine 25 milliGRAM(s) Oral at bedtime    MEDICATIONS  (PRN):  acetaminophen   Tablet .. 650 milliGRAM(s) Oral every 4 hours PRN Mild Pain (1 - 3)  calamine/zinc oxide Lotion 1 Application(s) Topical three times a day PRN Itching  fluocinonide 0.05% Solution 1 Application(s) Topical daily PRN Itching  metoprolol tartrate Injectable 5 milliGRAM(s) IV Push once PRN afib rvr  ondansetron    Tablet 4 milliGRAM(s) Oral every 8 hours PRN Nausea and/or Vomiting      ALLERGIES:  Allergies    No Known Allergies    Intolerances        LABS:                        8.8    9.48  )-----------( 242      ( 10 Feb 2021 06:21 )             26.9     02-09    137  |  104  |  28<H>  ----------------------------<  101<H>  4.6   |  20<L>  |  1.55<H>    Ca    9.0      09 Feb 2021 05:44  Phos  3.3     02-09  Mg     2.0     02-09    TPro  x   /  Alb  3.0<L>  /  TBili  x   /  DBili  x   /  AST  x   /  ALT  x   /  AlkPhos  x   02-09    PT/INR - ( 08 Feb 2021 17:10 )   PT: 14.9 sec;   INR: 1.26          PTT - ( 10 Feb 2021 06:21 )  PTT:90.8 sec    CAPILLARY BLOOD GLUCOSE      POCT Blood Glucose.: 104 mg/dL (09 Feb 2021 22:32)      RADIOLOGY & ADDITIONAL TESTS: Reviewed. OVERNIGHT EVENTS: Heparin gtt therapeutic x1, stayed at rate of 15, repeat blood cultures collected.    SUBJECTIVE / INTERVAL HPI: Patient seen and examined at bedside. Admits to persistent neck pain (sharp, shooting), improved but persistently mild dyspnea, denies chest pain, palpitations, lightheadedness, dizziness, nausea/vomiting, abdominal pain, diarrhea, fever, hemoptysis.    VITAL SIGNS:  Vital Signs Last 24 Hrs  T(C): 36.6 (10 Feb 2021 04:58), Max: 36.8 (09 Feb 2021 13:51)  T(F): 97.8 (10 Feb 2021 04:58), Max: 98.3 (09 Feb 2021 13:51)  HR: 74 (10 Feb 2021 05:06) (64 - 122)  BP: 106/52 (10 Feb 2021 05:06) (98/48 - 136/61)  BP(mean): 75 (10 Feb 2021 05:06) (69 - 93)  RR: 20 (10 Feb 2021 05:06) (16 - 20)  SpO2: 94% (10 Feb 2021 05:06) (94% - 100%)    PHYSICAL EXAM:    General: Elderly female patient in NAD  HEENT: NC/AT; PERRL, anicteric sclera; MMM  Neck: supple; 4 pink-purple 2-3cm hard & fixed palpable lymph nodes, tender to palpation  Cardiovascular: +S1/S2, irregularly irregular, crescendo-decrescendo systolic murmur heard best over aortic region  Respiratory: Lungs CTA b/l, mildly diminished over R inferior lung field  Gastrointestinal: soft, NT/ND; +BSx4  Extremities: WWP; no edema, clubbing or cyanosis  Vascular: Palpable radial, DP pulses B/L  Neurological: AAOx3; no focal deficits    MEDICATIONS:  MEDICATIONS  (STANDING):  aspirin  chewable 81 milliGRAM(s) Oral daily  atorvastatin 10 milliGRAM(s) Oral at bedtime  dextrose 40% Gel 15 Gram(s) Oral once  dextrose 5%. 1000 milliLiter(s) (50 mL/Hr) IV Continuous <Continuous>  dextrose 5%. 1000 milliLiter(s) (100 mL/Hr) IV Continuous <Continuous>  dextrose 50% Injectable 25 Gram(s) IV Push once  dextrose 50% Injectable 12.5 Gram(s) IV Push once  dextrose 50% Injectable 25 Gram(s) IV Push once  diltiazem    Tablet 30 milliGRAM(s) Oral every 8 hours  gabapentin 100 milliGRAM(s) Oral two times a day  glucagon  Injectable 1 milliGRAM(s) IntraMuscular once  heparin  Infusion 1500 Unit(s)/Hr (15 mL/Hr) IV Continuous <Continuous>  insulin lispro (ADMELOG) corrective regimen sliding scale   SubCutaneous Before meals and at bedtime  metoprolol succinate ER 50 milliGRAM(s) Oral every 24 hours  pantoprazole    Tablet 40 milliGRAM(s) Oral before breakfast  QUEtiapine 25 milliGRAM(s) Oral at bedtime    MEDICATIONS  (PRN):  acetaminophen   Tablet .. 650 milliGRAM(s) Oral every 4 hours PRN Mild Pain (1 - 3)  calamine/zinc oxide Lotion 1 Application(s) Topical three times a day PRN Itching  fluocinonide 0.05% Solution 1 Application(s) Topical daily PRN Itching  metoprolol tartrate Injectable 5 milliGRAM(s) IV Push once PRN afib rvr  ondansetron    Tablet 4 milliGRAM(s) Oral every 8 hours PRN Nausea and/or Vomiting      ALLERGIES:  Allergies    No Known Allergies    Intolerances        LABS:                        8.8    9.48  )-----------( 242      ( 10 Feb 2021 06:21 )             26.9     02-09    137  |  104  |  28<H>  ----------------------------<  101<H>  4.6   |  20<L>  |  1.55<H>    Ca    9.0      09 Feb 2021 05:44  Phos  3.3     02-09  Mg     2.0     02-09    TPro  x   /  Alb  3.0<L>  /  TBili  x   /  DBili  x   /  AST  x   /  ALT  x   /  AlkPhos  x   02-09    PT/INR - ( 08 Feb 2021 17:10 )   PT: 14.9 sec;   INR: 1.26          PTT - ( 10 Feb 2021 06:21 )  PTT:90.8 sec    CAPILLARY BLOOD GLUCOSE      POCT Blood Glucose.: 104 mg/dL (09 Feb 2021 22:32)      RADIOLOGY & ADDITIONAL TESTS: Reviewed.

## 2021-02-10 NOTE — PROGRESS NOTE ADULT - SUBJECTIVE AND OBJECTIVE BOX
INTERVAL EVENTS: Patient feeling well this AM. No complaints.    In AM, pt with sinus bradycardia and possible sinus arrest with junctional escape beats, with recovery to sinus bradycardia. Likely due to iatrogenic AV block with additional Cardizem. Cardizem DC'd.   In early afternoon, pt with HR 160s for a few minutes with self resolution. Pt asymptomatic and HD stable.     PAST MEDICAL & SURGICAL HISTORY:  Heart murmur    Depression    Tobacco use    Squamous cell carcinoma of left lung    Adenocarcinoma of head and neck    Submandibular gland mass    DM (diabetes mellitus)    H/O: hysterectomy        MEDICATIONS  (STANDING):  apixaban 2.5 milliGRAM(s) Oral every 12 hours  aspirin  chewable 81 milliGRAM(s) Oral daily  atorvastatin 10 milliGRAM(s) Oral at bedtime  dextrose 40% Gel 15 Gram(s) Oral once  dextrose 5%. 1000 milliLiter(s) (50 mL/Hr) IV Continuous <Continuous>  dextrose 5%. 1000 milliLiter(s) (100 mL/Hr) IV Continuous <Continuous>  dextrose 50% Injectable 25 Gram(s) IV Push once  dextrose 50% Injectable 12.5 Gram(s) IV Push once  dextrose 50% Injectable 25 Gram(s) IV Push once  gabapentin 100 milliGRAM(s) Oral two times a day  glucagon  Injectable 1 milliGRAM(s) IntraMuscular once  insulin lispro (ADMELOG) corrective regimen sliding scale   SubCutaneous Before meals and at bedtime  metoprolol succinate ER 50 milliGRAM(s) Oral every 24 hours  pantoprazole    Tablet 40 milliGRAM(s) Oral before breakfast  QUEtiapine 25 milliGRAM(s) Oral at bedtime    MEDICATIONS  (PRN):  acetaminophen   Tablet .. 650 milliGRAM(s) Oral every 4 hours PRN Mild Pain (1 - 3)  calamine/zinc oxide Lotion 1 Application(s) Topical three times a day PRN Itching  fluocinonide 0.05% Solution 1 Application(s) Topical daily PRN Itching  HYDROmorphone  Injectable 0.2 milliGRAM(s) IV Push every 3 hours PRN Moderate Pain (4 - 6)  metoprolol tartrate Injectable 5 milliGRAM(s) IV Push once PRN afib rvr  ondansetron    Tablet 4 milliGRAM(s) Oral every 8 hours PRN Nausea and/or Vomiting      Vital Signs Last 24 Hrs  T(C): 37.2 (10 Feb 2021 14:00), Max: 37.2 (10 Feb 2021 14:00)  T(F): 98.9 (10 Feb 2021 14:00), Max: 98.9 (10 Feb 2021 14:00)  HR: 100 (10 Feb 2021 11:17) (60 - 120)  BP: 116/59 (10 Feb 2021 11:17) (98/48 - 121/59)  BP(mean): 85 (10 Feb 2021 11:17) (69 - 89)  RR: 20 (10 Feb 2021 11:17) (18 - 20)  SpO2: 95% (10 Feb 2021 11:17) (94% - 98%)     PHYSICAL EXAM:  GEN: Awake, alert. NAD.   HEENT: NCAT, PERRL, EOMI. Mucosa moist. No JVD.  RESP: CTA b/l  CV: Irregularly irregular. Normal S1/S2. No m/r/g.  ABD: Soft. NT/ND. BS+  EXT: Warm. No edema, clubbing, or cyanosis.   NEURO: AAOx3. No focal deficits.     LABS:                        8.8    9.48  )-----------( 242      ( 10 Feb 2021 06:21 )             26.9     02-10    142  |  109<H>  |  25<H>  ----------------------------<  101<H>  4.3   |  19<L>  |  1.48<H>    Ca    8.7      10 Feb 2021 06:21  Phos  2.6     02-10  Mg     2.0     02-10    TPro  6.2  /  Alb  2.8<L>  /  TBili  0.4  /  DBili  x   /  AST  11  /  ALT  6<L>  /  AlkPhos  43  02-10        PT/INR - ( 08 Feb 2021 17:10 )   PT: 14.9 sec;   INR: 1.26          PTT - ( 10 Feb 2021 06:21 )  PTT:90.8 sec    I&O's Summary    09 Feb 2021 07:01  -  10 Feb 2021 07:00  --------------------------------------------------------  IN: 420 mL / OUT: 1300 mL / NET: -880 mL    10 Feb 2021 07:01  -  10 Feb 2021 15:33  --------------------------------------------------------  IN: 75 mL / OUT: 0 mL / NET: 75 mL      BNP  RADIOLOGY & ADDITIONAL STUDIES:    TELEMETRY:    EKG:         INTERVAL EVENTS: Patient feeling well this AM. No complaints.    In AM, pt with sinus bradycardia and possible sinus arrest with junctional escape beats, with recovery to sinus bradycardia. Likely due to iatrogenic AV block with additional Cardizem. Cardizem DC'd.   In early afternoon, pt with A fib with RVR in 160s for a few minutes with self resolution. Pt asymptomatic and HD stable.     PAST MEDICAL & SURGICAL HISTORY:  Heart murmur    Depression    Tobacco use    Squamous cell carcinoma of left lung    Adenocarcinoma of head and neck    Submandibular gland mass    DM (diabetes mellitus)    H/O: hysterectomy        MEDICATIONS  (STANDING):  apixaban 2.5 milliGRAM(s) Oral every 12 hours  aspirin  chewable 81 milliGRAM(s) Oral daily  atorvastatin 10 milliGRAM(s) Oral at bedtime  dextrose 40% Gel 15 Gram(s) Oral once  dextrose 5%. 1000 milliLiter(s) (50 mL/Hr) IV Continuous <Continuous>  dextrose 5%. 1000 milliLiter(s) (100 mL/Hr) IV Continuous <Continuous>  dextrose 50% Injectable 25 Gram(s) IV Push once  dextrose 50% Injectable 12.5 Gram(s) IV Push once  dextrose 50% Injectable 25 Gram(s) IV Push once  gabapentin 100 milliGRAM(s) Oral two times a day  glucagon  Injectable 1 milliGRAM(s) IntraMuscular once  insulin lispro (ADMELOG) corrective regimen sliding scale   SubCutaneous Before meals and at bedtime  metoprolol succinate ER 50 milliGRAM(s) Oral every 24 hours  pantoprazole    Tablet 40 milliGRAM(s) Oral before breakfast  QUEtiapine 25 milliGRAM(s) Oral at bedtime    MEDICATIONS  (PRN):  acetaminophen   Tablet .. 650 milliGRAM(s) Oral every 4 hours PRN Mild Pain (1 - 3)  calamine/zinc oxide Lotion 1 Application(s) Topical three times a day PRN Itching  fluocinonide 0.05% Solution 1 Application(s) Topical daily PRN Itching  HYDROmorphone  Injectable 0.2 milliGRAM(s) IV Push every 3 hours PRN Moderate Pain (4 - 6)  metoprolol tartrate Injectable 5 milliGRAM(s) IV Push once PRN afib rvr  ondansetron    Tablet 4 milliGRAM(s) Oral every 8 hours PRN Nausea and/or Vomiting      Vital Signs Last 24 Hrs  T(C): 37.2 (10 Feb 2021 14:00), Max: 37.2 (10 Feb 2021 14:00)  T(F): 98.9 (10 Feb 2021 14:00), Max: 98.9 (10 Feb 2021 14:00)  HR: 100 (10 Feb 2021 11:17) (60 - 120)  BP: 116/59 (10 Feb 2021 11:17) (98/48 - 121/59)  BP(mean): 85 (10 Feb 2021 11:17) (69 - 89)  RR: 20 (10 Feb 2021 11:17) (18 - 20)  SpO2: 95% (10 Feb 2021 11:17) (94% - 98%)     PHYSICAL EXAM:  GEN: Awake, alert. NAD.   HEENT: NCAT, PERRL, EOMI. Mucosa moist. No JVD.  RESP: CTA b/l  CV: Irregularly irregular. Normal S1/S2. No m/r/g.  ABD: Soft. NT/ND. BS+  EXT: Warm. No edema, clubbing, or cyanosis.   NEURO: AAOx3. No focal deficits.     LABS:                        8.8    9.48  )-----------( 242      ( 10 Feb 2021 06:21 )             26.9     02-10    142  |  109<H>  |  25<H>  ----------------------------<  101<H>  4.3   |  19<L>  |  1.48<H>    Ca    8.7      10 Feb 2021 06:21  Phos  2.6     02-10  Mg     2.0     02-10    TPro  6.2  /  Alb  2.8<L>  /  TBili  0.4  /  DBili  x   /  AST  11  /  ALT  6<L>  /  AlkPhos  43  02-10        PT/INR - ( 08 Feb 2021 17:10 )   PT: 14.9 sec;   INR: 1.26          PTT - ( 10 Feb 2021 06:21 )  PTT:90.8 sec    I&O's Summary    09 Feb 2021 07:01  -  10 Feb 2021 07:00  --------------------------------------------------------  IN: 420 mL / OUT: 1300 mL / NET: -880 mL    10 Feb 2021 07:01  -  10 Feb 2021 15:33  --------------------------------------------------------  IN: 75 mL / OUT: 0 mL / NET: 75 mL      BNP  RADIOLOGY & ADDITIONAL STUDIES:    TELEMETRY:    EKG:

## 2021-02-10 NOTE — PROGRESS NOTE ADULT - ATTENDING COMMENTS
Patient seen and examined. Agree with assessment and plan as documented above. She will f/u with me in clinic, 3 weeks post discharge for repeat ultrasound. If fluid malignant and rapidly reaccumulates, will consider IPC placement.

## 2021-02-10 NOTE — PHYSICAL THERAPY INITIAL EVALUATION ADULT - LEVEL OF INDEPENDENCE: SIT/SUPINE, REHAB EVAL
supervision Consent 1/Introductory Paragraph: The rationale for Mohs was explained to the patient and consent was obtained. The risks, benefits and alternatives to therapy were discussed in detail. Specifically, the risks of infection, scarring, bleeding, prolonged wound healing, incomplete removal, allergy to anesthesia, nerve injury and recurrence were addressed. Prior to the procedure, the treatment site was clearly identified and confirmed by the patient. All components of Universal Protocol/PAUSE Rule completed.

## 2021-02-10 NOTE — PROGRESS NOTE ADULT - PROBLEM SELECTOR PLAN 1
Patient has pain related her mets at her site of metastatic disease on her right neck area. Would recommend continuing Gabapentin 100mg TID and if patient is not sedated can gradually increase to 200mg TID. Continue  Dilaudid 0.2mg q3h IV PRN for moderate to severe pain.

## 2021-02-10 NOTE — PROGRESS NOTE ADULT - ASSESSMENT
per Internal Medicine    83 y/o F PMHx DM, HTN, HLD, Stage IIIb LLL NSCLC metastatic, c/l R endobronchial SCC, TOMMY presenting to [facility] with palpitations and shortness of breath for the past 3 days, admitted for R. pleural effusion with right lung lobe collapse with hilar involvement and paroxysmal Afib. Now stepped up to [facility] due to AFib RVR, s/p thoracentesis 2/9.      Problem/Plan - 1:  ·  Problem: Atrial fibrillation with rapid ventricular response.  Plan: Prior hx of paroxysmal AFib, but patient reports never being on rate control or AC. CHADsVASc 5. Since admission patient has had multiple AFib RVR episodes, as high as 150/160 bpm. S/p multiple IV lopressor pushes, PO toprol and lopressor, as well as diltiazem. Stepped up from [facility] to [facility] for further monitoring.  -TTE 2/8 shows AS with a calcified aortic valve with area 1.36cm². Moderate TC regurgitation. normal LV/RV size and function, LVEF 65%  -Per Cardiology recs, c/w Toprol XL 50 qd  -Started Heparin gtt for AC on 2/9 at 10pm  -c/w Lopressor 5mg PRN for bouts of AFib  -Started Diltiazem 30mg PO q8h on 2/9.     Problem/Plan - 2:  ·  Problem: Squamous cell carcinoma of left lung.  Plan: Pt has hx of LLL NSCLC and R endobronchial CSS s/p 3 cycles of Carboplatin/Etoposide, follows with Dr. Wellington. CXR shows RIGHT Lower Lobe opacity likely due to underlying malignancy. Pt notes sharp, shooting pain associated with enlarged, fixed, hard R neck LAD (tender to palpation).  - Per Heme/Onc recs, recommend holding off on starting immunotherapy until 5 treatment course of radiation therapy is complete  - c/w management of effusion as below  - Will need to follow up with Radiation oncology once discharged to start radiation  - Per Palliative, start Dilaudid 0.2mg IV q3h PRN for pain  - c/w Calamine lotion and heat packs for LAD pain  - c/w Zofran PRN for nausea  - Currently requiring no supplemental O2, occasionally hypoxic w/lateral decubitus  - Monitor respiratory status.     Problem/Plan - 3:  ·  Problem: Exudative pleural effusion.  Plan: Pt noted to have large R pleural effusion on CT chest associated with dyspnea and palpitations.  - s/p Thoracentesis with 1.3L of serous fluid removed 2/9  - 2 of 3 Light's criteria met, suggesting exudative effusion  - Gram stain negative  - f/u AFB and Fungal cultures + cell count.     Problem/Plan - 4:  ·  Problem: Junctional rhythm.  Plan: Pt noted to have rhythm strip with junctional rhythm on 2/10 AM after her Toprol and Diltiazem doses at 6am.  -f/u EKG.     Problem/Plan - 5:  ·  Problem: Agitation.  Plan: Pt noted to be increasingly agitated as of 2/7. Significantly improved as of 2/10, now cooperating with exams and labs.  - c/w Seroquel 25mg qHS.     Problem/Plan - 6:  Problem: Chronic kidney disease. Plan: Cr. 1.6, baseline 1.4-1.6  - Avoid nephrotoxic agents   - Continue to monitor.    Problem/Plan - 7:  ·  Problem: Electrolyte abnormality.  Plan: Hypercalcemia of 11, Potassium 5.5, s/p Lokelma 10mg. No EKG changes (peaked T waves or prolonged QT), likely 2/2 metastases, unknown if there is bone involvement. Differential includes tumor lysis syndrome.   - TSH wnl  - Uric acid and PTH both elevated, likely 2/2 active pulmonary malignancy  - f/u Heme/Onc (Dr. Wellington's) recs as above.     Problem/Plan - 8:  ·  Problem: DM (diabetes mellitus).  Plan: On metformin at home  - c/w mISS  - Carb consistent diet.     Problem/Plan - 9:  ·  Problem: HLD (hyperlipidemia).  Plan: - c/w Atorvastatin 10mg daily.     Problem/Plan - 10:  Problem: Nutrition, metabolism, and development symptoms. Plan; F: None   E: Replete as necessary K>4 Mg>2  N: DASH w/CC diet  DVT Prophylaxis: none at this time given scheduled thoracentesis   GI prophylaxis: Protonix 40mg daily  CODE STATUS: FULL CODE  Dispo: [facility].

## 2021-02-10 NOTE — PROGRESS NOTE ADULT - ASSESSMENT
83 y/o F PMHx DM, HTN, HLD, Stage IIIb LLL NSCLC metastatic, c/l R endobronchial SCC, TOMMY presenting to [facility] with palpitations and shortness of breath for the past 3 days, admitted for R. pleural effusion with right lung lobe collapse with hilar involvement and paroxysmal Afib. Now stepped up to [facility] due to AFib RVR, s/p thoracentesis 2/9.

## 2021-02-10 NOTE — PROGRESS NOTE ADULT - PROBLEM SELECTOR PLAN 4
Pt noted to have rhythm strip with junctional rhythm on 2/10 AM after her Toprol and Diltiazem doses at 6am.  -f/u EKG

## 2021-02-10 NOTE — PROGRESS NOTE ADULT - PROBLEM SELECTOR PLAN 6
Patient last assessed: 2/9/2021  to manage: GOC/AD, symptoms, and support.  30 Minutes; Start: 1130a End:1200pm  , of non-face-to-face prolonged service provided that relates to (face-to-face) care that has or will occur and ongoing patient management, including one or more of the following:   - Reviewed records from other physicians or other health care professional services, including one or more of the following: other medical records and diagnostic / radiology study results

## 2021-02-10 NOTE — PROGRESS NOTE ADULT - ATTENDING COMMENTS
Breathing much better after thoracentesis. EP to f/u regarding Afib management. Cardizem d/judie with transient junctional rhythm. BP stable. Breathing much better after thoracentesis. EP to f/u regarding Afib management. Cardizem d/judie with transient junctional rhythm. BP stable. Continue anticoagulation.

## 2021-02-10 NOTE — PHYSICAL THERAPY INITIAL EVALUATION ADULT - PERTINENT HX OF CURRENT PROBLEM, REHAB EVAL
85F PMHx DM, HTN, Stage IIIb LLL NSCLC metastatic, c/l R endobronchial SCC, TOMMY presenting to [facility] with palpitations and shortness of breath for the past 3 days. She has noticed worsening of her symptoms and decided to come to the ED. Patient denies any recent travel, abx use, hospitalizations, and sick contacts.

## 2021-02-10 NOTE — PROGRESS NOTE ADULT - SUBJECTIVE AND OBJECTIVE BOX
Interval Events:  Patient seen and examined at bedside.    MEDICATIONS:  Pulmonary:    Antimicrobials:    Anticoagulants:  aspirin  chewable 81 milliGRAM(s) Oral daily  heparin  Infusion 1500 Unit(s)/Hr IV Continuous <Continuous>    Cardiac:  metoprolol succinate ER 50 milliGRAM(s) Oral every 24 hours  metoprolol tartrate Injectable 5 milliGRAM(s) IV Push once PRN    Endocrine:  atorvastatin 10 milliGRAM(s) Oral at bedtime  dextrose 40% Gel 15 Gram(s) Oral once  dextrose 50% Injectable 25 Gram(s) IV Push once  dextrose 50% Injectable 12.5 Gram(s) IV Push once  dextrose 50% Injectable 25 Gram(s) IV Push once  glucagon  Injectable 1 milliGRAM(s) IntraMuscular once  insulin lispro (ADMELOG) corrective regimen sliding scale   SubCutaneous Before meals and at bedtime    Allergies    No Known Allergies    Intolerances        Vital Signs Last 24 Hrs  T(C): 36.8 (10 Feb 2021 08:55), Max: 36.8 (09 Feb 2021 13:51)  T(F): 98.3 (10 Feb 2021 08:55), Max: 98.3 (09 Feb 2021 13:51)  HR: 64 (10 Feb 2021 08:25) (64 - 122)  BP: 115/78 (10 Feb 2021 08:25) (98/48 - 136/61)  BP(mean): 89 (10 Feb 2021 08:25) (69 - 93)  RR: 20 (10 Feb 2021 08:25) (16 - 20)  SpO2: 96% (10 Feb 2021 08:25) (94% - 100%)    02-09 @ 07:01  -  02-10 @ 07:00  --------------------------------------------------------  IN: 420 mL / OUT: 1300 mL / NET: -880 mL          LABS:      CBC Full  -  ( 10 Feb 2021 06:21 )  WBC Count : 9.48 K/uL  RBC Count : 2.99 M/uL  Hemoglobin : 8.8 g/dL  Hematocrit : 26.9 %  Platelet Count - Automated : 242 K/uL  Mean Cell Volume : 90.0 fl  Mean Cell Hemoglobin : 29.4 pg  Mean Cell Hemoglobin Concentration : 32.7 gm/dL  Auto Neutrophil # : 7.29 K/uL  Auto Lymphocyte # : 0.97 K/uL  Auto Monocyte # : 0.95 K/uL  Auto Eosinophil # : 0.19 K/uL  Auto Basophil # : 0.02 K/uL  Auto Neutrophil % : 77.0 %  Auto Lymphocyte % : 10.2 %  Auto Monocyte % : 10.0 %  Auto Eosinophil % : 2.0 %  Auto Basophil % : 0.2 %    02-10    142  |  109<H>  |  25<H>  ----------------------------<  101<H>  4.3   |  19<L>  |  1.48<H>    Ca    8.7      10 Feb 2021 06:21  Phos  2.6     02-10  Mg     2.0     02-10    TPro  6.2  /  Alb  2.8<L>  /  TBili  0.4  /  DBili  x   /  AST  11  /  ALT  6<L>  /  AlkPhos  43  02-10    PT/INR - ( 08 Feb 2021 17:10 )   PT: 14.9 sec;   INR: 1.26          PTT - ( 10 Feb 2021 06:21 )  PTT:90.8 sec                  RADIOLOGY & ADDITIONAL STUDIES (The following images were personally reviewed): Interval Events:  Patient seen and examined at bedside.    MEDICATIONS:  Pulmonary:    Antimicrobials:    Anticoagulants:  aspirin  chewable 81 milliGRAM(s) Oral daily  heparin  Infusion 1500 Unit(s)/Hr IV Continuous <Continuous>    Cardiac:  metoprolol succinate ER 50 milliGRAM(s) Oral every 24 hours  metoprolol tartrate Injectable 5 milliGRAM(s) IV Push once PRN    Endocrine:  atorvastatin 10 milliGRAM(s) Oral at bedtime  dextrose 40% Gel 15 Gram(s) Oral once  dextrose 50% Injectable 25 Gram(s) IV Push once  dextrose 50% Injectable 12.5 Gram(s) IV Push once  dextrose 50% Injectable 25 Gram(s) IV Push once  glucagon  Injectable 1 milliGRAM(s) IntraMuscular once  insulin lispro (ADMELOG) corrective regimen sliding scale   SubCutaneous Before meals and at bedtime    Allergies    No Known Allergies    Intolerances    Vital Signs Last 24 Hrs  T(C): 36.8 (10 Feb 2021 08:55), Max: 36.8 (09 Feb 2021 13:51)  T(F): 98.3 (10 Feb 2021 08:55), Max: 98.3 (09 Feb 2021 13:51)  HR: 64 (10 Feb 2021 08:25) (64 - 122)  BP: 115/78 (10 Feb 2021 08:25) (98/48 - 136/61)  BP(mean): 89 (10 Feb 2021 08:25) (69 - 93)  RR: 20 (10 Feb 2021 08:25) (16 - 20)  SpO2: 96% (10 Feb 2021 08:25) (94% - 100%)    02-09 @ 07:01  -  02-10 @ 07:00  --------------------------------------------------------  IN: 420 mL / OUT: 1300 mL / NET: -880 mL    Physical Exam:  General: NAD, AAO x3  HEENT: No icterus,. Moist mucous membranes  Neck: No JVD noted. Supple, no meningismus  Cardio: S1, S2 noted, RRR. No murmurs, rubs or gallops  Resp: decreased breath sounds at the r base. some crackles R apex. L side CTA.   Abdo: Soft, NT, bowel sounds present. No organomegaly  Extremities: No edema noted. Pulses present b/l  Neuro: AAO x3, grossly normal motor strength.  Lymphnodes: no lymphadenopathy identified.  Skin: Dry, no rashes    LABS:  CBC Full  -  ( 10 Feb 2021 06:21 )  WBC Count : 9.48 K/uL  RBC Count : 2.99 M/uL  Hemoglobin : 8.8 g/dL  Hematocrit : 26.9 %  Platelet Count - Automated : 242 K/uL  Mean Cell Volume : 90.0 fl  Mean Cell Hemoglobin : 29.4 pg  Mean Cell Hemoglobin Concentration : 32.7 gm/dL  Auto Neutrophil # : 7.29 K/uL  Auto Lymphocyte # : 0.97 K/uL  Auto Monocyte # : 0.95 K/uL  Auto Eosinophil # : 0.19 K/uL  Auto Basophil # : 0.02 K/uL  Auto Neutrophil % : 77.0 %  Auto Lymphocyte % : 10.2 %  Auto Monocyte % : 10.0 %  Auto Eosinophil % : 2.0 %  Auto Basophil % : 0.2 %    02-10    142  |  109<H>  |  25<H>  ----------------------------<  101<H>  4.3   |  19<L>  |  1.48<H>    Ca    8.7      10 Feb 2021 06:21  Phos  2.6     02-10  Mg     2.0     02-10    TPro  6.2  /  Alb  2.8<L>  /  TBili  0.4  /  DBili  x   /  AST  11  /  ALT  6<L>  /  AlkPhos  43  02-10    PT/INR - ( 08 Feb 2021 17:10 )   PT: 14.9 sec;   INR: 1.26       PTT - ( 10 Feb 2021 06:21 )  PTT:90.8 sec    RADIOLOGY & ADDITIONAL STUDIES :  Reviewed

## 2021-02-10 NOTE — PROGRESS NOTE ADULT - SUBJECTIVE AND OBJECTIVE BOX
CRISTI PEREA             [id number]    CC: Palpatations    HPI:    83 y/o F PMHx DM, HTN, Stage IIIb LLL NSCLC metastatic, c/l R endobronchial SCC, TOMMY presenting to [facility] with palpitations and shortness of breath for the past 3 days. She has noticed worsening of her symptoms and decided to come to the ED. Patient denies any recent travel, abx use, hospitalizations, and sick contacts. Patient denies h/n/v/d, fever, chills, cp, abd pain, leg swelling, rashes, dysuria, and changes in BM.       In the ED- VS were T 98, HR , -125/56-61, RR16 SP02 100 on 2L NC   Labs significant for wbc 13.3, hgb 11.2, D-Dimer 418, INR 1.35, K 5.5, Bicarb 21, BUN/Cr 36/1.65, Ca 11, BNP 5352  CXR showed right sided opacification to the midlung field  CT PE protocol negative for PE but does show progression and extension of metastatic disease with collapse of right midle and lower lobes with right hilar mass.   CT head negative for metastasis   EKG showed afib with rvr, no ischemic changes   Given Metoprolol 5mg IVP, Lopressor 25mg oral, Lokelma 10mg and admitted for further management of worsening tumor burden.  (07 Feb 2021 03:27)    SUBJECTIVE: Patient resting in bed in no distress.     ROS:              Dyspnea (Cortes 0-10):          0              N/V (Y/N):                    N          Secretions (Y/N) :         N       Agitation(Y/N): N  Pain (Y/N):     Yes  -Provocation/Palliation: movement makes pain worse  -Quality/Quantity: aching throbbing and shooting pain   -Radiating: pain radiates from right ear to right neck   -Severity: 10/10  -Timing/Frequency: Constant  -Impact on ADLs: pain impacts ability to perform ADL;'s     OTHER REVIEW OF SYSTEMS: +weakness  UNABLE TO OBTAIN  due to: n/a     PEx:  T(C): 37.2 (02-10-21 @ 14:00), Max: 37.2 (02-10-21 @ 14:00)  HR: 64 (02-10-21 @ 16:08) (60 - 120)  BP: 115/57 (02-10-21 @ 16:08) (98/48 - 120/65)  RR: 20 (02-10-21 @ 16:08) (20 - 20)  SpO2: 100% (02-10-21 @ 16:08) (94% - 100%)  Wt(kg): 53.5kg      GENERAL:  [x ]Alert  [x ]Oriented x 3   [ ]Lethargic  [ ]Cachexia  [ ]Unarousable  [ ]Verbal  [ ]Non-Verbal  Behavioral:   [ ] Anxiety  [ ] Delirium [ ] Agitation [ x] Other - calm   HEENT:  [ ]Normal   [ ]Dry mouth   [ ]ET Tube/Trach  [ ]Oral lesions [x] nodules noted on right neck, painful to touch   PULMONARY:   [ ]Clear  [ ]Tachypnea  [ ]Audible excessive secretions   [ x]Rhonchi        [ ]Right [ ]Left [x ]Bilateral  [ ]Crackles        [ ]Right [ ]Left [ ]Bilateral  [ ]Wheezing     [ ]Right [ ]Left [ ]Bilateral  CARDIOVASCULAR:    [ ]Regular [ x]Irregular [x ]Tachy  [ ]Eric [ ]Murmur [ ]Other  GASTROINTESTINAL:  [x ]Soft  [ ]Distended   [ ]+BS  [x ]Non tender [ ]Tender  [ ]PEG [ ]OGT/ NGT  Last BM: 2/9/2021  GENITOURINARY:  [ ]Normal [ ] Incontinent   [ ]Oliguria/Anuria   [ ]Roman  MUSCULOSKELETAL:   [x ]Normal   [ ]Weakness  [ ]Bed/Wheelchair bound [ ]Edema  NEUROLOGIC:   [x ]No focal deficits  [ ] Cognitive impairment  [ ] Dysphagia [ ]Dysarthria [ ] Paresis [ ]Other   SKIN:   [ x]Normal   [ ]Pressure ulcer(s)  [ ]Rash    ALLERGIES: No Known Allergies    OPIATE NAÏVE (Y/N): Y    MEDICATIONS: REVIEWED  MEDICATIONS  (STANDING):  apixaban 2.5 milliGRAM(s) Oral every 12 hours  aspirin  chewable 81 milliGRAM(s) Oral daily  atorvastatin 10 milliGRAM(s) Oral at bedtime  dextrose 40% Gel 15 Gram(s) Oral once  dextrose 5%. 1000 milliLiter(s) (50 mL/Hr) IV Continuous <Continuous>  dextrose 5%. 1000 milliLiter(s) (100 mL/Hr) IV Continuous <Continuous>  dextrose 50% Injectable 25 Gram(s) IV Push once  dextrose 50% Injectable 12.5 Gram(s) IV Push once  dextrose 50% Injectable 25 Gram(s) IV Push once  gabapentin 100 milliGRAM(s) Oral two times a day  glucagon  Injectable 1 milliGRAM(s) IntraMuscular once  insulin lispro (ADMELOG) corrective regimen sliding scale   SubCutaneous Before meals and at bedtime  metoprolol succinate ER 25 milliGRAM(s) Oral once  pantoprazole    Tablet 40 milliGRAM(s) Oral before breakfast  QUEtiapine 25 milliGRAM(s) Oral at bedtime    MEDICATIONS  (PRN):  acetaminophen   Tablet .. 650 milliGRAM(s) Oral every 4 hours PRN Mild Pain (1 - 3)  calamine/zinc oxide Lotion 1 Application(s) Topical three times a day PRN Itching  fluocinonide 0.05% Solution 1 Application(s) Topical daily PRN Itching  HYDROmorphone  Injectable 0.2 milliGRAM(s) IV Push every 3 hours PRN Moderate Pain (4 - 6)  metoprolol tartrate Injectable 5 milliGRAM(s) IV Push once PRN afib rvr  ondansetron    Tablet 4 milliGRAM(s) Oral every 8 hours PRN Nausea and/or Vomiting    LABS: REVIEWED  CBC:                        8.8    9.48  )-----------( 242      ( 10 Feb 2021 06:21 )             26.9     CMP:    02-10    142  |  109<H>  |  25<H>  ----------------------------<  101<H>  4.3   |  19<L>  |  1.48<H>    Ca    8.7      10 Feb 2021 06:21  Phos  2.6     02-10  Mg     2.0     02-10    TPro  6.2  /  Alb  2.8<L>  /  TBili  0.4  /  DBili  x   /  AST  11  /  ALT  6<L>  /  AlkPhos  43  02-10      IMAGING: REVIEWED    ADVANCED DIRECTIVES:            FULL CODE          DECISION MAKER: Patient is able to make decision for herself  LEGAL SURROGATE: Jeremie Moyer [phone]    PSYCHOSOCIAL-SPIRITUAL ASSESSMENT:       Reviewed       Care plan unchanged      GOALS OF CARE DISCUSSION       Palliative care info/counseling provided	           Family meeting       Advanced Directives addressed please see Advance Care Planning Note	           See previous Palliative Medicine Note       Documentation of GOC: Full code   	      AGENCY CHOICE DISCUSSED:           Homecare        JANIS    REFERRALS	        Unit SW/Case Mgmt       PT/OT

## 2021-02-10 NOTE — PROGRESS NOTE ADULT - ASSESSMENT
85 year old woman with neoplasm related pain, debility, Effusion, high grade large cell neuroendocrine cancer and encounter for palliative care.

## 2021-02-10 NOTE — PROGRESS NOTE ADULT - ASSESSMENT
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 Wellington.   Presenting on 2/7/21 with several days of worsening SOB and palpitations,   Found in AFib w/ RVR and moderate R-sided pleural effusion     #Pleural Effusion - Right-sided, moderate, simple-appearing. Either malignant or consequence of possible trapped lung related to worsening of right hilum malignant lesions causing segmental right lower lobe airway obstruction with subsequent atelectasis. Low concern for infection. Appears comfortable, not HD-unstable. Fluid started to be seen  on PET from 8/2020, but now grown much larger in size as also seen on per from january 2021.    Pleural Fluid exudative with lymphocyte predominance.     - F/u pleural fluid cytology, however, Likley no plan to intervene further unless rapidly reaccumilates.   - If malignant with rapid re-accumulation, return of symptoms, will benefit from  in-dwelling pleural catheter

## 2021-02-10 NOTE — PROGRESS NOTE ADULT - ASSESSMENT
84F w/HTN, DM, Stage IIIb NSCLC metastatic cancer, R endobrachial SCC, TOMMY adm on 2/6 to F for pAF in setting of R lung collapse 2/2 R hilar mass. Cardiology consulted 2/7 for Afib recommendations.     #pAF - new onset, currently in Afib on monitor. [id number] - 5.  -Agree with discontinuation of Cardizem.   - Increase Toprol XL 50mg daily to Toprol XL 100mg po daily w/hold parameters for HR< 60, SBP < 90  - Agree with switch to Eliquis 2.5mg po BID.   -Patient is mostly rate controlled with short periods of RVR which patient is HD stable and terminates without intervention. No need for cardiac telemetry at this time.       Discussed with attending and primary team. Will sign off at this time.    84F w/HTN, DM, Stage IIIb NSCLC metastatic cancer, R endobrachial SCC, TOMMY adm on 2/6 to F for pAF in setting of R lung collapse 2/2 R hilar mass. Cardiology consulted 2/7 for Afib recommendations.     #pAF - new onset, currently in  rate controlled Afib on monitor. [id number] - 5.  -Agree with discontinuation of Cardizem given bradycardia with addition of cardizem  - Increase Toprol XL 50mg daily to Toprol XL 100mg po daily w/hold parameters for HR< 60, SBP < 90 for better rate control  - Agree with switch to Eliquis 2.5mg po BID.   -Patient is mostly rate controlled with short periods of RVR at which time patient is HD stable and terminates without intervention. No need for cardiac telemetry at this time.       Discussed with attending and primary team. Please re consult as needed

## 2021-02-10 NOTE — PROGRESS NOTE ADULT - PROBLEM SELECTOR PLAN 5
Pt noted to be increasingly agitated as of 2/7. Significantly improved as of 2/10, now cooperating with exams and labs.  - c/w Seroquel 25mg qHS

## 2021-02-10 NOTE — PROGRESS NOTE ADULT - PROBLEM SELECTOR PLAN 3
Pt noted to have large R pleural effusion on CT chest associated with dyspnea and palpitations.  - s/p Thoracentesis with 1.3L of serous fluid removed 2/9  - 2 of 3 Light's criteria met, suggesting exudative effusion  - Gram stain negative  - f/u AFB and Fungal cultures + cell count

## 2021-02-10 NOTE — PROGRESS NOTE ADULT - SUBJECTIVE AND OBJECTIVE BOX
Physical Medicine and Rehabilitation Progress Note:    Patient is a 85y old  Female who presents with a chief complaint of Palpitations (09 Feb 2021 15:27)      HPI:    83 y/o F PMHx DM, HTN, Stage IIIb LLL NSCLC metastatic, c/l R endobronchial SCC, TOMMY presenting to [facility] with palpitations and shortness of breath for the past 3 days. She has noticed worsening of her symptoms and decided to come to the ED. Patient denies any recent travel, abx use, hospitalizations, and sick contacts. Patient denies h/n/v/d, fever, chills, cp, abd pain, leg swelling, rashes, dysuria, and changes in BM.       In the ED- VS were T 98, HR , -125/56-61, RR16 SP02 100 on 2L NC   Labs significant for wbc 13.3, hgb 11.2, D-Dimer 418, INR 1.35, K 5.5, Bicarb 21, BUN/Cr 36/1.65, Ca 11, BNP 5352  CXR showed right sided opacification to the midlung field  CT PE protocol negative for PE but does show progression and extension of metastatic disease with collapse of right midle and lower lobes with right hilar mass.   CT head negative for metastasis   EKG showed afib with rvr, no ischemic changes   Given Metoprolol 5mg IVP, Lopressor 25mg oral, Lokelma 10mg and admitted for further management of worsening tumor burden.  (07 Feb 2021 03:27)                            8.8    9.48  )-----------( 242      ( 10 Feb 2021 06:21 )             26.9       02-10    142  |  109<H>  |  25<H>  ----------------------------<  101<H>  4.3   |  19<L>  |  1.48<H>    Ca    8.7      10 Feb 2021 06:21  Phos  2.6     02-10  Mg     2.0     02-10    TPro  6.2  /  Alb  2.8<L>  /  TBili  0.4  /  DBili  x   /  AST  11  /  ALT  6<L>  /  AlkPhos  43  02-10    Vital Signs Last 24 Hrs  T(C): 37.2 (10 Feb 2021 14:00), Max: 37.2 (10 Feb 2021 14:00)  T(F): 98.9 (10 Feb 2021 14:00), Max: 98.9 (10 Feb 2021 14:00)  HR: 100 (10 Feb 2021 11:17) (60 - 120)  BP: 116/59 (10 Feb 2021 11:17) (98/48 - 136/61)  BP(mean): 85 (10 Feb 2021 11:17) (69 - 89)  RR: 20 (10 Feb 2021 11:17) (16 - 20)  SpO2: 95% (10 Feb 2021 11:17) (94% - 98%)    MEDICATIONS  (STANDING):  apixaban 2.5 milliGRAM(s) Oral every 12 hours  aspirin  chewable 81 milliGRAM(s) Oral daily  atorvastatin 10 milliGRAM(s) Oral at bedtime  dextrose 40% Gel 15 Gram(s) Oral once  dextrose 5%. 1000 milliLiter(s) (50 mL/Hr) IV Continuous <Continuous>  dextrose 5%. 1000 milliLiter(s) (100 mL/Hr) IV Continuous <Continuous>  dextrose 50% Injectable 25 Gram(s) IV Push once  dextrose 50% Injectable 12.5 Gram(s) IV Push once  dextrose 50% Injectable 25 Gram(s) IV Push once  gabapentin 100 milliGRAM(s) Oral two times a day  glucagon  Injectable 1 milliGRAM(s) IntraMuscular once  insulin lispro (ADMELOG) corrective regimen sliding scale   SubCutaneous Before meals and at bedtime  metoprolol succinate ER 50 milliGRAM(s) Oral every 24 hours  pantoprazole    Tablet 40 milliGRAM(s) Oral before breakfast  QUEtiapine 25 milliGRAM(s) Oral at bedtime    MEDICATIONS  (PRN):  acetaminophen   Tablet .. 650 milliGRAM(s) Oral every 4 hours PRN Mild Pain (1 - 3)  calamine/zinc oxide Lotion 1 Application(s) Topical three times a day PRN Itching  fluocinonide 0.05% Solution 1 Application(s) Topical daily PRN Itching  HYDROmorphone  Injectable 0.2 milliGRAM(s) IV Push every 3 hours PRN Moderate Pain (4 - 6)  metoprolol tartrate Injectable 5 milliGRAM(s) IV Push once PRN afib rvr  ondansetron    Tablet 4 milliGRAM(s) Oral every 8 hours PRN Nausea and/or Vomiting    Currently Undergoing Physical/ Occupational Therapy at bedside.    Functional Status Assessment:         Previous Level of Function:     · Ambulation Skills	independent  · Transfer Skills	independent  · ADL Skills	independent  · Work/Leisure Activity	independent  · Additional Comments	Pt lives in an apartment building, no NERI, denies use of DME prior to admission    Cognitive Status Examination:   · Orientation	oriented to person, place, time and situation  · Level of Consciousness	alert  · Follows Commands and Answers Questions	100% of the time  · Personal Safety and Judgment	intact    Range of Motion Exam:   · Range of Motion Examination	no ROM deficits were identified    Manual Muscle Testing:   · Manual Muscle Testing Results	no strength deficits were identified    Bed Mobility: Sit to Supine:     · Level of [place]	supervision  · Physical Assist/Nonphysical Assist	supervision    Bed Mobility: Supine to Sit:     · Level of [place]	contact guard  · Physical Assist/Nonphysical Assist	verbal cues; 1 person assist    Transfer: Sit to Stand:     · Level of [place]	contact guard  · Physical Assist/Nonphysical Assist	verbal cues; 1 person assist  · Weight-Bearing Restrictions	full weight-bearing    Transfer: Stand to Sit:     · Level of [place]	contact guard  · Physical Assist/Nonphysical Assist	1 person assist; verbal cues  · Weight-Bearing Restrictions	full weight-bearing    Sit/Stand Transfer Safety Analysis:     · Impairments Contributing to Impaired Transfers	impaired balance    Gait Skills:     · Level of [place]	contact guard  · Physical Assist/Nonphysical Assist	verbal cues; 1 person assist  · Weight-Bearing Restrictions	full weight-bearing  · Gait Distance	5 feet    Gait Analysis:     · Gait Pattern Used	2-point gait  · Gait Deviations Noted	decreased sharonda  · Impairments Contributing to Gait Deviations	impaired balance    Balance Skills Assessment:     · Sitting Balance: Static	good balance  · Sitting Balance: Dynamic	good balance  · Sit-to-Stand Balance	good minus  · Standing Balance: Static	good balance  · Standing Balance: Dynamic	good minus    Sensory Examination:   Sensory Examination:    Grossly Intact:   · Gross Sensory Examination	Grossly Intact      Clinical Impressions:   · Criteria for Skilled Therapeutic Interventions	impairments found; rehab potential; therapy frequency; anticipated discharge recommendation  · Impairments Found (describe specific impairments)	aerobic capacity/endurance; gait, locomotion, and balance  · Rehab Potential	good, to achieve stated therapy goals  · Therapy Frequency	2-3x/week        PM&R Impression: as above    Current Disposition Plan Recommendations:    d/c home with home physical therapy

## 2021-02-10 NOTE — PROGRESS NOTE ADULT - PROBLEM SELECTOR PLAN 2
Pt has hx of LLL NSCLC and R endobronchial CSS s/p 3 cycles of Carboplatin/Etoposide, follows with Dr. Wellington. CXR shows RIGHT Lower Lobe opacity likely due to underlying malignancy. Pt notes sharp, shooting pain associated with enlarged, fixed, hard R neck LAD (tender to palpation).  - Per Heme/Onc recs, recommend holding off on starting immunotherapy until 5 treatment course of radiation therapy is complete  - c/w management of effusion as below  - Will need to follow up with Radiation oncology once discharged to start radiation  - Per Palliative, start Dilaudid 0.2mg IV q3h PRN for pain  - c/w Calamine lotion and heat packs for LAD pain  - c/w Zofran PRN for nausea  - Currently requiring no supplemental O2, occasionally hypoxic w/lateral decubitus  - Monitor respiratory status

## 2021-02-10 NOTE — PROGRESS NOTE ADULT - PROBLEM SELECTOR PLAN 1
Prior hx of paroxysmal AFib, but patient reports never being on rate control or AC. CHADsVASc 5. Since admission patient has had multiple AFib RVR episodes, as high as 150/160 bpm. S/p multiple IV lopressor pushes, PO toprol and lopressor, as well as diltiazem. Stepped up from [facility] to [facility] for further monitoring.  -TTE 2/8 shows AS with a calcified aortic valve with area 1.36cm². Moderate TC regurgitation. normal LV/RV size and function, LVEF 65%  -Per Cardiology recs, c/w Toprol XL 50 qd  -Started Heparin gtt for AC on 2/9 at 10pm  -c/w Lopressor 5mg PRN for bouts of AFib  -Started Diltiazem 30mg PO q8h on 2/9

## 2021-02-10 NOTE — PROGRESS NOTE ADULT - ATTENDING COMMENTS
See fellow note written above for details. I reviewed the fellow documentation. I have personally seen and examined this patient. I reviewed vitals, labs, medications, cardiac studies, and additional imaging. I agree with the above fellow's findings and plans as written above

## 2021-02-11 NOTE — PROGRESS NOTE ADULT - PROBLEM SELECTOR PLAN 1
Patient has pain related her mets at her site of metastatic disease on her right neck area. Gabapentin making patien sleepy, would recommend 300mg at bedtime and discontinue TID. Medication appears to be helping patients pain.   Continue  Dilaudid 0.2mg q3h IV PRN for moderate to severe pain.

## 2021-02-11 NOTE — PROGRESS NOTE ADULT - ASSESSMENT
85 y/o F PMHx DM, HTN, HLD, Stage IIIb LLL NSCLC metastatic, c/l R endobronchial SCC, TOMMY presenting to [facility] with palpitations and shortness of breath for the past 3 days, admitted for R. pleural effusion with right lung lobe collapse with hilar involvement and paroxysmal Afib. Now stepped up to [facility] due to AFib RVR, s/p thoracentesis 2/9.

## 2021-02-11 NOTE — PROGRESS NOTE ADULT - PROBLEM SELECTOR PLAN 1
Prior hx of paroxysmal AFib, but patient reports never being on rate control or AC. CHADsVASc 5. Since admission patient has had multiple AFib RVR episodes, as high as 150/160 bpm. S/p multiple IV lopressor pushes, PO toprol and lopressor, as well as diltiazem. Stepped up from [facility] to [facility] for further monitoring.  -TTE 2/8 shows AS with a calcified aortic valve with area 1.36cm². Moderate TC regurgitation. normal LV/RV size and function, LVEF 65%  -Cariology following, appreciate recs  -c/w Toprol 75mg qd with plan for Toprol 32.5mg BID on discharge  -Now bridged to Eliquis 2.5mg BID for AC  -c/w Lopressor 5mg PRN for bouts of AFib  -Discontinued Diltiazem due to junctional bradycardia on 2/10

## 2021-02-11 NOTE — PROGRESS NOTE ADULT - PROBLEM SELECTOR PLAN 3
Patient has right sided pleural effusion which plan it so have thoracentesis for. She had doen on 2/9/2021 and had 1300ml drained. Appreciate Pulmonary involvement. Patient to f/u in pulmonary clinic.

## 2021-02-11 NOTE — PROGRESS NOTE ADULT - ATTENDING COMMENTS
Afib better controlled. Aflutter noted at times. Will add po Amiodarone as outlined by EP. Continue telemetry monitoring.

## 2021-02-11 NOTE — PROGRESS NOTE ADULT - SUBJECTIVE AND OBJECTIVE BOX
OVERNIGHT EVENTS:    SUBJECTIVE / INTERVAL HPI: Patient seen and examined at bedside.     VITAL SIGNS:  Vital Signs Last 24 Hrs  T(C): 36.4 (11 Feb 2021 04:45), Max: 37.3 (10 Feb 2021 22:30)  T(F): 97.6 (11 Feb 2021 04:45), Max: 99.2 (10 Feb 2021 22:30)  HR: 148 (11 Feb 2021 05:16) (60 - 148)  BP: 101/64 (11 Feb 2021 05:16) (101/64 - 135/63)  BP(mean): 76 (11 Feb 2021 05:16) (73 - 91)  RR: 18 (11 Feb 2021 05:16) (18 - 20)  SpO2: 98% (11 Feb 2021 05:16) (94% - 100%)    PHYSICAL EXAM:    General: WDWN  HEENT: NC/AT; PERRL, anicteric sclera; MMM  Neck: supple  Cardiovascular: +S1/S2, RRR  Respiratory: CTA B/L; no W/R/R  Gastrointestinal: soft, NT/ND; +BSx4  Extremities: WWP; no edema, clubbing or cyanosis  Vascular: 2+ radial, DP/PT pulses B/L  Neurological: AAOx3; no focal deficits    MEDICATIONS:  MEDICATIONS  (STANDING):  apixaban 2.5 milliGRAM(s) Oral every 12 hours  aspirin  chewable 81 milliGRAM(s) Oral daily  atorvastatin 10 milliGRAM(s) Oral at bedtime  dextrose 40% Gel 15 Gram(s) Oral once  dextrose 5%. 1000 milliLiter(s) (50 mL/Hr) IV Continuous <Continuous>  dextrose 5%. 1000 milliLiter(s) (100 mL/Hr) IV Continuous <Continuous>  dextrose 50% Injectable 25 Gram(s) IV Push once  dextrose 50% Injectable 12.5 Gram(s) IV Push once  dextrose 50% Injectable 25 Gram(s) IV Push once  gabapentin 100 milliGRAM(s) Oral two times a day  glucagon  Injectable 1 milliGRAM(s) IntraMuscular once  insulin lispro (ADMELOG) corrective regimen sliding scale   SubCutaneous Before meals and at bedtime  metoprolol succinate ER 75 milliGRAM(s) Oral every 24 hours  metoprolol tartrate Injectable 5 milliGRAM(s) IV Push once  pantoprazole    Tablet 40 milliGRAM(s) Oral before breakfast  QUEtiapine 25 milliGRAM(s) Oral at bedtime  sodium chloride 0.9% Bolus 500 milliLiter(s) IV Bolus once    MEDICATIONS  (PRN):  acetaminophen   Tablet .. 650 milliGRAM(s) Oral every 4 hours PRN Mild Pain (1 - 3)  calamine/zinc oxide Lotion 1 Application(s) Topical three times a day PRN Itching  fluocinonide 0.05% Solution 1 Application(s) Topical daily PRN Itching  HYDROmorphone  Injectable 0.2 milliGRAM(s) IV Push every 3 hours PRN Moderate Pain (4 - 6)  ondansetron    Tablet 4 milliGRAM(s) Oral every 8 hours PRN Nausea and/or Vomiting      ALLERGIES:  Allergies    No Known Allergies    Intolerances        LABS:                        8.7    8.89  )-----------( 241      ( 11 Feb 2021 06:53 )             25.9     02-11    143  |  113<H>  |  24<H>  ----------------------------<  114<H>  4.1   |  19<L>  |  1.38<H>    Ca    8.7      11 Feb 2021 06:53  Phos  2.1     02-11  Mg     2.0     02-11    TPro  6.2  /  Alb  2.8<L>  /  TBili  0.4  /  DBili  x   /  AST  11  /  ALT  6<L>  /  AlkPhos  43  02-10    PTT - ( 10 Feb 2021 06:21 )  PTT:90.8 sec    CAPILLARY BLOOD GLUCOSE      POCT Blood Glucose.: 129 mg/dL (11 Feb 2021 06:14)      RADIOLOGY & ADDITIONAL TESTS: Reviewed. OVERNIGHT EVENTS: Noted to have episode of AFib with RVR to 160s for about 10min at 4am. Given Lopressor 5mg and 500cc bolus with return to NSR.     SUBJECTIVE / INTERVAL HPI: Patient seen and examined at bedside. Admits to persistent R neck pain (sharp, shooting, intermittent), denies dyspnea, chest pain, abdominal pain, subjective palpitations, nausea/vomiting, leg pain, dysphagia, odynophagia.    VITAL SIGNS:  Vital Signs Last 24 Hrs  T(C): 36.4 (11 Feb 2021 04:45), Max: 37.3 (10 Feb 2021 22:30)  T(F): 97.6 (11 Feb 2021 04:45), Max: 99.2 (10 Feb 2021 22:30)  HR: 148 (11 Feb 2021 05:16) (60 - 148)  BP: 101/64 (11 Feb 2021 05:16) (101/64 - 135/63)  BP(mean): 76 (11 Feb 2021 05:16) (73 - 91)  RR: 18 (11 Feb 2021 05:16) (18 - 20)  SpO2: 98% (11 Feb 2021 05:16) (94% - 100%)    PHYSICAL EXAM:    General: Elderly female patient in NAD  HEENT: NC/AT; PERRL, anicteric sclera; MMM  Neck: supple; 4 pink-purple 2-3cm hard & fixed palpable lymph nodes, tender to palpation  Cardiovascular: +S1/S2, irregularly irregular, crescendo-decrescendo systolic murmur heard best over aortic region  Respiratory: Lungs CTA b/l, mildly diminished over R inferior lung field  Gastrointestinal: soft, NT/ND; +BSx4  Extremities: WWP; no edema, clubbing or cyanosis  Vascular: Palpable radial, DP pulses B/L  Neurological: AAOx3; no focal deficits    MEDICATIONS:  MEDICATIONS  (STANDING):  apixaban 2.5 milliGRAM(s) Oral every 12 hours  aspirin  chewable 81 milliGRAM(s) Oral daily  atorvastatin 10 milliGRAM(s) Oral at bedtime  dextrose 40% Gel 15 Gram(s) Oral once  dextrose 5%. 1000 milliLiter(s) (50 mL/Hr) IV Continuous <Continuous>  dextrose 5%. 1000 milliLiter(s) (100 mL/Hr) IV Continuous <Continuous>  dextrose 50% Injectable 25 Gram(s) IV Push once  dextrose 50% Injectable 12.5 Gram(s) IV Push once  dextrose 50% Injectable 25 Gram(s) IV Push once  gabapentin 100 milliGRAM(s) Oral two times a day  glucagon  Injectable 1 milliGRAM(s) IntraMuscular once  insulin lispro (ADMELOG) corrective regimen sliding scale   SubCutaneous Before meals and at bedtime  metoprolol succinate ER 75 milliGRAM(s) Oral every 24 hours  metoprolol tartrate Injectable 5 milliGRAM(s) IV Push once  pantoprazole    Tablet 40 milliGRAM(s) Oral before breakfast  QUEtiapine 25 milliGRAM(s) Oral at bedtime  sodium chloride 0.9% Bolus 500 milliLiter(s) IV Bolus once    MEDICATIONS  (PRN):  acetaminophen   Tablet .. 650 milliGRAM(s) Oral every 4 hours PRN Mild Pain (1 - 3)  calamine/zinc oxide Lotion 1 Application(s) Topical three times a day PRN Itching  fluocinonide 0.05% Solution 1 Application(s) Topical daily PRN Itching  HYDROmorphone  Injectable 0.2 milliGRAM(s) IV Push every 3 hours PRN Moderate Pain (4 - 6)  ondansetron    Tablet 4 milliGRAM(s) Oral every 8 hours PRN Nausea and/or Vomiting      ALLERGIES:  Allergies    No Known Allergies    Intolerances        LABS:                        8.7    8.89  )-----------( 241      ( 11 Feb 2021 06:53 )             25.9     02-11    143  |  113<H>  |  24<H>  ----------------------------<  114<H>  4.1   |  19<L>  |  1.38<H>    Ca    8.7      11 Feb 2021 06:53  Phos  2.1     02-11  Mg     2.0     02-11    TPro  6.2  /  Alb  2.8<L>  /  TBili  0.4  /  DBili  x   /  AST  11  /  ALT  6<L>  /  AlkPhos  43  02-10    PTT - ( 10 Feb 2021 06:21 )  PTT:90.8 sec    CAPILLARY BLOOD GLUCOSE      POCT Blood Glucose.: 129 mg/dL (11 Feb 2021 06:14)      RADIOLOGY & ADDITIONAL TESTS: Reviewed.

## 2021-02-11 NOTE — PROGRESS NOTE ADULT - PROBLEM SELECTOR PLAN 6
Patient last assessed: 2/10/2021  to manage: GOC/AD, symptoms, and support.  30 Minutes; Start: 0800a End: 0830a, of non-face-to-face prolonged service provided that relates to (face-to-face) care that has or will occur and ongoing patient management, including one or more of the following:   - Reviewed records from other physicians or other health care professional services, including one or more of the following: other medical records and diagnostic / radiology study results

## 2021-02-11 NOTE — PROGRESS NOTE ADULT - SUBJECTIVE AND OBJECTIVE BOX
CRISTI PEREA             MRN-[id number]    CC: Palpitations    HPI:    83 y/o F PMHx DM, HTN, Stage IIIb LLL NSCLC metastatic, c/l R endobronchial SCC, TOMMY presenting to [facility] with palpitations and shortness of breath for the past 3 days. She has noticed worsening of her symptoms and decided to come to the ED. Patient denies any recent travel, abx use, hospitalizations, and sick contacts. Patient denies h/n/v/d, fever, chills, cp, abd pain, leg swelling, rashes, dysuria, and changes in BM.       In the ED- VS were T 98, HR , -125/56-61, RR16 SP02 100 on 2L NC   Labs significant for wbc 13.3, hgb 11.2, D-Dimer 418, INR 1.35, K 5.5, Bicarb 21, BUN/Cr 36/1.65, Ca 11, BNP 5352  CXR showed right sided opacification to the midlung field  CT PE protocol negative for PE but does show progression and extension of metastatic disease with collapse of right midle and lower lobes with right hilar mass.   CT head negative for metastasis   EKG showed afib with rvr, no ischemic changes   Given Metoprolol 5mg IVP, Lopressor 25mg oral, Lokelma 10mg and admitted for further management of worsening tumor burden.  (07 Feb 2021 03:27)    SUBJECTIVE:  Patient resting in bed in no distress. No family at bedside at this time     ROS:  Dyspnea ([place] 0-10):          0              N/V (Y/N):                    N          Secretions (Y/N) :         N       Agitation(Y/N): N  Pain (Y/N):     Yes  -Provocation/Palliation: movement makes pain worse, but better with her medications  -Quality/Quantity: aching throbbing and shooting pain   -Radiating: pain radiates from right ear to right neck   -Severity: 7/10  -Timing/Frequency: Constant  -Impact on ADLs: pain impacts ability to perform ADL;'s     OTHER REVIEW OF SYSTEMS: +weakness  UNABLE TO OBTAIN  due to: n/a       PEx:  T(C): 37 (02-11-21 @ 09:27), Max: 37.3 (02-10-21 @ 22:30)  HR: 62 (02-11-21 @ 08:35) (62 - 148)  BP: 101/46 (02-11-21 @ 08:35) (101/46 - 135/63)  RR: 18 (02-11-21 @ 08:35) (18 - 20)  SpO2: 100% (02-11-21 @ 08:35) (94% - 100%)  Wt(kg): 53.5kg    GENERAL:  [x ]Alert  [x ]Oriented x 3   [ ]Lethargic  [ ]Cachexia  [ ]Unarousable  [ ]Verbal  [ ]Non-Verbal  Behavioral:   [ ] Anxiety  [ ] Delirium [ ] Agitation [ x] Other - calm   HEENT:  [ ]Normal   [ ]Dry mouth   [ ]ET Tube/Trach  [ ]Oral lesions [x] nodules noted on right neck, painful to touch   PULMONARY:   [ ]Clear  [ ]Tachypnea  [ ]Audible excessive secretions   [ x]Rhonchi        [ ]Right [ ]Left [x ]Bilateral  [ ]Crackles        [ ]Right [ ]Left [ ]Bilateral  [ ]Wheezing     [ ]Right [ ]Left [ ]Bilateral  CARDIOVASCULAR:    [ ]Regular [ x]Irregular [x ]Tachy  [ ]Eric [ ]Murmur [ ]Other  GASTROINTESTINAL:  [x ]Soft  [ ]Distended   [ ]+BS  [x ]Non tender [ ]Tender  [ ]PEG [ ]OGT/ NGT  Last BM: 2/9/2021  GENITOURINARY:  [ ]Normal [ ] Incontinent   [ ]Oliguria/Anuria   [ ]Roman  MUSCULOSKELETAL:   [x ]Normal   [ ]Weakness  [ ]Bed/Wheelchair bound [ ]Edema  NEUROLOGIC:   [x ]No focal deficits  [ ] Cognitive impairment  [ ] Dysphagia [ ]Dysarthria [ ] Paresis [ ]Other   SKIN:   [ ]Normal   [ ]Pressure ulcer(s)  [ ]Rash [x] Nodules on right neck    ALLERGIES: No Known Allergies      ALLERGIES: No Known Allergies      OPIATE NAÏVE (Y/N): Y    MEDICATIONS: REVIEWED  MEDICATIONS  (STANDING):  apixaban 2.5 milliGRAM(s) Oral every 12 hours  aspirin  chewable 81 milliGRAM(s) Oral daily  atorvastatin 10 milliGRAM(s) Oral at bedtime  dextrose 40% Gel 15 Gram(s) Oral once  dextrose 5%. 1000 milliLiter(s) (50 mL/Hr) IV Continuous <Continuous>  dextrose 5%. 1000 milliLiter(s) (100 mL/Hr) IV Continuous <Continuous>  dextrose 50% Injectable 25 Gram(s) IV Push once  dextrose 50% Injectable 12.5 Gram(s) IV Push once  dextrose 50% Injectable 25 Gram(s) IV Push once  gabapentin 100 milliGRAM(s) Oral two times a day  glucagon  Injectable 1 milliGRAM(s) IntraMuscular once  insulin lispro (ADMELOG) corrective regimen sliding scale   SubCutaneous Before meals and at bedtime  metoprolol succinate ER 75 milliGRAM(s) Oral every 24 hours  pantoprazole    Tablet 40 milliGRAM(s) Oral before breakfast  QUEtiapine 25 milliGRAM(s) Oral at bedtime    MEDICATIONS  (PRN):  acetaminophen   Tablet .. 650 milliGRAM(s) Oral every 4 hours PRN Mild Pain (1 - 3)  calamine/zinc oxide Lotion 1 Application(s) Topical three times a day PRN Itching  fluocinonide 0.05% Solution 1 Application(s) Topical daily PRN Itching  HYDROmorphone  Injectable 0.2 milliGRAM(s) IV Push every 3 hours PRN Moderate Pain (4 - 6)  ondansetron    Tablet 4 milliGRAM(s) Oral every 8 hours PRN Nausea and/or Vomiting      LABS: REVIEWED  CBC:                        8.7    8.89  )-----------( 241      ( 11 Feb 2021 06:53 )             25.9     CMP:    02-11    143  |  113<H>  |  24<H>  ----------------------------<  114<H>  4.1   |  19<L>  |  1.38<H>    Ca    8.7      11 Feb 2021 06:53  Phos  2.1     02-11  Mg     2.0     02-11    TPro  6.2  /  Alb  2.8<L>  /  TBili  0.4  /  DBili  x   /  AST  11  /  ALT  6<L>  /  AlkPhos  43  02-10      IMAGING: REVIEWED    ADVANCED DIRECTIVES:            FULL CODE          DECISION MAKER: Patient is able to make decision for herself  LEGAL SURROGATE: Jeremie Moyer [phone]    PSYCHOSOCIAL-SPIRITUAL ASSESSMENT:       Reviewed       Care plan unchanged    GOALS OF CARE DISCUSSION       Palliative care info/counseling provided	           Documentation of GOC: Full code   	      AGENCY CHOICE DISCUSSED:           Homecare        JANIS    REFERRALS	        Unit SW/Case Mgmt       PT/OT

## 2021-02-11 NOTE — PROGRESS NOTE ADULT - PROBLEM SELECTOR PLAN 2
Pt has hx of LLL NSCLC and R endobronchial CSS s/p 3 cycles of Carboplatin/Etoposide, follows with Dr. Wellington. CXR shows RIGHT Lower Lobe opacity likely due to underlying malignancy. Pt notes sharp, shooting pain associated with enlarged, fixed, hard R neck LAD (tender to palpation).  - Per Heme/Onc recs, recommend holding off on starting immunotherapy until 5 treatment course of radiation therapy is complete  - c/w management of effusion as below  - Will need to follow up with Radiation oncology once discharged to start radiation  - Per Heme/Onc, radiation is best therapy for lymph node pain  - Per Palliative, start Dilaudid 0.2mg IV q3h PRN for pain  - c/w Calamine lotion and heat packs for LAD pain  - c/w Zofran PRN for nausea  - Currently requiring no supplemental O2, occasionally hypoxic w/lateral decubitus  - Monitor respiratory status  - f/u PT recs

## 2021-02-11 NOTE — PROGRESS NOTE ADULT - PROBLEM SELECTOR PLAN 5
Patient remains full code, will discuss this decision with her children once she is out of the hospital. Will continue to manage patients symptoms as they arise.

## 2021-02-11 NOTE — CHART NOTE - NSCHARTNOTEFT_GEN_A_CORE
Admitting Diagnosis:   Patient is a 85y old  Female who presents with a chief complaint of Palpitations (11 Feb 2021 11:48)      PAST MEDICAL & SURGICAL HISTORY:  Heart murmur    Tobacco use    Squamous cell carcinoma of left lung    Adenocarcinoma of head and neck    Submandibular gland mass    DM (diabetes mellitus)    H/O: hysterectomy        Current Nutrition Order:  DASH    PO Intake: Good (%) [   ]  Fair (50-75%) [   ] Poor (<25%) [   ]- ~ 30% intake of breakfast    GI Issues: No N/V/C/D reported at this time. +BM 2/9    Pain: Chronic R. neck pain, manageable at time of assessment     Skin Integrity: Nigel 17; no edema  Intact pressure-wise     Labs:   02-11    143  |  113<H>  |  24<H>  ----------------------------<  114<H>  4.1   |  19<L>  |  1.38<H>    Ca    8.7      11 Feb 2021 06:53  Phos  2.1     02-11  Mg     2.0     02-11    TPro  6.2  /  Alb  2.8<L>  /  TBili  0.4  /  DBili  x   /  AST  11  /  ALT  6<L>  /  AlkPhos  43  02-10    CAPILLARY BLOOD GLUCOSE      POCT Blood Glucose.: 115 mg/dL (11 Feb 2021 12:05)  POCT Blood Glucose.: 129 mg/dL (11 Feb 2021 06:14)  POCT Blood Glucose.: 108 mg/dL (10 Feb 2021 22:16)  POCT Blood Glucose.: 104 mg/dL (10 Feb 2021 17:28)      Medications:  MEDICATIONS  (STANDING):  aMIOdarone    Tablet 200 milliGRAM(s) Oral every 12 hours  apixaban 2.5 milliGRAM(s) Oral every 12 hours  aspirin  chewable 81 milliGRAM(s) Oral daily  atorvastatin 10 milliGRAM(s) Oral at bedtime  dextrose 40% Gel 15 Gram(s) Oral once  dextrose 5%. 1000 milliLiter(s) (50 mL/Hr) IV Continuous <Continuous>  dextrose 5%. 1000 milliLiter(s) (100 mL/Hr) IV Continuous <Continuous>  dextrose 50% Injectable 25 Gram(s) IV Push once  dextrose 50% Injectable 12.5 Gram(s) IV Push once  dextrose 50% Injectable 25 Gram(s) IV Push once  gabapentin 100 milliGRAM(s) Oral two times a day  glucagon  Injectable 1 milliGRAM(s) IntraMuscular once  insulin lispro (ADMELOG) corrective regimen sliding scale   SubCutaneous Before meals and at bedtime  metoprolol succinate ER 75 milliGRAM(s) Oral every 24 hours  pantoprazole    Tablet 40 milliGRAM(s) Oral before breakfast  QUEtiapine 25 milliGRAM(s) Oral at bedtime    MEDICATIONS  (PRN):  acetaminophen   Tablet .. 650 milliGRAM(s) Oral every 4 hours PRN Mild Pain (1 - 3)  calamine/zinc oxide Lotion 1 Application(s) Topical three times a day PRN Itching  fluocinonide 0.05% Solution 1 Application(s) Topical daily PRN Itching  HYDROmorphone  Injectable 0.2 milliGRAM(s) IV Push every 3 hours PRN Moderate Pain (4 - 6)  ondansetron    Tablet 4 milliGRAM(s) Oral every 8 hours PRN Nausea and/or Vomiting      Weight: 53.5kg     Weight Change: No new weights recorded     Nutrition Focused Physical Exam: Completed [  X ]  Not Pertinent [   ]    Estimated energy needs: Height 63"; ABW 53.5kg; IBW 52.1kg; 103%IBW; BMI 20.9  ABW used for calculations as pt between % of IBW. (103%). Needs adjusted for suspected severe malnutrition, CA (hypermetabolic condition) and advanced age  Calories: 25-30 kcal/kg = 7023-1022 kcal/day  Protein: 1.2-1.5 g/kg = 64-80g protein/day  Fluids per team 2/2 compromised respiratory status     Subjective: 85F PMHx DM, HTN, Stage IIIb LLL NSCLC metastatic, c/l R endobronchial SCC, TOMMY presenting to [facility] with palpitations and shortness of breath for the past 3 days, admitted for Paroxysmal Afib and right lung lobe collapse with hilar involvement. Now stepped up to [facility] due to AFib RVR. S/p thoracentesis 2/9 w/1.3L removed- lymphocytic predominance, suggestive of malignant effusion. Continues to have episodes of Afib/Aflutter- team to start PO amio per EP recs. Pt seen in room, awake, alert, pleasant. Appears exhausted. She endorsed that her appetite is slowly improving and that she ate some cornflakes, juice, and banana for breakfast. She continues to decline ONS but discussed increasing intake of protein from other sources. She denied N/V/C/D. Last BM 2/9. Afebrile. Noted order for dilaudid for pain. Palliative care team continues to follow to facilitate GOC discussions. POC , 108mg/dL, Phos 2.1 (L). Will continue to follow per RD protocol.     Previous Nutrition Diagnosis:  Malnutrition Severe protein-calorie malnutrition RT decreased appetite and increased energy expenditure 2/2 CA AEB suspect meeting <50% est needs x3 months, -24% wt loss x3 months, moderate muscle and fat wasting  Active [X   ]  Resolved [   ]    If resolved, new PES:     Goal: Pt will consistently meet % of EER and show no further s/s malnutrition throughout admit     Recommendations:  1. Recommend liberalizing to Low Sodium diet. Encourage PO intake and honor food preferences   2. Monitor lytes and replete prn. POC BG q6hrs   3. Pain and bowel regimens per team     Education: Encouraged PO intake w/focus on small, frequent meals and protein at each meal     Risk Level: High [ X  ] Moderate [   ] Low [   ]

## 2021-02-11 NOTE — PROGRESS NOTE ADULT - PROBLEM SELECTOR PLAN 3
Pt noted to have large R pleural effusion on CT chest associated with dyspnea and palpitations.  - s/p Thoracentesis with 1.3L of serous fluid removed 2/9  - 2 of 3 Light's criteria met, suggesting exudative effusion  - Gram stain negative  - Lymphocytic predominance, suggestive of malignant effusion  - f/u AFB and Fungal cultures

## 2021-02-12 NOTE — PROGRESS NOTE ADULT - PROBLEM SELECTOR PLAN 3
Patient has right sided pleural effusion which plan it so have thoracentesis for. She had done on 2/9/2021 and had 1300ml drained. Appreciate Pulmonary involvement. Patient to f/u in pulmonary clinic.

## 2021-02-12 NOTE — PROGRESS NOTE ADULT - PROBLEM SELECTOR PLAN 1
Prior hx of paroxysmal AFib, but patient reports never being on rate control or AC. CHADsVASc 5. Since admission patient has had multiple AFib RVR episodes, as high as 150/160 bpm. S/p multiple IV lopressor pushes, PO toprol and lopressor, as well as diltiazem. Stepped up from [facility] to [facility] for further monitoring.  -TTE 2/8 shows AS with a calcified aortic valve with area 1.36cm². Moderate TC regurgitation. normal LV/RV size and function, LVEF 65%  -Cariology following, appreciate recs  -c/w Toprol 75mg qd with plan for Toprol 32.5mg BID on discharge  -Now bridged to Eliquis 2.5mg BID for AC  -c/w Lopressor 5mg PRN for bouts of AFib  -Started Amiodarone 200mg PO BID (2/12-2/19, once daily thereafter) given intermittent Atrial Flutter during her RVR episodes

## 2021-02-12 NOTE — PROGRESS NOTE ADULT - PROBLEM SELECTOR PLAN 4
Pt noted to have rhythm strip with junctional rhythm on 2/10 AM after her Toprol and Diltiazem doses at 6am.  -EKG shows junctional bradycardia  -Discontinue Diltiazem and c/w Toprol + Amiodarone for rate control as above

## 2021-02-12 NOTE — PROGRESS NOTE ADULT - PROBLEM SELECTOR PLAN 10
F: None   E: Replete as necessary K>4 Mg>2  N: DASH w/CC diet  DVT Prophylaxis: none at this time given scheduled thoracentesis   GI prophylaxis: Protonix 40mg daily  CODE STATUS: FULL CODE  Dispo: [facility]

## 2021-02-12 NOTE — PROGRESS NOTE ADULT - PROBLEM SELECTOR PROBLEM 1
Pleural effusion, right
Pleural effusion, right
Neoplasm related pain
Atrial fibrillation with rapid ventricular response
Pleural effusion, right
Paroxysmal atrial fibrillation
Paroxysmal atrial fibrillation
Atrial fibrillation with rapid ventricular response
Neoplasm related pain
Neoplasm related pain
Atrial fibrillation with rapid ventricular response
Atrial fibrillation with rapid ventricular response
Paroxysmal atrial fibrillation
Paroxysmal atrial fibrillation
Atrial fibrillation with rapid ventricular response

## 2021-02-12 NOTE — PROGRESS NOTE ADULT - PROBLEM SELECTOR PLAN 1
Patients pain is now controlled with her current regimen. Would continue gabapentin 100mg BID as this is working. Continue Dilaudid 0.2mg q3h IV PRN for severe pain.

## 2021-02-12 NOTE — PROGRESS NOTE ADULT - PROBLEM SELECTOR PLAN 2
Pt has hx of LLL NSCLC and R endobronchial CSS s/p 3 cycles of Carboplatin/Etoposide, follows with Dr. Wellington. CXR shows RIGHT Lower Lobe opacity likely due to underlying malignancy. Pt notes sharp, shooting pain associated with enlarged, fixed, hard R neck LAD (tender to palpation).  - Per Heme/Onc recs, recommend holding off on starting immunotherapy until 5 treatment course of radiation therapy is complete  - c/w management of effusion as below  - Will need to follow up with Radiation oncology once discharged to start radiation  - Per Heme/Onc, radiation is best therapy for lymph node pain  - Per Palliative, start Dilaudid 0.2mg IV q3h PRN for pain  - c/w Calamine lotion and heat packs for LAD pain  - c/w Zofran PRN for nausea  - Currently requiring no supplemental O2, occasionally hypoxic w/lateral decubitus  - Monitor respiratory status  - PT recommends home with home PT

## 2021-02-12 NOTE — PROGRESS NOTE ADULT - SUBJECTIVE AND OBJECTIVE BOX
CRISTI PEREA             [id number]    CC: Palpations    HPI:    83 y/o F PMHx DM, HTN, Stage IIIb LLL NSCLC metastatic, c/l R endobronchial SCC, TOMMY presenting to [facility] with palpitations and shortness of breath for the past 3 days. She has noticed worsening of her symptoms and decided to come to the ED. Patient denies any recent travel, abx use, hospitalizations, and sick contacts. Patient denies h/n/v/d, fever, chills, cp, abd pain, leg swelling, rashes, dysuria, and changes in BM.       In the ED- VS were T 98, HR , -125/56-61, RR16 SP02 100 on 2L NC   Labs significant for wbc 13.3, hgb 11.2, D-Dimer 418, INR 1.35, K 5.5, Bicarb 21, BUN/Cr 36/1.65, Ca 11, BNP 5352  CXR showed right sided opacification to the midlung field  CT PE protocol negative for PE but does show progression and extension of metastatic disease with collapse of right midle and lower lobes with right hilar mass.   CT head negative for metastasis   EKG showed afib with rvr, no ischemic changes   Given Metoprolol 5mg IVP, Lopressor 25mg oral, Lokelma 10mg and admitted for further management of worsening tumor burden.  (07 Feb 2021 03:27)    SUBJECTIVE: Patient resting in bed in no distress. Continues to ask when she will go home.    ROS:  DYSPNEA: N  NAUS/VOM: N  SECRETIONS: N	  AGITATION: N  Pain (Y/N):     N  -Provocation/Palliation: n/a   -Quality/Quantity:  n/a   -Radiating: n/a   -Severity: n/a   -Timing/Frequency: n/a   -Impact on ADLs: n/a     OTHER REVIEW OF SYSTEMS: + weakness  UNABLE TO OBTAIN  due to: n/a     PEx:  T(C): 36.9 (02-12-21 @ 09:49), Max: 37.3 (02-11-21 @ 21:59)  HR: 76 (02-12-21 @ 08:30) (68 - 82)  BP: 108/55 (02-12-21 @ 08:30) (108/55 - 140/64)  RR: 18 (02-12-21 @ 08:30) (18 - 18)  SpO2: 95% (02-12-21 @ 08:30) (94% - 99%)  Wt(kg): 53.5kg    GENERAL:  [x ]Alert  [x ]Oriented x 3   [ ]Lethargic  [ ]Cachexia  [ ]Unarousable  [ ]Verbal  [ ]Non-Verbal  Behavioral:   [ ] Anxiety  [ ] Delirium [ ] Agitation [ x] Other - calm   HEENT:  [ ]Normal   [ ]Dry mouth   [ ]ET Tube/Trach  [ ]Oral lesions [x] nodules noted on right neck, painful to touch   PULMONARY:   [ ]Clear  [ ]Tachypnea  [ ]Audible excessive secretions   [ x]Rhonchi        [ ]Right [ ]Left [x ]Bilateral  [ ]Crackles        [ ]Right [ ]Left [ ]Bilateral  [ ]Wheezing     [ ]Right [ ]Left [ ]Bilateral  CARDIOVASCULAR:    [ ]Regular [ x]Irregular [x ]Tachy  [ ]Eric [ ]Murmur [ ]Other  GASTROINTESTINAL:  [x ]Soft  [ ]Distended   [ ]+BS  [x ]Non tender [ ]Tender  [ ]PEG [ ]OGT/ NGT  Last BM: 2/9/2021  GENITOURINARY:  [ x]Normal [ ] Incontinent   [ ]Oliguria/Anuria   [ ]Roman  MUSCULOSKELETAL:   [x ]Normal   [ ]Weakness  [ ]Bed/Wheelchair bound [ ]Edema  NEUROLOGIC:   [x ]No focal deficits  [ ] Cognitive impairment  [ ] Dysphagia [ ]Dysarthria [ ] Paresis [ ]Other   SKIN:   [ ]Normal   [ ]Pressure ulcer(s)  [ ]Rash [x] Nodules on right neck    ALLERGIES: No Known Allergies      OPIATE NAÏVE (Y/N): Y    MEDICATIONS: REVIEWED  MEDICATIONS  (STANDING):  aMIOdarone    Tablet 200 milliGRAM(s) Oral every 12 hours  apixaban 2.5 milliGRAM(s) Oral every 12 hours  aspirin  chewable 81 milliGRAM(s) Oral daily  atorvastatin 10 milliGRAM(s) Oral at bedtime  calamine/zinc oxide Lotion 1 Application(s) Topical every 8 hours  dextrose 40% Gel 15 Gram(s) Oral once  dextrose 5%. 1000 milliLiter(s) (50 mL/Hr) IV Continuous <Continuous>  dextrose 5%. 1000 milliLiter(s) (100 mL/Hr) IV Continuous <Continuous>  dextrose 50% Injectable 25 Gram(s) IV Push once  dextrose 50% Injectable 12.5 Gram(s) IV Push once  dextrose 50% Injectable 25 Gram(s) IV Push once  gabapentin 100 milliGRAM(s) Oral two times a day  glucagon  Injectable 1 milliGRAM(s) IntraMuscular once  insulin lispro (ADMELOG) corrective regimen sliding scale   SubCutaneous Before meals and at bedtime  melatonin 5 milliGRAM(s) Oral at bedtime  metoprolol succinate ER 75 milliGRAM(s) Oral every 24 hours  pantoprazole    Tablet 40 milliGRAM(s) Oral before breakfast  QUEtiapine 25 milliGRAM(s) Oral at bedtime    MEDICATIONS  (PRN):  acetaminophen   Tablet .. 650 milliGRAM(s) Oral every 4 hours PRN Mild Pain (1 - 3)  fluocinonide 0.05% Solution 1 Application(s) Topical daily PRN Itching  guaiFENesin   Syrup  (Sugar-Free) 100 milliGRAM(s) Oral every 6 hours PRN Cough  HYDROmorphone  Injectable 0.2 milliGRAM(s) IV Push every 3 hours PRN Moderate Pain (4 - 6)  ondansetron    Tablet 4 milliGRAM(s) Oral every 8 hours PRN Nausea and/or Vomiting      LABS: REVIEWED  CBC:                        9.8    10.14 )-----------( 252      ( 12 Feb 2021 07:06 )             29.4     CMP:    02-12    140  |  110<H>  |  23  ----------------------------<  88  4.2   |  19<L>  |  1.36<H>    Ca    9.0      12 Feb 2021 07:06  Phos  2.2     02-12  Mg     2.1     02-12    IMAGING: REVIEWED    ADVANCED DIRECTIVES:            FULL CODE         DECISION MAKER: Patient is able to make decision for herself  LEGAL SURROGATE: Jeremie Moyer [phone]    PSYCHOSOCIAL-SPIRITUAL ASSESSMENT:       Reviewed       Care plan unchanged    GOALS OF CARE DISCUSSION       Palliative care info/counseling provided	           Documentation of GOC: Full code   	      AGENCY CHOICE DISCUSSED:           Homecare        JANIS    REFERRALS	        Unit SW/Case Mgmt       PT/OT

## 2021-02-12 NOTE — PROGRESS NOTE ADULT - PROBLEM SELECTOR PLAN 8
F: None   E: Replete as necessary K>4 Mg>2  N: DASH w/CC diet  DVT Prophylaxis: none at this time given scheduled thoracentesis   GI prophylaxis: Protonix 40mg daily  CODE STATUS: FULL CODE  Dispo: [facility]
On metformin at home  - c/w mISS  - Carb consistent diet
F: None   E: Replete as necessary K>4 Mg>2  N: DASH w/CC diet  DVT Prophylaxis: none at this time given scheduled thoracentesis   GI prophylaxis: Protonix 40mg daily  CODE STATUS: FULL CODE  Dispo: [facility]
F: None   E: Replete as necessary K>4 Mg>2  N: DASH w/CC diet  DVT Prophylaxis: Lovenox 50mg daily, holding for thora on 2/8  GI prophylaxis: Protonix 40mg daily  CODE STATUS: FULL CODE  Dispo: [facility]
On metformin at home  - c/w mISS  - Carb consistent diet
On metformin at home  - c/w mISS  - Carb consistent diet
F: None   E: Replete as necessary K>4 Mg>2  N: DASH w/CC diet  DVT Prophylaxis: Lovenox 50mg BID  GI prophylaxis: Protonix 40mg daily  CODE STATUS: FULL CODE  Dispo: [facility] to [facility]

## 2021-02-12 NOTE — PROGRESS NOTE ADULT - PROBLEM SELECTOR PLAN 6
Patient last assessed: 2/11/2021  to manage: GOC/AD, symptoms, and support.  30 Minutes; Start: 0800a End: 0830a, of non-face-to-face prolonged service provided that relates to (face-to-face) care that has or will occur and ongoing patient management, including one or more of the following:   - Reviewed records from other physicians or other health care professional services, including one or more of the following: other medical records and diagnostic / radiology study results

## 2021-02-12 NOTE — PROGRESS NOTE ADULT - PROBLEM SELECTOR PLAN 7
- c/w Atorvastatin 10mg daily
Hypercalcemia of 11, Potassium 5.5, s/p Lokelma 10mg. No EKG changes (peaked T waves or prolonged QT), likely 2/2 metastases, unknown if there is bone involvement. Differential includes tumor lysis syndrome.   - TSH wnl  - Uric acid and PTH both elevated, likely 2/2 active pulmonary malignancy  - f/u Heme/Onc (Dr. Wellington's) recs as above
Hypercalcemia of 11, Potassium 5.5, s/p Lokelma 10mg. No EKG changes (peaked T waves or prolonged QT), likely 2/2 metastases, unknown if there is bone involvement. Differential includes tumor lysis syndrome.   - TSH wnl  - Uric acid and PTH both elevated, likely 2/2 active pulmonary malignancy  - f/u Heme/Onc (Dr. Wellington's) recs as above
- c/w Atorvastatin 10mg daily
- c/w Atorvastatin 10mg daily
Hypercalcemia of 11, Potassium 5.5, s/p Lokelma 10mg. No EKG changes (peaked T waves or prolonged QT), likely 2/2 metastases, unknown if there is bone involvement. Differential includes tumor lysis syndrome.   - TSH wnl  - Uric acid and PTH both elevated, likely 2/2 active pulmonary malignancy  - f/u Heme/Onc (Dr. Wellington's) recs as above
- c/w Atorvastatin 10mg daily

## 2021-02-12 NOTE — PROGRESS NOTE ADULT - PROBLEM SELECTOR PROBLEM 7
HLD (hyperlipidemia)
Electrolyte abnormality
HLD (hyperlipidemia)
Electrolyte abnormality
Electrolyte abnormality
HLD (hyperlipidemia)
HLD (hyperlipidemia)

## 2021-02-12 NOTE — PROGRESS NOTE ADULT - PROBLEM SELECTOR PLAN 3
Pt noted to have large R pleural effusion on CT chest associated with dyspnea and palpitations.  - s/p Thoracentesis with 1.3L of serous fluid removed 2/9  - 2 of 3 Light's criteria met, suggesting exudative effusion  - Gram stain negative  - Lymphocytic predominance, suggestive of malignant effusion  - AFB negative, fungal culture pending

## 2021-02-12 NOTE — PROGRESS NOTE ADULT - PROBLEM SELECTOR PROBLEM 2
Non-small cell cancer of left lung
Non-small cell cancer of left lung
Debility
Non-small cell cancer of left lung
Squamous cell carcinoma of left lung
Debility
Debility
Squamous cell carcinoma of left lung

## 2021-02-12 NOTE — PROGRESS NOTE ADULT - PROBLEM SELECTOR PLAN 5
Patient remains full code, will discuss this decision with her children once she is out of the hospital. Patient wants to go home. Will continue to manage patients symptoms as they arise.

## 2021-02-12 NOTE — PROGRESS NOTE ADULT - ASSESSMENT
85 y/o F PMHx DM, HTN, HLD, Stage IIIb LLL NSCLC metastatic, c/l R endobronchial SCC, TOMMY presenting to [facility] with palpitations and shortness of breath for the past 3 days, admitted for R. pleural effusion with right lung lobe collapse with hilar involvement and paroxysmal Afib. Now stepped up to [facility] due to AFib RVR, s/p thoracentesis 2/9.

## 2021-02-12 NOTE — PROGRESS NOTE ADULT - SUBJECTIVE AND OBJECTIVE BOX
OVERNIGHT EVENTS:    SUBJECTIVE / INTERVAL HPI: Patient seen and examined at bedside.     VITAL SIGNS:  Vital Signs Last 24 Hrs  T(C): 37.2 (12 Feb 2021 06:00), Max: 37.3 (11 Feb 2021 21:59)  T(F): 98.9 (12 Feb 2021 06:00), Max: 99.2 (11 Feb 2021 21:59)  HR: 80 (12 Feb 2021 06:18) (62 - 84)  BP: 110/57 (12 Feb 2021 06:18) (101/46 - 140/64)  BP(mean): 70 (12 Feb 2021 06:18) (67 - 92)  RR: 18 (12 Feb 2021 06:18) (17 - 18)  SpO2: 98% (12 Feb 2021 06:18) (94% - 100%)    PHYSICAL EXAM:    General: WDWN  HEENT: NC/AT; PERRL, anicteric sclera; MMM  Neck: supple  Cardiovascular: +S1/S2, RRR  Respiratory: CTA B/L; no W/R/R  Gastrointestinal: soft, NT/ND; +BSx4  Extremities: WWP; no edema, clubbing or cyanosis  Vascular: 2+ radial, DP/PT pulses B/L  Neurological: AAOx3; no focal deficits    MEDICATIONS:  MEDICATIONS  (STANDING):  aMIOdarone    Tablet 200 milliGRAM(s) Oral every 12 hours  apixaban 2.5 milliGRAM(s) Oral every 12 hours  aspirin  chewable 81 milliGRAM(s) Oral daily  atorvastatin 10 milliGRAM(s) Oral at bedtime  calamine/zinc oxide Lotion 1 Application(s) Topical every 8 hours  dextrose 40% Gel 15 Gram(s) Oral once  dextrose 5%. 1000 milliLiter(s) (50 mL/Hr) IV Continuous <Continuous>  dextrose 5%. 1000 milliLiter(s) (100 mL/Hr) IV Continuous <Continuous>  dextrose 50% Injectable 25 Gram(s) IV Push once  dextrose 50% Injectable 12.5 Gram(s) IV Push once  dextrose 50% Injectable 25 Gram(s) IV Push once  gabapentin 100 milliGRAM(s) Oral two times a day  glucagon  Injectable 1 milliGRAM(s) IntraMuscular once  insulin lispro (ADMELOG) corrective regimen sliding scale   SubCutaneous Before meals and at bedtime  melatonin 5 milliGRAM(s) Oral at bedtime  metoprolol succinate ER 75 milliGRAM(s) Oral every 24 hours  pantoprazole    Tablet 40 milliGRAM(s) Oral before breakfast  QUEtiapine 25 milliGRAM(s) Oral at bedtime    MEDICATIONS  (PRN):  acetaminophen   Tablet .. 650 milliGRAM(s) Oral every 4 hours PRN Mild Pain (1 - 3)  fluocinonide 0.05% Solution 1 Application(s) Topical daily PRN Itching  guaiFENesin   Syrup  (Sugar-Free) 100 milliGRAM(s) Oral every 6 hours PRN Cough  HYDROmorphone  Injectable 0.2 milliGRAM(s) IV Push every 3 hours PRN Moderate Pain (4 - 6)  ondansetron    Tablet 4 milliGRAM(s) Oral every 8 hours PRN Nausea and/or Vomiting      ALLERGIES:  Allergies    No Known Allergies    Intolerances        LABS:                        9.8    10.14 )-----------( 252      ( 12 Feb 2021 07:06 )             29.4     02-11    143  |  113<H>  |  24<H>  ----------------------------<  114<H>  4.1   |  19<L>  |  1.38<H>    Ca    8.7      11 Feb 2021 06:53  Phos  2.1     02-11  Mg     2.0     02-11          CAPILLARY BLOOD GLUCOSE      POCT Blood Glucose.: 96 mg/dL (12 Feb 2021 06:40)      RADIOLOGY & ADDITIONAL TESTS: Reviewed. OVERNIGHT EVENTS: Pt went in and out of AFib w/RVR to 160s 4-5 times overnight with maximum duration 10min. All episodes resolved without medical intervention, pt was HD stable and asymptomatic.    SUBJECTIVE / INTERVAL HPI: Patient seen and examined at bedside. Admits to persistent neck pain and pruritus, but denies chest pain, dyspnea, abdominal pain, nausea/vomiting, leg pain, fever, headache.    VITAL SIGNS:  Vital Signs Last 24 Hrs  T(C): 37.2 (12 Feb 2021 06:00), Max: 37.3 (11 Feb 2021 21:59)  T(F): 98.9 (12 Feb 2021 06:00), Max: 99.2 (11 Feb 2021 21:59)  HR: 80 (12 Feb 2021 06:18) (62 - 84)  BP: 110/57 (12 Feb 2021 06:18) (101/46 - 140/64)  BP(mean): 70 (12 Feb 2021 06:18) (67 - 92)  RR: 18 (12 Feb 2021 06:18) (17 - 18)  SpO2: 98% (12 Feb 2021 06:18) (94% - 100%)    PHYSICAL EXAM:    General: Elderly female patient in NAD  HEENT: NC/AT; PERRL, anicteric sclera; MMM  Neck: supple; 4 pink-purple 2-3cm hard & fixed palpable lymph nodes, tender to palpation  Cardiovascular: +S1/S2, irregularly irregular, crescendo-decrescendo systolic murmur heard best over aortic region  Respiratory: Lungs CTA b/l, mildly diminished over R inferior lung field  Gastrointestinal: soft, NT/ND; +BSx4  Extremities: WWP; no edema, clubbing or cyanosis  Vascular: Palpable radial, DP pulses B/L  Neurological: AAOx3; no focal deficits    MEDICATIONS:  MEDICATIONS  (STANDING):  aMIOdarone    Tablet 200 milliGRAM(s) Oral every 12 hours  apixaban 2.5 milliGRAM(s) Oral every 12 hours  aspirin  chewable 81 milliGRAM(s) Oral daily  atorvastatin 10 milliGRAM(s) Oral at bedtime  calamine/zinc oxide Lotion 1 Application(s) Topical every 8 hours  dextrose 40% Gel 15 Gram(s) Oral once  dextrose 5%. 1000 milliLiter(s) (50 mL/Hr) IV Continuous <Continuous>  dextrose 5%. 1000 milliLiter(s) (100 mL/Hr) IV Continuous <Continuous>  dextrose 50% Injectable 25 Gram(s) IV Push once  dextrose 50% Injectable 12.5 Gram(s) IV Push once  dextrose 50% Injectable 25 Gram(s) IV Push once  gabapentin 100 milliGRAM(s) Oral two times a day  glucagon  Injectable 1 milliGRAM(s) IntraMuscular once  insulin lispro (ADMELOG) corrective regimen sliding scale   SubCutaneous Before meals and at bedtime  melatonin 5 milliGRAM(s) Oral at bedtime  metoprolol succinate ER 75 milliGRAM(s) Oral every 24 hours  pantoprazole    Tablet 40 milliGRAM(s) Oral before breakfast  QUEtiapine 25 milliGRAM(s) Oral at bedtime    MEDICATIONS  (PRN):  acetaminophen   Tablet .. 650 milliGRAM(s) Oral every 4 hours PRN Mild Pain (1 - 3)  fluocinonide 0.05% Solution 1 Application(s) Topical daily PRN Itching  guaiFENesin   Syrup  (Sugar-Free) 100 milliGRAM(s) Oral every 6 hours PRN Cough  HYDROmorphone  Injectable 0.2 milliGRAM(s) IV Push every 3 hours PRN Moderate Pain (4 - 6)  ondansetron    Tablet 4 milliGRAM(s) Oral every 8 hours PRN Nausea and/or Vomiting      ALLERGIES:  Allergies    No Known Allergies    Intolerances        LABS:                        9.8    10.14 )-----------( 252      ( 12 Feb 2021 07:06 )             29.4     02-11    143  |  113<H>  |  24<H>  ----------------------------<  114<H>  4.1   |  19<L>  |  1.38<H>    Ca    8.7      11 Feb 2021 06:53  Phos  2.1     02-11  Mg     2.0     02-11          CAPILLARY BLOOD GLUCOSE      POCT Blood Glucose.: 96 mg/dL (12 Feb 2021 06:40)      RADIOLOGY & ADDITIONAL TESTS: Reviewed.

## 2021-02-13 NOTE — DISCHARGE NOTE PROVIDER - NSDCCPCAREPLAN_GEN_ALL_CORE_FT
PRINCIPAL DISCHARGE DIAGNOSIS  Diagnosis: Atrial fibrillation with rapid ventricular response  Assessment and Plan of Treatment: You were found to be in an abnormal heart rhythm called atrial fibrillation. This can increase your risk for a stroke. It is important your heart rate is controlled. We started you on medications for this. Please continue taking Toprol 50mg twice per day. Please continue Amiodarone 400mg three times per day thru 2/17. Beginning on 2/18 please take Amiodarone 200mg once per day. Please continue Eliquis 2.5mg twice per day.      SECONDARY DISCHARGE DIAGNOSES  Diagnosis: Lung cancer  Assessment and Plan of Treatment: Please follow up with Dr. Wellington and your radiation oncologist for your radiation treatments.    Diagnosis: Pleural effusion  Assessment and Plan of Treatment: You were found to have fluid in your right lung. This was drained by the pulmonology team. It is due to your lung cancer. Please follow up with Dr. Coronel and your radiation oncologist as well.

## 2021-02-13 NOTE — PROGRESS NOTE ADULT - PROVIDER SPECIALTY LIST ADULT
Internal Medicine
Pulmonology
Cardiology
Internal Medicine
Palliative Care
Pulmonology
Cardiology
Cardiology
Internal Medicine
Rehab Medicine
Pulmonology
Palliative Care
Internal Medicine
Palliative Care
Internal Medicine

## 2021-02-13 NOTE — DISCHARGE NOTE PROVIDER - CARE PROVIDER_API CALL
Mine Wellington)  Medical Oncology  [address] [id number]  Phone: [phone]  Fax: [phone]  Follow Up Time:

## 2021-02-13 NOTE — DISCHARGE NOTE PROVIDER - HOSPITAL COURSE
#Discharge: do not delete    Patient is 84 yo F with past medical history of _____  Presented with _____, found to have _____  Problem List/Main Diagnoses (system-based):   Inpatient treatment course:   New medications:   Labs to be followed outpatient:   Exam to be followed outpatient:    #Discharge: do not delete    Patient is 86 yo F with past medical history of DM, HTN, HLD, Stage IIIb LLL NSCLC metastatic, c/l R endobronchial SCC, TOMMY  Presented with _____, found to have _____  Problem List/Main Diagnoses (system-based):   Inpatient treatment course:   New medications:   Labs to be followed outpatient:   Exam to be followed outpatient:    #Discharge: do not delete    Patient is 84 yo F with past medical history of DM2, HTN, HLD, Stage IIIB LLL NSCLC/R endobronchial SCC, TOMMY  Presented with , found to have _____  Problem List/Main Diagnoses (system-based):   Inpatient treatment course:   New medications:   Labs to be followed outpatient:   Exam to be followed outpatient:    #Discharge: do not delete    85F with PMHx DM, HTN, HLD, Stage IIIb LLL NSCLC metastatic, c/l R endobronchial SCC (Dr. Wellington), TOMMY presenting to [facility] with palpitations and shortness of breath that have worsened for the past 3 days, found to have large R sided pleural effusion with RML/RLL collapse and R hilar mass, admitted for management of her pleural effusion, found to have new onset AFib with RVR. Cardiology and pulmonology were consulted. Patient started on Lopressor, Amiodarone and Heparin gtt. Later transitioned to Toprol 50mg BID, Eliquis 2.5mg BID, continued on Amiodarone. Thoracentesis performed during hospital stay showing exudative effusion which is consistent with known malignancy.     Inpatient treatment course: See above   New medications: Toprol 50mg BID, Amiodarone 400mg TID thru 2/17 then 200mg qd, Eliquis 2.5mg BID   Labs to be followed outpatient: CBC, BMP   Exam to be followed outpatient: Dr. Wellington, PMD    #Discharge: do not delete    85F with PMHx DM, HTN, HLD, Stage IIIb LLL NSCLC metastatic, c/l R endobronchial SCC (Dr. Wellington), TOMMY presenting to [facility] with palpitations and shortness of breath that have worsened for the past 3 days, found to have large R sided pleural effusion with RML/RLL collapse and R hilar mass, admitted for management of her pleural effusion, found to have new onset AFib with RVR. Cardiology and pulmonology were consulted. Patient started on Lopressor, Amiodarone and Heparin gtt. Later transitioned to Toprol 50mg BID, Eliquis 2.5mg BID, continued on Amiodarone. Thoracentesis performed during hospital stay showing exudative effusion which is consistent with known malignancy.     Inpatient treatment course: See above   New medications: Toprol 50mg BID, Amiodarone 400mg TID thru 2/17 then 200mg qd, Eliquis 2.5mg BID   Labs to be followed outpatient: CBC, BMP   Exam to be followed outpatient: Dr. Wellington, PMD       Attending Addendum  Afib controlled. Discharge to home.

## 2021-02-13 NOTE — DISCHARGE NOTE PROVIDER - NSDCMRMEDTOKEN_GEN_ALL_CORE_FT
acetaminophen 500 mg oral tablet: 2 tab(s) orally every 6 hours, As Needed  alcaftadine 0.25% ophthalmic solution: 1 drop(s) to each affected eye once a day  aspirin 81 mg oral tablet: 1 tab(s) orally once a day  atorvastatin 10 mg oral tablet: 1 tab(s) orally once a day  buPROPion 300 mg/24 hours (XL) oral tablet, extended release: 1 tab(s) orally every 24 hours  folic acid 1 mg oral tablet: 1 tab(s) orally once a day  omeprazole 40 mg oral delayed release capsule: 1 cap(s) orally once a day  ondansetron 4 mg oral tablet: 1 tab(s) orally every 8 hours, As Needed  QUEtiapine 25 mg oral tablet: 1 tab(s) orally once a day   acetaminophen 500 mg oral tablet: 2 tab(s) orally every 6 hours, As Needed  alcaftadine 0.25% ophthalmic solution: 1 drop(s) to each affected eye once a day  amiodarone 200 mg oral tablet: 2 tab(s) orally 3 times a day  (through February 17), Please take 1 tablet orally 1 time a day from February 18 onwards.   apixaban 2.5 mg oral tablet: 1 tab(s) orally every 12 hours  aspirin 81 mg oral tablet: 1 tab(s) orally once a day  atorvastatin 10 mg oral tablet: 1 tab(s) orally once a day  buPROPion 300 mg/24 hours (XL) oral tablet, extended release: 1 tab(s) orally every 24 hours  folic acid 1 mg oral tablet: 1 tab(s) orally once a day  omeprazole 40 mg oral delayed release capsule: 1 cap(s) orally once a day  ondansetron 4 mg oral tablet: 1 tab(s) orally every 8 hours, As Needed  QUEtiapine 25 mg oral tablet: 1 tab(s) orally once a day  Toprol-XL 50 mg oral tablet, extended release: 1 tab(s) orally every 12 hours

## 2021-02-13 NOTE — DISCHARGE NOTE NURSING/CASE MANAGEMENT/SOCIAL WORK - PATIENT PORTAL LINK FT
You can access the FollowMyHealth Patient Portal offered by [facility] by registering at the following website: [email or web address]. By joining [facility]’s FollowMyHealth portal, you will also be able to view your health information using other applications (apps) compatible with our system.

## 2021-02-13 NOTE — DISCHARGE NOTE PROVIDER - NSDCFUADDAPPT_GEN_ALL_CORE_FT
Hem/onc: Please follow up with Dr. Wellington at your regularly scheduled appointments    Radiation oncology: Please follow up for your radiation treatments.

## 2021-02-13 NOTE — PROGRESS NOTE ADULT - SUBJECTIVE AND OBJECTIVE BOX
OVERNIGHT EVENTS:    SUBJECTIVE / INTERVAL HPI: Patient seen and examined at bedside.     VITAL SIGNS:  Vital Signs Last 24 Hrs  T(C): 36.8 (13 Feb 2021 09:32), Max: 37.3 (12 Feb 2021 22:00)  T(F): 98.3 (13 Feb 2021 09:32), Max: 99.1 (12 Feb 2021 22:00)  HR: 60 (13 Feb 2021 09:04) (60 - 76)  BP: 117/60 (13 Feb 2021 09:04) (105/51 - 139/62)  BP(mean): 86 (13 Feb 2021 09:04) (74 - 89)  RR: 18 (13 Feb 2021 09:04) (16 - 18)  SpO2: 96% (13 Feb 2021 09:04) (94% - 99%)    PHYSICAL EXAM:    General: WDWN  HEENT: NC/AT; PERRL, anicteric sclera; MMM  Neck: supple  Cardiovascular: +S1/S2, RRR  Respiratory: CTA B/L; no W/R/R  Gastrointestinal: soft, NT/ND; +BSx4  Extremities: WWP; no edema, clubbing or cyanosis  Vascular: 2+ radial, DP/PT pulses B/L  Neurological: AAOx3; no focal deficits    MEDICATIONS:  MEDICATIONS  (STANDING):  aMIOdarone    Tablet 400 milliGRAM(s) Oral three times a day  apixaban 2.5 milliGRAM(s) Oral every 12 hours  aspirin  chewable 81 milliGRAM(s) Oral daily  atorvastatin 10 milliGRAM(s) Oral at bedtime  calamine/zinc oxide Lotion 1 Application(s) Topical every 8 hours  dextrose 40% Gel 15 Gram(s) Oral once  dextrose 5%. 1000 milliLiter(s) (50 mL/Hr) IV Continuous <Continuous>  dextrose 5%. 1000 milliLiter(s) (100 mL/Hr) IV Continuous <Continuous>  dextrose 50% Injectable 25 Gram(s) IV Push once  dextrose 50% Injectable 12.5 Gram(s) IV Push once  dextrose 50% Injectable 25 Gram(s) IV Push once  gabapentin 100 milliGRAM(s) Oral two times a day  glucagon  Injectable 1 milliGRAM(s) IntraMuscular once  insulin lispro (ADMELOG) corrective regimen sliding scale   SubCutaneous Before meals and at bedtime  melatonin 5 milliGRAM(s) Oral at bedtime  metoprolol succinate ER 50 milliGRAM(s) Oral every 12 hours  pantoprazole    Tablet 40 milliGRAM(s) Oral before breakfast  QUEtiapine 25 milliGRAM(s) Oral at bedtime    MEDICATIONS  (PRN):  acetaminophen   Tablet .. 650 milliGRAM(s) Oral every 4 hours PRN Mild Pain (1 - 3)  fluocinonide 0.05% Solution 1 Application(s) Topical daily PRN Itching  guaiFENesin   Syrup  (Sugar-Free) 100 milliGRAM(s) Oral every 6 hours PRN Cough  HYDROmorphone  Injectable 0.2 milliGRAM(s) IV Push every 3 hours PRN Moderate Pain (4 - 6)  ondansetron    Tablet 4 milliGRAM(s) Oral every 8 hours PRN Nausea and/or Vomiting  zaleplon 5 milliGRAM(s) Oral at bedtime PRN Insomnia      ALLERGIES:  Allergies    No Known Allergies    Intolerances        LABS:                        9.8    10.14 )-----------( 252      ( 12 Feb 2021 07:06 )             29.4     02-12    140  |  110<H>  |  23  ----------------------------<  88  4.2   |  19<L>  |  1.36<H>    Ca    9.0      12 Feb 2021 07:06  Phos  2.2     02-12  Mg     2.1     02-12          CAPILLARY BLOOD GLUCOSE      POCT Blood Glucose.: 107 mg/dL (13 Feb 2021 06:57)      RADIOLOGY & ADDITIONAL TESTS: Reviewed.

## 2021-02-13 NOTE — PROGRESS NOTE ADULT - NUTRITIONAL ASSESSMENT
This patient has been assessed with a concern for Malnutrition and has been determined to have a diagnosis/diagnoses of Severe protein-calorie malnutrition.    This patient is being managed with:   Diet DASH/TLC-  Sodium & Cholesterol Restricted  Entered: Feb 7 2021  3:21AM    
This patient has been assessed with a concern for Malnutrition and has been determined to have a diagnosis/diagnoses of Severe protein-calorie malnutrition.    This patient is being managed with:   Diet DASH/TLC-  Sodium & Cholesterol Restricted  Entered: Feb 7 2021  3:21AM    
Nutritional Assessment:  · Nutritional Assessment	This patient has been assessed with a concern for Malnutrition and has been determined to have a diagnosis/diagnoses of Severe protein-calorie malnutrition.    This patient is being managed with:   Diet DASH/TLC-  Sodium & Cholesterol Restricted  Entered: Feb 7 2021  3:21AM
This patient has been assessed with a concern for Malnutrition and has been determined to have a diagnosis/diagnoses of Severe protein-calorie malnutrition.    This patient is being managed with:   Diet DASH/TLC-  Sodium & Cholesterol Restricted  Entered: Feb 7 2021  3:21AM    

## 2021-02-13 NOTE — DISCHARGE NOTE PROVIDER - DETAILS OF MALNUTRITION DIAGNOSIS/DIAGNOSES
This patient has been assessed with a concern for Malnutrition and was treated during this hospitalization for the following Nutrition diagnosis/diagnoses:     -  02/07/2021: Severe protein-calorie malnutrition   This patient has been assessed with a concern for Malnutrition and was treated during this hospitalization for the following Nutrition diagnosis/diagnoses:     -  02/07/2021: Severe protein-calorie malnutrition    This patient has been assessed with a concern for Malnutrition and was treated during this hospitalization for the following Nutrition diagnosis/diagnoses:     -  02/07/2021: Severe protein-calorie malnutrition   This patient has been assessed with a concern for Malnutrition and was treated during this hospitalization for the following Nutrition diagnosis/diagnoses:     -  02/07/2021: Severe protein-calorie malnutrition    This patient has been assessed with a concern for Malnutrition and was treated during this hospitalization for the following Nutrition diagnosis/diagnoses:     -  02/07/2021: Severe protein-calorie malnutrition    This patient has been assessed with a concern for Malnutrition and was treated during this hospitalization for the following Nutrition diagnosis/diagnoses:     -  02/07/2021: Severe protein-calorie malnutrition   This patient has been assessed with a concern for Malnutrition and was treated during this hospitalization for the following Nutrition diagnosis/diagnoses:     -  02/07/2021: Severe protein-calorie malnutrition    This patient has been assessed with a concern for Malnutrition and was treated during this hospitalization for the following Nutrition diagnosis/diagnoses:     -  02/07/2021: Severe protein-calorie malnutrition    This patient has been assessed with a concern for Malnutrition and was treated during this hospitalization for the following Nutrition diagnosis/diagnoses:     -  02/07/2021: Severe protein-calorie malnutrition    This patient has been assessed with a concern for Malnutrition and was treated during this hospitalization for the following Nutrition diagnosis/diagnoses:     -  02/07/2021: Severe protein-calorie malnutrition   This patient has been assessed with a concern for Malnutrition and was treated during this hospitalization for the following Nutrition diagnosis/diagnoses:     -  02/07/2021: Severe protein-calorie malnutrition    This patient has been assessed with a concern for Malnutrition and was treated during this hospitalization for the following Nutrition diagnosis/diagnoses:     -  02/07/2021: Severe protein-calorie malnutrition    This patient has been assessed with a concern for Malnutrition and was treated during this hospitalization for the following Nutrition diagnosis/diagnoses:     -  02/07/2021: Severe protein-calorie malnutrition    This patient has been assessed with a concern for Malnutrition and was treated during this hospitalization for the following Nutrition diagnosis/diagnoses:     -  02/07/2021: Severe protein-calorie malnutrition    This patient has been assessed with a concern for Malnutrition and was treated during this hospitalization for the following Nutrition diagnosis/diagnoses:     -  02/07/2021: Severe protein-calorie malnutrition

## 2021-02-22 NOTE — ED ADULT NURSE NOTE - OBJECTIVE STATEMENT
Pt presents to ED c/o shortness of breath. Recently discharged from [facility] on sunday. Reports worsening shortness of breath and palpitations since discharge. Pt noted to be tachycardic in the 150s. Pt upgraded to  Director- at bedside. Continuous cardiac/pulse oximetry monitoring initiated. 12 lead EKG done. Pt speaking in clear full sentences. A&ox3. IV access obtained. Denies chest pain, fevers.

## 2021-02-22 NOTE — ED PROVIDER NOTE - OBJECTIVE STATEMENT
87 yo female h/o DM, HTN, HLD, Stage IIIb LLL NSCLC metastatic, c/l R endobronchial SCC (Dr. 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  Pt was discharged w eliquis, amiodarone and toprol.  Pt returns today c/o palpitations w/o cp, sob, and ramírez w/o orthopnea, le edema.  Pt has been unable to get amiodarone rx filled since dc but has been taking her other meds.  No fever, cough, uri sx, abd pain, n/v/d.

## 2021-02-22 NOTE — CONSULT NOTE ADULT - SUBJECTIVE AND OBJECTIVE BOX
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 Wellington. Patient as no acute complaints other than SOB and palpitations x 1 day and chronic headache/neck pain. Currently denies n/v/d/c, fever, chills, or CP. Also denies any recent sick contacts or recent travel. ROS also positive for increased urinary frequency. Of note, patient was recently admitted for similar presentation of rapid a fib and SOB, (discharged 02/13/2021) after having thoracentesis (cell studies showed atypical cells per pulm note but were unable to differentiate further). Per family, 1L of pleural fluid was removed at that time (bandage still in place).     ED Course:   vitals: HR 160s (a fib RVR) --> 66, /56, RR 20, 100% sat on 4L NC --> 98% on 2L NC   labs:   imaging: bedside pocus showing moderate right-sided pleural effusion consistent with CXR   interventions: placed on NC received amio bolus and Tylenol         GASTROENTEROLOGY CONSULT NOTE    HPI:    Allergies    No Known Allergies    Intolerances      Home Medications:  acetaminophen 500 mg oral tablet: 2 tab(s) orally every 6 hours, As Needed (24 Jul 2019 09:35)  alcaftadine 0.25% ophthalmic solution: 1 drop(s) to each affected eye once a day (24 Jul 2019 09:35)  aspirin 81 mg oral tablet: 1 tab(s) orally once a day (24 Jul 2019 09:35)  atorvastatin 10 mg oral tablet: 1 tab(s) orally once a day (24 Jul 2019 09:35)  buPROPion 300 mg/24 hours (XL) oral tablet, extended release: 1 tab(s) orally every 24 hours (24 Jul 2019 09:35)  folic acid 1 mg oral tablet: 1 tab(s) orally once a day (24 Jul 2019 09:35)  omeprazole 40 mg oral delayed release capsule: 1 cap(s) orally once a day (24 Jul 2019 09:35)  ondansetron 4 mg oral tablet: 1 tab(s) orally every 8 hours, As Needed (24 Jul 2019 09:35)  QUEtiapine 25 mg oral tablet: 1 tab(s) orally once a day (24 Jul 2019 09:35)    MEDICATIONS:  MEDICATIONS  (STANDING):    MEDICATIONS  (PRN):    PAST MEDICAL & SURGICAL HISTORY:  Heart murmur    Tobacco use    Squamous cell carcinoma of left lung    Adenocarcinoma of head and neck    Submandibular gland mass    DM (diabetes mellitus)    H/O: hysterectomy      FAMILY HISTORY:    SOCIAL HISTORY:  [place]: [ ] Current, [ ] Former, [ ] Never; Pack Years:  Alcohol:  Illicit Drugs:    REVIEW OF SYSTEMS:  CONSTITUTIONAL: No weakness, fevers or chills  HEENT: No visual changes; No vertigo or throat pain   NECK: No pain or stiffness  RESPIRATORY: No cough, wheezing, hemoptysis; No shortness of breath  CARDIOVASCULAR: No chest pain or palpitations  GASTROINTESTINAL: No abdominal or epigastric pain. No nausea, vomiting, or hematemesis; No diarrhea or constipation. No melena or hematochezia.  GENITOURINARY: No dysuria, frequency or hematuria  NEUROLOGICAL: No numbness or weakness  SKIN: No itching, burning, rashes, or lesions   All other 10 review of systems is negative unless indicated above.    Vital Signs Last 24 Hrs  T(C): 37.1 (22 Feb 2021 16:05), Max: 37.1 (22 Feb 2021 16:05)  T(F): 98.7 (22 Feb 2021 16:05), Max: 98.7 (22 Feb 2021 16:05)  HR: 72 (22 Feb 2021 18:37) (66 - 160)  BP: 113/56 (22 Feb 2021 18:37) (89/62 - 113/56)  BP(mean): --  RR: 20 (22 Feb 2021 18:37) (20 - 22)  SpO2: 100% (22 Feb 2021 18:37) (93% - 100%)      PHYSICAL EXAM:    General: Well developed; well nourished; in no acute distress  Eyes: Anicteric sclerae, moist conjunctivae  HENT: Moist mucous membranes  Neck: Trachea midline, supple  Lungs: Normal respiratory effort, no intercostal retractions  Cardiovascular: RRR  Abdomen: Soft, non-tender non-distended; Normal bowel sounds; No rebound or guarding  Extremities: Normal range of motion, No clubbing, cyanosis or edema  Neurological: Alert and oriented x3  Skin: Warm and dry. No obvious rash    LABS:                        12.7   13.24 )-----------( 281      ( 22 Feb 2021 17:11 )             38.6     02-22    138  |  106  |  31<H>  ----------------------------<  150<H>  x    |  17<L>  |  1.89<H>    Ca    9.7      22 Feb 2021 17:11          PT/INR - ( 22 Feb 2021 17:11 )   PT: 19.6 sec;   INR: 1.67          PTT - ( 22 Feb 2021 17:11 )  PTT:28.6 sec    RADIOLOGY & ADDITIONAL STUDIES:     Reviewed     ICU CONSULT NOTE  HPI:   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 Wellington. Patient describes that she was at radiation therapy, which she suddenly started experiencing palpitations and SOB x1. Also complains of chronic head and neck pain and increased urinary frequency. Currently denies n/v/d/c, fever, chills, or CP. Also denies any recent sick contacts or recent travel. Of note, patient was recently admitted (02/07/2021-02/13/2021) for similar presentation of rapid a fib and SOB. After having thoracentesis that admission, cytology fluid studies showed atypical cells per pulm note but were unable to differentiate further. Per family, 1L of pleural fluid was removed at that time (bandage still in place).     ED Course:   vitals: HR 160s (a fib RVR) --> 66, /56, RR 20, 100% sat on 4L NC --> 98% on 2L NC   labs:   imaging: bedside pocus showing moderate right-sided pleural effusion consistent with CXR   interventions: placed on NC, received 150 mg IV amio bolus, and Tylenol                             HPI:    Allergies    No Known Allergies    Intolerances      Home Medications:  acetaminophen 500 mg oral tablet: 2 tab(s) orally every 6 hours, As Needed (24 Jul 2019 09:35)  alcaftadine 0.25% ophthalmic solution: 1 drop(s) to each affected eye once a day (24 Jul 2019 09:35)  aspirin 81 mg oral tablet: 1 tab(s) orally once a day (24 Jul 2019 09:35)  atorvastatin 10 mg oral tablet: 1 tab(s) orally once a day (24 Jul 2019 09:35)  buPROPion 300 mg/24 hours (XL) oral tablet, extended release: 1 tab(s) orally every 24 hours (24 Jul 2019 09:35)  folic acid 1 mg oral tablet: 1 tab(s) orally once a day (24 Jul 2019 09:35)  omeprazole 40 mg oral delayed release capsule: 1 cap(s) orally once a day (24 Jul 2019 09:35)  ondansetron 4 mg oral tablet: 1 tab(s) orally every 8 hours, As Needed (24 Jul 2019 09:35)  QUEtiapine 25 mg oral tablet: 1 tab(s) orally once a day (24 Jul 2019 09:35)    MEDICATIONS:  MEDICATIONS  (STANDING):    MEDICATIONS  (PRN):    PAST MEDICAL & SURGICAL HISTORY:  Heart murmur    Tobacco use    Squamous cell carcinoma of left lung    Adenocarcinoma of head and neck    Submandibular gland mass    DM (diabetes mellitus)    H/O: hysterectomy      FAMILY HISTORY:    SOCIAL HISTORY:  [place]: [ ] Current, [ ] Former, [ ] Never; Pack Years:  Alcohol:  Illicit Drugs:    REVIEW OF SYSTEMS:  CONSTITUTIONAL: No weakness, fevers or chills  HEENT: No visual changes; No vertigo or throat pain   NECK: No pain or stiffness  RESPIRATORY: No cough, wheezing, hemoptysis; No shortness of breath  CARDIOVASCULAR: No chest pain or palpitations  GASTROINTESTINAL: No abdominal or epigastric pain. No nausea, vomiting, or hematemesis; No diarrhea or constipation. No melena or hematochezia.  GENITOURINARY: No dysuria, frequency or hematuria  NEUROLOGICAL: No numbness or weakness  SKIN: No itching, burning, rashes, or lesions   All other 10 review of systems is negative unless indicated above.    Vital Signs Last 24 Hrs  T(C): 37.1 (22 Feb 2021 16:05), Max: 37.1 (22 Feb 2021 16:05)  T(F): 98.7 (22 Feb 2021 16:05), Max: 98.7 (22 Feb 2021 16:05)  HR: 72 (22 Feb 2021 18:37) (66 - 160)  BP: 113/56 (22 Feb 2021 18:37) (89/62 - 113/56)  BP(mean): --  RR: 20 (22 Feb 2021 18:37) (20 - 22)  SpO2: 100% (22 Feb 2021 18:37) (93% - 100%)      PHYSICAL EXAM:    General: Well developed; well nourished; in no acute distress  Eyes: Anicteric sclerae, moist conjunctivae  HENT: Moist mucous membranes  Neck: Trachea midline, supple  Lungs: Normal respiratory effort, no intercostal retractions  Cardiovascular: RRR  Abdomen: Soft, non-tender non-distended; Normal bowel sounds; No rebound or guarding  Extremities: Normal range of motion, No clubbing, cyanosis or edema  Neurological: Alert and oriented x3  Skin: Warm and dry. No obvious rash    LABS:                        12.7   13.24 )-----------( 281      ( 22 Feb 2021 17:11 )             38.6     02-22    138  |  106  |  31<H>  ----------------------------<  150<H>  x    |  17<L>  |  1.89<H>    Ca    9.7      22 Feb 2021 17:11          PT/INR - ( 22 Feb 2021 17:11 )   PT: 19.6 sec;   INR: 1.67          PTT - ( 22 Feb 2021 17:11 )  PTT:28.6 sec    RADIOLOGY & ADDITIONAL STUDIES:     Reviewed     ICU CONSULT NOTE    HPI:   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 Wellington. Patient describes that she was at radiation therapy, which she suddenly started experiencing palpitations and SOB x1. Also complains of chronic head and neck pain and increased urinary frequency. Currently denies n/v/d/c, fever, chills, or CP. Also denies any recent sick contacts or recent travel. Of note, patient was recently admitted (02/07/2021-02/13/2021) for similar presentation of rapid a fib and SOB. After having thoracentesis that admission, cytology fluid studies showed atypical cells per pulm note but were unable to differentiate further. Per family, 1L of pleural fluid was removed at that time (bandage still in place).     ED Course:   vitals: HR 160s (a fib RVR) --> 66, /56, RR 20, 100% sat on 4L NC --> 98% on 2L NC   labs: WBC 13.2, H/H 12.7/38.6, plt 281, PT 19.6, INR 1.67, PTT 28.6, lactate 4.6 --> 1.9, Na 138, K+ hemolyzed, Cr 1.89, troponin negative   imaging: bedside pocus showing moderate right-sided pleural effusion consistent with CXR   interventions: placed on NC, received 150 mg IV amio bolus, and Tylenol       Allergies  No Known Allergies  Intolerances    Home Medications:  acetaminophen 500 mg oral tablet: 2 tab(s) orally every 6 hours, As Needed (24 Jul 2019 09:35)  alcaftadine 0.25% ophthalmic solution: 1 drop(s) to each affected eye once a day (24 Jul 2019 09:35)  aspirin 81 mg oral tablet: 1 tab(s) orally once a day (24 Jul 2019 09:35)  atorvastatin 10 mg oral tablet: 1 tab(s) orally once a day (24 Jul 2019 09:35)  buPROPion 300 mg/24 hours (XL) oral tablet, extended release: 1 tab(s) orally every 24 hours (24 Jul 2019 09:35)  folic acid 1 mg oral tablet: 1 tab(s) orally once a day (24 Jul 2019 09:35)  omeprazole 40 mg oral delayed release capsule: 1 cap(s) orally once a day (24 Jul 2019 09:35)  ondansetron 4 mg oral tablet: 1 tab(s) orally every 8 hours, As Needed (24 Jul 2019 09:35)  QUEtiapine 25 mg oral tablet: 1 tab(s) orally once a day (24 Jul 2019 09:35)    MEDICATIONS:  MEDICATIONS  (STANDING):    MEDICATIONS  (PRN):    PAST MEDICAL & SURGICAL HISTORY:  Heart murmur    Tobacco use    Squamous cell carcinoma of left lung    Adenocarcinoma of head and neck    Submandibular gland mass    DM (diabetes mellitus)    H/O: hysterectomy      FAMILY HISTORY:    SOCIAL HISTORY:  [place]: [ ] Current, [ ] Former, [ ] Never; Pack Years:  Alcohol:  Illicit Drugs:    REVIEW OF SYSTEMS:    All other 10 review of systems is negative unless indicated above.    Vital Signs Last 24 Hrs  T(C): 37.1 (22 Feb 2021 16:05), Max: 37.1 (22 Feb 2021 16:05)  T(F): 98.7 (22 Feb 2021 16:05), Max: 98.7 (22 Feb 2021 16:05)  HR: 72 (22 Feb 2021 18:37) (66 - 160)  BP: 113/56 (22 Feb 2021 18:37) (89/62 - 113/56)  BP(mean): --  RR: 20 (22 Feb 2021 18:37) (20 - 22)  SpO2: 100% (22 Feb 2021 18:37) (93% - 100%)      PHYSICAL EXAM:  General: thin, frail, cachectic, in NAD, resting comfortably in bed   Eyes: Anicteric sclerae, moist conjunctivae  HENT: dry mucous membranes  Neck: Trachea midline, supple, enlarged immobile hardened lymph nodes on right side of neck   Lungs: right-sided crackles, no wheezing, rhonchi or rales   Cardiovascular: initially irregular and tachycardic, now regular rate, +S1, +S2, no murmurs, gallops, rubs   Abdomen: Soft, non-tender non-distended; Normal bowel sounds; No rebound or guarding  Extremities: Normal range of motion, No clubbing, cyanosis or edema  Neurological: Alert and oriented x3, no focal deficits, moves all extremities spontaneously, sensation intact   Skin: Warm and dry. No obvious rash    LABS:                        12.7   13.24 )-----------( 281      ( 22 Feb 2021 17:11 )             38.6     02-22    138  |  106  |  31<H>  ----------------------------<  150<H>  x    |  17<L>  |  1.89<H>    Ca    9.7      22 Feb 2021 17:11    PT/INR - ( 22 Feb 2021 17:11 )   PT: 19.6 sec;   INR: 1.67          PTT - ( 22 Feb 2021 17:11 )  PTT:28.6 sec    RADIOLOGY & ADDITIONAL STUDIES:     Reviewed

## 2021-02-22 NOTE — ED ADULT NURSE REASSESSMENT NOTE - NS ED NURSE REASSESS COMMENT FT1
Dr. Owusu returned page. Aware of patients vital signs. Provider to order more fluid. To be medicated as per MAR. Continuous cardiac/pulse oximetry monitoring remains in place.

## 2021-02-22 NOTE — CONSULT NOTE ADULT - ASSESSMENT
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 Admitted for a fib RVR and with moderate-sized right pleural effusion on bedside POCUS.      Recommendations:   #Shortness of breath 2/2 to moderate-sized right pleural effusion:   Recently admitted (02/07/2021-02/13/2021) for similar presentation of rapid a fib and SOB. After having thoracentesis that admission, cytology fluid studies showed atypical cells per pulm note but were unable to differentiate further, approximately 1L of fluid removed.  - bedside POCUS and CXR showing re-accumulation of moderate sized pleural effusion   - currently on Eliquis (last dose AM O2/22), so plan to unable to perform thoracentesis for 48H   - pulmonology consulted from ED, recommendations appreciated   - plan to stop Eliquis at this time, transition to heparin gtt for a fib (PTT goal: 58-99)   - given pleural effusions keep re-accumulating, patient good candidate for PleurX   - continue with supplemental O2 as needed (saturating >95% on 2L NC)     #A fib with RVR:  Presented to ED with irregular rhythm (HR 160s) and complaining of palpitations x1 day. Loaded with amiodarone on prior admission (02/07/2021-02/13/2021) and was on 200 mg PO daily at home.   - EP consulted from ED, recommendations appreciated   - s/p amiodarone load (150 mg IV bolus x1)   - no need for additional re-load at this time   - continue with home amiodarone 200 mg PO daily   - continue with home Toprol XL 50 mg PO q12   - likely volume depleted, so recommending 500 cc of NS x1 and maintenance fluids thereafter     #YAMILA:   Creatinine upon discharge last admission was 1.36, today 1.89. Patient appears dry on exam and hemoconcentrated on labs.   - obtain urine studies (urine Na, urine Cr)  - given 250 cc of NS x1 in ED  - give an additional 500 cc of NS now   - repeat BMP given K+ is hemolyzed   - encourage PO intake     #Malnutrition:   BMI of 20.7 and consumes poor diet at home (a few sips of a milkshake).   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 Admitted for a fib RVR and with moderate-sized right pleural effusion on bedside POCUS.      Recommendations:   #Shortness of breath 2/2 to moderate-sized right pleural effusion:   Recently admitted (02/07/2021-02/13/2021) for similar presentation of rapid a fib and SOB. After having thoracentesis that admission, cytology fluid studies showed atypical cells per pulm note but were unable to differentiate further, approximately 1L of fluid removed.  - bedside POCUS and CXR showing re-accumulation of moderate sized pleural effusion   - currently on Eliquis (last dose AM O2/22), so plan to unable to perform thoracentesis for 48H   - pulmonology consulted from ED, recommendations appreciated   - plan to stop Eliquis at this time, transition to heparin gtt for a fib (PTT goal: 58-99)   - given pleural effusions keep re-accumulating, patient good candidate for PleurX   - continue with supplemental O2 as needed (saturating >95% on 2L NC)     #A fib with RVR:  Presented to ED with irregular rhythm (HR 160s) and complaining of palpitations x1 day. Loaded with amiodarone on prior admission (02/07/2021-02/13/2021) and was on 200 mg PO daily at home.   - EP consulted from ED, recommendations appreciated   - s/p amiodarone load (150 mg IV bolus x1)   - no need for additional re-load at this time   - continue with home amiodarone 200 mg PO daily   - continue with home Toprol XL 50 mg PO q12   - likely volume depleted, so recommending 500 cc of NS x1 and maintenance fluids thereafter   - medicine telemetry given episode of a fib RVR in ED     #YAMILA:   Creatinine upon discharge last admission was 1.36, today 1.89. Patient appears dry on exam and hemoconcentrated on labs.   - obtain urine studies (urine Na, urine Cr)  - given 250 cc of NS x1 in ED  - give an additional 500 cc of NS now   - repeat BMP given K+ is hemolyzed   - encourage PO intake     #Malnutrition:   BMI of 20.7 and consumes poor diet at home (a few sips of a milkshake).   - nutrition consult in AM   - renal diet with daily Ensure     #Nutrition and metabolism:   Fluids: 500cc of NS   Electrolytes: keep K>4, Mg>2   DVT ppx: heparin gtt   GI ppx: none  Diet: renal diet with Ensure   Code status: full code but recommend palliative consult   Dispo: Medicine Telemetry ([address])

## 2021-02-22 NOTE — ED PROVIDER NOTE - CLINICAL SUMMARY MEDICAL DECISION MAKING FREE TEXT BOX
Pt c/o palpitations, sob/ramírez w rapid afib, mild hypotension and decreased bs R lung concerning for recurrent effusion.  ? if bp low 2/2 rapid afib or dehydration.  No fever, cough, localizing complaint to suggest infection.  Plan rate control, small ivf bolus, consider cardioversion if pt becomes unstable, ep, pulm consult; likely admit.

## 2021-02-22 NOTE — CONSULT NOTE ADULT - SUBJECTIVE AND OBJECTIVE BOX
HPI:  86 year old female with diabetes, HTN, Stage IIIb LLL NSCLC metastatic, c/l R endobronchial SCC, TOMMY, R sided pleural effusion and atrial fibrillation who was recently discharged from [facility] 2/13 and presents to the ER with palpitations found to be in afib with RVR.        She was recently admitted to [facility] with a large right sided pleural effusion with RML/RLL collapse and hilar mass.  She underwent a thoracentesis with exudative effusion consistent with known malignancy.  Found to have new onset afib and started on Toprol and Eliquis and amidoarone.  She has not started the amiodarone because was unable to get it.  She presents with complaints of palpitations.          PAST MEDICAL & SURGICAL HISTORY:  Heart murmur  Tobacco use  Squamous cell carcinoma of left lung  Adenocarcinoma of head and neck  Submandibular gland mass  DM (diabetes mellitus)  H/O: hysterectomy        Social History: no drugs     pertinent home medications:  Toprol 50mg BID, Eliquis 2.5mg BID Amiodarone 400mg TID thru 2/17 then 200mg qd (didnt start)          Allergies: No Known Allergies      ROS:   CONSTITUTIONAL: No fever, weight loss    EYES: Pt denies  RESPIRATORY: No cough, wheezing, chills or hemoptysis;    CARDIOVASCULAR: see HPI  GASTROINTESTINAL: Pt denies  NEUROLOGICAL: Pt denies  SKIN: Pt denies   PSYCHIATRIC: Pt denies  HEME/LYMPH: Pt denies    PHYSICAL:  T(C): 37.1 (02-22-21 @ 16:05), Max: 37.1 (02-22-21 @ 16:05)  HR: 160 (02-22-21 @ 16:36) (147 - 160)  BP: 92/72 (02-22-21 @ 16:36) (89/62 - 100/69)  RR: 22 (02-22-21 @ 16:36) (20 - 22)  SpO2: 100% (02-22-21 @ 16:36) (93% - 100%)     Appearance: No acute distress, well developed  Eyes: normal appearing conjunctiva, pupils and eyelids  Cardiovascular: Irregularly irregular - R neck with mass   Respiratory: Lungs clear - R sided diminished sounds   Gastrointestinal:  Soft, NT/ND 	  Neurologic:  No deficit noted  Psych: A&Ox3, normal mood/affect  Musculoskeletal: no deformities   Skin: no rash noted, normal color and pigmentation.        LABS: pending    EKG: today afib with a ventricular rate of 155      ECHO: (02.08.21 @ 13:15) >   1. The left ventricle is normal in size, wall thickness, and systolic function with a calculated ejection fraction of 65%.   2. The right ventricle is normal in size. Right ventricular systolic function is normal.   3. The left atrium is borderline dilated.   4. The aortic valve is calcified (morphology not well seen). There is mild aortic stenosis. The peak transvalvular velocity is 2.74 m/s, the mean transvalvular gradient is 17.00 mmHg, and the LVOT/AV velocity ratio is 0.36. The peak transaortic gradient is 30.03 mmHg. The mean transaortic gradient is 17.00 mmHg. The aortic valve area (estimated via the continuity method) is 1.36 cm². There is mild to moderate aortic regurgitation.   5. The mitral valve is moderately thickened and calcified. Mitral annular calcification noted. There is mild mitral regurgitation.   6. There is moderate tricuspid regurgitation. There is mild pulmonary hypertension, pulmonary artery systolic pressure is 47 mmHg.   7. The aortic root is normal in size.   8.No pericardial effusion is seen.       Assessment Plan:  86 year old female with diabetes, HTN, Stage IIIb LLL NSCLC metastatic, c/l R endobronchial SCC, TOMMY, R sided pleural effusion and atrial fibrillation who was recently discharged from [facility] 2/13 and presents to the ER with palpitations found to be in afib with RVR.   Agree with hydration - BP is coming up (now 106) getting amio bolus.  Would rate control - EF normal on last admission can use cardizem drip and uptitrate as needed / BP allows.  Continue oral anticoagulation - would put her on heparin as she may need a repeat thoracentesis (to be seen by pulm).  Would cardiovert once pulmonary issue is resolved (after thoracentesis or if decides not needed).  Afib likely secondary to increased effusion.  Will follow with you.

## 2021-02-22 NOTE — ED PROVIDER NOTE - PMH
Adenocarcinoma of head and neck    DM (diabetes mellitus)    Heart murmur    Squamous cell carcinoma of left lung    Submandibular gland mass    Tobacco use     Adenocarcinoma of head and neck    Afib    DM (diabetes mellitus)    Heart murmur    Pleural effusion    Squamous cell carcinoma of left lung    Submandibular gland mass    Tobacco use

## 2021-02-22 NOTE — ED ADULT NURSE REASSESSMENT NOTE - NS ED NURSE REASSESS COMMENT FT1
pt awake and alert. Medicated as per MAR. Updated on plan of care. Continuous cardiac/pulse oximetry monitoring remains in place.

## 2021-02-22 NOTE — ED PROVIDER NOTE - PROGRESS NOTE DETAILS
EP consulted and in ed - agree w mgmt.  HR improving w amiodarone, bp holding ~ 100.  Pt feeling some improvement in palpitation sensation.  Pulm consulted and will follow but are holding on tap 2/2 eliquis at this time.  ICU consulted and will eval. Pt converted back to nsr 60's, feeling improved.

## 2021-02-22 NOTE — ED ADULT NURSE NOTE - NSIMPLEMENTINTERV_GEN_ALL_ED
Implemented All Fall with Harm Risk Interventions:  [place] to call system. Call bell, personal items and telephone within reach. Instruct patient to call for assistance. Room bathroom lighting operational. Non-slip footwear when patient is off stretcher. Physically safe environment: no spills, clutter or unnecessary equipment. Stretcher in lowest position, wheels locked, appropriate side rails in place. Provide visual cue, wrist band, yellow gown, etc. Monitor gait and stability. Monitor for mental status changes and reorient to person, place, and time. Review medications for side effects contributing to fall risk. Reinforce activity limits and safety measures with patient and family. Provide visual clues: red socks.

## 2021-02-23 NOTE — H&P ADULT - PROBLEM SELECTOR PLAN 2
Presented to ED with irregular rhythm (HR 160s) and complaining of palpitations x1 day. Loaded with amiodarone on prior admission (02/07/2021-02/13/2021) and was on 200 mg PO daily at home.   - EP consulted from ED, recommendations appreciated   - s/p amiodarone load (150 mg IV bolus x1)   - no need for additional re-load at this time   - continue with home amiodarone 200 mg PO daily   - continue with home Toprol XL 50 mg PO q12   - likely volume depleted, so recommending 500 cc of NS x1 and maintenance fluids thereafter   - medicine telemetry given episode of a fib RVR in ED

## 2021-02-23 NOTE — CONSULT NOTE ADULT - ASSESSMENT
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 She follows with Dr. Wellington. She is currently admitted for Afib with RVR and is now rate controlled. Pulmonary is consulted due to recurrent R sided pleural effusion.

## 2021-02-23 NOTE — H&P ADULT - PROBLEM SELECTOR PLAN 4
BMI of 20.7 and consumes poor diet at home (a few sips of a milkshake).   - nutrition consult in AM   - renal diet with daily Ensure

## 2021-02-23 NOTE — CONSULT NOTE ADULT - ATTENDING COMMENTS
I evaluated this 86-year-old lady with a history of to lung cancers.  In the right lung, she had squamous cell cancer.  She also had a left lower lobe nodule which was found to be neuroendocrine tumor.  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  He was admitted with 2 weeks ago right-sided pleural effusion.  The patient was diuresed approximately 1 L of exudative, lymphocyte predominant fluid aspirated.  Atypical cells were identified.  Last night the patient developed atrial fibrillation with a rapid ventricular response rate and was found to have 93% SPO2 on room air.  He was brought to the hospital, given loading dose of amiodarone.  Valuated her chest x-ray from 2 weeks ago.  There was a nodule in the left midlung zone as well as the left lower lobe.  There appears to be a loculated pleural effusion on the right side.  Chest x-ray repeated last night showed an increased accumulation of fluid, with a shift of the mediastinum to the ipsilateral side.    CT of the chest) PE study) demonstrated an almost completely occluded bronchus intermedius.  There was pleural effusion on the right side.  3 nodules were seen on the left side.  We recommend evaluating the patient for endobronchial obstruction by airways tumor.  The patient may require a stent.  We have requested our interventional pulmonology team to evaluate the patient and to ensure that the obstruction is not too distal for a stent.  In case the endobronchial obstruction cannot be relieved, consideration of a Pleurx catheter will be made to drain the pleural effusion and hopefully improve the patient's respiratory status.  However it must be noted that with an endobronchial obstruction, thoracentesis and/or chest tube placement on the right side may result in trapped physiology with the lung unable to expand.  Rate control and diuresis as per the cardiology team

## 2021-02-23 NOTE — H&P ADULT - HISTORY OF PRESENT ILLNESS
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 Wellington. Patient describes that she was at radiation therapy, which she suddenly started experiencing palpitations and SOB x1. Also complains of chronic head and neck pain and increased urinary frequency. Currently denies n/v/d/c, fever, chills, or CP. Also denies any recent sick contacts or recent travel. Of note, patient was recently admitted (02/07/2021-02/13/2021) for similar presentation of rapid a fib and SOB. After having thoracentesis that admission, cytology fluid studies showed atypical cells per pulm note but were unable to differentiate further. Per family, 1L of pleural fluid was removed at that time (bandage still in place).

## 2021-02-23 NOTE — CONSULT NOTE ADULT - SUBJECTIVE AND OBJECTIVE BOX
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 Wellington. Patient describes that she was at radiation therapy, which she suddenly started experiencing palpitations and SOB x1. Also complains of chronic head and neck pain and increased urinary frequency. Currently denies n/v/d/c, fever, chills, or CP. Also denies any recent sick contacts or recent travel. Of note, patient was recently admitted (02/07/2021-02/13/2021) for similar presentation of rapid a fib and SOB. After having thoracentesis that admission, cytology fluid studies showed atypical cells per pulm note but were unable to differentiate further. Per family, 1L of pleural fluid was removed at that time (bandage still in place).  (23 Feb 2021 00:28)      PAST MEDICAL & SURGICAL HISTORY:  Pleural effusion    Afib    Heart murmur    Tobacco use    Squamous cell carcinoma of left lung    Adenocarcinoma of head and neck    Submandibular gland mass    DM (diabetes mellitus)    H/O: hysterectomy        FAMILY HISTORY:      SOCIAL HISTORY:  Smoking Status: [ ] Current, [ ] Former, [ ] Never  Pack Years:    MEDICATIONS:  Pulmonary:    Antimicrobials:    Anticoagulants:  aspirin  chewable 81 milliGRAM(s) Oral daily  heparin  Infusion.  Unit(s)/Hr IV Continuous <Continuous>    Onc:    GI/:  pantoprazole    Tablet 40 milliGRAM(s) Oral before breakfast    Endocrine:    Cardiac:  aMIOdarone    Tablet 200 milliGRAM(s) Oral every 24 hours  metoprolol succinate ER 50 milliGRAM(s) Oral every 12 hours    Other Medications:  acetaminophen   Tablet .. 650 milliGRAM(s) Oral every 6 hours PRN  buPROPion XL . 300 milliGRAM(s) Oral daily  folic acid 1 milliGRAM(s) Oral daily  ondansetron    Tablet 4 milliGRAM(s) Oral every 8 hours PRN  QUEtiapine 25 milliGRAM(s) Oral every 24 hours  sodium chloride 0.9%. 1000 milliLiter(s) IV Continuous <Continuous>      Allergies    No Known Allergies    Intolerances        Vital Signs Last 24 Hrs  T(C): 36.5 (23 Feb 2021 04:30), Max: 37.1 (22 Feb 2021 16:05)  T(F): 97.7 (23 Feb 2021 04:30), Max: 98.7 (22 Feb 2021 16:05)  HR: 62 (23 Feb 2021 04:25) (60 - 160)  BP: 91/45 (23 Feb 2021 04:25) (89/62 - 116/55)  BP(mean): 65 (23 Feb 2021 04:25) (65 - 70)  RR: 19 (22 Feb 2021 23:48) (18 - 22)  SpO2: 96% (23 Feb 2021 04:25) (93% - 100%)    02-22 @ 07:01  -  02-23 @ 07:00  --------------------------------------------------------  IN: 80 mL / OUT: 0 mL / NET: 80 mL          LABS:      CBC Full  -  ( 22 Feb 2021 17:11 )  WBC Count : 13.24 K/uL  RBC Count : 4.23 M/uL  Hemoglobin : 12.7 g/dL  Hematocrit : 38.6 %  Platelet Count - Automated : 281 K/uL  Mean Cell Volume : 91.3 fl  Mean Cell Hemoglobin : 30.0 pg  Mean Cell Hemoglobin Concentration : 32.9 gm/dL  Auto Neutrophil # : 11.06 K/uL  Auto Lymphocyte # : 1.26 K/uL  Auto Monocyte # : 0.81 K/uL  Auto Eosinophil # : 0.02 K/uL  Auto Basophil # : 0.04 K/uL  Auto Neutrophil % : 83.5 %  Auto Lymphocyte % : 9.5 %  Auto Monocyte % : 6.1 %  Auto Eosinophil % : 0.2 %  Auto Basophil % : 0.3 %    02-22    136  |  106  |  31<H>  ----------------------------<  94  5.2   |  19<L>  |  1.82<H>    Ca    8.6      22 Feb 2021 23:25      PT/INR - ( 22 Feb 2021 17:11 )   PT: 19.6 sec;   INR: 1.67          PTT - ( 23 Feb 2021 07:56 )  PTT:63.6 sec                  RADIOLOGY & ADDITIONAL STUDIES (The following images were personally reviewed): Pulmonary Consult Note :    HPI:  86 year old female with PMH of current-smoker (>65 pack-year), with hx of multiple lung cancers (AJCC IIIB NSCL -LLL lesion and SCC endobronchial right lung), and 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 Wellington. Patient describes that she was at radiation therapy, which she suddenly started experiencing palpitations and SOB x1. Also complains of chronic head and neck pain and increased urinary frequency. Currently denies n/v/d/c, fever, chills, or CP. Also denies any recent sick contacts or recent travel. Of note, patient was recently admitted (02/07/2021-02/13/2021) for similar presentation of rapid a fib and SOB. After having thoracentesis that admission, cytology fluid studies showed atypical cells per pulm note but were unable to differentiate further. Per family, 1L of pleural fluid was removed at that time (bandage still in place).  (23 Feb 2021 00:28)      PAST MEDICAL & SURGICAL HISTORY:  Pleural effusion    Afib    Heart murmur    Tobacco use    Squamous cell carcinoma of left lung    Adenocarcinoma of head and neck    Submandibular gland mass    DM (diabetes mellitus)    H/O: hysterectomy        FAMILY HISTORY:      SOCIAL HISTORY:  Smoking Status: [ ] Current, [ ] Former, [ ] Never  Pack Years:    MEDICATIONS:  Pulmonary:    Antimicrobials:    Anticoagulants:  aspirin  chewable 81 milliGRAM(s) Oral daily  heparin  Infusion.  Unit(s)/Hr IV Continuous <Continuous>    Onc:    GI/:  pantoprazole    Tablet 40 milliGRAM(s) Oral before breakfast    Endocrine:    Cardiac:  aMIOdarone    Tablet 200 milliGRAM(s) Oral every 24 hours  metoprolol succinate ER 50 milliGRAM(s) Oral every 12 hours    Other Medications:  acetaminophen   Tablet .. 650 milliGRAM(s) Oral every 6 hours PRN  buPROPion XL . 300 milliGRAM(s) Oral daily  folic acid 1 milliGRAM(s) Oral daily  ondansetron    Tablet 4 milliGRAM(s) Oral every 8 hours PRN  QUEtiapine 25 milliGRAM(s) Oral every 24 hours  sodium chloride 0.9%. 1000 milliLiter(s) IV Continuous <Continuous>      Allergies    No Known Allergies    Intolerances        Vital Signs Last 24 Hrs  T(C): 36.5 (23 Feb 2021 04:30), Max: 37.1 (22 Feb 2021 16:05)  T(F): 97.7 (23 Feb 2021 04:30), Max: 98.7 (22 Feb 2021 16:05)  HR: 62 (23 Feb 2021 04:25) (60 - 160)  BP: 91/45 (23 Feb 2021 04:25) (89/62 - 116/55)  BP(mean): 65 (23 Feb 2021 04:25) (65 - 70)  RR: 19 (22 Feb 2021 23:48) (18 - 22)  SpO2: 96% (23 Feb 2021 04:25) (93% - 100%)    02-22 @ 07:01  -  02-23 @ 07:00  --------------------------------------------------------  IN: 80 mL / OUT: 0 mL / NET: 80 mL          LABS:      CBC Full  -  ( 22 Feb 2021 17:11 )  WBC Count : 13.24 K/uL  RBC Count : 4.23 M/uL  Hemoglobin : 12.7 g/dL  Hematocrit : 38.6 %  Platelet Count - Automated : 281 K/uL  Mean Cell Volume : 91.3 fl  Mean Cell Hemoglobin : 30.0 pg  Mean Cell Hemoglobin Concentration : 32.9 gm/dL  Auto Neutrophil # : 11.06 K/uL  Auto Lymphocyte # : 1.26 K/uL  Auto Monocyte # : 0.81 K/uL  Auto Eosinophil # : 0.02 K/uL  Auto Basophil # : 0.04 K/uL  Auto Neutrophil % : 83.5 %  Auto Lymphocyte % : 9.5 %  Auto Monocyte % : 6.1 %  Auto Eosinophil % : 0.2 %  Auto Basophil % : 0.3 %    02-22    136  |  106  |  31<H>  ----------------------------<  94  5.2   |  19<L>  |  1.82<H>    Ca    8.6      22 Feb 2021 23:25      PT/INR - ( 22 Feb 2021 17:11 )   PT: 19.6 sec;   INR: 1.67          PTT - ( 23 Feb 2021 07:56 )  PTT:63.6 sec                  RADIOLOGY & ADDITIONAL STUDIES (The following images were personally reviewed): Pulmonary Consult Note :    HPI:  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 She follows with Dr. Wellington. She is currently admitted for Afib with RVR and is now rate controlled. Pulmonary is consulted due to recurrent R sided pleural effusion. Of note, she was admitted earlier this month and underwent R sided pleural effusion on feb 9th with removal of 1L of fluid. Fluid was lymphocyte predominant exudate. Cytology result showed aytpical cells. Fluid appears to have reaccumulated. This morning, patient does not complain of any SOB or chest discomfort. Main complaint is neck pain.     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     PAST MEDICAL & SURGICAL HISTORY:  Pleural effusion    Afib    Heart murmur    Tobacco use    Adenocarcinoma of head and neck    Submandibular gland mass    DM (diabetes mellitus)    H/O: hysterectomy    FAMILY HISTORY:  non contributory    SOCIAL HISTORY:  Smoking Status: [X] Current, [ ] Former, [ ] Never  Pack Years: 65 pack years.     MEDICATIONS:  Pulmonary:    Antimicrobials:    Anticoagulants:  aspirin  chewable 81 milliGRAM(s) Oral daily  heparin  Infusion.  Unit(s)/Hr IV Continuous <Continuous>    Onc:    GI/:  pantoprazole    Tablet 40 milliGRAM(s) Oral before breakfast    Endocrine:    Cardiac:  aMIOdarone    Tablet 200 milliGRAM(s) Oral every 24 hours  metoprolol succinate ER 50 milliGRAM(s) Oral every 12 hours    Other Medications:  acetaminophen   Tablet .. 650 milliGRAM(s) Oral every 6 hours PRN  buPROPion XL . 300 milliGRAM(s) Oral daily  folic acid 1 milliGRAM(s) Oral daily  ondansetron    Tablet 4 milliGRAM(s) Oral every 8 hours PRN  QUEtiapine 25 milliGRAM(s) Oral every 24 hours  sodium chloride 0.9%. 1000 milliLiter(s) IV Continuous <Continuous>    Allergies    No Known Allergies    Intolerances    Vital Signs Last 24 Hrs  T(C): 36.5 (23 Feb 2021 04:30), Max: 37.1 (22 Feb 2021 16:05)  T(F): 97.7 (23 Feb 2021 04:30), Max: 98.7 (22 Feb 2021 16:05)  HR: 62 (23 Feb 2021 04:25) (60 - 160)  BP: 91/45 (23 Feb 2021 04:25) (89/62 - 116/55)  BP(mean): 65 (23 Feb 2021 04:25) (65 - 70)  RR: 19 (22 Feb 2021 23:48) (18 - 22)  SpO2: 96% (23 Feb 2021 04:25) (93% - 100%)    02-22 @ 07:01  -  02-23 @ 07:00  --------------------------------------------------------  IN: 80 mL / OUT: 0 mL / NET: 80 mL    Physical Exam:  General: NAD, AAO x3  HEENT: No icterus,. Moist mucous membranes  Neck: Nodular neck mass-like appearance with ulceration.   Cardio: S1, S2 noted, RRR. No murmurs, rubs or gallops  Resp: No breath sounds at the base of the R lung. L sided clear to ascultation.   Abdo: Soft, NT, bowel sounds present. No organomegaly  Extremities: No edema noted. Pulses present b/l  Neuro: AAO x3, grossly normal motor strength.  Lymphnodes: no lymphadenopathy identified.  Skin: Dry, no rashes    LABS:  CBC Full  -  ( 22 Feb 2021 17:11 )  WBC Count : 13.24 K/uL  RBC Count : 4.23 M/uL  Hemoglobin : 12.7 g/dL  Hematocrit : 38.6 %  Platelet Count - Automated : 281 K/uL  Mean Cell Volume : 91.3 fl  Mean Cell Hemoglobin : 30.0 pg  Mean Cell Hemoglobin Concentration : 32.9 gm/dL  Auto Neutrophil # : 11.06 K/uL  Auto Lymphocyte # : 1.26 K/uL  Auto Monocyte # : 0.81 K/uL  Auto Eosinophil # : 0.02 K/uL  Auto Basophil # : 0.04 K/uL  Auto Neutrophil % : 83.5 %  Auto Lymphocyte % : 9.5 %  Auto Monocyte % : 6.1 %  Auto Eosinophil % : 0.2 %  Auto Basophil % : 0.3 %    02-22    136  |  106  |  31<H>  ----------------------------<  94  5.2   |  19<L>  |  1.82<H>    Ca    8.6      22 Feb 2021 23:25    PT/INR - ( 22 Feb 2021 17:11 )   PT: 19.6 sec;   INR: 1.67       PTT - ( 23 Feb 2021 07:56 )  PTT:63.6 sec    RADIOLOGY & ADDITIONAL STUDIES (The following images were personally reviewed):  Reviewed.

## 2021-02-23 NOTE — H&P ADULT - ASSESSMENT
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 Admitted for a fib RVR and with moderate-sized right pleural effusion on bedside POCUS.

## 2021-02-23 NOTE — CONSULT NOTE ADULT - PROBLEM SELECTOR RECOMMENDATION 9
Patient with multiple malignancies, most notably a tumor encasing R mainstem bronchus with endobronchial component that was shown to be squamous cell lung ca. She also has a LLL nodule shown to be non small cell lung ca.     She presented roughly 3 weeks ago with R sided pleural effusion which was drained and was lymphocyte predominant exudate. Patient with multiple malignancies, most notably a tumor encasing R mainstem bronchus with endobronchial component that was shown to be squamous cell lung ca. She also has a LLL nodule shown to be non small cell lung ca.     She presented roughly 3 weeks ago with R sided pleural effusion which was drained and was lymphocyte predominant exudate, favoring malignant effusion.     On CT from prior admission, R mainsteam bronchus is narrowed. RLL bronchus is collapses and RUL takeoff is also narrowed.     Recommendation:  - Will discuss with IP feasibility of placing a stent  - Will also consider Indwelling pleural catheter placement.  - please keep off oral AC at this time, agree with heparin.

## 2021-02-23 NOTE — H&P ADULT - NSHPPHYSICALEXAM_GEN_ALL_CORE
VITAL SIGNS:  T(C): 36.5 (02-22-21 @ 22:59), Max: 37.1 (02-22-21 @ 16:05)  T(F): 97.7 (02-22-21 @ 22:59), Max: 98.7 (02-22-21 @ 16:05)  HR: 71 (02-22-21 @ 23:24) (60 - 160)  BP: 116/55 (02-22-21 @ 23:24) (89/62 - 116/55)  BP(mean): --  RR: 18 (02-22-21 @ 23:24) (18 - 22)  SpO2: 97% (02-22-21 @ 23:24) (93% - 100%)  Wt(kg): --    PHYSICAL EXAM:    Constitutional: thin, frail, cachetic female, laying comfortably in bed, no acute distress  Head: NC/AT  Eyes: anicteric sclera  Respiratory: CTA B/L; no W/R/R, no retractions  Cardiac: +S1/S2; RRR; no M/R/G;  Gastrointestinal: soft, NT/ND; no rebound or guarding  Extremities: WWP, no clubbing or cyanosis; no peripheral edema  Neurologic: AAOx3;   Psychiatric: affect and characteristics of appearance, verbalizations, behaviors are appropriate

## 2021-02-23 NOTE — DIETITIAN NUTRITION RISK NOTIFICATION - TREATMENT: THE FOLLOWING DIET HAS BEEN RECOMMENDED
Continue with regular diet and honor food preferences  Consider addition of appetite stimulant

## 2021-02-23 NOTE — DIETITIAN INITIAL EVALUATION ADULT. - ADD RECOMMEND
1. Encourage PO intake and honor food preferences. 2. Consider appetite stimulant 3. Monitor lytes and replete prn. 4. Pain and bowel regimens per team

## 2021-02-23 NOTE — DIETITIAN INITIAL EVALUATION ADULT. - NAME AND PHONE
Samantha Gitlin, RD, CDN, [facility], [phone] or available on [employer] [place]

## 2021-02-23 NOTE — H&P ADULT - PROBLEM SELECTOR PLAN 5
Electrolytes: keep K>4, Mg>2   DVT ppx: heparin gtt   GI ppx: none  Diet: renal diet with Ensure   Code status: full code but recommend palliative consult   Dispo: Medicine Telemetry ([address])

## 2021-02-23 NOTE — PROGRESS NOTE ADULT - SUBJECTIVE AND OBJECTIVE BOX
EPS Progress Note  CC: sob / palp    S: states she feels somewhat better. no palpitations.     O: T(C): 36.8 (02-23-21 @ 14:20), Max: 36.8 (02-23-21 @ 10:03)  HR: 62 (02-23-21 @ 08:44) (60 - 160)  BP: 92/49 (02-23-21 @ 08:44) (89/62 - 116/55)  RR: 18 (02-23-21 @ 08:44) (18 - 22)  SpO2: 100% (02-23-21 @ 08:44) (94% - 100%)    TELE: NSR with short bouts of AFIB since last night.  PAC.  HR currently 70s     PHYSICAL  Constitutional:  NAD        Neck: right neck mass  Pulm:  decreased BS right lung field.   Cardiac:   + s1/s2, RRR, no murmur   GI:  +BS , soft ND/NT  Vascular: No LE edema, pulse 2+  Neuro: AAO x 3. no focal deficit  Skin: Warm. No rash or lesion     LABS:                        9.4    8.87  )-----------( 216      ( 23 Feb 2021 10:18 )             29.3     02-23    137  |  109<H>  |  34<H>  ----------------------------<  87  5.0   |  16<L>  |  1.89<H>    Ca    8.4      23 Feb 2021 10:18  Phos  3.8     02-23  Mg     2.0     02-23      PT/INR - ( 22 Feb 2021 17:11 )   PT: 19.6 sec;   INR: 1.67          PTT - ( 23 Feb 2021 12:30 )  PTT:38.2 sec    MEDICATIONS:  acetaminophen   Tablet .. 650 milliGRAM(s) Oral every 6 hours PRN  aMIOdarone    Tablet 200 milliGRAM(s) Oral every 24 hours  aspirin  chewable 81 milliGRAM(s) Oral daily  buPROPion XL . 300 milliGRAM(s) Oral daily  folic acid 1 milliGRAM(s) Oral daily  heparin  Infusion 1200 Unit(s)/Hr IV Continuous <Continuous>  metoprolol succinate ER 50 milliGRAM(s) Oral every 12 hours  mirtazapine 7.5 milliGRAM(s) Oral at bedtime  ondansetron    Tablet 4 milliGRAM(s) Oral every 8 hours PRN  pantoprazole    Tablet 40 milliGRAM(s) Oral before breakfast  QUEtiapine 25 milliGRAM(s) Oral every 24 hours  sodium bicarbonate 650 milliGRAM(s) Oral every 12 hours  sodium chloride 0.9%. 1000 milliLiter(s) IV Continuous <Continuous>

## 2021-02-23 NOTE — H&P ADULT - PROBLEM SELECTOR PLAN 3
Creatinine upon discharge last admission was 1.36, today 1.89. Patient appears dry on exam and hemoconcentrated on labs.   - obtain urine studies (urine Na, urine Cr)  - given 250 cc of NS x1 in ED  - give an additional 500 cc of NS now   - repeat BMP given K+ is hemolyzed   - encourage PO intake

## 2021-02-23 NOTE — H&P ADULT - PROBLEM SELECTOR PLAN 1
Recently admitted (02/07/2021-02/13/2021) for similar presentation of rapid a fib and SOB. After having thoracentesis that admission, cytology fluid studies showed atypical cells per pulm note but were unable to differentiate further, approximately 1L of fluid removed.  - bedside POCUS and CXR showing re-accumulation of moderate sized pleural effusion   - currently on Eliquis (last dose AM O2/22), so plan to unable to perform thoracentesis for 48H   - pulmonology consulted from ED, recommendations appreciated   - plan to stop Eliquis at this time, transition to heparin gtt for a fib (PTT goal: 58-99)   - given pleural effusions keep re-accumulating, patient good candidate for PleurX   - continue with supplemental O2 as needed (saturating >95% on 2L NC)

## 2021-02-23 NOTE — DIETITIAN INITIAL EVALUATION ADULT. - OTHER INFO
85F PMHx DM, HTN, Stage IIIb LLL NSCLC metastatic, c/l R endobronchial SCC, TOMMY, recent admission for palpitations and pleural effusion s/p thoracentesis, now admitted for similar complaints of palpitations, SOB, Afib w/RVR, and moderate R. sided pleural effusion. Pt seen in room, awake, alert, pleasant, known to this RD from last admit. She endorsed ongoing poor appetite and taste bud changes. When asked what she's been eating at home since DC, she replied nothing. She does not like the taste of ONS. NKFA. Currently NPO for unknown reason, so d/w MD and diet advanced for lunch. She was asking for jello and toast. 85F PMHx DM, HTN, Stage IIIb LLL NSCLC metastatic, c/l R endobronchial SCC, TOMMY, recent admission for palpitations and pleural effusion s/p thoracentesis, now admitted for similar complaints of palpitations, SOB, Afib w/RVR, and moderate R. sided pleural effusion. Pt seen in room, awake, alert, pleasant, known to this RD from last admit. She endorsed ongoing poor appetite and taste bud changes. When asked what she's been eating at home since DC, she replied nothing. She does not like the taste of ONS. NKFA. Currently NPO for unknown reason, so d/w MD and diet advanced for lunch. She was asking for jello and toast. No complaints of N/V but does report some constipation likely 2/2 poor PO intake. She also endorsed difficulty chewing 2/2 pain 2/2 cancer. No issues swallowing. Skin noted intact pressure-wise w/o edema. Afebrile. No specific complaints of pain at this time. NFPE completed and significant for malnutrition. Discussed increased nutrient needs and importance of increasing PO intake w/focus on protein foods. Will continue to follow per RD protocol.

## 2021-02-23 NOTE — DIETITIAN INITIAL EVALUATION ADULT. - OTHER CALCULATIONS
ActualBW used for calculations as pt between % of IBW (102% IBW). Needs estimated for age and adjusted for malignancy, malnutrition. Fluids per team 2/2 SOB, pleural effusion

## 2021-02-24 NOTE — PROCEDURE NOTE - NSPROCDETAILS_GEN_ALL_CORE
location identified, draped/prepped, sterile technique used/blood seen on insertion/dressing applied/flushes easily/secured in place/sterile technique, catheter placed
location identified, draped/prepped, sterile technique used, needle inserted/introduced/Seldinger technique/catheter inserted over needle/ultrasound assessment of effusion (localization)

## 2021-02-24 NOTE — PROGRESS NOTE ADULT - PROBLEM SELECTOR PLAN 5
Electrolytes: keep K>4, Mg>2   DVT ppx: none for pleurex  GI ppx: none  Diet: renal diet with Ensure   Code status: full code but recommend palliative consult   Dispo: Medicine Telemetry (7 [facility])

## 2021-02-24 NOTE — PROGRESS NOTE ADULT - SUBJECTIVE AND OBJECTIVE BOX
OVERNIGHT EVENTS: sherrill    SUBJECTIVE / INTERVAL HPI: Patient seen and examined at bedside. Feels weak but notes headache is improved. BReathing also improved. Denies chest pain.     VITAL SIGNS:  Vital Signs Last 24 Hrs  T(C): 36.7 (24 Feb 2021 06:00), Max: 37 (23 Feb 2021 17:08)  T(F): 98.1 (24 Feb 2021 06:00), Max: 98.6 (23 Feb 2021 17:08)  HR: 68 (24 Feb 2021 00:44) (62 - 74)  BP: 127/88 (24 Feb 2021 00:44) (92/49 - 127/88)  BP(mean): 103 (24 Feb 2021 00:44) (64 - 103)  RR: 17 (24 Feb 2021 00:44) (17 - 18)  SpO2: 95% (24 Feb 2021 00:44) (95% - 100%)    PHYSICAL EXAM:    General: nad  HEENT: NC/AT; PERRL, anicteric sclera; MMM  Neck: supple  Cardiovascular: +S1/S2; RRR  Respiratory: CTA B/L; no W/R/R  Gastrointestinal: soft, NT/ND; +BSx4  Extremities: WWP; no edema, clubbing or cyanosis  Vascular: 2+ radial, DP/PT pulses B/L  Derm: multiple hardened submandibular lymph notes on right, non tender to palpation  Neurological: AAOx3; no focal deficits    MEDICATIONS:  MEDICATIONS  (STANDING):  aMIOdarone    Tablet 200 milliGRAM(s) Oral every 24 hours  aspirin  chewable 81 milliGRAM(s) Oral daily  buPROPion XL . 300 milliGRAM(s) Oral daily  folic acid 1 milliGRAM(s) Oral daily  metoprolol succinate ER 50 milliGRAM(s) Oral every 12 hours  mirtazapine 7.5 milliGRAM(s) Oral at bedtime  pantoprazole    Tablet 40 milliGRAM(s) Oral before breakfast  QUEtiapine 25 milliGRAM(s) Oral every 24 hours  sodium bicarbonate 650 milliGRAM(s) Oral every 12 hours  sodium chloride 0.9%. 1000 milliLiter(s) (90 mL/Hr) IV Continuous <Continuous>    MEDICATIONS  (PRN):  acetaminophen   Tablet .. 650 milliGRAM(s) Oral every 6 hours PRN Mild Pain (1 - 3), Moderate Pain (4 - 6)  ondansetron    Tablet 4 milliGRAM(s) Oral every 8 hours PRN Nausea and/or Vomiting      ALLERGIES:  Allergies    No Known Allergies    Intolerances        LABS:                        9.4    8.87  )-----------( 216      ( 23 Feb 2021 10:18 )             29.3     02-23    137  |  109<H>  |  34<H>  ----------------------------<  87  5.0   |  16<L>  |  1.89<H>    Ca    8.4      23 Feb 2021 10:18  Phos  3.8     02-23  Mg     2.0     02-23      PT/INR - ( 22 Feb 2021 17:11 )   PT: 19.6 sec;   INR: 1.67          PTT - ( 23 Feb 2021 23:50 )  PTT:58.0 sec    CAPILLARY BLOOD GLUCOSE      POCT Blood Glucose.: 82 mg/dL (24 Feb 2021 07:25)      RADIOLOGY & ADDITIONAL TESTS: Reviewed.    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 Admitted for a fib RVR and with moderate-sized right pleural effusion on bedside POCUS.  On 7L, pulm followed, and patient underwent placement of pleurex for malignant effusion, and is planned for bronchosopy on friday. As a result, her eliquis was transitioned to heparin gtt. She also was started on Bicarb 650mg BID. Once her pleurex procedure had finished, she was deemed stable for step down to [facility].    OVERNIGHT EVENTS: sherrill    SUBJECTIVE / INTERVAL HPI: Patient seen and examined at bedside. Feels weak but notes headache is improved. BReathing also improved. Denies chest pain.     VITAL SIGNS:  Vital Signs Last 24 Hrs  T(C): 36.7 (24 Feb 2021 06:00), Max: 37 (23 Feb 2021 17:08)  T(F): 98.1 (24 Feb 2021 06:00), Max: 98.6 (23 Feb 2021 17:08)  HR: 68 (24 Feb 2021 00:44) (62 - 74)  BP: 127/88 (24 Feb 2021 00:44) (92/49 - 127/88)  BP(mean): 103 (24 Feb 2021 00:44) (64 - 103)  RR: 17 (24 Feb 2021 00:44) (17 - 18)  SpO2: 95% (24 Feb 2021 00:44) (95% - 100%)    PHYSICAL EXAM:    General: nad  HEENT: NC/AT; PERRL, anicteric sclera; MMM  Neck: supple  Cardiovascular: +S1/S2; RRR  Respiratory: CTA B/L; no W/R/R  Gastrointestinal: soft, NT/ND; +BSx4  Extremities: WWP; no edema, clubbing or cyanosis  Vascular: 2+ radial, DP/PT pulses B/L  Derm: multiple hardened submandibular lymph notes on right, non tender to palpation  Neurological: AAOx3; no focal deficits    MEDICATIONS:  MEDICATIONS  (STANDING):  aMIOdarone    Tablet 200 milliGRAM(s) Oral every 24 hours  aspirin  chewable 81 milliGRAM(s) Oral daily  buPROPion XL . 300 milliGRAM(s) Oral daily  folic acid 1 milliGRAM(s) Oral daily  metoprolol succinate ER 50 milliGRAM(s) Oral every 12 hours  mirtazapine 7.5 milliGRAM(s) Oral at bedtime  pantoprazole    Tablet 40 milliGRAM(s) Oral before breakfast  QUEtiapine 25 milliGRAM(s) Oral every 24 hours  sodium bicarbonate 650 milliGRAM(s) Oral every 12 hours  sodium chloride 0.9%. 1000 milliLiter(s) (90 mL/Hr) IV Continuous <Continuous>    MEDICATIONS  (PRN):  acetaminophen   Tablet .. 650 milliGRAM(s) Oral every 6 hours PRN Mild Pain (1 - 3), Moderate Pain (4 - 6)  ondansetron    Tablet 4 milliGRAM(s) Oral every 8 hours PRN Nausea and/or Vomiting      ALLERGIES:  Allergies    No Known Allergies    Intolerances        LABS:                        9.4    8.87  )-----------( 216      ( 23 Feb 2021 10:18 )             29.3     02-23    137  |  109<H>  |  34<H>  ----------------------------<  87  5.0   |  16<L>  |  1.89<H>    Ca    8.4      23 Feb 2021 10:18  Phos  3.8     02-23  Mg     2.0     02-23      PT/INR - ( 22 Feb 2021 17:11 )   PT: 19.6 sec;   INR: 1.67          PTT - ( 23 Feb 2021 23:50 )  PTT:58.0 sec    CAPILLARY BLOOD GLUCOSE      POCT Blood Glucose.: 82 mg/dL (24 Feb 2021 07:25)      RADIOLOGY & ADDITIONAL TESTS: Reviewed.    ASSESSMENT:    PLAN:

## 2021-02-24 NOTE — PROGRESS NOTE ADULT - SUBJECTIVE AND OBJECTIVE BOX
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 Admitted for a fib RVR and with moderate-sized right pleural effusion on bedside POCUS.  On 7, pulm followed, and patient underwent placement of pleurex for malignant effusion, and is planned for bronchosopy on friday. As a result, her eliquis was transitioned to heparin gtt. She also was started on Bicarb 650mg BID. Once her pleurex procedure had finished, she was deemed stable for step down to [facility].    INTERVAL HPI/OVERNIGHT EVENTS:  Patient was seen and examined at bedside. As per nurse and patient, no o/n events, patient resting comfortably. Complaining of chest pain . Patient denies: fever, chills, dizziness, weakness, HA, Changes in vision, CP, palpitations, SOB, cough, N/V/D/C, dysuria, LE edema. ROS otherwise negative.    VITAL SIGNS:  T(F): 97.3 (02-24-21 @ 14:34)  HR: 62 (02-24-21 @ 11:07)  BP: 99/51 (02-24-21 @ 11:07)  RR: 18 (02-24-21 @ 11:07)  SpO2: 97% (02-24-21 @ 11:07)  Wt(kg): --      02-23-21 @ 07:01  -  02-24-21 @ 07:00  --------------------------------------------------------  IN: 2032 mL / OUT: 0 mL / NET: 2032 mL        PHYSICAL EXAM:    Constitutional: WDWN resting comfortably in bed; NAD  HEENT: NC/AT, PER, anicteric sclera, no nasal discharge; uvula midline, no oropharyngeal erythema or exudates; MMM  Neck: R neck erythema and multiple subcutaneous masses  Respiratory: distant lung sounds, limited tolerance to posterior auscultation  Cardiac: +S1/S2; irregularly irregular; no M/R/G  Gastrointestinal: soft, NT/ND; no rebound or guarding  Back: clean nonerythematous R back markedly tender to palpation  Extremities: WWP, no clubbing or cyanosis; no peripheral edema  Vascular: 1+ radial, DP/PT pulses B/L  Neurologic: AAOx3; CNII-XII grossly intact; no focal deficits; moves all extremities to command  Psychiatric: affect and characteristics of appearance, verbalizations, behaviors are appropriate    MEDICATIONS  (STANDING):  aMIOdarone    Tablet 200 milliGRAM(s) Oral every 24 hours  aspirin  chewable 81 milliGRAM(s) Oral daily  buPROPion XL . 300 milliGRAM(s) Oral daily  folic acid 1 milliGRAM(s) Oral daily  heparin  Infusion 1200 Unit(s)/Hr (12 mL/Hr) IV Continuous <Continuous>  metoprolol succinate ER 50 milliGRAM(s) Oral every 12 hours  mirtazapine 7.5 milliGRAM(s) Oral at bedtime  oxycodone    5 mG/acetaminophen 325 mG 1 Tablet(s) Oral once  pantoprazole    Tablet 40 milliGRAM(s) Oral before breakfast  QUEtiapine 25 milliGRAM(s) Oral every 24 hours  sodium bicarbonate 650 milliGRAM(s) Oral every 12 hours    MEDICATIONS  (PRN):  acetaminophen   Tablet .. 650 milliGRAM(s) Oral every 6 hours PRN Mild Pain (1 - 3), Moderate Pain (4 - 6)  ondansetron    Tablet 4 milliGRAM(s) Oral every 8 hours PRN Nausea and/or Vomiting      Allergies    No Known Allergies    Intolerances        LABS:                        9.4    8.87  )-----------( 216      ( 23 Feb 2021 10:18 )             29.3     02-23    137  |  109<H>  |  34<H>  ----------------------------<  87  5.0   |  16<L>  |  1.89<H>    Ca    8.4      23 Feb 2021 10:18  Phos  3.8     02-23  Mg     2.0     02-23      PT/INR - ( 22 Feb 2021 17:11 )   PT: 19.6 sec;   INR: 1.67          PTT - ( 23 Feb 2021 23:50 )  PTT:58.0 sec      RADIOLOGY & ADDITIONAL TESTS:  Reviewed

## 2021-02-24 NOTE — PROCEDURE NOTE - NSPOSTCAREGUIDE_GEN_A_CORE
Verbal/written post procedure instructions were given to patient/caregiver
Verbal/written post procedure instructions were given to patient/caregiver/Instructed patient/caregiver regarding signs and symptoms of infection/Keep the cast/splint/dressing clean and dry/Care for catheter as per unit/ICU protocols

## 2021-02-24 NOTE — PROCEDURE NOTE - ADDITIONAL PROCEDURE DETAILS
Right side indwelling pleural catheter placed under local anaesthesia.   Patient tolerated the procedure well.  Follow up CXR post procedure.   Back to bell.   Restart heparin in 4-6 hrs.   PleurX related paper work and drainage kit given to the  and patient.   Use painkiller tylenol or percocet post procedure.   Sign out to the resident on floor.

## 2021-02-24 NOTE — PROGRESS NOTE ADULT - PROBLEM SELECTOR PLAN 5
BMI of 20.7 and consumes poor diet at home (a few sips of a milkshake).   - regular diet with daily Ensure  - nutrition input appreciated

## 2021-02-24 NOTE — PROCEDURE NOTE - NSICDXPROCEDURE_GEN_ALL_CORE_FT
PROCEDURES:  Insertion, catheter, intravenous 24-Feb-2021 23:52:33  Nesha Chan  Ultrasound guided venous access 24-Feb-2021 23:52:24  Nesha Chan

## 2021-02-24 NOTE — BRIEF OPERATIVE NOTE - OPERATION/FINDINGS
Informed consent was obtained from patient. Patient was positioned in left lateral decubitus position and site of catheter insertion was marked using ultrasound.  Site was prepped and draped under sterile conditions. 10 ml of 1% lidocaine was used for local anesthesia. 20G needle was inserted in mid axillary line in prox. 7th ICS, clear yellow fluid was aspirated. Guidewire was inserted over 18G needle/catheter, wire position within pleural space was confirmed using ultrasound. IPC was tunneled under the skin and placed via peel off IPC introducer into the pleural space. IPC insertion site was closed with dermabond. Catheter exit site was secured by suture. 950 of yellow color fluid was drained. Catheter was dressed. Patient tolerated procedure well.

## 2021-02-25 PROBLEM — I48.91 UNSPECIFIED ATRIAL FIBRILLATION: Chronic | Status: ACTIVE | Noted: 2021-01-01

## 2021-02-25 PROBLEM — J90 PLEURAL EFFUSION, NOT ELSEWHERE CLASSIFIED: Chronic | Status: ACTIVE | Noted: 2021-01-01

## 2021-02-25 NOTE — CONSULT NOTE ADULT - ASSESSMENT
Assessment: 87yo Female with PMH of current-smoker (>65 pack-year), with hx of multiple lung cancers (AJCC IIIB NSCL -LLL lesion and SCC endobronchial right lung), and 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 Wellington. Now for port placement. Case reviewed with Dr. Tobias, will plan for port placement for immunotherapy pending repeat INR.     INR 1.67 noted      Recommendations: repeat INR in the morning. NPO at midnight prior to procedure with sedation. Heparin gtt d/c'd      Communicated with: Dr. Jaramillo, primary team

## 2021-02-25 NOTE — CONSULT NOTE ADULT - SUBJECTIVE AND OBJECTIVE BOX
CRISTI PEREA          MRN-[id number]            (1935)    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 Wellington. Patient describes that she was at radiation therapy, which she suddenly started experiencing palpitations and SOB x1. Also complains of chronic head and neck pain and increased urinary frequency. Currently denies n/v/d/c, fever, chills, or CP. Also denies any recent sick contacts or recent travel. Of note, patient was recently admitted (2021-2021) for similar presentation of rapid a fib and SOB. After having thoracentesis that admission, cytology fluid studies showed atypical cells per pulm note but were unable to differentiate further. Per family, 1L of pleural fluid was removed at that time (bandage still in place).  (2021 00:28)      PAST MEDICAL & SURGICAL HISTORY:  Pleural effusion    Afib    Heart murmur    Tobacco use    Squamous cell carcinoma of left lung    Adenocarcinoma of head and neck    Submandibular gland mass    DM (diabetes mellitus)    H/O: hysterectomy        FAMILY HISTORY:   Reviewed and found non contributory in mother or father    SOCIAL HISTORY:  Former smoker, no ETOH, no drugs     ROS:    Unable to attain due to:   n/a                     Dyspnea (Cortes 0-10):          0              N/V (Y/N):                    N          Secretions (Y/N) :         N       Agitation(Y/N): N  Pain (Y/N):     Yes  -Provocation/Palliation: movement makes pain worse  -Quality/Quantity: aching throbbing and shooting pain   -Radiating: pain radiates from right ear to right neck   -Severity: 8/10  -Timing/Frequency: Constant  -Impact on ADLs: pain impacts ability to perform ADL;'s     General:  + weakness   HEENT:  + painful swallowing  Neck:  Denied  CVS:  Denied  Resp:  Denied  GI:  Denied  :  Denied  Musc:  Denied  Neuro:  Denied  Psych:  Denied  Skin:  Denied  Lymph:  Denied    Allergies    No Known Allergies    Intolerances      Opiate Naive (Y/N): Y  -iStop reviewed (Y/N):Y  (Ref #: [id number])  2021	acetaminophen-cod #3 tablet	60	10	Brad Drake MD	Medicare	[id number]  06/10/2020	06/10/2020	oxycodone-acetaminophen 5-325 mg tablet	60	15	[employer]	Medicare	[id number]  2020	oxycodone-acetaminophen 5-325 mg tablet	60	15	[facility], [place] SARMIENTO	Medicare	[id number]  04/15/2020	2020	oxycodone-acetaminophen 5-325 mg tablet	30	7	[facility], [place] SARMIENTO	Medicare	[id number]    Medications:      MEDICATIONS  (STANDING):  aMIOdarone    Tablet 200 milliGRAM(s) Oral every 24 hours  aspirin  chewable 81 milliGRAM(s) Oral daily  buPROPion XL . 300 milliGRAM(s) Oral daily  folic acid 1 milliGRAM(s) Oral daily  metoprolol succinate ER 50 milliGRAM(s) Oral every 12 hours  mirtazapine 7.5 milliGRAM(s) Oral at bedtime  pantoprazole    Tablet 40 milliGRAM(s) Oral before breakfast  QUEtiapine 25 milliGRAM(s) Oral every 24 hours    MEDICATIONS  (PRN):  acetaminophen   Tablet .. 650 milliGRAM(s) Oral every 6 hours PRN Mild Pain (1 - 3), Moderate Pain (4 - 6)  ondansetron    Tablet 4 milliGRAM(s) Oral every 8 hours PRN Nausea and/or Vomiting      Labs:    Reviewed     PTT - ( 2021 21:04 )  PTT:28.3 sec    Imaging:    < from: Xray Chest 1 View- PORTABLE-Urgent (Xray Chest 1 View- PORTABLE-Urgent .) (21 @ 14:36) >  EXAM:  XR CHEST PORTABLE URGENT 1V                          PROCEDURE DATE:  2021    IMPRESSION:  Interval placement of a right-sided chest tube with interval decrease in right-sided pleural effusion.  New small right apical pneumothorax.  Question developing multifocal pneumonia.    < end of copied text >      PEx:  T(C): 36.7 (21 @ 06:23), Max: 36.8 (21 @ 17:12)  HR: 60 (21 @ 06:23) (58 - 74)  BP: 104/73 (21 @ 06:23) (100/53 - 116/60)  RR: 18 (21 @ 06:23) (18 - 20)  SpO2: 99% (21 @ 06:23) (96% - 99%)  Wt(kg): 53.1kg  Daily     Daily   CAPILLARY BLOOD GLUCOSE      POCT Blood Glucose.: 108 mg/dL (2021 08:43)    I&O's Summary      GENERAL:  [x ]Alert  [ x]Oriented x 3  [ x]Lethargic  [ ]Cachexia  [ ]Unarousable  [x ]Verbal  [ ]Non-Verbal  Behavioral:   [ ] Anxiety  [ ] Delirium [ ] Agitation [x ] Other - calm  HEENT:  [ ]Normal   [ x]Dry mouth   [ ]ET Tube/Trach  [ ]Oral lesions  PULMONARY:   [x ]Clear  [ ]Tachypnea  [ ]Audible excessive secretions  [x] Right Pleurx  [ ]Rhonchi        [ ]Right [ ]Left [ ]Bilateral  [ ]Crackles        [ ]Right [ ]Left [ ]Bilateral  [ ]Wheezing     [ ]Right [ ]Left [ ]Bilateral  CARDIOVASCULAR:    [x ]Regular [ ]Irregular [ ]Tachy  [ ]Eric [ ]Murmur [ ]Other  GASTROINTESTINAL:  [x ]Soft  [ ]Distended   [ ]+BS  [x ]Non tender [ ]Tender  [ ]PEG [ ]OGT/ NGT  Last BM: 2021  GENITOURINARY:  [ x]Normal [ ] Incontinent   [ ]Oliguria/Anuria   [ ]Roman  MUSCULOSKELETAL:   [ ]Normal   [x ]Weakness  [ ]Bed/Wheelchair bound [ ]Edema  NEUROLOGIC:   [x ]No focal deficits  [ ] Cognitive impairment  [ ] Dysphagia [ ]Dysarthria [ ] Paresis [ ]Other   SKIN:   [x ]Normal   [ ]Pressure ulcer(s)  [ ]Rash      Preadmit Karnofsky: 70 %           Current Karnofsky:    70 %  Cachexia (Y/N): N  BMI: 20.7kG    Advanced Directives:     Full Code     DECISION MAKER:  Patient is able to make decisions for her self  LEGAL SURROGATE: Jeremie Moyer [phone]    GOALS OF CARE DISCUSSION       Palliative care info/counseling provided	           Documentation of GOC: 	Full code           REFERRALS	        Unit SW/Case Mgmt       PT/OT

## 2021-02-25 NOTE — CONSULT NOTE ADULT - SUBJECTIVE AND OBJECTIVE BOX
85yo Female with PMH of current-smoker (>65 pack-year), with hx of multiple lung cancers (AJCC IIIB NSCL -LLL lesion and SCC endobronchial right lung), and 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 Wellington. Patient describes that she was at radiation therapy, which she suddenly started experiencing palpitations and SOB x1. Also complains of chronic head and neck pain and increased urinary frequency. IR consulted for port placement.       Clinical History: RAPID ATRIAL FIBRILLATIO    Handoff    MEWS Score    Pleural effusion    Afib    Heart murmur    Depression    Tobacco use    Squamous cell carcinoma of left lung    Adenocarcinoma of head and neck    Submandibular gland mass    DM (diabetes mellitus)    Malignant pleural effusion    Malignant pleural effusion    Rapid atrial fibrillation    Encounter for palliative care    Squamous cell carcinoma of left lung    Debility    Neoplasm related pain    Acute respiratory failure with hypoxia    Pleural effusion    Non-small cell cancer of left lung    Squamous cell carcinoma of right lung    Nutrition, metabolism, and development symptoms    Malnutrition    YAMILA (acute kidney injury)    Afib    Shortness of breath    Insertion, catheter, intravenous    Ultrasound guided venous access    Insertion, indwelling pleural catheter    H/O: hysterectomy    SOB    8    Pleural effusion    [email or web address]        Meds:acetaminophen   Tablet .. 650 milliGRAM(s) Oral every 6 hours PRN  aMIOdarone    Tablet 200 milliGRAM(s) Oral every 24 hours  aspirin  chewable 81 milliGRAM(s) Oral daily  buPROPion XL . 300 milliGRAM(s) Oral daily  folic acid 1 milliGRAM(s) Oral daily  metoprolol succinate ER 50 milliGRAM(s) Oral every 12 hours  mirtazapine 7.5 milliGRAM(s) Oral at bedtime  ondansetron    Tablet 4 milliGRAM(s) Oral every 8 hours PRN  pantoprazole    Tablet 40 milliGRAM(s) Oral before breakfast  QUEtiapine 25 milliGRAM(s) Oral every 24 hours      Allergies:No Known Allergies        Labs:                           9.5    8.91  )-----------( 196      ( 25 Feb 2021 15:38 )             29.6     PTT - ( 25 Feb 2021 15:38 )  PTT:28.7 sec  02-25    139  |  111<H>  |  26<H>  ----------------------------<  115<H>  5.3   |  19<L>  |  1.43<H>    Ca    8.2<L>      25 Feb 2021 15:38  Phos  2.5     02-25  Mg     1.9     02-25

## 2021-02-25 NOTE — CONSULT NOTE ADULT - SUBJECTIVE AND OBJECTIVE BOX
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 Wellington. Patient describes that she was at radiation therapy, which she suddenly started experiencing palpitations and SOB x1. Also complains of chronic head and neck pain and increased urinary frequency. Currently denies n/v/d/c, fever, chills, or CP. Also denies any recent sick contacts or recent travel. Of note, patient was recently admitted (02/07/2021-02/13/2021) for similar presentation of rapid a fib and SOB. After having thoracentesis that admission, cytology fluid studies showed atypical cells per pulm note but were unable to differentiate further. Per family, 1L of pleural fluid was removed at that time (bandage still in place).  (23 Feb 2021 00:28)    Allergies    No Known Allergies    Intolerances        MEDICATIONS  (STANDING):  aMIOdarone    Tablet 200 milliGRAM(s) Oral every 24 hours  aspirin  chewable 81 milliGRAM(s) Oral daily  buPROPion XL . 300 milliGRAM(s) Oral daily  folic acid 1 milliGRAM(s) Oral daily  metoprolol succinate ER 50 milliGRAM(s) Oral every 12 hours  mirtazapine 7.5 milliGRAM(s) Oral at bedtime  pantoprazole    Tablet 40 milliGRAM(s) Oral before breakfast  QUEtiapine 25 milliGRAM(s) Oral every 24 hours    MEDICATIONS  (PRN):  acetaminophen   Tablet .. 650 milliGRAM(s) Oral every 6 hours PRN Mild Pain (1 - 3), Moderate Pain (4 - 6)  ondansetron    Tablet 4 milliGRAM(s) Oral every 8 hours PRN Nausea and/or Vomiting      PAST MEDICAL & SURGICAL HISTORY:  Pleural effusion    Afib    Heart murmur    Tobacco use    Squamous cell carcinoma of left lung    Adenocarcinoma of head and neck    Submandibular gland mass    DM (diabetes mellitus)    H/O: hysterectomy        FAMILY HISTORY:      SOCIAL HISTORY: No EtOH, no tobacco    REVIEW OF SYSTEMS:          T(F): 98.1 (02-25-21 @ 06:23), Max: 98.3 (02-24-21 @ 17:12)  HR: 60 (02-25-21 @ 06:23)  BP: 104/73 (02-25-21 @ 06:23)  RR: 18 (02-25-21 @ 06:23)  SpO2: 99% (02-25-21 @ 06:23)  Wt(kg): --    Constitutional: NAD  HEENT: NC/AT, MMM  Neck: R neck erythema and multiple subcutaneous masses  Respiratory: distant lung sounds  Cardiac: +S1/S2; irregularly irregular  Gastrointestinal: soft, NT/ND; no rebound or guarding  Extremities:  no peripheral edema  Neurologic: AAOx3; CNII-XII grossly intact;                    9.5    8.91  )-----------( 196      ( 25 Feb 2021 15:38 )             29.6       02-25    139  |  111<H>  |  26<H>  ----------------------------<  115<H>  5.3   |  19<L>  |  1.43<H>    Ca    8.2<L>      25 Feb 2021 15:38  Phos  2.5     02-25  Mg     1.9     02-25        Phosphorus Level, Serum: 2.5 mg/dL (02-25 @ 15:38)  Magnesium, Serum: 1.9 mg/dL (02-25 @ 15:38)

## 2021-02-25 NOTE — CONSULT NOTE ADULT - ASSESSMENT
86 year old woman with neoplasm related pain, debility, Effusion, high grade large cell neuroendocrine cancer and encounter for palliative care.

## 2021-02-25 NOTE — CONSULT NOTE ADULT - PROBLEM SELECTOR RECOMMENDATION 5
Patient remains full code. Primary team started conversation regarding DNR/DNI and so did this writer, but patient continues to state that she needs to think about it and talk to her children. Will continue to follow up for goals of care and symptom management.  As discussed during the palliative IDT meeting, the patients PSSA screening did not identify any current psychosocial need or spiritual support deficits.

## 2021-02-25 NOTE — CONSULT NOTE ADULT - PROBLEM SELECTOR RECOMMENDATION 4
Patient follows Dr. Wellington, per discussion with oncology team plan is for patient to receive Immunotherapy, possibly as inpatient. Appreciate oncology involvement.

## 2021-02-25 NOTE — CONSULT NOTE ADULT - PROBLEM SELECTOR RECOMMENDATION 9
Patient has pain related her mets at her site of metastatic disease on her right neck area. Would recommend initiating Gabapentin 100mg at bedtime to start with goal of titrating upward. Would also recommend Dilaudid 0.2mg q3h IV PRN for severe pain.

## 2021-02-25 NOTE — CONSULT NOTE ADULT - ASSESSMENT
86 year old F   PMHx DM, HTN, NSCLC metastatic, c/l R endobronchial SCC, Head and neck submandibular  p/w acute hypoxic resp failure 2/2 malignant pleural effusion, s/p pleurX placement, stepped down to [facility]. s/p bronch today for possible stent placement but mass was friable and stent not placed.      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 Initially was treated with neuroendocrine-intensive combination chemotherapy regimen Carbo/Etoposide s/p 4 cycles which she tolerated very well.  - F/up PET scan 12/2019 with significant progression of solitary sites of osseous metastatic disease involving left inferior pubic ramus but waxing/waning appearance in SARAH c/w inflammatory/infectious pattern.   - Developed new soft tissue mass in R submandibular triangle without progression of distant disease on imaging. Declined systemic chemotherapy at that time, was retreated with palliative radiation to right jaw/neck.     -Developed large lump under her chin and restaging PET 8/2020 with diffuse recurrence of disease including neck LNs and pulmonary nodules. Patient was restarted on systemic therapy with Carboplatin/Etoposide since strong and sustained response to same regimen originally. S/p 4 cycles ending December w/ decrease in size of submental LN clinically.  - Recently developed multiple new nodules/masses in R side of neck. Was referred back to Rad/Onc and plan to retreat with palliative radiation to new area of disease.   - PET scan 1/20 shows diffuse progression of disease including in neck as well as hilar/mediastinal and abdominal LAD. Discussed next line of therapy with patient including immunotherapy. Would consider single-agent Nivolumab 240mg q2 weeks based on Checkmate 057 results in patients with non-squamous non-small cell lung cancer who progressed on Taxol-based therapy.  - S/p pleurx s/p bronch with pulmonary today  - Will get port placement tomorrow  - evaluating if nivolumab is available for treatment in patient, discussing with chemo pharmacy    D/w Dr. Wellington

## 2021-02-25 NOTE — PROGRESS NOTE ADULT - SUBJECTIVE AND OBJECTIVE BOX
INTERVAL HPI/OVERNIGHT EVENTS:  Patient was seen and examined at bedside. As per nurse and patient, no o/n events, patient resting comfortably. Complaining of improving chest pain. Patient denies: fever, chills, dizziness, weakness, HA, Changes in vision, palpitations, SOB, cough, N/V/D/C, dysuria, LE edema. ROS otherwise negative.    VITAL SIGNS:  T(F): 98.3 (02-26-21 @ 10:35)  HR: 71 (02-26-21 @ 11:55)  BP: 130/54 (02-26-21 @ 11:55)  RR: 20 (02-26-21 @ 10:35)  SpO2: 96% (02-26-21 @ 11:55)  Wt(kg): --        PHYSICAL EXAM:    Constitutional: thin woman resting comfortably in bed; NAD  HEENT: NC/AT, PER, anicteric sclera, no nasal discharge; uvula midline, no oropharyngeal erythema or exudates; MMM  Respiratory: CTA B/L; no W/R/R, no retractions; WOB normal on RA  Cardiac: +S1/S2; RRR; no M/R/G;   Gastrointestinal: soft, NT/ND; no rebound or guarding  Extremities: WWP, no clubbing or cyanosis; no peripheral edema  Vascular: 2+ radial, DP/PT pulses B/L  Dermatologic: skin warm, dry and intact; no rashes, wounds, or scars  Neurologic: AAOx3; CNII-XII grossly intact; no focal deficits  Psychiatric: affect and characteristics of appearance, verbalizations, behaviors are appropriate    MEDICATIONS  (STANDING):  aMIOdarone    Tablet 200 milliGRAM(s) Oral every 24 hours  aspirin  chewable 81 milliGRAM(s) Oral daily  buPROPion XL . 300 milliGRAM(s) Oral daily  folic acid 1 milliGRAM(s) Oral daily  metoprolol succinate ER 50 milliGRAM(s) Oral every 12 hours  mirtazapine 7.5 milliGRAM(s) Oral at bedtime  pantoprazole    Tablet 40 milliGRAM(s) Oral before breakfast  QUEtiapine 25 milliGRAM(s) Oral every 24 hours    MEDICATIONS  (PRN):  acetaminophen   Tablet .. 650 milliGRAM(s) Oral every 6 hours PRN Mild Pain (1 - 3), Moderate Pain (4 - 6)  ondansetron    Tablet 4 milliGRAM(s) Oral every 8 hours PRN Nausea and/or Vomiting      Allergies    No Known Allergies    Intolerances        LABS:                        9.7    8.25  )-----------( 198      ( 26 Feb 2021 08:46 )             29.2     02-26    141  |  112<H>  |  28<H>  ----------------------------<  102<H>  5.2   |  21<L>  |  1.55<H>    Ca    8.4      26 Feb 2021 08:46  Phos  2.5     02-25  Mg     2.0     02-26    TPro  6.1  /  Alb  2.8<L>  /  TBili  0.2  /  DBili  x   /  AST  10  /  ALT  6<L>  /  AlkPhos  41  02-26    PT/INR - ( 26 Feb 2021 08:46 )   PT: 16.5 sec;   INR: 1.40          PTT - ( 26 Feb 2021 08:46 )  PTT:26.6 sec      RADIOLOGY & ADDITIONAL TESTS:  Reviewed

## 2021-02-25 NOTE — CONSULT NOTE ADULT - PROBLEM SELECTOR RECOMMENDATION 2
Patient had a Pleurx placed for drainage purposes as she continues to accumulate fluid. Appreciate Pulmonary involvement. Patient is S/P thoracentesis with 7L drainage.

## 2021-02-26 NOTE — CONSULT NOTE ADULT - CONSULT REQUESTED DATE/TIME
22-Feb-2021 16:57
22-Feb-2021 19:25
23-Feb-2021 09:00
25-Feb-2021 16:10
26-Feb-2021 05:05
25-Feb-2021 17:13
25-Feb-2021 14:38

## 2021-02-26 NOTE — DISCHARGE NOTE PROVIDER - HOSPITAL COURSE
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 Admitted for a fib RVR and with moderate-sized right pleural effusion on bedside POCUS. Stepped up to medical telemetry due to tachypnea of unclear etiology. On tele, pulm followed and patient underwent placement of Pleurex for malignant effusion 2/24/21. She also was started on Bicarb 650mg BID for metabolic acidosis thought to be secondary to respiratory alkalosis.  New medications:   Labs to be followed outpatient:   Exam to be followed outpatient:

## 2021-02-26 NOTE — PROGRESS NOTE ADULT - SUBJECTIVE AND OBJECTIVE BOX
Interval Events:  Patient seen and examined at bedside.    Patient underwent bronchoscopy yesterday which revealed externally compressed airways with friable mucosa. Patient today on room air and appears comfortable. Denies any respiratory distress, chest pain or SOB.     MEDICATIONS:  Pulmonary:    Antimicrobials:    Anticoagulants:  aspirin  chewable 81 milliGRAM(s) Oral daily    Cardiac:  aMIOdarone    Tablet 200 milliGRAM(s) Oral every 24 hours  metoprolol succinate ER 50 milliGRAM(s) Oral every 12 hours    Endocrine:    Allergies    No Known Allergies    Intolerances    Vital Signs Last 24 Hrs  T(C): 36.7 (26 Feb 2021 05:56), Max: 36.8 (25 Feb 2021 21:07)  T(F): 98.1 (26 Feb 2021 05:56), Max: 98.2 (25 Feb 2021 21:07)  HR: 59 (26 Feb 2021 07:55) (59 - 75)  BP: 99/53 (26 Feb 2021 05:56) (99/53 - 106/67)  BP(mean): --  RR: 18 (26 Feb 2021 07:55) (16 - 20)  SpO2: 94% (26 Feb 2021 07:55) (94% - 100%)    Physical Exam:  General: NAD, AAO x3  HEENT: No icterus,. Moist mucous membranes  Neck: Nodular neck mass-like appearance with ulceration.   Cardio: S1, S2 noted, RRR. No murmurs, rubs or gallops  Resp: decreased breath sound on the R base, improved aeration on the R upper lung zones.  L sided clear to ascultation.   Abdo: Soft, NT, bowel sounds present. No organomegaly  Extremities: No edema noted. Pulses present b/l  Neuro: AAO x3, grossly normal motor strength.  Lymphnodes: no lymphadenopathy identified.  Skin: Dry, no rashes    LABS  CBC Full  -  ( 26 Feb 2021 08:46 )  WBC Count : 8.25 K/uL  RBC Count : 3.26 M/uL  Hemoglobin : 9.7 g/dL  Hematocrit : 29.2 %  Platelet Count - Automated : 198 K/uL  Mean Cell Volume : 89.6 fl  Mean Cell Hemoglobin : 29.8 pg  Mean Cell Hemoglobin Concentration : 33.2 gm/dL    02-25    139  |  111<H>  |  26<H>  ----------------------------<  115<H>  5.3   |  19<L>  |  1.43<H>    Ca    8.2<L>      25 Feb 2021 15:38  Phos  2.5     02-25  Mg     1.9     02-25    PTT - ( 25 Feb 2021 15:38 )  PTT:28.7 sec    RADIOLOGY & ADDITIONAL STUDIES (The following images were personally reviewed):  Reviewed

## 2021-02-26 NOTE — DISCHARGE NOTE PROVIDER - DETAILS OF MALNUTRITION DIAGNOSIS/DIAGNOSES
This patient has been assessed with a concern for Malnutrition and was treated during this hospitalization for the following Nutrition diagnosis/diagnoses:     -  02/23/2021: Severe protein-calorie malnutrition

## 2021-02-26 NOTE — PROGRESS NOTE ADULT - SUBJECTIVE AND OBJECTIVE BOX
INTERVAL HPI/OVERNIGHT EVENTS:  Patient was seen and examined at bedside. As per nurse and patient, no o/n events, patient resting comfortably. Chest pain improving, patient was on 3L overnight but feeling fine on RA. Patient denies: fever, chills, dizziness, weakness, HA, Changes in vision, palpitations, SOB, cough, N/V/D/C, dysuria, LE edema. ROS otherwise negative.    VITAL SIGNS:  T(F): 98.3 (02-26-21 @ 10:35)  HR: 71 (02-26-21 @ 11:55)  BP: 130/54 (02-26-21 @ 11:55)  RR: 20 (02-26-21 @ 10:35)  SpO2: 96% (02-26-21 @ 11:55)  Wt(kg): --        PHYSICAL EXAM:    Constitutional: WDWN resting comfortably in bed; NAD  HEENT: NC/AT, PER, anicteric sclera, no nasal discharge; uvula midline, no oropharyngeal erythema or exudates; MMM  Respiratory: CTA B/L; no W/R/R, no retractions  Cardiac: +S1/S2; RRR; no M/R/G  Gastrointestinal: soft, NT/ND; no rebound or guarding  Back: pleurex sight with clean wrapping, tender to light palpation but not warm or erythematous  Extremities: WWP, no clubbing or cyanosis; no peripheral edema  Vascular: 2+ radial, DP/PT pulses B/L  Neurologic: AAOx3; CNII-XII grossly intact; no focal deficits  Psychiatric: affect and characteristics of appearance, verbalizations, behaviors are appropriate    MEDICATIONS  (STANDING):  aMIOdarone    Tablet 200 milliGRAM(s) Oral every 24 hours  aspirin  chewable 81 milliGRAM(s) Oral daily  buPROPion XL . 300 milliGRAM(s) Oral daily  folic acid 1 milliGRAM(s) Oral daily  metoprolol succinate ER 50 milliGRAM(s) Oral every 12 hours  mirtazapine 7.5 milliGRAM(s) Oral at bedtime  pantoprazole    Tablet 40 milliGRAM(s) Oral before breakfast  QUEtiapine 25 milliGRAM(s) Oral every 24 hours    MEDICATIONS  (PRN):  acetaminophen   Tablet .. 650 milliGRAM(s) Oral every 6 hours PRN Mild Pain (1 - 3), Moderate Pain (4 - 6)  ondansetron    Tablet 4 milliGRAM(s) Oral every 8 hours PRN Nausea and/or Vomiting      Allergies    No Known Allergies    Intolerances        LABS:                        9.7    8.25  )-----------( 198      ( 26 Feb 2021 08:46 )             29.2     02-26    141  |  112<H>  |  28<H>  ----------------------------<  102<H>  5.2   |  21<L>  |  1.55<H>    Ca    8.4      26 Feb 2021 08:46  Phos  2.5     02-25  Mg     2.0     02-26    TPro  6.1  /  Alb  2.8<L>  /  TBili  0.2  /  DBili  x   /  AST  10  /  ALT  6<L>  /  AlkPhos  41  02-26    PT/INR - ( 26 Feb 2021 08:46 )   PT: 16.5 sec;   INR: 1.40          PTT - ( 26 Feb 2021 08:46 )  PTT:26.6 sec      RADIOLOGY & ADDITIONAL TESTS:  Reviewed INTERVAL HPI/OVERNIGHT EVENTS:  Patient was seen and examined at bedside. As per nurse and patient, no o/n events, patient resting comfortably. Chest pain improving, patient was on 3L overnight but feeling fine on RA. Patient denies: fever, chills, dizziness, weakness, HA, Changes in vision, palpitations, SOB, cough, N/V/D/C, dysuria, LE edema. ROS otherwise negative.  Port placement with IR done without complication; patient mentating and breathing well on arrival back to floor but on 2L for 91%RA sat.    VITAL SIGNS:  T(F): 98.3 (02-26-21 @ 10:35)  HR: 71 (02-26-21 @ 11:55)  BP: 130/54 (02-26-21 @ 11:55)  RR: 20 (02-26-21 @ 10:35)  SpO2: 96% (02-26-21 @ 11:55)  Wt(kg): --        PHYSICAL EXAM:    Constitutional: WDWN resting comfortably in bed; NAD  HEENT: NC/AT, PER, anicteric sclera, no nasal discharge; uvula midline, no oropharyngeal erythema or exudates; MMM  Respiratory: CTA B/L; no W/R/R, no retractions  Cardiac: +S1/S2; RRR; no M/R/G  Gastrointestinal: soft, NT/ND; no rebound or guarding  Back: pleurex sight with clean wrapping, tender to light palpation but not warm or erythematous  Extremities: WWP, no clubbing or cyanosis; no peripheral edema  Vascular: 2+ radial, DP/PT pulses B/L  Neurologic: AAOx3; CNII-XII grossly intact; no focal deficits  Psychiatric: affect and characteristics of appearance, verbalizations, behaviors are appropriate    MEDICATIONS  (STANDING):  aMIOdarone    Tablet 200 milliGRAM(s) Oral every 24 hours  aspirin  chewable 81 milliGRAM(s) Oral daily  buPROPion XL . 300 milliGRAM(s) Oral daily  folic acid 1 milliGRAM(s) Oral daily  metoprolol succinate ER 50 milliGRAM(s) Oral every 12 hours  mirtazapine 7.5 milliGRAM(s) Oral at bedtime  pantoprazole    Tablet 40 milliGRAM(s) Oral before breakfast  QUEtiapine 25 milliGRAM(s) Oral every 24 hours    MEDICATIONS  (PRN):  acetaminophen   Tablet .. 650 milliGRAM(s) Oral every 6 hours PRN Mild Pain (1 - 3), Moderate Pain (4 - 6)  ondansetron    Tablet 4 milliGRAM(s) Oral every 8 hours PRN Nausea and/or Vomiting      Allergies    No Known Allergies    Intolerances        LABS:                        9.7    8.25  )-----------( 198      ( 26 Feb 2021 08:46 )             29.2     02-26    141  |  112<H>  |  28<H>  ----------------------------<  102<H>  5.2   |  21<L>  |  1.55<H>    Ca    8.4      26 Feb 2021 08:46  Phos  2.5     02-25  Mg     2.0     02-26    TPro  6.1  /  Alb  2.8<L>  /  TBili  0.2  /  DBili  x   /  AST  10  /  ALT  6<L>  /  AlkPhos  41  02-26    PT/INR - ( 26 Feb 2021 08:46 )   PT: 16.5 sec;   INR: 1.40          PTT - ( 26 Feb 2021 08:46 )  PTT:26.6 sec      RADIOLOGY & ADDITIONAL TESTS:  Reviewed

## 2021-02-26 NOTE — PROGRESS NOTE ADULT - SUBJECTIVE AND OBJECTIVE BOX
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 Wellington. Patient describes that she was at radiation therapy, which she suddenly started experiencing palpitations and SOB x1. Also complains of chronic head and neck pain and increased urinary frequency. Currently denies n/v/d/c, fever, chills, or CP. Also denies any recent sick contacts or recent travel. Of note, patient was recently admitted (02/07/2021-02/13/2021) for similar presentation of rapid a fib and SOB. After having thoracentesis that admission, cytology fluid studies showed atypical cells per pulm note but were unable to differentiate further. Per family, 1L of pleural fluid was removed at that time (bandage still in place).  (23 Feb 2021 00:28)    SUBJECTIVE: Patient resting in bed, no distress noted.    ROS:  DYSPNEA: N  NAUS/VOM: N	  SECRETIONS: N	  AGITATION: N  Pain (Y/N):     N  -Provocation/Palliation: n/a   -Quality/Quantity: n/a   -Radiating: n/a   -Severity: n/a   -Timing/Frequency: n/a   -Impact on ADLs: n/a     OTHER REVIEW OF SYSTEMS: + weakness  UNABLE TO OBTAIN  due to: n/a     PEx:  T(C): 36.8 (02-26-21 @ 10:35), Max: 36.8 (02-25-21 @ 21:07)  HR: 64 (02-26-21 @ 10:35) (59 - 75)  BP: 95/59 (02-26-21 @ 10:35) (95/59 - 106/67)  RR: 20 (02-26-21 @ 10:35) (16 - 20)  SpO2: 96% (02-26-21 @ 10:35) (94% - 100%)  Wt(kg): 53.1kG      GENERAL:  [x ]Alert  [ x]Oriented x 3  [ x]Lethargic  [ ]Cachexia  [ ]Unarousable  [x ]Verbal  [ ]Non-Verbal  Behavioral:   [ ] Anxiety  [ ] Delirium [ ] Agitation [x ] Other - calm  HEENT:  [ ]Normal   [ x]Dry mouth   [ ]ET Tube/Trach  [ ]Oral lesions  PULMONARY:   [x ]Clear  [ ]Tachypnea  [ ]Audible excessive secretions  [x] Right Pleurx  [ ]Rhonchi        [ ]Right [ ]Left [ ]Bilateral  [ ]Crackles        [ ]Right [ ]Left [ ]Bilateral  [ ]Wheezing     [ ]Right [ ]Left [ ]Bilateral  CARDIOVASCULAR:    [x ]Regular [ ]Irregular [ ]Tachy  [ ]Eric [ ]Murmur [ ]Other  GASTROINTESTINAL:  [x ]Soft  [ ]Distended   [ ]+BS  [x ]Non tender [ ]Tender  [ ]PEG [ ]OGT/ NGT  Last BM: 2/24/2021  GENITOURINARY:  [ x]Normal [ ] Incontinent   [ ]Oliguria/Anuria   [ ]Roman  MUSCULOSKELETAL:   [ ]Normal   [x ]Weakness  [ ]Bed/Wheelchair bound [ ]Edema  NEUROLOGIC:   [x ]No focal deficits  [ ] Cognitive impairment  [ ] Dysphagia [ ]Dysarthria [ ] Paresis [ ]Other   SKIN:   [ ]Normal   [ ]Pressure ulcer(s)  [ ]Rash [x] nodules noted on right neck      ALLERGIES: No Known Allergies      OPIATE NAÏVE (Y/N): Y    MEDICATIONS: REVIEWED  MEDICATIONS  (STANDING):  aMIOdarone    Tablet 200 milliGRAM(s) Oral every 24 hours  aspirin  chewable 81 milliGRAM(s) Oral daily  buPROPion XL . 300 milliGRAM(s) Oral daily  folic acid 1 milliGRAM(s) Oral daily  metoprolol succinate ER 50 milliGRAM(s) Oral every 12 hours  mirtazapine 7.5 milliGRAM(s) Oral at bedtime  pantoprazole    Tablet 40 milliGRAM(s) Oral before breakfast  QUEtiapine 25 milliGRAM(s) Oral every 24 hours    MEDICATIONS  (PRN):  acetaminophen   Tablet .. 650 milliGRAM(s) Oral every 6 hours PRN Mild Pain (1 - 3), Moderate Pain (4 - 6)  ondansetron    Tablet 4 milliGRAM(s) Oral every 8 hours PRN Nausea and/or Vomiting      LABS: REVIEWED  CBC:                        9.7    8.25  )-----------( 198      ( 26 Feb 2021 08:46 )             29.2     CMP:    02-26    141  |  112<H>  |  28<H>  ----------------------------<  102<H>  5.2   |  21<L>  |  1.55<H>    Ca    8.4      26 Feb 2021 08:46  Phos  2.5     02-25  Mg     2.0     02-26    TPro  6.1  /  Alb  2.8<L>  /  TBili  0.2  /  DBili  x   /  AST  10  /  ALT  6<L>  /  AlkPhos  41  02-26      IMAGING: REVIEWED    ADVANCED DIRECTIVES:            FULL CODE          DECISION MAKER:  Patient is able to make decisions for her self  LEGAL SURROGATE: Jeremie Gibsonbarbara [phone]    PSYCHOSOCIAL-SPIRITUAL ASSESSMENT:       Reviewed       Care plan unchanged    GOALS OF CARE DISCUSSION       Palliative care info/counseling provided	           Documentation of GOC: 	Full code     REFERRALS	        Unit SW/Case Mgmt              PT/OT

## 2021-02-26 NOTE — DISCHARGE NOTE PROVIDER - NSDCMRMEDTOKEN_GEN_ALL_CORE_FT
acetaminophen 500 mg oral tablet: 2 tab(s) orally every 6 hours, As Needed  alcaftadine 0.25% ophthalmic solution: 1 drop(s) to each affected eye once a day  amiodarone 200 mg oral tablet: 2 tab(s) orally 3 times a day  (through February 17), Please take 1 tablet orally 1 time a day from February 18 onwards.   apixaban 2.5 mg oral tablet: 1 tab(s) orally every 12 hours  aspirin 81 mg oral tablet: 1 tab(s) orally once a day  atorvastatin 10 mg oral tablet: 1 tab(s) orally once a day  buPROPion 300 mg/24 hours (XL) oral tablet, extended release: 1 tab(s) orally every 24 hours  folic acid 1 mg oral tablet: 1 tab(s) orally once a day  omeprazole 40 mg oral delayed release capsule: 1 cap(s) orally once a day  ondansetron 4 mg oral tablet: 1 tab(s) orally every 8 hours, As Needed  QUEtiapine 25 mg oral tablet: 1 tab(s) orally once a day  Toprol-XL 50 mg oral tablet, extended release: 1 tab(s) orally every 12 hours

## 2021-02-26 NOTE — PHYSICAL THERAPY INITIAL EVALUATION ADULT - PERTINENT HX OF CURRENT PROBLEM, REHAB EVAL
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 Wellington. Patient describes that she was at radiation therapy, which she suddenly started experiencing palpitations and SOB x1.

## 2021-02-26 NOTE — CONSULT NOTE ADULT - ASSESSMENT
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 Admitted for a fib RVR and with moderate-sized right pleural effusion on bedside POCUS.        Problem/Plan - 1:  ·  Problem: Acute respiratory failure with hypoxia.  Plan: 91% on RA confirmed on [facility] transfer. Initially dyspnea secondary to hypoxic respiratory failure 2/2 malignant pleural effusion. Now more likely atelectasis 2/2 procedure. Recently admitted (02/07/2021-02/13/2021) for similar presentation of rapid a fib and SOB. After having thoracentesis that admission, cytology fluid studies showed atypical cells per pulm note but were unable to differentiate further, approximately 1L of fluid removed. Now s/p R pleurex with 900cc removed.  - continue with supplemental O2 as needed (saturating >95% on 2L NC)  - pulmonology following, anticipate bronch 2/26/21  - IS.     Problem/Plan - 2:  ·  Problem: Squamous cell carcinoma of right lung.  Plan: Etiology of above respiratory failure. Patient has not thought about code status or whether she desires a feeding tube. Broached these topics and per pt and grandson request we will revisit them in more detail tomorrow.  - plan for palliative consult.     Problem/Plan - 3:  ·  Problem: Afib.  Plan: Presented to ED with irregular rhythm (HR 160s) and complaining of palpitations x1 day. Loaded with amiodarone on prior admission (02/07/2021-02/13/2021) and was on 200 mg PO daily at home.   - EP consulted from ED, recommendations appreciated   - s/p amiodarone load (150 mg IV bolus x1)   - no need for additional re-load at this time per verbal recs from EP 2/24/21  - continue with home amiodarone 200 mg PO daily   - continue with home Toprol XL 50 mg PO q12   - patient tolerating more PO, will attempt PO hydration.     Problem/Plan - 4:  ·  Problem: YAMILA (acute kidney injury).  Plan: Creatinine upon discharge last admission was 1.36, today 1.89. Patient appears dry on exam and hemoconcentrated on labs.   - urine studies consistent with pre renal yamila  - s/p maintenance fluids in 7Lach, will attempt PO  - encourage PO intake  - started pt on sodium bicarb 650mg bid for low bicarbonate.     Problem/Plan - 5:  ·  Problem: Malnutrition.  Plan: BMI of 20.7 and consumes poor diet at home (a few sips of a milkshake).   - regular diet with daily Ensure  - nutrition input appreciated.     Problem/Plan - 6:  Problem: Nutrition, metabolism, and development symptoms. Plan: Electrolytes: keep K>4, Mg>2   DVT ppx: none for pleurex  GI ppx: none  Diet: regular diet with Ensure   Code status: full code but recommend palliative consult   Dispo: [facility].    Nutritional Assessment:  · Nutritional Assessment	This patient has been assessed with a concern for Malnutrition and has been determined to have a diagnosis/diagnoses of Severe protein-calorie malnutrition.    This patient is being managed with:   Diet Regular-  Entered: Feb 23 2021 10:18AM

## 2021-02-26 NOTE — CONSULT NOTE ADULT - PROVIDER SPECIALTY LIST ADULT
Heme/Onc
Rehab Medicine
Critical Care
Electrophysiology
Pulmonology
Intervent Radiology
Palliative Care

## 2021-02-26 NOTE — CONSULT NOTE ADULT - CONSULT REASON
A fib RVR and moderate pleural effusion
afib with RVR
Recurrent R pleural effusion
Lung Ca
Rehab evaluation
request for port placement
Complex decision making related to goals of care

## 2021-02-26 NOTE — CONSULT NOTE ADULT - SUBJECTIVE AND OBJECTIVE BOX
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 Wellington. Patient describes that she was at radiation therapy, which she suddenly started experiencing palpitations and SOB x1. Also complains of chronic head and neck pain and increased urinary frequency. Currently denies n/v/d/c, fever, chills, or CP. Also denies any recent sick contacts or recent travel. Of note, patient was recently admitted (02/07/2021-02/13/2021) for similar presentation of rapid a fib and SOB. After having thoracentesis that admission, cytology fluid studies showed atypical cells per pulm note but were unable to differentiate further. Per family, 1L of pleural fluid was removed at that time (bandage still in place).  (23 Feb 2021 00:28)      PAST MEDICAL & SURGICAL HISTORY:  Pleural effusion    Afib    Heart murmur    Tobacco use    Squamous cell carcinoma of left lung    Adenocarcinoma of head and neck    Submandibular gland mass    DM (diabetes mellitus)    H/O: hysterectomy        MEDICATIONS  (STANDING):  aMIOdarone    Tablet 200 milliGRAM(s) Oral every 24 hours  aspirin  chewable 81 milliGRAM(s) Oral daily  buPROPion XL . 300 milliGRAM(s) Oral daily  folic acid 1 milliGRAM(s) Oral daily  metoprolol succinate ER 50 milliGRAM(s) Oral every 12 hours  mirtazapine 7.5 milliGRAM(s) Oral at bedtime  pantoprazole    Tablet 40 milliGRAM(s) Oral before breakfast  QUEtiapine 25 milliGRAM(s) Oral every 24 hours    MEDICATIONS  (PRN):  acetaminophen   Tablet .. 650 milliGRAM(s) Oral every 6 hours PRN Mild Pain (1 - 3), Moderate Pain (4 - 6)  ondansetron    Tablet 4 milliGRAM(s) Oral every 8 hours PRN Nausea and/or Vomiting        Social History:               -  Home Living Status:  lives with er 2 grandsons in an elevator accessible apartment building           -  Prior Home Care Services:   none    Baseline Functional Level Prior to Admission:             - ADL's/ IADL's:    patient states she is independent           - ambulatory status PTA:  walked with a walker    FAMILY HISTORY:      CBC Full  -  ( 25 Feb 2021 15:38 )  WBC Count : 8.91 K/uL  RBC Count : 3.25 M/uL  Hemoglobin : 9.5 g/dL  Hematocrit : 29.6 %  Platelet Count - Automated : 196 K/uL  Mean Cell Volume : 91.1 fl  Mean Cell Hemoglobin : 29.2 pg  Mean Cell Hemoglobin Concentration : 32.1 gm/dL  Auto Neutrophil # : x  Auto Lymphocyte # : x  Auto Monocyte # : x  Auto Eosinophil # : x  Auto Basophil # : x  Auto Neutrophil % : x  Auto Lymphocyte % : x  Auto Monocyte % : x  Auto Eosinophil % : x  Auto Basophil % : x      02-25    139  |  111<H>  |  26<H>  ----------------------------<  115<H>  5.3   |  19<L>  |  1.43<H>    Ca    8.2<L>      25 Feb 2021 15:38  Phos  2.5     02-25  Mg     1.9     02-25              Radiology:    < from: Xray Chest 1 View- PORTABLE-Urgent (Xray Chest 1 View- PORTABLE-Urgent .) (02.25.21 @ 13:43) >  EXAM:  XR CHEST PORTABLE URGENT 1V                          PROCEDURE DATE:  02/25/2021          INTERPRETATION:  Clinical History: Postop    Frontal examination of the chest demonstrates the heart to be within normal limits in transverse diameter.Congestion and/or infiltrates. Right effusion. Right apical pneumothorax cannot be excluded. Degenerative changes thoracic spine.    IMPRESSION: Congestion and/or infiltrates. Right effusion. Right apical pneumothorax cannot be excluded. Right chest tube noted                  Vital Signs Last 24 Hrs  T(C): 36.7 (26 Feb 2021 05:56), Max: 36.8 (25 Feb 2021 21:07)  T(F): 98.1 (26 Feb 2021 05:56), Max: 98.2 (25 Feb 2021 21:07)  HR: 59 (26 Feb 2021 07:55) (59 - 75)  BP: 99/53 (26 Feb 2021 05:56) (99/53 - 106/67)  BP(mean): --  RR: 18 (26 Feb 2021 07:55) (16 - 20)  SpO2: 94% (26 Feb 2021 07:55) (94% - 100%)    REVIEW OF SYSTEMS:    CONSTITUTIONAL: No fever, weight loss, or fatigue  EYES: No eye pain, visual disturbances, or discharge  ENMT:  No difficulty hearing, tinnitus, vertigo; No sinus or throat pain  NECK: No pain or stiffness  BREASTS: No pain, masses, or nipple discharge  RESPIRATORY:  shortness of breath  CARDIOVASCULAR:  palpitations  GASTROINTESTINAL: No abdominal or epigastric pain. No nausea, vomiting, or hematemesis; No diarrhea or constipation. No melena or hematochezia.  GENITOURINARY: No dysuria, frequency, hematuria, or incontinence  NEUROLOGICAL: No headaches, memory loss, loss of strength, numbness, or tremors  SKIN: No itching, burning, rashes, or lesions   LYMPH NODES: No enlarged glands  ENDOCRINE: No heat or cold intolerance; No hair loss  MUSCULOSKELETAL: No joint pain or swelling; No muscle, back, or extremity pain  PSYCHIATRIC: No depression, anxiety, mood swings, or difficulty sleeping  HEME/LYMPH: No easy bruising, or bleeding gums  ALLERGY AND IMMUNOLOGIC: No hives or eczema  VASCULAR: no swelling, erythema,   : no dysuria, hematuria, frequency        Physical Exam: cachectic 87 yo  woman sleeping, easily arousable, no acute complaints    Head: normocephalic, atraumatic    Eyes: PERRLA, EOMI, no nystagmus, sclera anicteric    ENT: nasal discharge, uvula midline, no oropharyngeal erythema/exudate    Neck: neck mass    Chest: no breath sounds left fields, CTA on R    Cardiovascular: irregular    Abdomen: soft, non distended, non tender to palpation in all 4 quadrants, negative rebound/guarding, normal bowel sounds    Extremities: WWP, neg cyanosis/clubbing/edema, negative calf tenderness to palpation, negative Luisa's sign    Musculoskeletal:    Skin:      :     Neurologic Exam:    Alert and oriented to person, place, date/year, speech fluent w/o dysarthria, follows commands, recent and remote memory intact, repetition intact, comprehension intact,  attention/concentration intact, fund of knowledge appropriate    Cranial Nerves:     II:                        pupils equal, round and reactive to light, visual fields intact   III/ IV/VI:              extraocular movements intact, neg nystagmus, neg ptosis  V:                       facial sensation intact, V1-3 normal  VII:                      face symmetric, no droop, normal eye closure and smile  VIII:                     hearing intact to finger rub bilaterally  IX and X:             no hoarseness, gag intact, palate/ uvula rise symmetrically  XI:                       SCM/ trapezius strength intact bilateral  XII:                      no dysarthria, tongue weakness, fasciculations    Motor Exam:    Right UE:     no focal weakness > 3+/5                     pronator drift: neg    Left UE:       no focal weakness > 3+/5                    pronator drift: neg    Right LE:     no focal weakness > 3+/5    Left LE:       no focal weakness > 3+/5               Sensation: Intact to light touch x 4 extremities                                       DTR:                        biceps/brachioradialis: equal bilaterally                  patella/ankle: equal bilaterally                neg clonus          neg Babinski                        Gait:  not tested        PM&R Impression:    1) deconditioned/ cachexia  2) no focal weakness    Plan: cleared to begin rehab    1) Physical therapy focusing on therapeutic exercises, bed mobility/transfer out of bed evaluation, progressive ambulation with assistive devices prn.    2) Anticipated Disposition Plan/Recs:  patient is requesting d/c home

## 2021-02-26 NOTE — DISCHARGE NOTE PROVIDER - NSDCFUSCHEDAPPT_GEN_ALL_CORE_FT
CRISTI PEREA ; 03/02/2021 ; [facility] PreAdmits  CRISTI PEREA ; 03/02/2021 ; [facility]  [address]

## 2021-02-26 NOTE — PROGRESS NOTE ADULT - PROBLEM SELECTOR PLAN 5
Patient remains full code. Attempted to have conversation regarding goals of care and she stated that she would want her son present for the conversation. Will reach out to patients son and see what time he will be available to come to the hospital Patient remains full code. Attempted to have conversation regarding goals of care and she stated that she would want her son present for the conversation. Spoke to son over the phone, said that he will not be able to come until tomorrow. But will be available by phone on Monday.

## 2021-02-26 NOTE — PHYSICAL THERAPY INITIAL EVALUATION ADULT - GENERAL OBSERVATIONS, REHAB EVAL
Pt received semi-supine no acute distress, cleared for PT by INGE [place], agreeable to PT Eval. Left as found +Call bell, RN aware. FIM 1

## 2021-02-27 NOTE — PROGRESS NOTE ADULT - PROBLEM SELECTOR PROBLEM 5
Malnutrition
Nutrition, metabolism, and development symptoms
Malnutrition
Malnutrition
Encounter for palliative care
Malnutrition

## 2021-02-27 NOTE — PROVIDER CONTACT NOTE (OTHER) - SITUATION
Patient reports coughing up bloody sputum, trouble breathing, oxygenating 88% on room air, in visible distress, RR called

## 2021-02-27 NOTE — PROGRESS NOTE ADULT - PROBLEM SELECTOR PLAN 1
Patient with multiple malignancies, most notably a tumor encasing R mainstem bronchus with endobronchial component that was shown to be squamous cell lung ca. She also has a LLL nodule shown to be non small cell lung ca.     She presented roughly 3 weeks ago with R sided pleural effusion which was drained and was lymphocyte predominant exudate, favoring malignant effusion.     On CT from prior admission, R mainsteam bronchus is narrowed. RLL bronchus is collapses and RUL takeoff is also narrowed.     Now s/p PleurX placement. Recommend continuing drainage with home care.     Patient also underwent bronchoscopy airway evaluation which revealed friable mucosa and external compression. While it may be ammenable to stenting, due to both patient's minimal symptoms and increased risk of complication due to mucosal friability ( likely from prior RT ), decision made to not proceed with stenting.     Recommendation:  - Please ensure follow up with Dr. Guerra as outpatient.   - Drainage of pleural catheter at least 3 times a week.
91% on RA confirmed on [facility] transfer. Initially dyspnea secondary to hypoxic respiratory failure 2/2 malignant pleural effusion. Now more likely atelectasis 2/2 procedure. Recently admitted (02/07/2021-02/13/2021) for similar presentation of rapid a fib and SOB. After having thoracentesis that admission, cytology fluid studies showed atypical cells per pulm note but were unable to differentiate further, approximately 1L of fluid removed. Now s/p R pleurex with 900cc removed.  - wean to RA as tolerated  - pulmonology attempted bronch, no further IP intervention needed  - IS
Recently admitted (02/07/2021-02/13/2021) for similar presentation of rapid a fib and SOB. After having thoracentesis that admission, cytology fluid studies showed atypical cells per pulm note but were unable to differentiate further, approximately 1L of fluid removed.  - heparin drip stopped for pleurex today.   - continue with supplemental O2 as needed (saturating >95% on 2L NC)
patient denies pain today, but had severe pain yesterday. Would still continue to recommend Gabapentin 100mg at beditme.
91% on RA confirmed on [facility] transfer. Initially dyspnea secondary to hypoxic respiratory failure 2/2 malignant pleural effusion. Now more likely atelectasis 2/2 procedure. Recently admitted (02/07/2021-02/13/2021) for similar presentation of rapid a fib and SOB. After having thoracentesis that admission, cytology fluid studies showed atypical cells per pulm note but were unable to differentiate further, approximately 1L of fluid removed. Now s/p R pleurex with 900cc removed.  - wean to RA as tolerated  - pulmonology attempted bronch, no further IP intervention needed  - IS
91% on RA confirmed on [facility] transfer. Initially dyspnea secondary to hypoxic respiratory failure 2/2 malignant pleural effusion. Now more likely atelectasis 2/2 procedure. Recently admitted (02/07/2021-02/13/2021) for similar presentation of rapid a fib and SOB. After having thoracentesis that admission, cytology fluid studies showed atypical cells per pulm note but were unable to differentiate further, approximately 1L of fluid removed. Now s/p R pleurex with 900cc removed.  - continue with supplemental O2 as needed (saturating >95% on 2L NC)  - pulmonology following, anticipate bronch 2/26/21  - IS
91% on RA confirmed on [facility] transfer. Initially dyspnea secondary to hypoxic respiratory failure 2/2 malignant pleural effusion. Now more likely atelectasis 2/2 procedure. Recently admitted (02/07/2021-02/13/2021) for similar presentation of rapid a fib and SOB. After having thoracentesis that admission, cytology fluid studies showed atypical cells per pulm note but were unable to differentiate further, approximately 1L of fluid removed. Now s/p R pleurex with 900cc removed.  - wean to RA as tolerated  - pulmonology attempted bronch, no further IP intervention needed  - IS

## 2021-02-27 NOTE — PROGRESS NOTE ADULT - PROBLEM SELECTOR PLAN 6
Patient last assessed: 2/25/2021   to manage: GOC/AD, symptoms, and support.  30 Minutes; Start: 0830am End: 0900am  , of non-face-to-face prolonged service provided that relates to (face-to-face) care that has or will occur and ongoing patient management, including one or more of the following:   - Reviewed records from other physicians or other health care professional services, including one or more of the following: other medical records and diagnostic / radiology study results
Electrolytes: keep K>4, Mg>2   DVT ppx: none for pleurex  GI ppx: none  Diet: regular diet with Ensure   Code status: full code but recommend palliative consult   Dispo: [facility]
Electrolytes: keep K>4, Mg>2   DVT ppx: Eliquis  GI ppx: none  Diet: regular diet with Ensure   Code status: full code  Dispo: [id number]
Electrolytes: keep K>4, Mg>2   DVT ppx: none for pleurex  GI ppx: none  Diet: regular diet with Ensure   Code status: full code pending GOC with palliative  Dispo: [facility]
Electrolytes: keep K>4, Mg>2   DVT ppx: none for pleurex  GI ppx: none  Diet: regular diet with Ensure   Code status: full code but recommend palliative consult   Dispo: [facility]

## 2021-02-27 NOTE — PROGRESS NOTE ADULT - PROBLEM SELECTOR PROBLEM 2
Afib
Non-small cell cancer of left lung
Pleural effusion
Squamous cell carcinoma of right lung

## 2021-02-27 NOTE — PROGRESS NOTE ADULT - ATTENDING COMMENTS
Patient seen and examined at bedside. Agree with exam, a/p as above with the following addendum/edits:    86 year old F w/ SCC lung cancer and NSCL, w/ head and neck cancer p/w acute hypoxic resp failure 2/2 malignant pleural effusion, s/p pleurX placement, stepped down to [facility]. Went for bronch today for possible stent placement but mass was friable and stent not placed. Onc plans trial of Keytruda so will place port tomorrow w/ IR. Palliative care also consulted.
Patient seen and examined at bedside. Agree with exam, a/p as above with the following addendum/edits:     Weaned off oxygen. Breathing not labored today. Wants to go home, PT recommends home PT. Plan for port placement today. If no complications s/p port, can d/c home w/ outpatient onc f/u for chemo. Will need to teach patient/family how to drain pleurX. Needs outpatient pulm f/u as well. Restart A/C after procedure.
Patient discussed with resident team and plan of care reviewed. I have personally reviewed all pertinent labs and imaging and performed an independent history and physical. Resident note personally reviewed, and I agree with above resident note with the following additions:    Agree with above. Breathing comfortably, s/p port placement. Plan immunotherapy initiation in house on Monday.
85 yo female with a hx of Lung Ca and SCC endobronchial right lung). 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 She was admitted with palpitations and shortness of breath. Admitted with  a fib RVR and with moderate-sized right pleural effusion on bedside US.  S/P thoracentesis with 7L drainage. Pulmonary on board. S/P placement of right sided pleurex for malignant effusion. He is planned to have bronchoscopy on 02/21/21 for obstructive cancer. Eliquis on hold and currently on heparin gtt. She also was started on Bicarb 650mg BID for metabolic acidosis. Transferred to the floors [facility] for medical management. I saw and examined patient independently. I reviewed the chart, lab results, radiological studies and vitals. I discussed the case, assessment and plan with the resident

## 2021-02-27 NOTE — PROGRESS NOTE ADULT - PROBLEM SELECTOR PROBLEM 4
Malnutrition
Squamous cell carcinoma of left lung
YAMILA (acute kidney injury)

## 2021-02-27 NOTE — PROGRESS NOTE ADULT - PROBLEM SELECTOR PROBLEM 6
Nutrition, metabolism, and development symptoms
Counseling and coordination of care

## 2021-02-27 NOTE — PROGRESS NOTE ADULT - SUBJECTIVE AND OBJECTIVE BOX
INTERVAL HPI/OVERNIGHT EVENTS:  Patient was seen and examined at bedside. As per nurse and patient, no o/n events, patient resting comfortably. No complaints at this time. Patient denies: fever, chills, dizziness, weakness, HA, Changes in vision, CP, palpitations, SOB, cough, N/V/D/C, dysuria, LE edema. ROS otherwise negative.    VITAL SIGNS:  T(F): 98.1 (02-27-21 @ 10:45)  HR: 61 (02-27-21 @ 10:45)  BP: 135/61 (02-27-21 @ 10:45)  RR: 18 (02-27-21 @ 10:45)  SpO2: 94% (02-27-21 @ 10:45)  Wt(kg): --        PHYSICAL EXAM:    Constitutional: thin woman resting comfortably in bed; NAD  HEENT: NC/AT, PER, anicteric sclera, no nasal discharge; uvula midline, no oropharyngeal erythema or exudates; MMM  Respiratory: CTA B/L; no W/R/R, no retractions; improving tenderness along back  Cardiac: +S1/S2; RRR; no M/R/G  Gastrointestinal: soft, NT/ND; no rebound or guarding  Extremities: WWP, no clubbing or cyanosis; no peripheral edema  Vascular: 2+ radial, DP/PT pulses B/L  Dermatologic: skin warm, dry and intact; no rashes, wounds, or scars  Neurologic: AAOx3; unchanged R lower facial droop, ambulating with nurse  Psychiatric: affect and characteristics of appearance, verbalizations, behaviors are appropriate    MEDICATIONS  (STANDING):  aMIOdarone    Tablet 200 milliGRAM(s) Oral every 24 hours  apixaban 2.5 milliGRAM(s) Oral every 12 hours  aspirin  chewable 81 milliGRAM(s) Oral daily  buPROPion XL . 300 milliGRAM(s) Oral daily  folic acid 1 milliGRAM(s) Oral daily  metoprolol succinate ER 50 milliGRAM(s) Oral every 12 hours  mirtazapine 7.5 milliGRAM(s) Oral at bedtime  pantoprazole    Tablet 40 milliGRAM(s) Oral before breakfast  QUEtiapine 25 milliGRAM(s) Oral every 24 hours    MEDICATIONS  (PRN):  acetaminophen   Tablet .. 650 milliGRAM(s) Oral every 6 hours PRN Mild Pain (1 - 3), Moderate Pain (4 - 6)  ondansetron    Tablet 4 milliGRAM(s) Oral every 8 hours PRN Nausea and/or Vomiting      Allergies    No Known Allergies    Intolerances        LABS:                        9.6    9.49  )-----------( 198      ( 27 Feb 2021 12:13 )             29.1     02-27    142  |  111<H>  |  26<H>  ----------------------------<  87  4.9   |  19<L>  |  1.44<H>    Ca    8.7      27 Feb 2021 12:13  Phos  2.5     02-25  Mg     2.1     02-27    TPro  6.1  /  Alb  2.8<L>  /  TBili  0.2  /  DBili  x   /  AST  10  /  ALT  6<L>  /  AlkPhos  41  02-26    PT/INR - ( 26 Feb 2021 08:46 )   PT: 16.5 sec;   INR: 1.40          PTT - ( 26 Feb 2021 08:46 )  PTT:26.6 sec      RADIOLOGY & ADDITIONAL TESTS:  Reviewed HOSPITAL COURSE:  86F with PMH of current-smoker (>65 pack-year), with hx of multiple lung cancers (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 Admitted for a fib RVR and with moderate-sized right pleural effusion on bedside POCUS. Given PO amio load and spontaneously converted to NSR. Pleurex placed 2/24/21 drained 900cc. Bronchoscopy was performed 2/25/21 based on CT findings concerning for peribronchial obstruction, unable to place stent due to friable tissue. Port placed with IR on 2/26/21 in anticipation of starting immunotherapy per Dr. Wellington. 2/27/21 she was ambulating and breathing RA-2L, drained ~1L from Pleurex at approximately 18:00 then 30-40 minutes after while she was eating dinner she began coughing. She had small volume hemoptysis into napkin and complained of dyspnea. Placed on 6L NC, found to be 85-88% with good waveform. Placed on NRB with rapidly declining oxygenation and mental status. Code blue called and CPR initiated. No breath sounds found on R side. Found to be in PEA for approximately 16 minutes, then ROSC obtained with highly variable HR.     INTERVAL HPI/OVERNIGHT EVENTS:  Patient was seen and examined at bedside. As per nurse and patient, no o/n events, patient resting comfortably. No complaints at this time. Patient denies: fever, chills, dizziness, weakness, HA, Changes in vision, CP, palpitations, SOB, cough, N/V/D/C, dysuria, LE edema. ROS otherwise negative.    VITAL SIGNS:  T(F): 98.1 (02-27-21 @ 10:45)  HR: 61 (02-27-21 @ 10:45)  BP: 135/61 (02-27-21 @ 10:45)  RR: 18 (02-27-21 @ 10:45)  SpO2: 94% (02-27-21 @ 10:45)  Wt(kg): --        PHYSICAL EXAM:    Constitutional: thin woman resting comfortably in bed; NAD  HEENT: NC/AT, PER, anicteric sclera, no nasal discharge; uvula midline, no oropharyngeal erythema or exudates; MMM  Respiratory: CTA B/L; no W/R/R, no retractions; improving tenderness along back  Cardiac: +S1/S2; RRR; no M/R/G  Gastrointestinal: soft, NT/ND; no rebound or guarding  Extremities: WWP, no clubbing or cyanosis; no peripheral edema  Vascular: 2+ radial, DP/PT pulses B/L  Dermatologic: skin warm, dry and intact; no rashes, wounds, or scars  Neurologic: AAOx3; unchanged R lower facial droop, ambulating with nurse  Psychiatric: affect and characteristics of appearance, verbalizations, behaviors are appropriate    MEDICATIONS  (STANDING):  aMIOdarone    Tablet 200 milliGRAM(s) Oral every 24 hours  apixaban 2.5 milliGRAM(s) Oral every 12 hours  aspirin  chewable 81 milliGRAM(s) Oral daily  buPROPion XL . 300 milliGRAM(s) Oral daily  folic acid 1 milliGRAM(s) Oral daily  metoprolol succinate ER 50 milliGRAM(s) Oral every 12 hours  mirtazapine 7.5 milliGRAM(s) Oral at bedtime  pantoprazole    Tablet 40 milliGRAM(s) Oral before breakfast  QUEtiapine 25 milliGRAM(s) Oral every 24 hours    MEDICATIONS  (PRN):  acetaminophen   Tablet .. 650 milliGRAM(s) Oral every 6 hours PRN Mild Pain (1 - 3), Moderate Pain (4 - 6)  ondansetron    Tablet 4 milliGRAM(s) Oral every 8 hours PRN Nausea and/or Vomiting      Allergies    No Known Allergies    Intolerances        LABS:                        9.6    9.49  )-----------( 198      ( 27 Feb 2021 12:13 )             29.1     02-27    142  |  111<H>  |  26<H>  ----------------------------<  87  4.9   |  19<L>  |  1.44<H>    Ca    8.7      27 Feb 2021 12:13  Phos  2.5     02-25  Mg     2.1     02-27    TPro  6.1  /  Alb  2.8<L>  /  TBili  0.2  /  DBili  x   /  AST  10  /  ALT  6<L>  /  AlkPhos  41  02-26    PT/INR - ( 26 Feb 2021 08:46 )   PT: 16.5 sec;   INR: 1.40          PTT - ( 26 Feb 2021 08:46 )  PTT:26.6 sec      RADIOLOGY & ADDITIONAL TESTS:  Reviewed

## 2021-02-27 NOTE — GOALS OF CARE CONVERSATION - ADVANCED CARE PLANNING - CONVERSATION DETAILS
Discussed acute events from earlier this evening, particularly cardiac arrest in the setting of suspected tension pneumothorax, airway bleeding and now acute critical illness and shock post-cardiac arrest requiring intensive care in the setting of existing incurable end stage lung CA and endobronchial disease.     Telephone conversation was with Jeremie Moyer but was on speaker phone with other siblings and relatives of Ms. Landin who had opportunity to ask questions and hear about the patient's current condition.    I was transparent with the extent of her underlying comorbidities, particularly lung CA, as well as now her post-cardiac arrest condition and our invasive measures to maintain life support with mechanical ventilation, vasopressors, invasive lines, and potential need for additional resuscitative efforts as we move forward.     I explained in plain terms that Ms. Landin appeared to be suffering, especially more-so now following ACLS, and that she did not regain consciousness following ROSC and that she is requiring artificial means of life support. Furthermore, our additional measures may add to her discomfort but would do what was necessary especially if they wished to see her in the hospital.    Jeremie, in discussion with the rest of Ms. Landin's family, wished that we focus only on ensuring she is not in pain or suffering any further from her disease, and that they were planning to come to the hospital tomorrow around 11-1200hrs to see her. I clarified that this would not include ACLS should her heart again stop spontaneously and he agreed. I said we would only use non-invasive, non-painful medical measures to try and sustain her including infusions, blood, and medications with the goal of permitting her extended family to see her tomorrow. We would not discontinue mechanical ventilation or existing lines/drains to that end, nor would we withdraw care pending follow-up discussion in person tomorrow.     In summary:  DNR  No additional invasive procedures, lines, tubes, drains  Pain control and alleviation of suffering is a priority  Non-invasive medical measures (such as pressors, antiarrhythmics, blood, etc.) to maintain relative hemodynamic stability are desirable to permit time for family to come see her tomorrow morning/early afternoon.   Further discussion of GOC to follow in person tomorrow

## 2021-02-27 NOTE — PROGRESS NOTE ADULT - PROBLEM SELECTOR PROBLEM 1
Acute respiratory failure with hypoxia
Acute respiratory failure with hypoxia
Squamous cell carcinoma of right lung
Acute respiratory failure with hypoxia
Shortness of breath
Neoplasm related pain
Acute respiratory failure with hypoxia

## 2021-02-27 NOTE — PROGRESS NOTE ADULT - PROBLEM SELECTOR PLAN 4
BMI of 20.7 and consumes poor diet at home (a few sips of a milkshake).   - renal diet with daily Ensure
Creatinine upon discharge last admission was 1.36, today 1.89. Patient appears dry on exam and hemoconcentrated on labs.   - urine studies consistent with pre renal rudi  - s/p maintenance fluids in 7Lach, will attempt PO  - encourage PO intake  - started pt on sodium bicarb 650mg bid for low bicarbonate. Continue now but plan to DC prior to discharge  - Cr stable without IVF
Creatinine upon discharge last admission was 1.36, today 1.89. Patient appears dry on exam and hemoconcentrated on labs.   - urine studies consistent with pre renal rudi  - s/p maintenance fluids in 7Lach, will attempt PO  - encourage PO intake  - started pt on sodium bicarb 650mg bid for low bicarbonate
Patient follows Dr. Wellington, per discussion with oncology team plan is for patient to receive Immunotherapy, possibly as inpatient. Plan is to get port and get nivolumab. Patient continues to have progression of disease. Appreciate oncology involvement.
Creatinine upon discharge last admission was 1.36, today 1.89. Patient appears dry on exam and hemoconcentrated on labs.   - urine studies consistent with pre renal rudi  - s/p maintenance fluids in 7Lach, will attempt PO  - encourage PO intake  - started pt on sodium bicarb 650mg bid for low bicarbonate
Creatinine upon discharge last admission was 1.36, today 1.89. Patient appears dry on exam and hemoconcentrated on labs.   - urine studies consistent with pre renal rudi  - s/p maintenance fluids in 7Lach, will attempt PO  - encourage PO intake  - started pt on sodium bicarb 650mg bid for low bicarbonate. Continue now but plan to DC prior to discharge

## 2021-02-27 NOTE — PROGRESS NOTE ADULT - PROBLEM SELECTOR PLAN 2
S/p Pleurx placement. Appreciate Pulmonary involvement
Presented to ED with irregular rhythm (HR 160s) and complaining of palpitations x1 day. Loaded with amiodarone on prior admission (02/07/2021-02/13/2021) and was on 200 mg PO daily at home.   - EP consulted from ED, recommendations appreciated   - s/p amiodarone load (150 mg IV bolus x1)   - no need for additional re-load at this time   - continue with home amiodarone 200 mg PO daily   - continue with home Toprol XL 50 mg PO q12   - continuing on maintenance fluids, as patient is still very dry and no tolerating adequate po
As above
Etiology of above respiratory failure. Patient has not thought about code status or whether she desires a feeding tube. Broached these topics and per pt and grandson request we will revisit them in more detail tomorrow.  - palliative following, patient currently resistant to discussing advanced directives  - patient desires immunotherapy to try to prolong life. Plan to initiate 3/1/21 inpatient
Etiology of above respiratory failure. Patient has not thought about code status or whether she desires a feeding tube. Broached these topics and per pt and grandson request we will revisit them in more detail tomorrow.  - plan for palliative consult
Etiology of above respiratory failure. Patient has not thought about code status or whether she desires a feeding tube. Broached these topics and per pt and grandson request we will revisit them in more detail tomorrow.  - plan for palliative consult  - patient desires immunotherapy to try to prolong life. Plan to initiate 3/1/21 inpatient
Etiology of above respiratory failure. Patient has not thought about code status or whether she desires a feeding tube. Broached these topics and per pt and grandson request we will revisit them in more detail tomorrow.  - plan for palliative consult

## 2021-02-27 NOTE — PROVIDER CONTACT NOTE (OTHER) - ASSESSMENT
Patient started on nonrebreather, RR called, pt still hypoxic on NRB at 88%. in visible distress and became unresponsive. pulse lost, code blue called at 18:58

## 2021-02-27 NOTE — PROGRESS NOTE ADULT - PROBLEM SELECTOR PLAN 3
PPS 70%. Skin care PRN. Assist with ADL's PRN. Appreciate PT involvement.
Creatinine upon discharge last admission was 1.36, today 1.89. Patient appears dry on exam and hemoconcentrated on labs.   - urine studies consistent with pre renal rudi  - maintenance fluids  - encourage PO intake  - started pt on sodium bicarb 650mg bid
Presented to ED with irregular rhythm (HR 160s) and complaining of palpitations x1 day. Loaded with amiodarone on prior admission (02/07/2021-02/13/2021) and was on 200 mg PO daily at home.   - EP consulted from ED, recommendations appreciated   - s/p amiodarone load (150 mg IV bolus x1)   - no need for additional re-load at this time per verbal recs from EP 2/24/21  - continue with home amiodarone 200 mg PO daily   - continue with home Toprol XL 50 mg PO q12   - restart Eliquis 2.5 dose reduced for age, Cr, weight
As above
Presented to ED with irregular rhythm (HR 160s) and complaining of palpitations x1 day. Loaded with amiodarone on prior admission (02/07/2021-02/13/2021) and was on 200 mg PO daily at home.   - EP consulted from ED, recommendations appreciated   - s/p amiodarone load (150 mg IV bolus x1)   - no need for additional re-load at this time per verbal recs from EP 2/24/21  - continue with home amiodarone 200 mg PO daily   - continue with home Toprol XL 50 mg PO q12   - patient tolerating more PO, will attempt PO hydration

## 2021-02-28 NOTE — PROGRESS NOTE ADULT - NUTRITIONAL ASSESSMENT
This patient has been assessed with a concern for Malnutrition and has been determined to have a diagnosis/diagnoses of Severe protein-calorie malnutrition.    This patient is being managed with:   Diet NPO after Midnight-     NPO Start Date: 25-Feb-2021   NPO Start Time: 23:59  Entered: Feb 25 2021  5:22PM    Diet Regular-  Supplement Feeding Modality:  Oral  Ensure Enlive Cans or Servings Per Day:  3       Frequency:  Three Times a day  Ensure Pudding Cans or Servings Per Day:  3       Frequency:  Three Times a day  Entered: Feb 25 2021  1:02PM    
This patient has been assessed with a concern for Malnutrition and has been determined to have a diagnosis/diagnoses of Severe protein-calorie malnutrition.    This patient is being managed with:   Diet Regular-  Supplement Feeding Modality:  Oral  Ensure Enlive Cans or Servings Per Day:  3       Frequency:  Three Times a day  Ensure Pudding Cans or Servings Per Day:  3       Frequency:  Three Times a day  Entered: Feb 25 2021  1:02PM    
This patient has been assessed with a concern for Malnutrition and has been determined to have a diagnosis/diagnoses of Severe protein-calorie malnutrition.    This patient is being managed with:   Diet Regular-  Entered: Feb 23 2021 10:18AM    
This patient has been assessed with a concern for Malnutrition and has been determined to have a diagnosis/diagnoses of Severe protein-calorie malnutrition.    This patient is being managed with:   Diet NPO after Midnight-     NPO Start Date: 23-Feb-2021   NPO Start Time: 23:59  Entered: Feb 23 2021 10:27AM    Diet Regular-  Entered: Feb 23 2021 10:18AM

## 2021-02-28 NOTE — PROGRESS NOTE ADULT - PROVIDER SPECIALTY LIST ADULT
Internal Medicine
MICU
Heme/Onc
Internal Medicine
Electrophysiology
Internal Medicine
Pulmonology
Palliative Care
Internal Medicine
Internal Medicine

## 2021-02-28 NOTE — DISCHARGE NOTE FOR THE EXPIRED PATIENT - HOSPITAL COURSE
Ms. 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 Admitted for Afib w/ RVR and with moderate-sized right pleural effusion on bedside POCUS. Given PO amio load and spontaneously converted to NSR. Pleurex placed 2/24/21 drained 900cc. Bronchoscopy was performed 2/25/21 based on CT findings concerning for peribronchial obstruction, unable to place stent due to friable tissue. Port placed with IR on 2/26/21 in anticipation of starting immunotherapy per Dr. Wellington. 2/27/21 she was ambulating and breathing RA-2L, drained ~1L from Pleurex at approximately 18:00 then 30-40 minutes after while she was eating dinner she began coughing. She had small volume hemoptysis into napkin and complained of dyspnea. Placed on 6L NC, found to be 85-88% with good waveform. Placed on NRB with rapidly declining oxygenation and mental status. Code blue called and CPR initiated. No breath sounds found on R side.     Patient was in a PEA arrest and patient was given 1mg of Epinephrine 1:10:000 IVP at 1900. At 1901 patient was intubated. Patient given Epi 1mg IVP 1:10:000 q 3-5 minutes per ACLS protocol for PEA for a total of 5 doses. Per primary team patient with absent breath sounds on right side with concern for acute tension pneumothroax so pleurex catheter was connected to a atrium drainage device and attached to suction. Patient achieved ROSC at 1914.     Patient with blood pressure of 200/110's and Sinus Tachycardia in the 120-130's. A emergent right femoral arterial line placed and patient started on peripheral Norepinephrine prior to transfer to MICU.     On arrival to MICU patient moving bilateral upper extremities purposefully grabbing at ETT so was given 50 mcg of Fentanyl IVP X 2 in aliquots. Patient with increasing pressor requirements, and hypoxemia. Concern for pneumothorax in the setting of chest compressions so POCUS was done with bilateral LS. Patient with blood out of ETT and suctioned with resolution of hypoxemia. Norepinephrine titrated upwards so started on Vasopressin as well. Patient given 2 mg of Midazolam and started on a Midazolam gtt. A left axillary arterial line was placed and a right subclavian triple lumen placed. Goals of Care done with family by PCCM Fellow and patient to be DNR.     Patient's pressor requirements continued to increase. At 12:20, patient's BP was unable to be measured via arterial line. Patient was found pulseless w/ Vfib on monitor, no heart sounds present. Patient was pronounced dead at 12:23 am.

## 2021-02-28 NOTE — PROGRESS NOTE ADULT - ASSESSMENT
86 year old female with diabetes, HTN, lung cancer and neck mass, TOMMY, noted to have R sided pleural effusion and atrial fibrillation on last admission.  She had thoracocentesis right lung last admission.  AFIB was self converted and she was sent home with Amio/metoprolol/eliquis.  However, she never got Amiodarone from her pharmacy (supposedly [facility] didn't have it). She presented yesterday with SOB / AFIB RVR.  Got Amio IV bolus in ER. She self converted in the ER.  Remains in NSR here with PAC and bouts of short AFIB (few mins).   - Please get a baseline EKG while in NSR today for QTc (given that she is on bupropion and sequel while on amio)   - Please do a gentle reload Amio 200 mg BID for 10 days, than 200 mg daily thereafter  - Heparin gtt in anticipation of pulm intervention. 
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 Admitted for a fib RVR and with moderate-sized right pleural effusion on bedside POCUS.  
86 year old F   PMHx DM, HTN, NSCLC metastatic, c/l R endobronchial SCC, Head and neck submandibular  p/w acute hypoxic resp failure 2/2 malignant pleural effusion, s/p pleurX placement, stepped down to RMF. s/p bronch today for possible stent placement but mass was friable and stent not placed.      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 Initially was treated with neuroendocrine-intensive combination chemotherapy regimen Carbo/Etoposide s/p 4 cycles which she tolerated very well.  - F/up PET scan 12/2019 with significant progression of solitary sites of osseous metastatic disease involving left inferior pubic ramus but waxing/waning appearance in SARAH c/w inflammatory/infectious pattern.   - Developed new soft tissue mass in R submandibular triangle without progression of distant disease on imaging. Declined systemic chemotherapy at that time, was retreated with palliative radiation to right jaw/neck.     -Developed large lump under her chin and restaging PET 8/2020 with diffuse recurrence of disease including neck LNs and pulmonary nodules. Patient was restarted on systemic therapy with Carboplatin/Etoposide since strong and sustained response to same regimen originally. S/p 4 cycles ending December w/ decrease in size of submental LN clinically.  - Recently developed multiple new nodules/masses in R side of neck. Was referred back to Rad/Onc and being  retreated with palliative radiation to new area of disease.   - PET scan 1/20 shows diffuse progression of disease including in neck as well as hilar/mediastinal and abdominal LAD. Discussed next line of therapy with patient including immunotherapy. Would consider single-agent Nivolumab 240mg q2 weeks based on Checkmate 057 results in patients with non-squamous non-small cell lung cancer who progressed on Taxol-based therapy.  - S/p pleurx s/p bronch with pulmonary  - port placement today  - Plan to treat with nivolumab on Monday (pharmacy will order drug for inpatient administration). Preferable to give first dose in the hospital given her pulmonary status and patient's high risk respiratory status and the risk of infusion reaction.       D/w Dr. Wellington and Dr. Estrada (covering for Dr. Wellington)  
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 Admitted for a fib RVR and effusion now s/p pleurex and port placement pending immunotherapy initiation.
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 Admitted for a fib RVR and effusion now s/p pleurex and port placement pending immunotherapy initiation; hospital course c/b dyspnea, hypoxia, hemoptysis leading to PEA, ROSC after 15 mins, transferred to MICU.    NEURO  sedated iso intubation    PULM  #Acute respiratory failure with hypoxia  - 91% on RA confirmed on RMF transfer. Initially dyspnea secondary to hypoxic respiratory failure 2/2 malignant pleural effusion. Now more likely atelectasis 2/2 procedure. Recently admitted (02/07/2021-02/13/2021) for similar presentation of rapid a fib and SOB. After having thoracentesis that admission, cytology fluid studies showed atypical cells per pulm note but were unable to differentiate further, approximately 1L of fluid removed. Now s/p R pleurex with 900cc removed.  - wean to RA as tolerated  - pulmonology attempted bronch, no further IP intervention needed  - 2/27 pm s/p hypoxia leading to PEA, ROSC after 15mins, now intubated; chest tube draining bloody fluid    #Squamous cell carcinoma of right lung   Etiology of above respiratory failure. Patient has not thought about code status or whether she desires a feeding tube. Broached these topics and per pt and grandson request we will revisit them in more detail tomorrow.  - palliative following, patient currently resistant to discussing advanced directives  - patient desires immunotherapy to try to prolong life. Plan to initiate 3/1/21 inpatient.     CV  #PEA  - s/p PEA w/ ROSC after 15 mins on 2/27, s/p intubation  - transferred to MICU, Lactate 9, s/p 2 amps bicarb, started on bicarb 100ml/hr      #Afib  - Presented to ED with irregular rhythm (HR 160s) and complaining of palpitations x1 day. Loaded with amiodarone on prior admission (02/07/2021-02/13/2021) and was on 200 mg PO daily at home.   - EP consulted from ED, recommendations appreciated   - s/p amiodarone load (150 mg IV bolus x1)   - no need for additional re-load at this time per verbal recs from EP 2/24/21  - continue with home amiodarone 200 mg PO daily   - continue with home Toprol XL 50 mg PO q12   - restart Eliquis 2.5 dose reduced for age, Cr, weight.     GI  #Malnutrition  - BMI of 20.7 and consumes poor diet at home (a few sips of a milkshake).   - regular diet with daily Ensure  - nutrition input appreciated.     RENAL  #YAMILA (acute kidney injury).    - Creatinine upon discharge last admission was 1.36, today 1.89. Patient appears dry on exam and hemoconcentrated on labs.   - urine studies consistent with pre renal yamila  - s/p maintenance fluids in 7Lach, will attempt PO  - encourage PO intake  - started pt on sodium bicarb 650mg bid for low bicarbonate. Continue now but plan to DC prior to discharge  - Cr stable without IVF.       Electrolytes: keep K>4, Mg>2   DVT ppx: Eliquis  GI ppx: none  Diet: regular diet with Ensure   Code status: DNR  Dispo: RMF.  
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 Admitted for a fib RVR and with moderate-sized right pleural effusion on bedside POCUS.  
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 She follows with Dr. Wellington. She is currently admitted for Afib with RVR and is now rate controlled. Pulmonary is consulted due to recurrent R sided pleural effusion.
86 year old woman with neoplasm related pain, debility, Effusion, quamous cell carcinoma of left lung and encounter for palliative care. 
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 Admitted for a fib RVR and effusion now s/p pleurex and port placement pending immunotherapy initiation.
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 Admitted for a fib RVR and with moderate-sized right pleural effusion on bedside POCUS.

## 2021-02-28 NOTE — PROGRESS NOTE ADULT - SUBJECTIVE AND OBJECTIVE BOX
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 Admitted for a fib RVR and with moderate-sized right pleural effusion on bedside POCUS. Given PO amio load and spontaneously converted to NSR. Pleurex placed 2/24/21 drained 900cc. Bronchoscopy was performed 2/25/21 based on CT findings concerning for peribronchial obstruction, unable to place stent due to friable tissue. Port placed with IR on 2/26/21 in anticipation of starting immunotherapy per Dr. Wellington. 2/27/21 she was ambulating and breathing RA-2L, drained ~1L from Pleurex at approximately 18:00 then 30-40 minutes after while she was eating dinner she began coughing. She had small volume hemoptysis into napkin and complained of dyspnea. Placed on 6L NC, found to be 85-88% with good waveform. Placed on NRB with rapidly declining oxygenation and mental status. Code blue called and CPR initiated. No breath sounds found on R side. Found to be in PEA for approximately 16 minutes, then ROSC obtained with highly variable HR.       Subjective/ROS: Unable to obtain 2/2 sedation.    Family History, Social History, Past Medical History, and Home Medications from admission H&P were reviewed and are unchanged unless noted below.       VITALS  Vital Signs Last 24 Hrs  T(C): 37.4 (27 Feb 2021 21:00), Max: 37.4 (27 Feb 2021 21:00)  T(F): 99.4 (27 Feb 2021 21:00), Max: 99.4 (27 Feb 2021 21:00)  HR: 73 (28 Feb 2021 00:20) (61 - 101)  BP: 94/60 (27 Feb 2021 22:00) (94/60 - 135/61)  BP(mean): 72 (27 Feb 2021 22:00) (72 - 84)  RR: 21 (28 Feb 2021 00:20) (16 - 41)  SpO2: 100% (28 Feb 2021 00:20) (56% - 100%)    CAPILLARY BLOOD GLUCOSE      POCT Blood Glucose.: 132 mg/dL (27 Feb 2021 18:54)      PHYSICAL EXAM  General: A&Ox0; sedated, intubated.  Head: NC/AT; MMM; PERRL; EOMI;  Respiratory: CTA B/L; no wheezes/crackles; intubated  Cardiovascular: Regular rhythm/rate; S1/S2   Gastrointestinal: Soft; NTND; normoactive BS  Extremities: WWP; no edema/cyanosis  Neurological:  sedated.    MEDICATIONS  (STANDING):  aMIOdarone    Tablet 200 milliGRAM(s) Oral every 24 hours  folic acid 1 milliGRAM(s) Oral daily  metoprolol succinate ER 50 milliGRAM(s) Oral every 12 hours  midazolam Infusion 0.02 mG/kG/Hr (1.06 mL/Hr) IV Continuous <Continuous>  norepinephrine Infusion 0.05 MICROgram(s)/kG/Min (4.98 mL/Hr) IV Continuous <Continuous>  pantoprazole    Tablet 40 milliGRAM(s) Oral before breakfast  sodium bicarbonate  Infusion 0.282 mEq/kG/Hr (100 mL/Hr) IV Continuous <Continuous>  vasopressin Infusion 0.02 Unit(s)/Min (1.2 mL/Hr) IV Continuous <Continuous>    MEDICATIONS  (PRN):  acetaminophen   Tablet .. 650 milliGRAM(s) Oral every 6 hours PRN Mild Pain (1 - 3), Moderate Pain (4 - 6)  ondansetron    Tablet 4 milliGRAM(s) Oral every 8 hours PRN Nausea and/or Vomiting      No Known Allergies      LABS                        6.7    6.61  )-----------( 137      ( 28 Feb 2021 00:09 )             20.8     02-28    x   |  x   |  x   ----------------------------<  87  x    |  x   |  x     Ca    8.0<L>      27 Feb 2021 20:00  Phos  5.9     02-27  Mg     2.4     02-28    TPro  4.5<L>  /  Alb  2.1<L>  /  TBili  0.3  /  DBili  x   /  AST  229<H>  /  ALT  81<H>  /  AlkPhos  64  02-27    PT/INR - ( 27 Feb 2021 19:30 )   PT: 29.9 sec;   INR: 2.61          PTT - ( 27 Feb 2021 19:30 )  PTT:32.0 sec    CARDIAC MARKERS ( 27 Feb 2021 20:00 )  x     / <0.01 ng/mL / 92 U/L / x     / 1.3 ng/mL  CARDIAC MARKERS ( 27 Feb 2021 19:30 )  x     / <0.01 ng/mL / 60 U/L / x     / <1.0 ng/mL        IMAGING/EKG/ETC  EKG:  Xray:  CT:  MRI:

## 2021-03-02 ENCOUNTER — APPOINTMENT (OUTPATIENT)
Dept: INTERVENTIONAL RADIOLOGY/VASCULAR | Facility: HOSPITAL | Age: 86
End: 2021-03-02

## 2021-03-10 LAB
CULTURE RESULTS: SIGNIFICANT CHANGE UP
SPECIMEN SOURCE: SIGNIFICANT CHANGE UP

## 2021-03-11 DIAGNOSIS — E87.2 ACIDOSIS: ICD-10-CM

## 2021-03-11 DIAGNOSIS — J44.9 CHRONIC OBSTRUCTIVE PULMONARY DISEASE, UNSPECIFIED: ICD-10-CM

## 2021-03-11 DIAGNOSIS — C34.91 MALIGNANT NEOPLASM OF UNSPECIFIED PART OF RIGHT BRONCHUS OR LUNG: ICD-10-CM

## 2021-03-11 DIAGNOSIS — Z79.01 LONG TERM (CURRENT) USE OF ANTICOAGULANTS: ICD-10-CM

## 2021-03-11 DIAGNOSIS — J90 PLEURAL EFFUSION, NOT ELSEWHERE CLASSIFIED: ICD-10-CM

## 2021-03-11 DIAGNOSIS — E78.5 HYPERLIPIDEMIA, UNSPECIFIED: ICD-10-CM

## 2021-03-11 DIAGNOSIS — E43 UNSPECIFIED SEVERE PROTEIN-CALORIE MALNUTRITION: ICD-10-CM

## 2021-03-11 DIAGNOSIS — J98.11 ATELECTASIS: ICD-10-CM

## 2021-03-11 DIAGNOSIS — Z85.89 PERSONAL HISTORY OF MALIGNANT NEOPLASM OF OTHER ORGANS AND SYSTEMS: ICD-10-CM

## 2021-03-11 DIAGNOSIS — Z92.3 PERSONAL HISTORY OF IRRADIATION: ICD-10-CM

## 2021-03-11 DIAGNOSIS — F17.210 NICOTINE DEPENDENCE, CIGARETTES, UNCOMPLICATED: ICD-10-CM

## 2021-03-11 DIAGNOSIS — I48.91 UNSPECIFIED ATRIAL FIBRILLATION: ICD-10-CM

## 2021-03-11 DIAGNOSIS — J95.89 OTHER POSTPROCEDURAL COMPLICATIONS AND DISORDERS OF RESPIRATORY SYSTEM, NOT ELSEWHERE CLASSIFIED: ICD-10-CM

## 2021-03-11 DIAGNOSIS — I46.8 CARDIAC ARREST DUE TO OTHER UNDERLYING CONDITION: ICD-10-CM

## 2021-03-11 DIAGNOSIS — G47.33 OBSTRUCTIVE SLEEP APNEA (ADULT) (PEDIATRIC): ICD-10-CM

## 2021-03-11 DIAGNOSIS — D63.0 ANEMIA IN NEOPLASTIC DISEASE: ICD-10-CM

## 2021-03-11 DIAGNOSIS — R53.81 OTHER MALAISE: ICD-10-CM

## 2021-03-11 DIAGNOSIS — E11.9 TYPE 2 DIABETES MELLITUS WITHOUT COMPLICATIONS: ICD-10-CM

## 2021-03-11 DIAGNOSIS — R63.8 OTHER SYMPTOMS AND SIGNS CONCERNING FOOD AND FLUID INTAKE: ICD-10-CM

## 2021-03-11 DIAGNOSIS — Z79.82 LONG TERM (CURRENT) USE OF ASPIRIN: ICD-10-CM

## 2021-03-11 DIAGNOSIS — C34.92 MALIGNANT NEOPLASM OF UNSPECIFIED PART OF LEFT BRONCHUS OR LUNG: ICD-10-CM

## 2021-03-11 DIAGNOSIS — C79.51 SECONDARY MALIGNANT NEOPLASM OF BONE: ICD-10-CM
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
## 2021-03-11 DIAGNOSIS — I10 ESSENTIAL (PRIMARY) HYPERTENSION: ICD-10-CM

## 2021-03-11 DIAGNOSIS — E87.3 ALKALOSIS: ICD-10-CM

## 2021-03-11 DIAGNOSIS — N17.9 ACUTE KIDNEY FAILURE, UNSPECIFIED: ICD-10-CM

## 2021-03-11 DIAGNOSIS — Z92.21 PERSONAL HISTORY OF ANTINEOPLASTIC CHEMOTHERAPY: ICD-10-CM

## 2021-03-11 DIAGNOSIS — I49.01 VENTRICULAR FIBRILLATION: ICD-10-CM

## 2021-03-11 DIAGNOSIS — C77.9 SECONDARY AND UNSPECIFIED MALIGNANT NEOPLASM OF LYMPH NODE, UNSPECIFIED: ICD-10-CM

## 2021-03-11 DIAGNOSIS — J96.01 ACUTE RESPIRATORY FAILURE WITH HYPOXIA: ICD-10-CM

## 2021-03-11 DIAGNOSIS — G89.3 NEOPLASM RELATED PAIN (ACUTE) (CHRONIC): ICD-10-CM

## 2021-03-11 DIAGNOSIS — C79.89 SECONDARY MALIGNANT NEOPLASM OF OTHER SPECIFIED SITES: ICD-10-CM

## 2021-03-11 DIAGNOSIS — J91.0 MALIGNANT PLEURAL EFFUSION: ICD-10-CM
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
## 2021-03-11 DIAGNOSIS — Z66 DO NOT RESUSCITATE: ICD-10-CM

## 2021-03-16 PROCEDURE — 71045 X-RAY EXAM CHEST 1 VIEW: CPT

## 2021-03-16 PROCEDURE — 84484 ASSAY OF TROPONIN QUANT: CPT

## 2021-03-16 PROCEDURE — U0003: CPT

## 2021-03-16 PROCEDURE — 36561 INSERT TUNNELED CV CATH: CPT

## 2021-03-16 PROCEDURE — 36415 COLL VENOUS BLD VENIPUNCTURE: CPT

## 2021-03-16 PROCEDURE — 84132 ASSAY OF SERUM POTASSIUM: CPT

## 2021-03-16 PROCEDURE — 82330 ASSAY OF CALCIUM: CPT

## 2021-03-16 PROCEDURE — 99291 CRITICAL CARE FIRST HOUR: CPT | Mod: 25

## 2021-03-16 PROCEDURE — 80048 BASIC METABOLIC PNL TOTAL CA: CPT

## 2021-03-16 PROCEDURE — 82550 ASSAY OF CK (CPK): CPT

## 2021-03-16 PROCEDURE — 86850 RBC ANTIBODY SCREEN: CPT

## 2021-03-16 PROCEDURE — 80053 COMPREHEN METABOLIC PANEL: CPT

## 2021-03-16 PROCEDURE — 83605 ASSAY OF LACTIC ACID: CPT

## 2021-03-16 PROCEDURE — 77001 FLUOROGUIDE FOR VEIN DEVICE: CPT

## 2021-03-16 PROCEDURE — 97161 PT EVAL LOW COMPLEX 20 MIN: CPT

## 2021-03-16 PROCEDURE — 82803 BLOOD GASES ANY COMBINATION: CPT

## 2021-03-16 PROCEDURE — 86901 BLOOD TYPING SEROLOGIC RH(D): CPT

## 2021-03-16 PROCEDURE — 85027 COMPLETE CBC AUTOMATED: CPT

## 2021-03-16 PROCEDURE — 82962 GLUCOSE BLOOD TEST: CPT

## 2021-03-16 PROCEDURE — U0005: CPT

## 2021-03-16 PROCEDURE — C1713: CPT

## 2021-03-16 PROCEDURE — 85730 THROMBOPLASTIN TIME PARTIAL: CPT

## 2021-03-16 PROCEDURE — 99153 MOD SED SAME PHYS/QHP EA: CPT

## 2021-03-16 PROCEDURE — C1788: CPT

## 2021-03-16 PROCEDURE — 85610 PROTHROMBIN TIME: CPT

## 2021-03-16 PROCEDURE — 76937 US GUIDE VASCULAR ACCESS: CPT

## 2021-03-16 PROCEDURE — 96374 THER/PROPH/DIAG INJ IV PUSH: CPT

## 2021-03-16 PROCEDURE — 86900 BLOOD TYPING SEROLOGIC ABO: CPT

## 2021-03-16 PROCEDURE — 87040 BLOOD CULTURE FOR BACTERIA: CPT

## 2021-03-16 PROCEDURE — 83735 ASSAY OF MAGNESIUM: CPT

## 2021-03-16 PROCEDURE — 84100 ASSAY OF PHOSPHORUS: CPT

## 2021-03-16 PROCEDURE — P9045: CPT

## 2021-03-16 PROCEDURE — 82553 CREATINE MB FRACTION: CPT

## 2021-03-16 PROCEDURE — 85025 COMPLETE CBC W/AUTO DIFF WBC: CPT

## 2021-03-16 PROCEDURE — C1769: CPT

## 2021-03-16 PROCEDURE — 99152 MOD SED SAME PHYS/QHP 5/>YRS: CPT

## 2021-03-16 PROCEDURE — 84295 ASSAY OF SERUM SODIUM: CPT

## 2021-03-31 LAB
CULTURE RESULTS: SIGNIFICANT CHANGE UP
SPECIMEN SOURCE: SIGNIFICANT CHANGE UP

## 2021-05-20 NOTE — CHART NOTE - NSCHARTNOTEFT_GEN_A_CORE
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 Wellington. Patient describes that she was at radiation therapy, which she suddenly started experiencing palpitations and SOB x1. Tonight patient had about 2 L drained from pleurex catheter. Was doing well but noted to have some dyspnea and began to have some hemoptysis so patient was placed on a NRB was placed. Patient became increasingly hypoxemic and with worsening mental status so a RRT was called. Patient was found unresponsive and pulseless so high quality CPR was started at 1858 and a Code Blue was called.     Patient was in a PEA arrest and patient was given 1mg of Epinephrine 1:10:000 IVP at 1900. At 1901 patient was intubated. Patient given Epi 1mg IVP 1:10:000 q 3-5 minutes per ACLS protocol for PEA for a total of 5 doses. Per primary team patient with absent breath sounds on right side with concern for acute tension pneumothroax so pleurex catheter was connected to a atrium drainage device and attached to suction. Patient achieved ROSC at 1914.     Patient with blood pressure of 200/110's and Sinus Tachycardia in the 120-130's. A emergent right femoral arterial line placed and patient started on peripheral Norepinephrine prior to transfer to MICU.     On arrival to MICU patient moving bilateral upper extremities purposefully grabbing at ETT so was given 50 mcg of Fentanyl IVP X 2 in aliquots. Patient with increasing pressor requirements, and hypoxemia. Concern for pneumothorax in the setting of chest compressions so POCUS was done with bilateral LS. Patient with blood out of ETT and suctioned with resolution of hypoxemia. Norepinephrine titrated upwards so started on Vasopressin as well. Patient given 2 mg of Midazolam and started on a Midazolam gtt. A left axillary arterial line was placed and a right subclavian triple lumen placed. Goals of Care done with family by PCCM Fellow and patient to be DNR.     Review of systems: Unable to obtain, patient intubated and sedated.    Physical Exam:     General: Intubated, and sedated elderly cachetic, chronically ill appearing female. Purposefully moving prior to analgosedation.  HEENT: +PERRL. Conjunctiva pink and moist. Mucus membranes moist. Trachea midline. +JVD. Masses on neck.  Pulm: Good bilateral chest rise. Right wall chest tube pleurex, left wall chest wall port. Diminished breath sounds on right side.  Cardiac: NSR on telemetry. +PAC. S1, S2.  GI/: Abdomen SNT To palpation.   MSK: +Pulses X 4.   Neuro: Intubated and Sedated.    POCUS: LS Bilaterally.     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 Wellington. Patient describes that she was at radiation therapy, which she suddenly started experiencing palpitations and SOB x1 now status post hypoxemic respiratory failure leading to PEA Arrest now with ROSC and transfer to MICU.    -Patient with purposeful mentation status post PEA arrest.  -Follow up CBC, CMP, Troponin, Lactate, ABG and Trend.  -Follow up CXR.  -Status post right SC TLC and Left Axillary A-Line.  -AC/VC   -Strict I and O's.   -Levo and Vaso for MAPS >65  -Fentanyl and Versed for Analgosedation.  -DNR per family.
Admitting Diagnosis:   Patient is a 86y old  Female who presents with a chief complaint of shortness of breath, palpitations x 1 day (2021 08:56)    PAST MEDICAL & SURGICAL HISTORY:  Pleural effusion    Afib    Heart murmur    Tobacco use    Squamous cell carcinoma of left lung    Adenocarcinoma of head and neck    Submandibular gland mass    DM (diabetes mellitus)    H/O: hysterectomy    Current Nutrition Order:  NPO today for procedure  Previously on regular diet +Ensure Enlive and Ensure Pudding both 9x daily?    PO Intake: Good (%) [   ]  Fair (50-75%) [   ] Poor (<25%) [   ]-NPO    GI Issues:   Pt denies N/V/D/C at present    Pain:  On pain regimen    Skin Integrity:  No edema noted    Labs:       141  |  112<H>  |  28<H>  ----------------------------<  102<H>  5.2   |  21<L>  |  1.55<H>    Ca    8.4      2021 08:46  Phos  2.5       Mg     2.0         TPro  6.1  /  Alb  2.8<L>  /  TBili  0.2  /  DBili  x   /  AST  10  /  ALT  6<L>  /  AlkPhos  41      CAPILLARY BLOOD GLUCOSE    POCT Blood Glucose.: 109 mg/dL (2021 07:16)    Medications:  MEDICATIONS  (STANDING):  aMIOdarone    Tablet 200 milliGRAM(s) Oral every 24 hours  aspirin  chewable 81 milliGRAM(s) Oral daily  buPROPion XL . 300 milliGRAM(s) Oral daily  folic acid 1 milliGRAM(s) Oral daily  metoprolol succinate ER 50 milliGRAM(s) Oral every 12 hours  mirtazapine 7.5 milliGRAM(s) Oral at bedtime  pantoprazole    Tablet 40 milliGRAM(s) Oral before breakfast  QUEtiapine 25 milliGRAM(s) Oral every 24 hours    MEDICATIONS  (PRN):  acetaminophen   Tablet .. 650 milliGRAM(s) Oral every 6 hours PRN Mild Pain (1 - 3), Moderate Pain (4 - 6)  ondansetron    Tablet 4 milliGRAM(s) Oral every 8 hours PRN Nausea and/or Vomiting    Anthropometric Measurements:    Weight Assessment:  · Height for BMI (FEET)	5 Feet  · Height for BMI (INCHES)	3 Inch(s)  · Height for BMI (CENTIMETERS)	160.02 Centimeter(s)  · Weight for BMI (lbs)	117.1 lb  · Weight for BMI (kg)	53.1 kg  · Body Mass Index	20.7   · Change in Usual Weight Prior to Admission	yes   · Was weight change intentional or unintentional?	unintentional   · Reason for Weight Change	decreased po intake  other (specify)   · Usual Weight Prior to Admission (lbs)	156 Pound(s)  · Usual Weight Prior to Admission (kg)	70.7 kg  · Date of Usual Weight (prior to admission)	2020  · Weight Change	Loss   · Pounds Lost/Gained	39 Pound(s)  · % Change	25 %  · Time Frame	3 months  · Ideal Body Weight (lbs)	115   · Ideal Body Weight (kg)	52.1     Weight Change: No new wts    Estimated energy needs:   ActualBW used for calculations as pt between % of IBW (102% IBW). Needs estimated for age and adjusted for malignancy, malnutrition. Fluids per team   25-30kcal/k-1593kcal  1.2-1.4g/k-74gprotein    Subjective:   86F with PMH of current-smoker (>65 pack-year), with hx of multiple lung cancers (AJCC IIIB NSCL -LLL lesion and SCC endobronchial right lung), and ?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 Admitted for a fib RVR and with moderate-sized right pleural effusion on bedside POCUS.      Pt seen in room, resting in bed. Pt NPO today for possible bronch, previously on regular diet +9 ONS daily. Suspect order error, pt reports not drinking all of ONS. When able to resume diet, recommend decrease ONS to TID instead. Continue to encourage PO intake and trend wts. Pt denies N/V/D/C at present. Please see recs below. Will continue to follow per RD protocol.     Previous Nutrition Diagnosis: Malnutrition RT inadequate energy intake 2/2 pain, lack of appetite AEB muscle and fat loss, poor PO intake    Active [ x  ]  Resolved [   ]    If resolved, new PES:     Goal: Meet >75% EER, show no further s/s PCM    Recommendations:  1. When able, resume regular diet +Ensure Enlive TID (each 350kcal/20gpro)  2. Encourage PO intake  3. Monitor lytes and replete  4. Trend biweekly wts  5. RD to remain available prn    Education: Encouraged PO intake     Risk Level: High [ x  ] Moderate [   ] Low [   ]
Brief Postoperative Note    Patient: Almas Adrian  YOB: 1939  MRN: [id number]    Date of Procedure: 5/20/2021     Pre-Op Diagnosis: Right ankle infection/abscess    Post-Op Diagnosis: Same as preoperative diagnosis.       Procedure(s):  INCISION AND DRAINAGE RIGHT ANKLE    Surgeon(s):  Fay Emery MD    Surgical Assistant: None    Anesthesia: General     Estimated Blood Loss (mL): Minimal    Complications: None    Specimens:   ID Type Source Tests Collected by Time Destination   1 : Right ankle Tissue Ankle ANAEROBIC/AEROBIC/GRAM STAIN Fay Emery MD 5/20/2021 1643 Microbiology        Implants: * No implants in log *    Drains: * No LDAs found *    Findings: as above    Electronically Signed by Jacquelyn Everett MD on 5/20/2021 at 5:09 PM
PALLIATIVE MEDICINE COORDINATION OF CARE NOTE FOR CRISTI PEREA  [  ] ED Trigger   [  ] MICU Trigger     [ x ] Consult    [  ] AI Comanagement    Patient last assessed: _2/12____  to manage: GOC/AD, Symptoms, and Support was recommended:__Ongoing support____    ___30___ Minutes; Start: _1200pm____  End: __1230pm __, of non-face-to-face prolonged service provided that relates to (face-to-face) care that has or will occur and ongoing patient management, including one or more of the following:   - Reviewed records from other physicians or other health care professional services, including one or more of the following: other medical records and diagnostic / radiology study results     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 Wellington. Patient describes that she was at radiation therapy, which she suddenly started experiencing palpitations and SOB x1. Also complains of chronic head and neck pain and increased urinary frequency. Currently denies n/v/d/c, fever, chills, or CP. Also denies any recent sick contacts or recent travel. Of note, patient was recently admitted (02/07/2021-02/13/2021) for similar presentation of rapid a fib and SOB. After having thoracentesis that admission, cytology fluid studies showed atypical cells per pulm note but were unable to differentiate further. Per family, 1L of pleural fluid was removed at that time (bandage still in place).  (23 Feb 2021 00:28)      - Other: iStop reviewed.    Rx found on iStop review. Ref #: [id number]  01/27/2021	01/27/2021	acetaminophen-cod #3 tablet	60	10	Brad Drake MD	Medicare	[id number]  06/10/2020	06/10/2020	oxycodone-acetaminophen 5-325 mg tablet	60	15	Mine Wellington	Medicare	[id number]  05/04/2020	05/05/2020	oxycodone-acetaminophen 5-325 mg tablet	60	15	AmMine reynolds	Medicare	[id number]  04/15/2020	04/16/2020	oxycodone-acetaminophen 5-325 mg tablet	30	7	[facility]ine	Medicare	[id number]    - Other: Medication reviewed.    The patient HAS NOT used PRN's in the last 24h.    MEDICATIONS  (STANDING):  aMIOdarone    Tablet 200 milliGRAM(s) Oral every 24 hours  aspirin  chewable 81 milliGRAM(s) Oral daily  buPROPion XL . 300 milliGRAM(s) Oral daily  folic acid 1 milliGRAM(s) Oral daily  metoprolol succinate ER 50 milliGRAM(s) Oral every 12 hours  mirtazapine 7.5 milliGRAM(s) Oral at bedtime  pantoprazole    Tablet 40 milliGRAM(s) Oral before breakfast  QUEtiapine 25 milliGRAM(s) Oral every 24 hours    MEDICATIONS  (PRN):  acetaminophen   Tablet .. 650 milliGRAM(s) Oral every 6 hours PRN Mild Pain (1 - 3), Moderate Pain (4 - 6)  ondansetron    Tablet 4 milliGRAM(s) Oral every 8 hours PRN Nausea and/or Vomiting    - Other: Advanced directives     Full Code     No documented MOLST form found on [facility]     No documented HCP form found on [facility]     Legal surrogates: Rajat Brothers [phone]     Other surrogates: Homa Arthur [phone]     No Living will / POA / Advance directives found on [facility] / [facility].     GOC notes on [facility] 2/9/2021    - Other: Coordination/Plan of care     2 admissions in 1 year     Current admission LOS: 3 days     LACE score: 13 ADVANCE ILLNESS PATIENT.     Patient previously seen by palliative medicine consult service.        Discussed with primary team.  Consult request for: " 86 with lung cancer, neck cancer, pursuing palliative radiation with [facility]. Per 2/24 transfer to [facility] discussion patient still full code but amenable to further GOC discussions."    Full consult to follow within 24h.

## 2021-08-05 NOTE — PHYSICAL THERAPY INITIAL EVALUATION ADULT - ADDITIONAL COMMENTS
no
Pt lives in an apartment building, no NERI, denies use of DME prior to admission ; owns RW at home

## 2022-01-18 NOTE — ED ADULT NURSE NOTE - NSFALLRSKASSESSDT_ED_ALL_ED
Speech Language Pathology Treatment    Patient Name:  Fareed Richard Jr.   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 To rinse valve: 1. Swish valve in pure soap and warm water, 2. Rinse valve thoroughly in warm running water, 3. Allow valve to air dry thoroughly before placing it in storage container. 4. DO NOT use hot water, peroxide, bleach, vinegar, alcohol, brushes, or cotton swabs to clean valve.     Recommendations:                 General Recommendations:  Dysphagia therapy and Speech/language therapy  Diet recommendations:  NPO, Liquid Diet Level: NPO   Aspiration Precautions: Continue alternate means of nutrition and Strict aspiration precautions   General Precautions: Standard, NPO,fall  Communication strategies:  One way speaking valve, provide increased time to answer, go to room if call light pushed and pen/paper    Subjective     Patient awake and alert  Communicated with nurse prior to session     Pain/Comfort:  · Pain Rating 1: 0/10  · Pain Rating Post-Intervention 1: 0/10    Respiratory Status: Trach collar    Objective:     Has the patient been evaluated by SLP for swallowing?   Yes  Keep patient NPO? Yes     Patient sitting up in chair with Shiley 6.0 cuffless trach and trach collar in place. He reported limited use of speaking valve 2' to copious secretions. During this session, he tolerated speaking valve for ~5 minutes without overt difficulty and improved ability to elevate secretions to oral cavity for suctioning. His speech intelligibility was ~50% in known contexts though this was improved from prior sessions. During PO trials of ice chips x2, patient completed effortful swallows x6, but with significant difficulty and wet throat clearing following each trial. Speaking  valve removed prior to exit. SLP educated patient regarding recs. Patient left in chair with call light within reach.     Assessment:     Fareed Richard Jr. is a 74 y.o. male with an SLP diagnosis of Dysphagia, S/P Trach and One Way Speaking Valve Training.  ST to continue to follow.     Goals:   Multidisciplinary Problems     SLP Goals        Problem: SLP Goal    Goal Priority Disciplines Outcome   SLP Goal     SLP Ongoing, Progressing   Description: Speech Language Pathology Goals  Goals expected to be met by 1/26    1. Pt will tolerate PMSV for 5 minutes with >92% spO2 to increase phonation, respiration and swallowing aspects  2. Pt/family will don/doff PMSV x1 during session with min cues  3. Pt will vocalize with PMSV in place to increase verbal expression.                          Plan:     · Patient to be seen:  4 x/week   · Plan of Care expires:  02/01/22  · Plan of Care reviewed with:  patient   · SLP Follow-Up:  Yes       Discharge recommendations:  rehabilitation facility   Barriers to Discharge:  None    Time Tracking:     SLP Treatment Date:   01/18/22  Speech Start Time:  1135  Speech Stop Time:  1152     Speech Total Time (min):  17 min    Billable Minutes: Speech Therapy Individual 8 and Treatment Swallowing Dysfunction 9    Jason Yusuf CCC-SLP  Speech-Language Pathology  Pager: [phone]   01/18/2022   22-Feb-2021 16:28

## 2022-06-20 NOTE — ED ADULT NURSE NOTE - CADM POA PRESS ULCER
Daily Note     Today's date: 2022  Patient name: Ochoa Uribe  : 1940  MRN: [id number]  Referring provider: Neville Cheadle, CR*  Dx:   Encounter Diagnosis     ICD-10-CM    1  Cervical spondylosis without myelopathy  M47 812                   Subjective: Mario reports his neck feels ok      Objective: See treatment diary below      Assessment: Tolerated treatment well  Patient exhibited good technique with therapeutic exercises      Plan: Progress treatment as tolerated         Precautions: pacemaker      Manuals 5/25 5/27 6/1 6/3 6/8 6/10 6/20      UPA C1-4  JE Gr II JE Gr II JE Gr II JE Gr I-II JE Gr I-II JE Gr I-II      Cervical distraction  JE JE JE JE JE JE      theragun R UT  JE 1750 JE 1750 B JE 1750 B JE 1750 JE 1750                    Neuro Re-Ed                                                                                                        Ther Ex             Cervical rotation supine  10ea 10ea 10ea 10ea 10ea 10ea      scap squeeze  10                                                                                         Ther Activity                                       Gait Training                                       Modalities              10' 10' 10' 10'  10' 10' No

## 2022-09-12 NOTE — PROGRESS NOTE ADULT - SUBJECTIVE AND OBJECTIVE BOX
Hematology Oncology Progress Note    Interval Hx- Seen and examined this morning. Patient states that her breathing is now more comfortable.    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 Wellington. Patient describes that she was at radiation therapy, which she suddenly started experiencing palpitations and SOB x1. Also complains of chronic head and neck pain and increased urinary frequency. Currently denies n/v/d/c, fever, chills, or CP. Also denies any recent sick contacts or recent travel. Of note, patient was recently admitted (02/07/2021-02/13/2021) for similar presentation of rapid a fib and SOB. After having thoracentesis that admission, cytology fluid studies showed atypical cells per pulm note but were unable to differentiate further. Per family, 1L of pleural fluid was removed at that time (bandage still in place).  (23 Feb 2021 00:28)    Allergies  No Known Allergies          MEDICATIONS  (STANDING):  aMIOdarone    Tablet 200 milliGRAM(s) Oral every 24 hours  aspirin  chewable 81 milliGRAM(s) Oral daily  buPROPion XL . 300 milliGRAM(s) Oral daily  folic acid 1 milliGRAM(s) Oral daily  metoprolol succinate ER 50 milliGRAM(s) Oral every 12 hours  mirtazapine 7.5 milliGRAM(s) Oral at bedtime  pantoprazole    Tablet 40 milliGRAM(s) Oral before breakfast  QUEtiapine 25 milliGRAM(s) Oral every 24 hours    MEDICATIONS  (PRN):  acetaminophen   Tablet .. 650 milliGRAM(s) Oral every 6 hours PRN Mild Pain (1 - 3), Moderate Pain (4 - 6)  ondansetron    Tablet 4 milliGRAM(s) Oral every 8 hours PRN Nausea and/or Vomiting      PAST MEDICAL & SURGICAL HISTORY:  Pleural effusion    Afib    Heart murmur    Tobacco use    Squamous cell carcinoma of left lung    Adenocarcinoma of head and neck    Submandibular gland mass    DM (diabetes mellitus)    H/O: hysterectomy        FAMILY HISTORY:      SOCIAL HISTORY: No EtOH, no tobacco    REVIEW OF SYSTEMS:        Vital Signs Last 24 Hrs  T(C): 36.8 (26 Feb 2021 10:35), Max: 36.8 (25 Feb 2021 21:07)  T(F): 98.3 (26 Feb 2021 10:35), Max: 98.3 (26 Feb 2021 10:35)  HR: 71 (26 Feb 2021 11:55) (59 - 75)  BP: 130/54 (26 Feb 2021 11:55) (95/59 - 130/54)  BP(mean): --  RR: 20 (26 Feb 2021 10:35) (16 - 20)  SpO2: 96% (26 Feb 2021 11:55) (94% - 100%)    Constitutional: NAD  HEENT: NC/AT, MMM  Neck: R neck erythema and multiple subcutaneous masses  Respiratory: distant lung sounds  Cardiac: +S1/S2; irregularly irregular  Gastrointestinal: soft, NT/ND; no rebound or guarding  Extremities:  no peripheral edema  Neurologic: AAOx3; CNII-XII grossly intact;                           9.7    8.25  )-----------( 198      ( 26 Feb 2021 08:46 )             29.2                      9.5    8.91  )-----------( 196      ( 25 Feb 2021 15:38 )             29.6       02-25    139  |  111<H>  |  26<H>  ----------------------------<  115<H>  5.3   |  19<L>  |  1.43<H>    Ca    8.2<L>      25 Feb 2021 15:38  Phos  2.5     02-25  Mg     1.9     02-25        Phosphorus Level, Serum: 2.5 mg/dL (02-25 @ 15:38)  Magnesium, Serum: 1.9 mg/dL (02-25 @ 15:38)       Pt will receive ap payment card free for one month

## 2022-12-07 NOTE — H&P ADULT - NSHPLABSRESULTS_GEN_ALL_CORE
Attempted report at this time. .  LABS:                         12.7   13.24 )-----------( 281      ( 22 Feb 2021 17:11 )             38.6     02-22    136  |  106  |  31<H>  ----------------------------<  94  5.2   |  19<L>  |  1.82<H>    Ca    8.6      22 Feb 2021 23:25      PT/INR - ( 22 Feb 2021 17:11 )   PT: 19.6 sec;   INR: 1.67          PTT - ( 22 Feb 2021 23:25 )  PTT:24.1 sec    CARDIAC MARKERS ( 22 Feb 2021 17:11 )  x     / <0.01 ng/mL / x     / x     / x            Lactate, Blood: 1.9 mmol/L (02-22 @ 17:57)  Lactate, Blood: 4.6 mmol/L (02-22 @ 17:11)      RADIOLOGY, EKG & ADDITIONAL TESTS: Reviewed.

## 2023-10-23 NOTE — ED PROVIDER NOTE - CONSTITUTIONAL, MLM
History  Chief Complaint   Patient presents with    Rash     Pt presents to the ED with severe generalized rash onset 3 weeks ago. Pt reports started initially in her groin and abd. Pt reports severe itching and pain. Has been to dermatology and is currently on rx of prednisone 40mg daily and 100mg doxycycline bid. Guzman Lujan is a 41 y.o. female who has a past medical history of Anxiety, Essential tremor, IBS, PTSD, Raynaud's disease presented in ED visit for generalized skin rash since 3 to 4 weeks ago. Patient reported the rashes initially started from her bilateral groin area and severe itching and pain. She did recall a possible exposure to her mother-in-law who was also having similar rashes. Patient endorsed the rashes was spreading despite she tried multiple medications I.e. prednisone 40mg, Abx (erythromycin, Keflex, doxycycline), antihistamines and several topical creams I.e. abx ointment, steroids, anti-fungal, "horses topical anesthetic- Linament Gel", etc. 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 Patient did report possible bug bites in her apartment. However, she had multiple pesticides/ extinguisher with no finding of scabies. She also tried to live in a hotel in the past week with no improvement either. Denied any new bedding stuffs, detergent, new food. Denied any objective fever or chills. Prior to Admission Medications   Prescriptions Last Dose Informant Patient Reported? Taking? Calcium Carb-Cholecalciferol 1000-800 MG-UNIT TABS More than a month Self Yes No   Sig: daily    Clary 0.35 MG tablet 10/22/2023 Self Yes Yes   Mucinex D Max Strength 120-1200 MG TB12 More than a month Self Yes No   Sig: TAKE 1 TABLET BY MOUTH 2 TIMES A DAY FOR 10 DAYS.    Multiple Vitamins-Minerals (MULTIVITAMIN ADULT PO) 10/22/2023 Self Yes Yes   Sig: Take by mouth daily   Omega-3 Fatty Acids (FISH OIL) 1,000 mg 10/22/2023 Self Yes Yes   Sig: Take 1,000 mg by mouth daily    Turmeric 500 MG TABS 10/22/2023 Self Yes Yes   Sig: Take by mouth   acyclovir (ZOVIRAX) 400 MG tablet Past Week  Yes Yes   Sig: Take 400 mg by mouth daily   albuterol (PROVENTIL HFA,VENTOLIN HFA) 90 mcg/act inhaler More than a month Self Yes No   amphetamine-dextroamphetamine (ADDERALL, 10MG,) 10 mg tablet Past Week  No Yes   Si/2 tablet in the afternoon   buPROPion (WELLBUTRIN SR) 100 mg 12 hr tablet Past Week  No Yes   Sig: TAKE 1 TABLET (100 MG TOTAL) BY MOUTH IN THE MORNING   cetirizine (ZyrTEC) 10 mg tablet 10/21/2023 Self Yes Yes   Sig: Take 10 mg by mouth daily at bedtime   clobetasol (TEMOVATE) 0.05 % ointment Past Week  No Yes   Sig: Apply topically 2 (two) times a day   clonazePAM (KlonoPIN) 0.5 mg tablet 10/22/2023  No Yes   Sig: Take 1 tablet (0.5 mg total) by mouth 2 (two) times a day as needed for anxiety   doxycycline hyclate (VIBRA-TABS) 100 mg tablet 10/22/2023 Self Yes Yes   Sig: Take 100 mg by mouth 2 (two) times a day   fluticasone (FLONASE) 50 mcg/act nasal spray More than a month Self Yes No   Si sprays into each nostril daily   lisdexamfetamine (Vyvanse) 40 MG capsule Past Week  No Yes   Sig: Take 1 capsule (40 mg total) by mouth every morning Max Daily Amount: 40 mg   mupirocin (BACTROBAN) 2 % ointment Past Week  No Yes   Sig: Apply topically 3 (three) times a day   propranolol (INDERAL) 20 mg tablet 10/21/2023 Self No Yes   Sig: Take 1 tablet (20 mg total) by mouth in the morning      Facility-Administered Medications: None       Past Medical History:   Diagnosis Date    Anxiety     Attention deficit hyperactivity disorder (ADHD), predominantly inattentive type 2015    Cystic fibrosis carrier 2021    Formatting of this note might be different from the original. Partner declined Testing    Essential tremor 2023    IBS (irritable bowel syndrome)     Panic attacks 10/1/2021    PTSD (post-traumatic stress disorder) 10/17/2022    Raynaud's disease     Recurrent major depressive disorder, in full remission ([address] ) 11/5/2018    Restless leg syndrome 11/5/2018    UTI (urinary tract infection)     UTI group B Strep x 2.5 months ago       Past Surgical History:   Procedure Laterality Date    AUGMENTATION BREAST  2013    AUGMENTATION MAMMAPLASTY      2 capsular contractures on left corrected, most recent 2015. SHOULDER SURGERY Right     Two surgeries to repair recurrent dislocation    WISDOM TOOTH EXTRACTION         Family History   Problem Relation Age of Onset    Cirrhosis Father     No Known Problems Mother     Diabetes Father     No Known Problems Sister     No Known Problems Daughter     No Known Problems Maternal Grandmother     No Known Problems Paternal Grandmother     No Known Problems Maternal Aunt     Breast cancer Neg Hx      I have reviewed and agree with the history as documented. E-Cigarette/Vaping    E-Cigarette Use Never User      E-Cigarette/Vaping Substances    Nicotine No     THC No     CBD No     Flavoring No     Other No     Unknown No      Social History     Tobacco Use    Smoking status: Never    Smokeless tobacco: Never   Vaping Use    Vaping Use: Never used   Substance Use Topics    Alcohol use: Not Currently    Drug use: Not Currently        Review of Systems   Constitutional:  Negative for chills, fatigue and fever. HENT:  Negative for ear pain and sore throat. Eyes:  Negative for visual disturbance. Respiratory:  Negative for cough and shortness of breath. Cardiovascular:  Negative for chest pain, palpitations and leg swelling. Gastrointestinal:  Negative for abdominal pain, constipation, diarrhea, nausea and vomiting. Genitourinary:  Negative for dysuria and hematuria. Musculoskeletal:  Negative for arthralgias and back pain. Skin:  Positive for rash. Negative for color change.    Neurological:  Negative for seizures, syncope, facial asymmetry, light-headedness and headaches. Psychiatric/Behavioral:  Negative for agitation, confusion, decreased concentration and hallucinations. The patient is nervous/anxious. All other systems reviewed and are negative. Physical Exam  ED Triage Vitals   Temperature Pulse Respirations Blood Pressure SpO2   10/22/23 1731 10/22/23 1731 10/22/23 1731 10/22/23 1731 10/22/23 1731   98.2 °F (36.8 °C) 78 18 113/74 99 %      Temp Source Heart Rate Source Patient Position - Orthostatic VS BP Location FiO2 (%)   10/22/23 1731 10/22/23 1731 10/22/23 2100 10/22/23 1731 --   Oral Monitor Lying Right arm       Pain Score       --                    Orthostatic Vital Signs  Vitals:    10/22/23 1731 10/22/23 2100 10/22/23 2314   BP: 113/74 132/76 122/61   Pulse: 78 64 83   Patient Position - Orthostatic VS:  Lying Sitting       Physical Exam  Vitals and nursing note reviewed. Constitutional:       General: She is not in acute distress. Appearance: She is well-developed. She is not ill-appearing. HENT:      Head: Normocephalic and atraumatic. Comments: No visible rashes in scalp area     Right Ear: External ear normal.      Left Ear: External ear normal.      Nose: No congestion or rhinorrhea. Eyes:      General: No scleral icterus. Right eye: No discharge. Left eye: No discharge. Extraocular Movements: Extraocular movements intact. Conjunctiva/sclera: Conjunctivae normal.   Cardiovascular:      Rate and Rhythm: Normal rate and regular rhythm. Heart sounds: No murmur heard. Pulmonary:      Effort: Pulmonary effort is normal. No respiratory distress. Breath sounds: Normal breath sounds. No wheezing, rhonchi or rales. Chest:      Chest wall: No tenderness. Abdominal:      General: Bowel sounds are normal.      Palpations: Abdomen is soft. Tenderness: There is no abdominal tenderness. There is no right CVA tenderness, left CVA tenderness, guarding or rebound.    Musculoskeletal:         General: No swelling or tenderness. Cervical back: Neck supple. No rigidity or tenderness. Lymphadenopathy:      Cervical: No cervical adenopathy. Skin:     General: Skin is warm and dry. Capillary Refill: Capillary refill takes less than 2 seconds. Findings: Rash (Diffused papular like skin rashes all over the body. + facial rashes. + poosible Pest bites. Scaling of b/l groin and axillary area) present. Comments: Left groin enlarged lymph node x1   Neurological:      Mental Status: She is alert and oriented to person, place, and time. Psychiatric:         Behavior: Behavior normal.         Thought Content: Thought content normal.         Judgment: Judgment normal.         ED Medications  Medications   hydrOXYzine HCL (ATARAX) tablet 25 mg (has no administration in time range)   ketorolac (TORADOL) injection 15 mg (15 mg Intravenous Given 10/22/23 2027)   hydrOXYzine HCL (ATARAX) tablet 25 mg (25 mg Oral Given 10/22/23 2105)       Diagnostic Studies  Results Reviewed       Procedure Component Value Units Date/Time    Sedimentation rate, automated [[id number]]  (Normal) Collected: 10/22/23 2026    Lab Status: Final result Specimen: Blood from Arm, Right Updated: 10/22/23 2103     Sed Rate <1 mm/hour     Narrative:      Verified by repeat.     Comprehensive metabolic panel [[id number]]  (Abnormal) Collected: 10/22/23 2028    Lab Status: Final result Specimen: Blood from Arm, Right Updated: 10/22/23 2102     Sodium 138 mmol/L      Potassium 3.3 mmol/L      Chloride 104 mmol/L      CO2 29 mmol/L      ANION GAP 5 mmol/L      BUN 16 mg/dL      Creatinine 0.74 mg/dL      Glucose 127 mg/dL      Calcium 8.3 mg/dL      AST 23 U/L      ALT 21 U/L      Alkaline Phosphatase 41 U/L      Total Protein 5.7 g/dL      Albumin 3.9 g/dL      Total Bilirubin 0.33 mg/dL      eGFR 100 ml/min/1.73sq m     Narrative:      [place] guidelines for Chronic Kidney Disease (CKD):     Stage 1 with normal or normal... high GFR (GFR > 90 mL/min/1.73 square meters)    Stage 2 Mild CKD (GFR = 60-89 mL/min/1.73 square meters)    Stage 3A Moderate CKD (GFR = 45-59 mL/min/1.73 square meters)    Stage 3B Moderate CKD (GFR = 30-44 mL/min/1.73 square meters)    Stage 4 Severe CKD (GFR = 15-29 mL/min/1.73 square meters)    Stage 5 End Stage CKD (GFR <15 mL/min/1.73 square meters)  Note: GFR calculation is accurate only with a steady state creatinine    C-reactive protein [[id number]]  (Normal) Collected: 10/22/23 2028    Lab Status: Final result Specimen: Blood from Arm, Right Updated: 10/22/23 2102     CRP <1.0 mg/L     CBC and differential [[id number]]  (Abnormal) Collected: 10/22/23 2026    Lab Status: Final result Specimen: Blood from Arm, Right Updated: 10/22/23 2043     WBC 10.75 Thousand/uL      RBC 3.94 Million/uL      Hemoglobin 13.2 g/dL      Hematocrit 40.0 %       fL      MCH 33.5 pg      MCHC 33.0 g/dL      RDW 12.3 %      MPV 10.6 fL      Platelets 423 Thousands/uL      nRBC 0 /100 WBCs      Neutrophils Relative 65 %      Immat GRANS % 0 %      Lymphocytes Relative 14 %      Monocytes Relative 5 %      Eosinophils Relative 15 %      Basophils Relative 1 %      Neutrophils Absolute 6.93 Thousands/µL      Immature Grans Absolute 0.03 Thousand/uL      Lymphocytes Absolute 1.55 Thousands/µL      Monocytes Absolute 0.56 Thousand/µL      Eosinophils Absolute 1.62 Thousand/µL      Basophils Absolute 0.06 Thousands/µL     MYRON Screen w/ Reflex to Titer/Pattern [[id number]] Collected: 10/22/23 2026    Lab Status:  In process Specimen: Blood from Arm, Right Updated: 10/22/23 2036                   No orders to display               ED Course  ED Course as of 10/22/23 2342   Sun Oct 22, 2023   1930 Blood Pressure: 113/74   1930 Temperature: 98.2 °F (36.8 °C)   1930 Pulse: 78   1931 Respirations: 18   1931 SpO2: 99 %   2045 WBC(!): 10.75   2045 Absolute Eosinophils(!): 1.62  elevated   2121 C-REACTIVE PROTEIN: <1.0  NEG   2121 Sed Rate: <1  NEG   2122 Potassium(!): 3.3                                       Medical Decision Making  41 y.o. female who presented in ED visit for generalized skin rash since 3 to 4 weeks ago. Patient reported the macular/papular rashes initially started from her bilateral groin area and severe itching and pain, then spreading to all of her body. On phyical, VSS, + pest bites, + scaling. Reported no improvement on prednisone, azithromycin, doxycycline, multiple ointments i.e. antihistamine, antibiotics, antifungal, steroid.  + Pest bites: Multiple pesticides/ extinguisher. dermatology neg for scabies. Possible bed bug but reported no improvement after changing living environment for 1 week. Concerning for AID, allergic reactions, scabies, bedbug, Angioedema, etc.  Case discussed dermatology on-call Dr. Cb Taylor- Based on patient's rashes manifestations (photos) and detailed history. More concerning for derm-hypersensitivity. patient to be discharged with clear instructions and Kenalog 0.1% ointment. Patient to be scheduled with [facility] on 10/24 for outpatient evaluation. Amount and/or Complexity of Data Reviewed  External Data Reviewed: labs, radiology, ECG and notes. Labs: ordered. Decision-making details documented in ED Course. Risk  Prescription drug management. Disposition  Final diagnoses:   Rash     Time reflects when diagnosis was documented in both MDM as applicable and the Disposition within this note       Time User Action Codes Description Comment    10/22/2023 11:26 PM Antony Fischer [facility] Rash           ED Disposition       ED Disposition   Discharge    Condition   Stable    Date/Time   Sun Oct 22, 2023 11:26 PM    [address] discharge to home/self care.                    Follow-up Information       Follow up With Specialties Details Why Contact Info Additional Information    [facility] [place] ([place] [facility] Schedule an appointment as soon as possible for a visit   [address] [place] [id number] [address] [facility] [place] ([place]), [address], [address], [place], [place], [address] [phone]            Patient's Medications   Discharge Prescriptions    PERMETHRIN (ELIMITE) 5 % CREAM    Apply to affected area once       Start Date: 10/22/2023End Date: --       Order Dose: --       Quantity: 60 g    Refills: 0    TRIAMCINOLONE (KENALOG) 0.1 % OINTMENT    Apply topically 2 (two) times a day Please apply to your affected groin area. Start Date: 10/22/2023End Date: --       Order Dose: --       Quantity: 80 g    Refills: 0     No discharge procedures on file. PDMP Review         Value Time User    PDMP Reviewed  Yes 9/19/2023  9:45 AM Jennifer Sanchez MD             ED Provider  Attending physically available and evaluated Cecilia Chávez. I managed the patient along with the ED Attending.     Electronically Signed by           Halima Wu MD  10/22/23 8585 Well appearing, awake, alert, oriented to person, place, time/situation and in no apparent distress.

## 2024-11-26 NOTE — DISCHARGE NOTE PROVIDER - PROVIDER TOKENS
Patient Quality Outreach    Patient is due for the following:   Breast Cancer Screening - Mammogram  Cervical Cancer Screening - PAP Needed  Depression  -  PHQ-9 needed  Physical Preventive Adult Physical    Action(s) Taken:   Schedule a Adult Preventative    Type of outreach:    Sent letter.    Questions for provider review:    None           Jessica Jay RN         PROVIDER:[TOKEN:[[id number]]]

## 2025-06-30 NOTE — ED PROVIDER NOTE - PMH
Agitated/Angry/Loud Adenocarcinoma of head and neck    DM (diabetes mellitus)    Heart murmur    Squamous cell carcinoma of left lung    Submandibular gland mass    Tobacco use

## 2025-07-22 NOTE — DIETITIAN INITIAL EVALUATION ADULT. - IDEAL BODY WEIGHT (LBS)
Patient walked in for lower abdominal/groin pain with dysuria, frequency and burning sensation starting around 4pm yesterday and worsen when he woke up at 1am today. 115